# Patient Record
Sex: MALE | Race: WHITE | HISPANIC OR LATINO
[De-identification: names, ages, dates, MRNs, and addresses within clinical notes are randomized per-mention and may not be internally consistent; named-entity substitution may affect disease eponyms.]

---

## 2021-02-22 ENCOUNTER — APPOINTMENT (OUTPATIENT)
Dept: CT IMAGING | Facility: CLINIC | Age: 61
End: 2021-02-22
Payer: SELF-PAY

## 2021-02-22 ENCOUNTER — OUTPATIENT (OUTPATIENT)
Dept: OUTPATIENT SERVICES | Facility: HOSPITAL | Age: 61
LOS: 1 days | End: 2021-02-22

## 2021-02-22 PROBLEM — Z00.00 ENCOUNTER FOR PREVENTIVE HEALTH EXAMINATION: Status: ACTIVE | Noted: 2021-02-22

## 2021-02-22 PROCEDURE — 75571 CT HRT W/O DYE W/CA TEST: CPT | Mod: 26

## 2021-02-26 VITALS
SYSTOLIC BLOOD PRESSURE: 176 MMHG | RESPIRATION RATE: 16 BRPM | TEMPERATURE: 97 F | OXYGEN SATURATION: 100 % | HEIGHT: 66 IN | HEART RATE: 76 BPM | WEIGHT: 199.96 LBS | DIASTOLIC BLOOD PRESSURE: 92 MMHG

## 2021-02-26 RX ORDER — CHLORHEXIDINE GLUCONATE 213 G/1000ML
1 SOLUTION TOPICAL ONCE
Refills: 0 | Status: DISCONTINUED | OUTPATIENT
Start: 2021-03-02 | End: 2021-03-03

## 2021-02-26 NOTE — H&P ADULT - ASSESSMENT
60 year old M from [place], remote smoking history, FHx CAD (Father-MI-60s), PMHx HTN, HLD, prediabetes who was presents for cardiac catheterization secondary to abnormal testing.    ASA III Mallampati III  Precath consented  Started IVF NS @ 75cc/h   Loaded with Plavix 600mg POx1 and ASA 325mg POx1    Patient is a candidate for moderate sedation    Risks & benefits of procedure and alternative therapy have been explained to the patient including but not limited to: allergic reaction, bleeding w/possible need for blood transfusion, infection, renal and vascular compromise, limb damage, arrhythmia, stroke, vessel dissection/perforation, Myocardial infarction, emergent CABG. Informed consent obtained and in chart.  60 year old M from [place], remote smoking history, FHx CAD (Father-MI-60s), PMHx HTN, HLD, prediabetes who was presents for cardiac catheterization secondary to abnormal testing.    ASA III Mallampati III  Precath consented  Started IVF NS @ 75cc/h   Loaded with Plavix 600mg POx1 and took daily dose of ASA 81mg POx1 this AM    Patient is a candidate for moderate sedation    Risks & benefits of procedure and alternative therapy have been explained to the patient including but not limited to: allergic reaction, bleeding w/possible need for blood transfusion, infection, renal and vascular compromise, limb damage, arrhythmia, stroke, vessel dissection/perforation, Myocardial infarction, emergent CABG. Informed consent obtained and in chart.

## 2021-02-26 NOTE — H&P ADULT - HISTORY OF PRESENT ILLNESS
Cardiologist: Dr. Boone     Escort:     Pharmacy:     Covid:     [facility]      60 year old M former smoker former with FHx CAD (Father-MI-60s) and PMHx HTN, Hyperlipidemia, Pre-Diabetes, who presented to cardiologist after an abnormal Coronary Calcium Score on 2/22/2021.  Calcium Score 1721 which is at 99th percentile for age, gender, race/ethnicity, LM: 0, LAD: 774, LCx: 548, and RCA: 214.  Patient noted to have new LBBB on EKG from 2018.  [facility]  VERIFY MEDS    Cardiologist: Dr. Boone   Escort:   Pharmacy:   Covid negative 3/1/21    60 year old M from [place], remote smoking history, FHx CAD (Father-MI-60s), PMHx HTN, HLD, prediabetes, who was referred to Cardiologist Dr. Boone after an abnormal coronary calcium score. Pt states he feels well and climbs stairs & walks up to a few miles daily without significant exertional symptoms.  Denies CP, SOB, dizziness. Calcium Score 2/22/21: calcium score 1721 (99th percentile for age, gender, race/ethnicity), LM: 0, LAD: 774, LCx: 548, and RCA: 214.  Patient noted to have new LBBB on EKG from 2018.  In light of pt's risk factors, elevated calcium score, pt referred for cardiac cath with possible intervention to r/o underlying CAD.  VERIFY MEDS    Cardiologist: Dr. Boone   Escort:  Wife Lianet  Pharmacy: [facility] or [facility] [address] [address]  Covid negative 3/1/21    60 year old M from [place], remote smoking history, FHx CAD (Father-MI-60s), PMHx HTN, HLD, prediabetes who was referred to Cardiologist Dr. Boone after an abnormal coronary calcium score. Pt states he feels well and climbs stairs & walks up to a few miles daily without any significant exertional symptoms. Feels in USOH.  Denies CP, SOB, dizziness, diaphoresis, fatigue, LE edema, orthopnea, PND, syncope, N/V, abdominal pain. Calcium Score 2/22/21: calcium score 1721 (99th percentile for age, gender, race/ethnicity), LM: 0, LAD: 774, LCx: 548, and RCA: 214.  Patient noted to have new LBBB on EKG from 2018.  In light of pt's risk factors, severely elevated calcium score, new LBBB, FHx MI, pt referred for cardiac cath with possible intervention to r/o underlying CAD.  [facility] - will bring bottles    Cardiologist: Dr. Boone   Escort:  Wife Lianet  Pharmacy: [facility] or [facility] [address] [address]  Covid negative 3/1/21 (in HIE)    60 year old M from [place], remote smoking history, FHx CAD (Father-MI-60s), PMHx HTN, HLD, prediabetes who was referred to Cardiologist Dr. Boone after an abnormal coronary calcium score. Pt states he feels well and climbs stairs & walks up to a few miles daily without any significant exertional symptoms. Feels in USOH.  Denies CP, SOB, dizziness, diaphoresis, fatigue, LE edema, orthopnea, PND, syncope, N/V, abdominal pain. Calcium Score 2/22/21: calcium score 1721 (99th percentile for age, gender, race/ethnicity), LM: 0, LAD: 774, LCx: 548, and RCA: 214.  Patient noted to have new LBBB on EKG from 2018.  In light of pt's risk factors, severely elevated calcium score, new LBBB, FHx MI, pt referred for cardiac cath with possible intervention to r/o underlying CAD.  Cardiologist: Dr. Boone   Escort:  Wife Lianet  Pharmacy: [facility] or [facility] [address] [address]  Covid negative 3/1/21 (in HIE)    60 year old M from [place], remote smoking history, FHx CAD (Father-MI-60s), PMHx HTN, HLD, prediabetes who was referred to Cardiologist Dr. Boone after an abnormal coronary calcium score. Pt states he feels well and climbs stairs & walks up to a few miles daily without any significant exertional symptoms. Feels in USOH.  Denies CP, SOB, dizziness, diaphoresis, fatigue, LE edema, orthopnea, PND, syncope, N/V, abdominal pain. Calcium Score 2/22/21: calcium score 1721 (99th percentile for age, gender, race/ethnicity), LM: 0, LAD: 774, LCx: 548, and RCA: 214.  Patient noted to have new LBBB on EKG from 2018.  In light of pt's risk factors, severely elevated calcium score, new LBBB, FHx MI, pt referred for cardiac cath with possible intervention to r/o underlying CAD.

## 2021-02-26 NOTE — H&P ADULT - NSHPSOCIALHISTORY_GEN_ALL_CORE
TOB: Quit 25 years ago     ETOH: Quit 20 years ago TOB: Quit 25 years ago   ETOH: Quit 20 years ago TOB: Quit 20 years ago, was smoking 10 cig/day over 10 years  ETOH: Quit 10 years ago  Denies illicit drug use

## 2021-02-26 NOTE — H&P ADULT - NSICDXPASTMEDICALHX_GEN_ALL_CORE_FT
PAST MEDICAL HISTORY:  Erectile dysfunction     Hyperlipidemia     Hypertension     Obese     Prediabetes     Umbilical hernia

## 2021-03-01 ENCOUNTER — LABORATORY RESULT (OUTPATIENT)
Age: 61
End: 2021-03-01

## 2021-03-02 ENCOUNTER — INPATIENT (INPATIENT)
Facility: HOSPITAL | Age: 61
LOS: 0 days | Discharge: HOME | DRG: 246 | End: 2021-03-03
Attending: INTERNAL MEDICINE | Admitting: INTERNAL MEDICINE
Payer: COMMERCIAL

## 2021-03-02 DIAGNOSIS — Z98.890 OTHER SPECIFIED POSTPROCEDURAL STATES: Chronic | ICD-10-CM

## 2021-03-02 LAB
A1C WITH ESTIMATED AVERAGE GLUCOSE RESULT: 6.3 % — HIGH (ref 4–5.6)
ALBUMIN SERPL ELPH-MCNC: 4.5 G/DL — SIGNIFICANT CHANGE UP (ref 3.3–5)
ALP SERPL-CCNC: 57 U/L — SIGNIFICANT CHANGE UP (ref 40–120)
ALT FLD-CCNC: 23 U/L — SIGNIFICANT CHANGE UP (ref 10–45)
ANION GAP SERPL CALC-SCNC: 11 MMOL/L — SIGNIFICANT CHANGE UP (ref 5–17)
APTT BLD: 33.6 SEC — SIGNIFICANT CHANGE UP (ref 27.5–35.5)
AST SERPL-CCNC: 23 U/L — SIGNIFICANT CHANGE UP (ref 10–40)
BASOPHILS # BLD AUTO: 0 K/UL — SIGNIFICANT CHANGE UP (ref 0–0.2)
BASOPHILS NFR BLD AUTO: 0 % — SIGNIFICANT CHANGE UP (ref 0–2)
BILIRUB SERPL-MCNC: 0.3 MG/DL — SIGNIFICANT CHANGE UP (ref 0.2–1.2)
BUN SERPL-MCNC: 14 MG/DL — SIGNIFICANT CHANGE UP (ref 7–23)
BURR CELLS BLD QL SMEAR: PRESENT — SIGNIFICANT CHANGE UP
CALCIUM SERPL-MCNC: 9.3 MG/DL — SIGNIFICANT CHANGE UP (ref 8.4–10.5)
CHLORIDE SERPL-SCNC: 106 MMOL/L — SIGNIFICANT CHANGE UP (ref 96–108)
CHOLEST SERPL-MCNC: 148 MG/DL — SIGNIFICANT CHANGE UP
CK MB CFR SERPL CALC: 8 NG/ML — HIGH (ref 0–6.7)
CK SERPL-CCNC: 280 U/L — HIGH (ref 30–200)
CK SERPL-CCNC: 286 U/L — HIGH (ref 30–200)
CO2 SERPL-SCNC: 26 MMOL/L — SIGNIFICANT CHANGE UP (ref 22–31)
CREAT SERPL-MCNC: 0.94 MG/DL — SIGNIFICANT CHANGE UP (ref 0.5–1.3)
EOSINOPHIL # BLD AUTO: 0.14 K/UL — SIGNIFICANT CHANGE UP (ref 0–0.5)
EOSINOPHIL NFR BLD AUTO: 1.8 % — SIGNIFICANT CHANGE UP (ref 0–6)
ESTIMATED AVERAGE GLUCOSE: 134 MG/DL — HIGH (ref 68–114)
GIANT PLATELETS BLD QL SMEAR: PRESENT — SIGNIFICANT CHANGE UP
GLUCOSE SERPL-MCNC: 107 MG/DL — HIGH (ref 70–99)
HCT VFR BLD CALC: 45 % — SIGNIFICANT CHANGE UP (ref 39–50)
HDLC SERPL-MCNC: 41 MG/DL — SIGNIFICANT CHANGE UP
HGB BLD-MCNC: 14.6 G/DL — SIGNIFICANT CHANGE UP (ref 13–17)
INR BLD: 1.02 — SIGNIFICANT CHANGE UP (ref 0.88–1.16)
LIPID PNL WITH DIRECT LDL SERPL: 93 MG/DL — SIGNIFICANT CHANGE UP
LYMPHOCYTES # BLD AUTO: 2.03 K/UL — SIGNIFICANT CHANGE UP (ref 1–3.3)
LYMPHOCYTES # BLD AUTO: 26.4 % — SIGNIFICANT CHANGE UP (ref 13–44)
MANUAL SMEAR VERIFICATION: SIGNIFICANT CHANGE UP
MCHC RBC-ENTMCNC: 28.6 PG — SIGNIFICANT CHANGE UP (ref 27–34)
MCHC RBC-ENTMCNC: 32.4 GM/DL — SIGNIFICANT CHANGE UP (ref 32–36)
MCV RBC AUTO: 88.1 FL — SIGNIFICANT CHANGE UP (ref 80–100)
MONOCYTES # BLD AUTO: 0.7 K/UL — SIGNIFICANT CHANGE UP (ref 0–0.9)
MONOCYTES NFR BLD AUTO: 9.1 % — SIGNIFICANT CHANGE UP (ref 2–14)
NEUTROPHILS # BLD AUTO: 4.83 K/UL — SIGNIFICANT CHANGE UP (ref 1.8–7.4)
NEUTROPHILS NFR BLD AUTO: 62.7 % — SIGNIFICANT CHANGE UP (ref 43–77)
NON HDL CHOLESTEROL: 107 MG/DL — SIGNIFICANT CHANGE UP
OVALOCYTES BLD QL SMEAR: SLIGHT — SIGNIFICANT CHANGE UP
PLAT MORPH BLD: ABNORMAL
PLATELET # BLD AUTO: 339 K/UL — SIGNIFICANT CHANGE UP (ref 150–400)
POIKILOCYTOSIS BLD QL AUTO: SLIGHT — SIGNIFICANT CHANGE UP
POTASSIUM SERPL-MCNC: 3.9 MMOL/L — SIGNIFICANT CHANGE UP (ref 3.5–5.3)
POTASSIUM SERPL-SCNC: 3.9 MMOL/L — SIGNIFICANT CHANGE UP (ref 3.5–5.3)
PROT SERPL-MCNC: 7.5 G/DL — SIGNIFICANT CHANGE UP (ref 6–8.3)
PROTHROM AB SERPL-ACNC: 12.2 SEC — SIGNIFICANT CHANGE UP (ref 10.6–13.6)
RBC # BLD: 5.11 M/UL — SIGNIFICANT CHANGE UP (ref 4.2–5.8)
RBC # FLD: 21.9 % — HIGH (ref 10.3–14.5)
RBC BLD AUTO: ABNORMAL
SODIUM SERPL-SCNC: 143 MMOL/L — SIGNIFICANT CHANGE UP (ref 135–145)
TRIGL SERPL-MCNC: 71 MG/DL — SIGNIFICANT CHANGE UP
WBC # BLD: 7.7 K/UL — SIGNIFICANT CHANGE UP (ref 3.8–10.5)
WBC # FLD AUTO: 7.7 K/UL — SIGNIFICANT CHANGE UP (ref 3.8–10.5)

## 2021-03-02 PROCEDURE — 93458 L HRT ARTERY/VENTRICLE ANGIO: CPT | Mod: 26,59

## 2021-03-02 PROCEDURE — 92978 ENDOLUMINL IVUS OCT C 1ST: CPT | Mod: 26,LC

## 2021-03-02 PROCEDURE — 93010 ELECTROCARDIOGRAM REPORT: CPT

## 2021-03-02 PROCEDURE — 92928 PRQ TCAT PLMT NTRAC ST 1 LES: CPT | Mod: LD

## 2021-03-02 RX ORDER — AMLODIPINE BESYLATE 2.5 MG/1
5 TABLET ORAL DAILY
Refills: 0 | Status: DISCONTINUED | OUTPATIENT
Start: 2021-03-02 | End: 2021-03-03

## 2021-03-02 RX ORDER — LISINOPRIL 2.5 MG/1
40 TABLET ORAL DAILY
Refills: 0 | Status: DISCONTINUED | OUTPATIENT
Start: 2021-03-03 | End: 2021-03-03

## 2021-03-02 RX ORDER — CLOPIDOGREL BISULFATE 75 MG/1
75 TABLET, FILM COATED ORAL DAILY
Refills: 0 | Status: DISCONTINUED | OUTPATIENT
Start: 2021-03-03 | End: 2021-03-03

## 2021-03-02 RX ORDER — AMLODIPINE BESYLATE 2.5 MG/1
5 TABLET ORAL ONCE
Refills: 0 | Status: COMPLETED | OUTPATIENT
Start: 2021-03-02 | End: 2021-03-02

## 2021-03-02 RX ORDER — ASPIRIN/CALCIUM CARB/MAGNESIUM 324 MG
81 TABLET ORAL DAILY
Refills: 0 | Status: DISCONTINUED | OUTPATIENT
Start: 2021-03-03 | End: 2021-03-03

## 2021-03-02 RX ORDER — INFLUENZA VIRUS VACCINE 15; 15; 15; 15 UG/.5ML; UG/.5ML; UG/.5ML; UG/.5ML
0.5 SUSPENSION INTRAMUSCULAR ONCE
Refills: 0 | Status: DISCONTINUED | OUTPATIENT
Start: 2021-03-02 | End: 2021-03-03

## 2021-03-02 RX ORDER — ATORVASTATIN CALCIUM 80 MG/1
40 TABLET, FILM COATED ORAL AT BEDTIME
Refills: 0 | Status: DISCONTINUED | OUTPATIENT
Start: 2021-03-02 | End: 2021-03-03

## 2021-03-02 RX ORDER — CLOPIDOGREL BISULFATE 75 MG/1
600 TABLET, FILM COATED ORAL ONCE
Refills: 0 | Status: COMPLETED | OUTPATIENT
Start: 2021-03-02 | End: 2021-03-02

## 2021-03-02 RX ORDER — SODIUM CHLORIDE 9 MG/ML
500 INJECTION INTRAMUSCULAR; INTRAVENOUS; SUBCUTANEOUS
Refills: 0 | Status: DISCONTINUED | OUTPATIENT
Start: 2021-03-02 | End: 2021-03-03

## 2021-03-02 RX ORDER — METOPROLOL TARTRATE 50 MG
25 TABLET ORAL DAILY
Refills: 0 | Status: DISCONTINUED | OUTPATIENT
Start: 2021-03-02 | End: 2021-03-03

## 2021-03-02 RX ORDER — SODIUM CHLORIDE 9 MG/ML
500 INJECTION INTRAMUSCULAR; INTRAVENOUS; SUBCUTANEOUS
Refills: 0 | Status: DISCONTINUED | OUTPATIENT
Start: 2021-03-02 | End: 2021-03-02

## 2021-03-02 RX ADMIN — Medication 25 MILLIGRAM(S): at 19:41

## 2021-03-02 RX ADMIN — SODIUM CHLORIDE 75 MILLILITER(S): 9 INJECTION INTRAMUSCULAR; INTRAVENOUS; SUBCUTANEOUS at 10:13

## 2021-03-02 RX ADMIN — AMLODIPINE BESYLATE 5 MILLIGRAM(S): 2.5 TABLET ORAL at 21:43

## 2021-03-02 RX ADMIN — AMLODIPINE BESYLATE 5 MILLIGRAM(S): 2.5 TABLET ORAL at 17:15

## 2021-03-02 RX ADMIN — ATORVASTATIN CALCIUM 40 MILLIGRAM(S): 80 TABLET, FILM COATED ORAL at 21:43

## 2021-03-02 RX ADMIN — CLOPIDOGREL BISULFATE 600 MILLIGRAM(S): 75 TABLET, FILM COATED ORAL at 10:12

## 2021-03-02 RX ADMIN — SODIUM CHLORIDE 75 MILLILITER(S): 9 INJECTION INTRAMUSCULAR; INTRAVENOUS; SUBCUTANEOUS at 14:57

## 2021-03-02 RX ADMIN — Medication 1 TABLET(S): at 17:15

## 2021-03-02 NOTE — CHART NOTE - NSCHARTNOTEFT_GEN_A_CORE
Interventional Cardiology Radial band Removal Note    s/p Heparin 			    Pt without complaints.  VSS.    Right Radial access site TR Hemoband in place, _no____ hematoma, _no__bleed  Radial pulse:  2+    Hemostasis achieved with manual release of hemoband.    No____  Vasovagal reaction.    Meds given:  None    Right Radial access site  __no___ hematoma, ___no___ bleed  Radial pulse:  2+    A/P:  s/p PTCA/Stent   -	continue to monitor  -	OOB as tolerated  -	Post Procedure Instructions given  -             Call [phone] if any access site issues.

## 2021-03-03 ENCOUNTER — TRANSCRIPTION ENCOUNTER (OUTPATIENT)
Age: 61
End: 2021-03-03

## 2021-03-03 VITALS — TEMPERATURE: 97 F

## 2021-03-03 LAB
ANION GAP SERPL CALC-SCNC: 13 MMOL/L — SIGNIFICANT CHANGE UP (ref 5–17)
BUN SERPL-MCNC: 11 MG/DL — SIGNIFICANT CHANGE UP (ref 7–23)
CALCIUM SERPL-MCNC: 9.3 MG/DL — SIGNIFICANT CHANGE UP (ref 8.4–10.5)
CHLORIDE SERPL-SCNC: 103 MMOL/L — SIGNIFICANT CHANGE UP (ref 96–108)
CO2 SERPL-SCNC: 23 MMOL/L — SIGNIFICANT CHANGE UP (ref 22–31)
CREAT SERPL-MCNC: 0.71 MG/DL — SIGNIFICANT CHANGE UP (ref 0.5–1.3)
GLUCOSE SERPL-MCNC: 110 MG/DL — HIGH (ref 70–99)
HCT VFR BLD CALC: 47.7 % — SIGNIFICANT CHANGE UP (ref 39–50)
HCV AB S/CO SERPL IA: 0.1 S/CO — SIGNIFICANT CHANGE UP
HCV AB SERPL-IMP: SIGNIFICANT CHANGE UP
HGB BLD-MCNC: 15.5 G/DL — SIGNIFICANT CHANGE UP (ref 13–17)
MAGNESIUM SERPL-MCNC: 1.8 MG/DL — SIGNIFICANT CHANGE UP (ref 1.6–2.6)
MCHC RBC-ENTMCNC: 28.2 PG — SIGNIFICANT CHANGE UP (ref 27–34)
MCHC RBC-ENTMCNC: 32.5 GM/DL — SIGNIFICANT CHANGE UP (ref 32–36)
MCV RBC AUTO: 86.9 FL — SIGNIFICANT CHANGE UP (ref 80–100)
NRBC # BLD: 0 /100 WBCS — SIGNIFICANT CHANGE UP (ref 0–0)
PLATELET # BLD AUTO: 339 K/UL — SIGNIFICANT CHANGE UP (ref 150–400)
POTASSIUM SERPL-MCNC: 3.9 MMOL/L — SIGNIFICANT CHANGE UP (ref 3.5–5.3)
POTASSIUM SERPL-SCNC: 3.9 MMOL/L — SIGNIFICANT CHANGE UP (ref 3.5–5.3)
RBC # BLD: 5.49 M/UL — SIGNIFICANT CHANGE UP (ref 4.2–5.8)
RBC # FLD: 22.1 % — HIGH (ref 10.3–14.5)
SODIUM SERPL-SCNC: 139 MMOL/L — SIGNIFICANT CHANGE UP (ref 135–145)
WBC # BLD: 11.76 K/UL — HIGH (ref 3.8–10.5)
WBC # FLD AUTO: 11.76 K/UL — HIGH (ref 3.8–10.5)

## 2021-03-03 PROCEDURE — 93010 ELECTROCARDIOGRAM REPORT: CPT

## 2021-03-03 PROCEDURE — 99239 HOSP IP/OBS DSCHRG MGMT >30: CPT

## 2021-03-03 RX ORDER — CLOPIDOGREL BISULFATE 75 MG/1
1 TABLET, FILM COATED ORAL
Qty: 30 | Refills: 11
Start: 2021-03-03 | End: 2022-02-25

## 2021-03-03 RX ORDER — AMLODIPINE BESYLATE 2.5 MG/1
10 TABLET ORAL DAILY
Refills: 0 | Status: DISCONTINUED | OUTPATIENT
Start: 2021-03-03 | End: 2021-03-03

## 2021-03-03 RX ORDER — ASPIRIN/CALCIUM CARB/MAGNESIUM 324 MG
1 TABLET ORAL
Qty: 30 | Refills: 11
Start: 2021-03-03 | End: 2022-02-25

## 2021-03-03 RX ORDER — ROSUVASTATIN CALCIUM 5 MG/1
1 TABLET ORAL
Qty: 30 | Refills: 3
Start: 2021-03-03 | End: 2021-06-30

## 2021-03-03 RX ORDER — LISINOPRIL 2.5 MG/1
1 TABLET ORAL
Qty: 30 | Refills: 3
Start: 2021-03-03 | End: 2021-06-30

## 2021-03-03 RX ORDER — METOPROLOL TARTRATE 50 MG
50 TABLET ORAL DAILY
Refills: 0 | Status: DISCONTINUED | OUTPATIENT
Start: 2021-03-03 | End: 2021-03-03

## 2021-03-03 RX ORDER — LISINOPRIL 2.5 MG/1
1 TABLET ORAL
Qty: 0 | Refills: 0 | DISCHARGE

## 2021-03-03 RX ORDER — ASPIRIN/CALCIUM CARB/MAGNESIUM 324 MG
1 TABLET ORAL
Qty: 0 | Refills: 0 | DISCHARGE

## 2021-03-03 RX ORDER — METOPROLOL TARTRATE 50 MG
1 TABLET ORAL
Qty: 30 | Refills: 3
Start: 2021-03-03 | End: 2021-06-30

## 2021-03-03 RX ORDER — ROSUVASTATIN CALCIUM 5 MG/1
1 TABLET ORAL
Qty: 0 | Refills: 0 | DISCHARGE

## 2021-03-03 RX ADMIN — AMLODIPINE BESYLATE 5 MILLIGRAM(S): 2.5 TABLET ORAL at 05:39

## 2021-03-03 RX ADMIN — Medication 81 MILLIGRAM(S): at 10:24

## 2021-03-03 RX ADMIN — CLOPIDOGREL BISULFATE 75 MILLIGRAM(S): 75 TABLET, FILM COATED ORAL at 10:24

## 2021-03-03 RX ADMIN — Medication 1 TABLET(S): at 10:24

## 2021-03-03 RX ADMIN — LISINOPRIL 40 MILLIGRAM(S): 2.5 TABLET ORAL at 07:50

## 2021-03-03 RX ADMIN — Medication 50 MILLIGRAM(S): at 10:50

## 2021-03-03 NOTE — DISCHARGE NOTE PROVIDER - HOSPITAL COURSE
60 year old M from [place], remote smoking history, FHx CAD (Father-MI-60s), PMHx HTN, HLD, prediabetes who was referred to Cardiologist Dr. Boone after an abnormal coronary calcium score. Pt states he feels well and climbs stairs & walks up to a few miles daily without any significant exertional symptoms. Feels in USOH.  Denies CP, SOB, dizziness, diaphoresis, fatigue, LE edema, orthopnea, PND, syncope, N/V, abdominal pain. Calcium Score 2/22/21: calcium score 1721 (99th percentile for age, gender, race/ethnicity), LM: 0, LAD: 774, LCx: 548, and RCA: 214.  Patient noted to have new LBBB on EKG from 2018.  In light of pt's risk factors, severely elevated calcium score, new LBBB, FHx MI, pt referred for cardiac cath with possible intervention to r/o underlying CAD.    Pt is s/p on Cardiac cath 3/2/21: S/p KUSHAL mLCx 80%, KUSHAL OM1 80%, KUSHAL pLAD 80%. RCA 80% (small). R radial TR @ 3:30. EDP 10. No LV gram.    No significant events on telemetry overnight. Repeat EKG without ischemic changes. Patient has been medically cleared for discharge as per Dr. Boone. Patient has been given appropriate discharge instructions including medication regimen, access site management and follow up. Medications that patient needs refills on have been e-prescribed to preferred pharmacy.     VSS.   Gen: NAD, A&O x3  Cards: RRR, clear S1 and S2 without murmur  Pulm: CTA B/L without w/r/r  Right wrist: No hematoma or ooze, peripheral pulses 2+ B/L  Abd: soft, NT  Ext: no LE edema or ulcerations B/L

## 2021-03-03 NOTE — DISCHARGE NOTE PROVIDER - CARE PROVIDER_API CALL
Dhaval Boone)  Cardiovascular Disease; Internal Medicine  [address] [place] [address], [address]  [place] [address]  Phone: [phone]  Fax: [phone]  Follow Up Time: 1 week

## 2021-03-03 NOTE — DISCHARGE NOTE PROVIDER - NSDCFUADDINST_GEN_ALL_CORE_FT
ACTIVITY:     Do not drive or operate hazardous machinery for 24 hours.                        Limit your physical activities for 24 hours.                        Do not engage in in sports, heavy work or heavy lifting for 72 hours.    DIET:             You may resume your regular diet.                        Drinking extra fluids (water, juice) is encouraged.                        Abstain from alcohol for 24 hours.    HYGIENE:     Shower and take off the puncture site dressing the next morning.                        If you received a "closure device" in your groin, you may shower the next day, but not take a bath, hot tub or swim for 5    days.    PAIN MEDICATION:   A mild pain at the puncture siteis not unusual.                                        You may take Tylenol 1-2 tabs every 4-6 hours as needed, for one day.                                        If the pain persists contact the office at [phone]    SPECIAL INSTRUCTIONS:  Signs and symptoms to look out for:       1. Kaiden bleeding from the puncture site is an emergency.            Put direct pressure on the site and go directly to your local Emergency   Room for treatment.       2. Bleeding under the skin may also occur and a small "black and blue may be expected.         If there appears to be an expanding mass or swelling around the puncture site, apply manual compression and go          immediately to your local Emergency Room for treatment.       3. If your foot/leg or hand/arm (puncture site) becomes cool or blue and/or you are unable to move it, this must be treated as an   emergency.         go directly to your local emergency room for treatment.       4. Excessive puncture site pain is abnormal and should be assessed.      5.  Look out for signs of infection in the puncture site: fever, red streaking of the leg, discharge, pain.      6.  Lack of adequate urine output, provided you are drinking enough fluids, may be cause for concern, notify us if you think this is the case.

## 2021-03-03 NOTE — DISCHARGE NOTE PROVIDER - NSDCCPCAREPLAN_GEN_ALL_CORE_FT
PRINCIPAL DISCHARGE DIAGNOSIS  Diagnosis: CAD (coronary artery disease)  Assessment and Plan of Treatment: -You underwent a cardiac catheterization on 03/02/2021  and the blockage in your LEFT CIRCUMFLEX ARTERY and OBTUSE MARGINAL ARTERY were opened with stents placement. You are to take ASPIRIN 81 mg daily and PLAVIX (CLOPIDROGEL) 75 mg daily. These medications work to keep your stents open.  NEVER MISS A DOSE OF ASPIRIN OR PLAVIX; IF YOU DO, YOU ARE AT RISK OF YOUR STENT CLOSING AND HAVING A HEART ATTACK. DO NOT STOP THESE TWO MEDICATIONS UNLESS INSTRUCTED TO DO SO BY YOUR CARDIOLOGIST.  Your procedure was done through your wrist. You do not need to keep this area covered and you may shower. Please avoid any heavy lifting  (no more than 3 to 5 lbs) or strenuous activity for five days. If you develop any swelling, bleeding, hardening of the skin (hematoma formation), acute pain, numbness/tingling  in your arm please contact your doctor immediately or call our 24/7 line: [phone] Please return to the hospital/seek immediate medical attention if worsening of symptoms- including not limited to chest pain, shortness of breath. Please follow up with Dr. Boone in 1-2 weeks. Please call his office and make an appointment to see him. Please continue a heart healthy diet low in sodium, cholesterol, and fat.        SECONDARY DISCHARGE DIAGNOSES  Diagnosis: Hyperlipidemia  Assessment and Plan of Treatment: Too much cholesterol in your arteries may lead to a buildup of plaque known as atherosclerosis and contribute to heart disease. Please continue: CRESTOR (ROSUVASTATIN) 10 mg once daily.  Appropriate refills were sent to your preferred pharmacy. Please follow-up with your cardiologist for further management.      Diagnosis: HTN (hypertension)  Assessment and Plan of Treatment: You have a diagnosis of Hypertension or elevated blood pressure. Please continue taking your medications as listed to keep your blood pressure controlled. Please continue LISINOPRIL 40 mg once daily. In addition, there are multiple lifestyle modifications that have been proven to lower blood pressure: maintaining a healthy body weight, engaging in regular physical activity for at least 30 minutes per day on most days, and consuming a diet rich in fruits, vegetables, and low-fat dairy products with a reduced amount of total and saturated fats and sodium.  For blood pressures at home that are too high or low please see your Doctor or go to the Emergency Room as necessary.       PRINCIPAL DISCHARGE DIAGNOSIS  Diagnosis: CAD (coronary artery disease)  Assessment and Plan of Treatment: -You underwent a cardiac catheterization on 03/02/2021  and the blockage in your LEFT CIRCUMFLEX ARTERY and OBTUSE MARGINAL ARTERY were opened with stents placement. You are to take ASPIRIN 81 mg daily and PLAVIX (CLOPIDROGEL) 75 mg daily. These medications work to keep your stents open.  NEVER MISS A DOSE OF ASPIRIN OR PLAVIX; IF YOU DO, YOU ARE AT RISK OF YOUR STENT CLOSING AND HAVING A HEART ATTACK. DO NOT STOP THESE TWO MEDICATIONS UNLESS INSTRUCTED TO DO SO BY YOUR CARDIOLOGIST.  Your procedure was done through your wrist. You do not need to keep this area covered and you may shower. Please avoid any heavy lifting  (no more than 3 to 5 lbs) or strenuous activity for five days. If you develop any swelling, bleeding, hardening of the skin (hematoma formation), acute pain, numbness/tingling  in your arm please contact your doctor immediately or call our 24/7 line: [phone] Please return to the hospital/seek immediate medical attention if worsening of symptoms- including not limited to chest pain, shortness of breath. Please follow up with Dr. Boone in 1-2 weeks. Please call his office and make an appointment to see him. Please continue a heart healthy diet low in sodium, cholesterol, and fat.        SECONDARY DISCHARGE DIAGNOSES  Diagnosis: Hyperlipidemia  Assessment and Plan of Treatment: Too much cholesterol in your arteries may lead to a buildup of plaque known as atherosclerosis and contribute to heart disease. Please continue: CRESTOR (ROSUVASTATIN) 20 mg once daily.  Appropriate refills were sent to your preferred pharmacy. Please follow-up with your cardiologist for further management.      Diagnosis: HTN (hypertension)  Assessment and Plan of Treatment: You have a diagnosis of Hypertension or elevated blood pressure. Please continue taking your medications as listed to keep your blood pressure controlled. Please continue LISINOPRIL 40 mg once daily. In addition, there are multiple lifestyle modifications that have been proven to lower blood pressure: maintaining a healthy body weight, engaging in regular physical activity for at least 30 minutes per day on most days, and consuming a diet rich in fruits, vegetables, and low-fat dairy products with a reduced amount of total and saturated fats and sodium. Your metoprolol was increased to 50mg orally daily.  For blood pressures at home that are too high or low please see your Doctor or go to the Emergency Room as necessary.

## 2021-03-03 NOTE — DISCHARGE NOTE PROVIDER - NSDCMRMEDTOKEN_GEN_ALL_CORE_FT
Crestor 10 mg oral tablet: 1 tab(s) orally once a day  Ecotrin Adult Low Strength 81 mg oral delayed release tablet: 1 tab(s) orally once a day  lisinopril 40 mg oral tablet: 1 tab(s) orally once a day  Multiple Vitamins oral tablet: 1 tab(s) orally once a day   clopidogrel 75 mg oral tablet: 1 tab(s) orally once a day  Crestor 20 mg oral tablet: 1 tab(s) orally once a day (at bedtime)   Ecotrin Adult Low Strength 81 mg oral delayed release tablet: 1 tab(s) orally once a day  lisinopril 40 mg oral tablet: 1 tab(s) orally once a day  metoprolol succinate 50 mg oral tablet, extended release: 1 tab(s) orally once a day  Multiple Vitamins oral tablet: 1 tab(s) orally once a day

## 2021-03-09 DIAGNOSIS — I10 ESSENTIAL (PRIMARY) HYPERTENSION: ICD-10-CM

## 2021-03-09 DIAGNOSIS — Z87.891 PERSONAL HISTORY OF NICOTINE DEPENDENCE: ICD-10-CM

## 2021-03-09 DIAGNOSIS — Z00.6 ENCOUNTER FOR EXAMINATION FOR NORMAL COMPARISON AND CONTROL IN CLINICAL RESEARCH PROGRAM: ICD-10-CM

## 2021-03-09 DIAGNOSIS — I25.118 ATHEROSCLEROTIC HEART DISEASE OF NATIVE CORONARY ARTERY WITH OTHER FORMS OF ANGINA PECTORIS: ICD-10-CM

## 2021-03-09 DIAGNOSIS — R73.03 PREDIABETES: ICD-10-CM

## 2021-03-09 DIAGNOSIS — E78.5 HYPERLIPIDEMIA, UNSPECIFIED: ICD-10-CM

## 2021-03-09 DIAGNOSIS — I44.7 LEFT BUNDLE-BRANCH BLOCK, UNSPECIFIED: ICD-10-CM

## 2021-03-10 PROCEDURE — 80048 BASIC METABOLIC PNL TOTAL CA: CPT

## 2021-03-10 PROCEDURE — 85610 PROTHROMBIN TIME: CPT

## 2021-03-10 PROCEDURE — 85730 THROMBOPLASTIN TIME PARTIAL: CPT

## 2021-03-10 PROCEDURE — 83735 ASSAY OF MAGNESIUM: CPT

## 2021-03-10 PROCEDURE — 82550 ASSAY OF CK (CPK): CPT

## 2021-03-10 PROCEDURE — C1769: CPT

## 2021-03-10 PROCEDURE — 85025 COMPLETE CBC W/AUTO DIFF WBC: CPT

## 2021-03-10 PROCEDURE — 93005 ELECTROCARDIOGRAM TRACING: CPT

## 2021-03-10 PROCEDURE — C1725: CPT

## 2021-03-10 PROCEDURE — 36415 COLL VENOUS BLD VENIPUNCTURE: CPT

## 2021-03-10 PROCEDURE — 80061 LIPID PANEL: CPT

## 2021-03-10 PROCEDURE — C1874: CPT

## 2021-03-10 PROCEDURE — 85027 COMPLETE CBC AUTOMATED: CPT

## 2021-03-10 PROCEDURE — C1894: CPT

## 2021-03-10 PROCEDURE — C1887: CPT

## 2021-03-10 PROCEDURE — C1753: CPT

## 2021-03-10 PROCEDURE — 82553 CREATINE MB FRACTION: CPT

## 2021-03-10 PROCEDURE — 86803 HEPATITIS C AB TEST: CPT

## 2021-03-10 PROCEDURE — 80053 COMPREHEN METABOLIC PANEL: CPT

## 2021-03-10 PROCEDURE — 83036 HEMOGLOBIN GLYCOSYLATED A1C: CPT

## 2021-12-02 ENCOUNTER — EMERGENCY (EMERGENCY)
Facility: HOSPITAL | Age: 61
LOS: 1 days | Discharge: HOME | End: 2021-12-02
Attending: EMERGENCY MEDICINE | Admitting: SURGERY
Payer: COMMERCIAL

## 2021-12-02 VITALS
SYSTOLIC BLOOD PRESSURE: 140 MMHG | OXYGEN SATURATION: 100 % | DIASTOLIC BLOOD PRESSURE: 80 MMHG | HEART RATE: 75 BPM | RESPIRATION RATE: 18 BRPM | TEMPERATURE: 98 F

## 2021-12-02 VITALS
WEIGHT: 179.9 LBS | TEMPERATURE: 98 F | HEIGHT: 66 IN | DIASTOLIC BLOOD PRESSURE: 67 MMHG | HEART RATE: 83 BPM | SYSTOLIC BLOOD PRESSURE: 108 MMHG | OXYGEN SATURATION: 99 % | RESPIRATION RATE: 18 BRPM

## 2021-12-02 DIAGNOSIS — Z98.890 OTHER SPECIFIED POSTPROCEDURAL STATES: Chronic | ICD-10-CM

## 2021-12-02 PROBLEM — N52.9 MALE ERECTILE DYSFUNCTION, UNSPECIFIED: Chronic | Status: ACTIVE | Noted: 2021-03-01

## 2021-12-02 PROBLEM — K42.9 UMBILICAL HERNIA WITHOUT OBSTRUCTION OR GANGRENE: Chronic | Status: ACTIVE | Noted: 2021-03-01

## 2021-12-02 PROBLEM — R73.03 PREDIABETES: Chronic | Status: ACTIVE | Noted: 2021-03-01

## 2021-12-02 PROBLEM — E66.9 OBESITY, UNSPECIFIED: Chronic | Status: ACTIVE | Noted: 2021-03-01

## 2021-12-02 PROBLEM — I10 ESSENTIAL (PRIMARY) HYPERTENSION: Chronic | Status: ACTIVE | Noted: 2021-03-01

## 2021-12-02 PROBLEM — E78.5 HYPERLIPIDEMIA, UNSPECIFIED: Chronic | Status: ACTIVE | Noted: 2021-03-01

## 2021-12-02 LAB
ALBUMIN SERPL ELPH-MCNC: 4.5 G/DL — SIGNIFICANT CHANGE UP (ref 3.3–5)
ALP SERPL-CCNC: 50 U/L — SIGNIFICANT CHANGE UP (ref 40–120)
ALT FLD-CCNC: 17 U/L — SIGNIFICANT CHANGE UP (ref 10–45)
ANION GAP SERPL CALC-SCNC: 8 MMOL/L — SIGNIFICANT CHANGE UP (ref 5–17)
APTT BLD: 28.4 SEC — SIGNIFICANT CHANGE UP (ref 27.5–35.5)
AST SERPL-CCNC: 18 U/L — SIGNIFICANT CHANGE UP (ref 10–40)
BASOPHILS # BLD AUTO: 0.07 K/UL — SIGNIFICANT CHANGE UP (ref 0–0.2)
BASOPHILS NFR BLD AUTO: 0.9 % — SIGNIFICANT CHANGE UP (ref 0–2)
BILIRUB SERPL-MCNC: 0.3 MG/DL — SIGNIFICANT CHANGE UP (ref 0.2–1.2)
BLD GP AB SCN SERPL QL: NEGATIVE — SIGNIFICANT CHANGE UP
BUN SERPL-MCNC: 11 MG/DL — SIGNIFICANT CHANGE UP (ref 7–23)
CALCIUM SERPL-MCNC: 9.2 MG/DL — SIGNIFICANT CHANGE UP (ref 8.4–10.5)
CHLORIDE SERPL-SCNC: 106 MMOL/L — SIGNIFICANT CHANGE UP (ref 96–108)
CO2 SERPL-SCNC: 29 MMOL/L — SIGNIFICANT CHANGE UP (ref 22–31)
CREAT SERPL-MCNC: 0.92 MG/DL — SIGNIFICANT CHANGE UP (ref 0.5–1.3)
EOSINOPHIL # BLD AUTO: 0.1 K/UL — SIGNIFICANT CHANGE UP (ref 0–0.5)
EOSINOPHIL NFR BLD AUTO: 1.3 % — SIGNIFICANT CHANGE UP (ref 0–6)
GLUCOSE SERPL-MCNC: 101 MG/DL — HIGH (ref 70–99)
HCT VFR BLD CALC: 25.9 % — LOW (ref 39–50)
HGB BLD-MCNC: 7.7 G/DL — LOW (ref 13–17)
IMM GRANULOCYTES NFR BLD AUTO: 0.3 % — SIGNIFICANT CHANGE UP (ref 0–1.5)
INR BLD: 1.16 — SIGNIFICANT CHANGE UP (ref 0.88–1.16)
LYMPHOCYTES # BLD AUTO: 0.87 K/UL — LOW (ref 1–3.3)
LYMPHOCYTES # BLD AUTO: 11.1 % — LOW (ref 13–44)
MCHC RBC-ENTMCNC: 24.4 PG — LOW (ref 27–34)
MCHC RBC-ENTMCNC: 29.7 GM/DL — LOW (ref 32–36)
MCV RBC AUTO: 82.2 FL — SIGNIFICANT CHANGE UP (ref 80–100)
MONOCYTES # BLD AUTO: 0.91 K/UL — HIGH (ref 0–0.9)
MONOCYTES NFR BLD AUTO: 11.6 % — SIGNIFICANT CHANGE UP (ref 2–14)
NEUTROPHILS # BLD AUTO: 5.88 K/UL — SIGNIFICANT CHANGE UP (ref 1.8–7.4)
NEUTROPHILS NFR BLD AUTO: 74.8 % — SIGNIFICANT CHANGE UP (ref 43–77)
NRBC # BLD: 0 /100 WBCS — SIGNIFICANT CHANGE UP (ref 0–0)
PLATELET # BLD AUTO: 450 K/UL — HIGH (ref 150–400)
POTASSIUM SERPL-MCNC: 4.3 MMOL/L — SIGNIFICANT CHANGE UP (ref 3.5–5.3)
POTASSIUM SERPL-SCNC: 4.3 MMOL/L — SIGNIFICANT CHANGE UP (ref 3.5–5.3)
PROT SERPL-MCNC: 7.1 G/DL — SIGNIFICANT CHANGE UP (ref 6–8.3)
PROTHROM AB SERPL-ACNC: 13.8 SEC — HIGH (ref 10.6–13.6)
RBC # BLD: 3.15 M/UL — LOW (ref 4.2–5.8)
RBC # FLD: 16.2 % — HIGH (ref 10.3–14.5)
RH IG SCN BLD-IMP: POSITIVE — SIGNIFICANT CHANGE UP
SARS-COV-2 RNA SPEC QL NAA+PROBE: NEGATIVE — SIGNIFICANT CHANGE UP
SODIUM SERPL-SCNC: 143 MMOL/L — SIGNIFICANT CHANGE UP (ref 135–145)
WBC # BLD: 7.85 K/UL — SIGNIFICANT CHANGE UP (ref 3.8–10.5)
WBC # FLD AUTO: 7.85 K/UL — SIGNIFICANT CHANGE UP (ref 3.8–10.5)

## 2021-12-02 PROCEDURE — 99285 EMERGENCY DEPT VISIT HI MDM: CPT

## 2021-12-02 PROCEDURE — 87635 SARS-COV-2 COVID-19 AMP PRB: CPT

## 2021-12-02 PROCEDURE — 36415 COLL VENOUS BLD VENIPUNCTURE: CPT

## 2021-12-02 PROCEDURE — 71045 X-RAY EXAM CHEST 1 VIEW: CPT | Mod: 26

## 2021-12-02 PROCEDURE — 80053 COMPREHEN METABOLIC PANEL: CPT

## 2021-12-02 PROCEDURE — 96375 TX/PRO/DX INJ NEW DRUG ADDON: CPT

## 2021-12-02 PROCEDURE — 99285 EMERGENCY DEPT VISIT HI MDM: CPT | Mod: 25

## 2021-12-02 PROCEDURE — 93005 ELECTROCARDIOGRAM TRACING: CPT

## 2021-12-02 PROCEDURE — 86900 BLOOD TYPING SEROLOGIC ABO: CPT

## 2021-12-02 PROCEDURE — 71045 X-RAY EXAM CHEST 1 VIEW: CPT

## 2021-12-02 PROCEDURE — 96374 THER/PROPH/DIAG INJ IV PUSH: CPT

## 2021-12-02 PROCEDURE — 74177 CT ABD & PELVIS W/CONTRAST: CPT | Mod: QQ

## 2021-12-02 PROCEDURE — 85610 PROTHROMBIN TIME: CPT

## 2021-12-02 PROCEDURE — 85025 COMPLETE CBC W/AUTO DIFF WBC: CPT

## 2021-12-02 PROCEDURE — 86850 RBC ANTIBODY SCREEN: CPT

## 2021-12-02 PROCEDURE — 86901 BLOOD TYPING SEROLOGIC RH(D): CPT

## 2021-12-02 PROCEDURE — 74177 CT ABD & PELVIS W/CONTRAST: CPT | Mod: 26,QQ

## 2021-12-02 PROCEDURE — 93010 ELECTROCARDIOGRAM REPORT: CPT | Mod: NC

## 2021-12-02 PROCEDURE — 85730 THROMBOPLASTIN TIME PARTIAL: CPT

## 2021-12-02 RX ORDER — IOHEXOL 300 MG/ML
30 INJECTION, SOLUTION INTRAVENOUS ONCE
Refills: 0 | Status: COMPLETED | OUTPATIENT
Start: 2021-12-02 | End: 2021-12-02

## 2021-12-02 RX ORDER — SODIUM CHLORIDE 9 MG/ML
1000 INJECTION INTRAMUSCULAR; INTRAVENOUS; SUBCUTANEOUS ONCE
Refills: 0 | Status: COMPLETED | OUTPATIENT
Start: 2021-12-02 | End: 2021-12-02

## 2021-12-02 RX ORDER — HYDROMORPHONE HYDROCHLORIDE 2 MG/ML
1 INJECTION INTRAMUSCULAR; INTRAVENOUS; SUBCUTANEOUS ONCE
Refills: 0 | Status: DISCONTINUED | OUTPATIENT
Start: 2021-12-02 | End: 2021-12-02

## 2021-12-02 RX ORDER — ONDANSETRON 8 MG/1
4 TABLET, FILM COATED ORAL ONCE
Refills: 0 | Status: COMPLETED | OUTPATIENT
Start: 2021-12-02 | End: 2021-12-02

## 2021-12-02 RX ADMIN — HYDROMORPHONE HYDROCHLORIDE 1 MILLIGRAM(S): 2 INJECTION INTRAMUSCULAR; INTRAVENOUS; SUBCUTANEOUS at 22:21

## 2021-12-02 RX ADMIN — IOHEXOL 30 MILLILITER(S): 300 INJECTION, SOLUTION INTRAVENOUS at 17:00

## 2021-12-02 RX ADMIN — SODIUM CHLORIDE 1000 MILLILITER(S): 9 INJECTION INTRAMUSCULAR; INTRAVENOUS; SUBCUTANEOUS at 17:00

## 2021-12-02 RX ADMIN — ONDANSETRON 4 MILLIGRAM(S): 8 TABLET, FILM COATED ORAL at 17:00

## 2021-12-02 NOTE — ED PROVIDER NOTE - PROGRESS NOTE DETAILS
Labs show hgb 7.7, pt does not require emergent blood transfusion. CT shows incarcerated fat containing umbilical hernia. Surgery was consulted and attempted reduction at bedside which was unsuccessful. They request admit to regional surgery Dr. Talbot for further mgmt. Surgery attempted reduction again and they were able to reduce the umbilical hernia. Pending recommendations from surgery attending prior to dispo Surgery attempted reduction again and they were able to reduce the umbilical hernia. Per surgery ok for DC with f/u in ACS clinic.  Pt's PMD to continue to workup anemia as outpt.   Pt feeling improved and is stable for DC. ED evaluation and management discussed with the patient in detail.  Close PMD follow up encouraged.  Strict ED return instructions discussed in detail and patient given the opportunity to ask any questions about their discharge diagnosis and instructions. Patient verbalized understanding.

## 2021-12-02 NOTE — ED ADULT NURSE REASSESSMENT NOTE - NS ED NURSE REASSESS COMMENT FT1
Surgery consult at bedside.
Patient and wife updated on need for CT Scan results and possible surgery consult evaluation.

## 2021-12-02 NOTE — ED PROVIDER NOTE - CLINICAL SUMMARY MEDICAL DECISION MAKING FREE TEXT BOX
60M from [place], remote smoking history, FHx CAD (Father-MI-60s), PMHx HTN, HLD, prediabetes, LBBB, CAD with stent, on asa and plavix, umbilical hernia, who c/o SOb and fatigue for a few days, associated with painful swelling at site of umbilical hernia was sent in from PMD for low hgb. Pt also reports weight loss of 25 lbs since last March despite normal appetite. No cp/sob, no night sweats, no f/c, no diarrhea, report some dark stool recently after eating oreos and chocolate which has since resolved, currently denies black or bloody stool or bleeding anywhere, had a BM yesterday, passing gas, Has not had an egd/colo in the past.  Pt is pale, nonreducible umbilical hernia on exam, VSS, no focal neuro deficits, EKG shows old LBBB. Plan for labs, CT a/p, ice packs applied to umbilical hernia

## 2021-12-02 NOTE — ED ADULT NURSE NOTE - OBJECTIVE STATEMENT
59yo male , A&OX4, advised he went to his PCP for a checkup and his PCP noted his blood count was low. He was told to come to the ER for a blood transfusion. PT denies any CP/RANDA/ does note occasional SOB. PT also advised his hernia -abdominal started giving him concerns 2 days prior.

## 2021-12-02 NOTE — ED PROVIDER NOTE - PATIENT PORTAL LINK FT
You can access the FollowMyHealth Patient Portal offered by [facility] by registering at the following website: [email or web address]. By joining [facility]’s FollowMyHealth portal, you will also be able to view your health information using other applications (apps) compatible with our system.

## 2021-12-02 NOTE — ED ADULT NURSE NOTE - CHIEF COMPLAINT QUOTE
Pt presented to ED w/ c/o of fatigue, SOB x few days. Pt went to see PCP and was told he had low Hgb.

## 2021-12-02 NOTE — ED PROVIDER NOTE - OBJECTIVE STATEMENT
60M from [place], remote smoking history, FHx CAD (Father-MI-60s), PMHx HTN, HLD, prediabetes, CAD with stent who c/o SOb and fatigue fro a few days, was sent in from PMD for low hgb. 60M from [place], remote smoking history, FHx CAD (Father-MI-60s), PMHx HTN, HLD, prediabetes, CAD with stent, umbilical hernia, who c/o SOb and fatigue for a few days, associated with painful swelling at site of umbilical hernia was sent in from PMD for low hgb. 60M from [place], remote smoking history, FHx CAD (Father-MI-60s), PMHx HTN, HLD, prediabetes, LBBB, CAD with stent, on asa and plavix, umbilical hernia, who c/o SOb and fatigue for a few days, associated with painful swelling at site of umbilical hernia was sent in from PMD for low hgb. Pt also reports weight loss of 25 lbs since last March despite normal appetite. No cp/sob, no night sweats, no f/c, no diarrhea, report some dark stool recently after eating oreos and chocolate which has since resolved, currently denies black or bloody stool or bleeding anywhere, had a BM yesterday, passing gas, Has not had an egd/colo in the past.

## 2021-12-02 NOTE — ED PROVIDER NOTE - NSFOLLOWUPINSTRUCTIONS_ED_ALL_ED_FT
Please follow up with your primary care physician and surgery You may call our referrals coordinator at [phone] Monday to Friday 11am-7pm for assistance with making an appointment.  Return to the Emergency Department if you have any new or worsening symptoms, or for any other concerns. Please read below for further information.    Anemia    Anemia is a condition in which the concentration of red blood cells or hemoglobin in the blood is below normal. Hemoglobin is a substance in red blood cells that carries oxygen to the tissues of the body. Anemia results in not enough oxygen reaching these tissues which can cause symptoms such as weakness, dizziness/lightheadedness, shortness of breath, chest pain, paleness, or nausea. The cause of your anemia may or may not be determined immediately. If your hemoglobin was dangerously low, you may have received a blood transfusion. Usually reactions to transfusions occur immediately but monitor yourself for any fevers, rash, or shortness of breath.    SEEK IMMEDIATE MEDICAL CARE IF YOU HAVE ANY OF THE FOLLOWING SYMPTOMS: extreme weakness/chest pain/shortness of breath, black or bloody stools, vomiting blood, fainting, fever, or any signs of dehydration.      Umbilical Hernia    WHAT YOU NEED TO KNOW:    An umbilical hernia is a bulge through the abdominal wall near your umbilicus (belly button). The hernia may contain tissue from the abdomen, part of an organ (such as the intestine), or fluid.    Umbilical Hernia         DISCHARGE INSTRUCTIONS:    Return to the emergency department if:   •Your hernia gets bigger, feels firm, or turns blue or purple.      •You have severe abdominal pain with nausea or vomiting.      •You stop having bowel movements and passing gas.      •You have blood in your bowel movement.      Contact your healthcare provider if:   •You have a fever.      •You have nausea or are vomiting.      •You are constipated.      •You have questions or concerns about your condition or care.      Self-care:   •Drink more liquids. Liquids may prevent constipation and straining during a bowel movement. This can prevent your hernia from getting bigger. Ask how much liquid you should drink each day and which liquids are best for you.      •Eat high-fiber foods. Fiber may prevent constipation and straining during a bowel movement. This can prevent your hernia from getting bigger. Foods that contain fiber include fruits, vegetables, legumes, and whole grains.             •Avoid heavy lifting. Heavy lifting can put pressure on your hernia and make it bigger. Ask your healthcare provider how much is safe to lift.      •Do not place anything over your umbilical hernia. Do not place tape or a coin over the hernia. This treatment does not help treat a hernia.      Follow up with your healthcare provider as directed: You may need to see a surgeon to plan for hernia repair. Write down your questions so you remember to ask them during your visits.

## 2021-12-02 NOTE — ED PROVIDER NOTE - PHYSICAL EXAMINATION
GEN: Well appearing, well developed, awake, alert, oriented to person, place, time/situation and in no apparent distress. NTAF  ENT: Airway patent, Nasal mucosa clear. Mouth with normal mucosa.  EYES: Clear bilaterally. PERRL, EOMI  RESPIRATORY: Breathing comfortably with normal RR. No W/C/R, no hypoxia or resp distress.  CARDIAC: Regular rate and rhythm, no M/R/G  ABDOMEN: Soft, +Tender erythematous, nonreducible umbilical hernia, abd otherwise nontender, +bowel sounds, no rebound, rigidity, or guarding.  MSK: Range of motion is not limited, no deformities noted.  NEURO: Alert and oriented, no focal deficits.  SKIN: Skin normal color for race, warm, dry and intact. No evidence of rash.  PSYCH: Alert and oriented to person, place, time/situation. normal mood and affect. no apparent risk to self or others.

## 2021-12-02 NOTE — ED PROVIDER NOTE - CARE PROVIDERS DIRECT ADDRESSES
[email or web address]

## 2021-12-03 NOTE — CONSULT NOTE ADULT - ASSESSMENT
60M with remote smoking history quit 30-40 years ago, regular smoker of marihueduplanet KK, PMHx HTN, HLD, prediabetes, LBBB, CAD with stent x3 on ASA and Plavix on 3/2021, umbilical hernia, who was seen by PCP on 12/02 for chest pain and shortness of breath. PCP sent patient to emergency department for hemoglobin of 7.7. Additionally, painful swelling at site of umbilical hernia in the past 24 hours. Diagnosed with incarcerated hernia without clinical or radiological small bowel obstruction.     Hernia was able to be reduced in the emergency department.     Recommendations:   Patient will follow-up in [facility] (acute care clinic). He was provided with phone number and address.   He will also make appointment with PCP for workup of anemia and obtain GI referral for C-scope (given age and anemia) and EGD.   Will also make appointment with cardiologist for cardiac risk stratification for elective repair of umbilical hernia.   Patient instructed to return to ED if recurrent or worsening symptoms.       Discussed with surgical attending on call and chief resident.

## 2021-12-03 NOTE — CHART NOTE - NSCHARTNOTEFT_GEN_A_CORE
Chief Surgery Resident's Note:    60M w/ recent diagnosis of anemia (hgb 7.7 from ~15 about 9 months ago), CAD s/p 4 stents placed on March 2021 currently on ASA and Plavix, remote smoking history presenting with incarcerated umbilical hernia. Patient first noted presence of hernia for 1 year, which used to freely reducible. Hernia become incarcerated for the past 3 days, causing an increasing amount of tenderness. He denies any skin changes or drainage. He denies n/v/f/c and reports normal bowel movements. He has never noted any blood per rectum. Denies ever having a cscope of EGD. He recently found out regarding his anemia incidentally on labs and is currently undergoing workup. Vitals stable. On exam patient has an acutely incarcerated hernia that is severely tender to palpation. No overlying erythema or drainage. Abdomen is soft and nondistended. CT A/P w/ PO/IV contrast significant for incarcerated umbilical hernia containing fat.     A/P 60F with incarcerated umbilical hernia containing fat, successfully reduced in the ED. Patient will follow-up in [facility] acute care clinic. He was provided with phone number and address. He will also make appointment with PCP for workup of anemia and obtain GI referral for cscope (given age and anemia) and EGD. Will also make appointment with cardiologist for cardiac risk stratification for elective repair of umbilical hernia. Patient instructed to return to ED if recurrent or worsening symptoms. Discussed with surgical attending on call.

## 2021-12-03 NOTE — CONSULT NOTE ADULT - SUBJECTIVE AND OBJECTIVE BOX
Patient seen in the ED around 8:00 pm                                                                                                General Surgery Consult      Consulting surgical team: 4  Consulting attending: Dr. Talbot      HPI:  60M with remote smoking history quit 30-40 years ago, regular smoker of marisjana, PMHx HTN, HLD, prediabetes, LBBB, CAD with stent x3 on ASA and Plavix, umbilical hernia, who was seen by PCP on 12/02 for chest pain and shortness of breath the past 2 months he says he get tired after walking 4 blocks and his hands become cold. PCP sent to emergency department for hemoglobin of 7.7. Additionally, painful swelling at site of umbilical hernia in the past 24 hours. He says that he has had umbilical hernia for the past year, it was small but he was unable to get it fix due to COVID. He acknowledges hernia has become larger and now has some redness of the skin. He is passing gas and had a bowel movement in the ED. Denies nausea or emesis.   Patient denies blood in stool or melena. Has not had an EGD or colonoscopy in the past.    REVIEW OF SYSTEMS:  CONSTITUTIONAL: No weakness, fevers or chills. Weight loss of 25 lbs since last March due to diet.   RESPIRATORY: No cough, wheezing. Shortness of breath after walking 4 blocks  CARDIOVASCULAR: Mild chest pain with walking. No palpitations  GASTROINTESTINAL: Described in HPI  GENITOURINARY: No dysuria, frequency or hematuria  NEUROLOGICAL: No numbness or weakness  SKIN: No itching, rashes      PAST MEDICAL HISTORY:  Hypertension  Hyperlipidemia  Prediabetes  Obese  Umbilical hernia  Erectile dysfunction  Cardiac cath 3/2/21: S/p KUSHAL mLCx 80%, KUSHAL OM1 80%, KUSHAL pLAD 80%. RCA 80% (small). R radial TR @ 3:30. EDP 10. No LV gram.    PAST SURGICAL HISTORY:  Varicocele surgery when he was young.   H/O foot surgery      MEDICATIONS:  Asa 81 mg QD   Plavix 75 mg QD  Atorvastatin 1 tab PM   Multivitamin     ALLERGIES:  No Known Allergies    FH   - Mom brain tumor   - Father perforated ulcer    SH:   - former smoker, quit 30-40 years ago   - Marihuana smoke 1-3 times a day for years   - Alcohol occasionally   - No use of other recreational drugs         VITALS & I/Os:  Vital Signs Last 24 Hrs  T(C): 36.7 (02 Dec 2021 23:37), Max: 36.9 (02 Dec 2021 14:10)  T(F): 98 (02 Dec 2021 23:37), Max: 98.4 (02 Dec 2021 14:10)  HR: 75 (02 Dec 2021 23:37) (72 - 83)  BP: 140/80 (02 Dec 2021 23:37) (108/67 - 140/80)  BP(mean): --  RR: 18 (02 Dec 2021 23:37) (17 - 18)  SpO2: 100% (02 Dec 2021 23:37) (99% - 100%)    I&O's Summary      PHYSICAL EXAM:  General: No acute distress  Respiratory: Nonlabored  Cardiovascular: normotensive, regular rate  Abdominal: Soft, nondistended, periumbilical tenderness mild erythema, umbilical hernia the size of golf ball. No rebound or guarding.   Extremities: Warm      LABS:                        7.7    7.85  )-----------( 450      ( 02 Dec 2021 15:39 )             25.9     12-02    143  |  106  |  11  ----------------------------<  101<H>  4.3   |  29  |  0.92    Ca    9.2      02 Dec 2021 15:39    TPro  7.1  /  Alb  4.5  /  TBili  0.3  /  DBili  x   /  AST  18  /  ALT  17  /  AlkPhos  50  12-02    Lactate:    PT/INR - ( 02 Dec 2021 15:39 )   PT: 13.8 sec;   INR: 1.16          PTT - ( 02 Dec 2021 15:39 )  PTT:28.4 sec      IMAGING:  CT Abdomen and Pelvis w/ Oral Cont and w/ IV Cont:   EXAM:  CT ABDOMEN AND PELVIS OC IC                          PROCEDURE DATE:  12/02/2021          INTERPRETATION:  CLINICAL INFORMATION: Painful umbilical hernia, evaluate for obstruction/incarceration    COMPARISON: CT chest 2/22/2021.    PROCEDURE:  CT of the Abdomen and Pelvis was performed with intravenous contrast.  Intravenous contrast: 90 ml Omnipaque 350. 10 ml discarded.  Oral contrast: positive contrast was administered.  Sagittal and coronal reformats were performed.    FINDINGS:    Lower chest: Within normal limits.    Liver: Normal in size and morphology. No focal abnormality. The main portal vein is patent.  Gallbladder and biliary ducts: No gallstones. No intra/extrahepatic biliary ductal dilatation.  Spleen: Within normal limits.  Pancreas: Within normal limits.  Adrenals: Within normal limits.  Kidneys/ureters: No hydronephrosis. Several nonobstructing bilateral renal stones.. No focal parenchymal abnormality.    Bladder: Within normal limits.  Reproductive organs: Prostate within normal limits.    Bowel: The bowel is normal in caliber. Colonic diverticulosis.  Peritoneum/retroperitoneum: No ascites.  Vasculature:  The aorta and its branches are normal in caliber.  Lymph nodes: Lymph nodes are not enlarged.  Bones and soft tissue: 6.5 x 6.6 x 7.6 cm Umbilical hernia containing fat with neck size of 2.5 cm x 2.2. Herniated fat demonstrates fat stranding and trace fluid. No herniation of bowel.      IMPRESSION:  Fat-containing umbilical hernia suspicious for incarceration.    --- End of Report ---

## 2021-12-06 ENCOUNTER — NON-APPOINTMENT (OUTPATIENT)
Age: 61
End: 2021-12-06

## 2021-12-07 ENCOUNTER — APPOINTMENT (OUTPATIENT)
Dept: SURGERY | Facility: CLINIC | Age: 61
End: 2021-12-07
Payer: COMMERCIAL

## 2021-12-07 VITALS
TEMPERATURE: 95.4 F | WEIGHT: 179.5 LBS | BODY MASS INDEX: 28.85 KG/M2 | HEART RATE: 71 BPM | SYSTOLIC BLOOD PRESSURE: 116 MMHG | DIASTOLIC BLOOD PRESSURE: 71 MMHG | HEIGHT: 66 IN | OXYGEN SATURATION: 98 %

## 2021-12-07 PROCEDURE — 99204 OFFICE O/P NEW MOD 45 MIN: CPT

## 2021-12-07 RX ORDER — ROSUVASTATIN CALCIUM 10 MG/1
10 TABLET, FILM COATED ORAL
Refills: 0 | Status: ACTIVE | COMMUNITY

## 2021-12-07 RX ORDER — LISINOPRIL 40 MG/1
40 TABLET ORAL
Refills: 0 | Status: ACTIVE | COMMUNITY

## 2021-12-07 RX ORDER — MULTIVITAMIN
TABLET ORAL
Refills: 0 | Status: ACTIVE | COMMUNITY

## 2021-12-07 RX ORDER — ASPIRIN 81 MG
81 TABLET, DELAYED RELEASE (ENTERIC COATED) ORAL
Refills: 0 | Status: ACTIVE | COMMUNITY

## 2021-12-07 RX ORDER — CLOPIDOGREL 75 MG/1
TABLET, FILM COATED ORAL
Refills: 0 | Status: ACTIVE | COMMUNITY

## 2021-12-07 RX ORDER — CARVEDILOL 3.12 MG/1
3.12 TABLET, FILM COATED ORAL
Refills: 0 | Status: ACTIVE | COMMUNITY

## 2021-12-07 NOTE — REASON FOR VISIT
[Consultation] : a consultation visit [FreeTextEntry1] : Consultation requested by: Dr. Jaswinder Lozano

## 2021-12-07 NOTE — REVIEW OF SYSTEMS
[Fever] : no fever [Chills] : no chills [Feeling Poorly] : not feeling poorly [Feeling Tired] : not feeling tired [Recent Weight Gain (___ Lbs)] : no recent weight gain [Recent Weight Loss (___ Lbs)] : recent [unfilled] ~Ulb weight loss [Shortness Of Breath] : no shortness of breath [Wheezing] : no wheezing [Cough] : no cough [SOB on Exertion] : shortness of breath during exertion [Orthopnea] : no orthopnea [PND] : no PND [Abdominal Pain] : abdominal pain [Vomiting] : no vomiting [Constipation] : no constipation [Diarrhea] : no diarrhea [Heartburn] : no heartburn [Melena] : no melena [Negative] : Heme/Lymph

## 2021-12-07 NOTE — DATA REVIEWED
[FreeTextEntry1] : CT abdomen/pelvis (12/2/2021) - 6.5 x 6.6 x 7.6 cm fat-containing umbilical hernia with neck size of 2.5 cm x 2.2. Herniated fat demonstrates fat stranding and trace fluid, suspicious for incarceration. No herniation of bowel.

## 2021-12-07 NOTE — HISTORY OF PRESENT ILLNESS
[de-identified] : Mr. Oliva presented today for evaluation and management of an umbilical hernia.  He first noticed the hernia after lifting garbage at work and "felt a tear", then noticed a bulge.  He denied significant pain of the area, although it is sensitive to pressure.  He stated he believes the hernia is enlarging.

## 2021-12-07 NOTE — PHYSICAL EXAM
[Calm] : calm [de-identified] : NAD, comfortable [de-identified] : NCAT, no scleral icterus [de-identified] : +BS soft NT ND.  No hepatosplenomegaly.  Moderate umbilical hernia, reducible and non-tender. [de-identified] : No clubbing, cyanosis, or edema. [de-identified] : Warm, dry. [de-identified] : A&Ox3

## 2021-12-07 NOTE — CONSULT LETTER
[FreeTextEntry1] : 2021\par \par \par \par Jaswinder Lozano M.D.\par [facility]\par [address], [address]\[place] [address] \par Telephone #: [phone]\par \par \par Re: Bryce Oliva\par : 1960\par \par \par Dear Dr. Lozano:\par \par I had the opportunity to see Mr. Oliva today for evaluation and management of ventral hernia. He first noticed the hernia after lifting garbage at work and "felt a tear", then noticed a bulge.  He denied significant pain of the area, although it is sensitive to pressure.  He stated he believes the hernia is enlarging.\par \par On physical examination, his height is 5 feet 6 inches, his weight is 179 pounds, and BMI is 28.97. His temperature is 95.4 °F, blood pressure is 116/71, heart rate is 71, and O2 saturation is 98% on room air. In general, he is a well-dressed well-nourished man who appears his stated age and is in no acute distress. He is calm, alert and oriented x3.  HEENT exam demonstrates a normocephalic atraumatic appearance with no scleral icterus.  His abdomen has audible bowel sounds, is soft, non-tender, and non-distended.  There is a moderate umbilical hernia that is soft, non-tender, and partially reducible.  His extremities are warm and dry without clubbing, cyanosis or edema.  \par \par I reviewed the images and report of the CT abdomen/pelvis that was performed on 2021, which demonstrated a 6.5 x 6.6 x 7.6 cm fat-containing umbilical hernia with neck size of 2.5 cm x 2.2.  Herniated fat demonstrates fat stranding and trace fluid, suspicious for incarceration. No herniation of bowel.\par \par In summary, Mr. Oliva is a 61-year-old man with a moderate umbilical hernia.  We will plan for an open ventral hernia repair with mesh at the patient's convenience.  However, given his newly diagnosed anemia, he will need further work up for the anemia prior to any hernia repair is planned.  He can continue aspirin perioperatively, but if he is able to stop Plavix that would be preferable.\par \par Thank you for the opportunity to care for this patient. Please do not hesitate to contact me in the event that you have any questions or concerns about the care of this patient.\par \par Sincerely,\par \par \par \par Marisabel Saenz M.D.

## 2021-12-07 NOTE — ASSESSMENT
[FreeTextEntry1] : Mr. Oliva is a 61-year-old man with a moderate umbilical hernia.  We will plan for an open ventral hernia repair with mesh at the patient's convenience.  However, given his newly diagnosed anemia, he will need further work up for the anemia prior to any hernia repair is planned.  He can continue aspirin perioperatively, but if he is able to stop Plavix that would be preferable.

## 2021-12-08 DIAGNOSIS — I44.7 LEFT BUNDLE-BRANCH BLOCK, UNSPECIFIED: ICD-10-CM

## 2021-12-08 DIAGNOSIS — K42.0 UMBILICAL HERNIA WITH OBSTRUCTION, WITHOUT GANGRENE: ICD-10-CM

## 2021-12-08 DIAGNOSIS — E78.5 HYPERLIPIDEMIA, UNSPECIFIED: ICD-10-CM

## 2021-12-08 DIAGNOSIS — R73.03 PREDIABETES: ICD-10-CM

## 2021-12-08 DIAGNOSIS — Z79.82 LONG TERM (CURRENT) USE OF ASPIRIN: ICD-10-CM

## 2021-12-08 DIAGNOSIS — I10 ESSENTIAL (PRIMARY) HYPERTENSION: ICD-10-CM

## 2021-12-08 DIAGNOSIS — D64.9 ANEMIA, UNSPECIFIED: ICD-10-CM

## 2021-12-08 DIAGNOSIS — Z79.02 LONG TERM (CURRENT) USE OF ANTITHROMBOTICS/ANTIPLATELETS: ICD-10-CM

## 2021-12-23 ENCOUNTER — APPOINTMENT (OUTPATIENT)
Dept: HEMATOLOGY ONCOLOGY | Facility: CLINIC | Age: 61
End: 2021-12-23
Payer: COMMERCIAL

## 2021-12-23 PROCEDURE — 99204 OFFICE O/P NEW MOD 45 MIN: CPT | Mod: 95

## 2021-12-23 RX ORDER — OMEPRAZOLE 40 MG/1
40 CAPSULE, DELAYED RELEASE ORAL
Refills: 0 | Status: ACTIVE | COMMUNITY

## 2021-12-23 RX ORDER — IRON/IRON ASP GLY/FA/MV-MIN 38 125-25-1MG
TABLET ORAL
Refills: 0 | Status: ACTIVE | COMMUNITY

## 2021-12-23 NOTE — CONSULT LETTER
[Dear  ___] : Dear  [unfilled], [Consult Letter:] : I had the pleasure of evaluating your patient, [unfilled]. [Please see my note below.] : Please see my note below. [Consult Closing:] : Thank you very much for allowing me to participate in the care of this patient.  If you have any questions, please do not hesitate to contact me. [Sincerely,] : Sincerely, [FreeTextEntry3] : Meagan Dominguez MD\par

## 2021-12-23 NOTE — ASSESSMENT
[FreeTextEntry1] : I discussed with patient his iron deficiency anemia, and advised him to increase his oral iron intake to 2 to 3 tablets a day... He was also advised to increase his red meat intake...\par \par I will arrange for intravenous iron as soon as we obtain authorization from his insurance...\par \par Patient encouraged to have upper and lower endoscopy to identify the source of bleed.\par \par Follow-up here 2 weeks after the last dose of intravenous iron.

## 2021-12-23 NOTE — HISTORY OF PRESENT ILLNESS
[Home] : at home, [unfilled] , at the time of the visit. [Other Location: e.g. Home (Enter Location, City,State)___] : at [unfilled] [Verbal consent obtained from patient] : the patient, [unfilled] [de-identified] : 61 years old male, found to have significant iron deficiency anemia, when he had blood work prior to surgery for an umbilical hernia repair.. Patient denies seeing blood in his stool... He never had a colonoscopy... He was placed on oral iron, and he takes iron preparation with 65 mg of elemental iron, 1 tablet a day which he tolerates well without diarrhea or constipation...

## 2022-01-05 ENCOUNTER — OUTPATIENT (OUTPATIENT)
Dept: OUTPATIENT SERVICES | Facility: HOSPITAL | Age: 62
LOS: 1 days | End: 2022-01-05
Payer: COMMERCIAL

## 2022-01-05 ENCOUNTER — APPOINTMENT (OUTPATIENT)
Age: 62
End: 2022-01-05

## 2022-01-05 VITALS
DIASTOLIC BLOOD PRESSURE: 87 MMHG | TEMPERATURE: 98 F | OXYGEN SATURATION: 99 % | RESPIRATION RATE: 18 BRPM | HEART RATE: 67 BPM | SYSTOLIC BLOOD PRESSURE: 160 MMHG

## 2022-01-05 VITALS
DIASTOLIC BLOOD PRESSURE: 85 MMHG | TEMPERATURE: 98 F | HEART RATE: 63 BPM | SYSTOLIC BLOOD PRESSURE: 134 MMHG | RESPIRATION RATE: 18 BRPM | OXYGEN SATURATION: 98 %

## 2022-01-05 DIAGNOSIS — Z98.890 OTHER SPECIFIED POSTPROCEDURAL STATES: Chronic | ICD-10-CM

## 2022-01-05 DIAGNOSIS — D50.9 IRON DEFICIENCY ANEMIA, UNSPECIFIED: ICD-10-CM

## 2022-01-05 PROCEDURE — 96365 THER/PROPH/DIAG IV INF INIT: CPT

## 2022-01-05 RX ORDER — FERUMOXYTOL 510 MG/17ML
510 INJECTION INTRAVENOUS ONCE
Refills: 0 | Status: COMPLETED | OUTPATIENT
Start: 2022-01-05 | End: 2022-01-05

## 2022-01-05 RX ADMIN — FERUMOXYTOL 510 MILLIGRAM(S): 510 INJECTION INTRAVENOUS at 14:46

## 2022-01-05 RX ADMIN — FERUMOXYTOL 117 MILLIGRAM(S): 510 INJECTION INTRAVENOUS at 13:37

## 2022-01-10 ENCOUNTER — APPOINTMENT (OUTPATIENT)
Age: 62
End: 2022-01-10

## 2022-01-10 ENCOUNTER — OUTPATIENT (OUTPATIENT)
Dept: OUTPATIENT SERVICES | Facility: HOSPITAL | Age: 62
LOS: 1 days | End: 2022-01-10
Payer: COMMERCIAL

## 2022-01-10 VITALS
SYSTOLIC BLOOD PRESSURE: 130 MMHG | RESPIRATION RATE: 18 BRPM | OXYGEN SATURATION: 99 % | DIASTOLIC BLOOD PRESSURE: 80 MMHG | HEART RATE: 65 BPM | TEMPERATURE: 98 F

## 2022-01-10 DIAGNOSIS — D50.9 IRON DEFICIENCY ANEMIA, UNSPECIFIED: ICD-10-CM

## 2022-01-10 DIAGNOSIS — Z98.890 OTHER SPECIFIED POSTPROCEDURAL STATES: Chronic | ICD-10-CM

## 2022-01-10 PROCEDURE — 96365 THER/PROPH/DIAG IV INF INIT: CPT

## 2022-01-10 RX ORDER — FERUMOXYTOL 510 MG/17ML
510 INJECTION INTRAVENOUS ONCE
Refills: 0 | Status: COMPLETED | OUTPATIENT
Start: 2022-01-10 | End: 2022-01-10

## 2022-01-10 RX ADMIN — FERUMOXYTOL 117 MILLIGRAM(S): 510 INJECTION INTRAVENOUS at 14:44

## 2022-01-10 RX ADMIN — FERUMOXYTOL 510 MILLIGRAM(S): 510 INJECTION INTRAVENOUS at 15:44

## 2022-01-24 ENCOUNTER — TRANSCRIPTION ENCOUNTER (OUTPATIENT)
Age: 62
End: 2022-01-24

## 2022-01-24 ENCOUNTER — OUTPATIENT (OUTPATIENT)
Dept: OUTPATIENT SERVICES | Facility: HOSPITAL | Age: 62
LOS: 1 days | Discharge: HOME | End: 2022-01-24
Payer: COMMERCIAL

## 2022-01-24 ENCOUNTER — RESULT REVIEW (OUTPATIENT)
Age: 62
End: 2022-01-24

## 2022-01-24 DIAGNOSIS — Z98.890 OTHER SPECIFIED POSTPROCEDURAL STATES: Chronic | ICD-10-CM

## 2022-01-24 PROCEDURE — 45385 COLONOSCOPY W/LESION REMOVAL: CPT

## 2022-01-24 PROCEDURE — 88305 TISSUE EXAM BY PATHOLOGIST: CPT

## 2022-01-24 PROCEDURE — C1889: CPT

## 2022-01-24 PROCEDURE — 88305 TISSUE EXAM BY PATHOLOGIST: CPT | Mod: 26

## 2022-01-24 PROCEDURE — 43239 EGD BIOPSY SINGLE/MULTIPLE: CPT

## 2022-01-24 PROCEDURE — 45380 COLONOSCOPY AND BIOPSY: CPT | Mod: XS

## 2022-01-24 DEVICE — CLIP RESOLUTION 360 235CM: Type: IMPLANTABLE DEVICE | Status: FUNCTIONAL

## 2022-01-25 LAB — SURGICAL PATHOLOGY STUDY: SIGNIFICANT CHANGE UP

## 2022-02-23 ENCOUNTER — OUTPATIENT (OUTPATIENT)
Dept: OUTPATIENT SERVICES | Facility: HOSPITAL | Age: 62
LOS: 1 days | End: 2022-02-23
Payer: COMMERCIAL

## 2022-02-23 DIAGNOSIS — Z98.890 OTHER SPECIFIED POSTPROCEDURAL STATES: Chronic | ICD-10-CM

## 2022-02-23 PROCEDURE — 71046 X-RAY EXAM CHEST 2 VIEWS: CPT | Mod: 26

## 2022-02-23 PROCEDURE — 71046 X-RAY EXAM CHEST 2 VIEWS: CPT

## 2022-02-28 RX ORDER — CHLORHEXIDINE GLUCONATE 213 G/1000ML
1 SOLUTION TOPICAL DAILY
Refills: 0 | Status: DISCONTINUED | OUTPATIENT
Start: 2022-03-01 | End: 2022-03-01

## 2022-02-28 NOTE — ASU PATIENT PROFILE, ADULT - FALL HARM RISK - UNIVERSAL INTERVENTIONS
Bed in lowest position, wheels locked, appropriate side rails in place/Call bell, personal items and telephone in reach/Instruct patient to call for assistance before getting out of bed or chair/Non-slip footwear when patient is out of bed/[place] to call system/Physically safe environment - no spills, clutter or unnecessary equipment/Purposeful Proactive Rounding/Room/bathroom lighting operational, light cord in reach

## 2022-02-28 NOTE — ASU PATIENT PROFILE, ADULT - NSICDXPASTSURGICALHX_GEN_ALL_CORE_FT
PAST SURGICAL HISTORY:  H/O foot surgery L    S/P scrotal varicocelectomy     Status post cardiac catheterization 4 stents

## 2022-02-28 NOTE — ASU PATIENT PROFILE, ADULT - NSICDXPASTMEDICALHX_GEN_ALL_CORE_FT
PAST MEDICAL HISTORY:  Erectile dysfunction     Hyperlipidemia     Hypertension     Obese     Prediabetes     Umbilical hernia      PAST MEDICAL HISTORY:  CAD (coronary artery disease)     Erectile dysfunction     H/O cardiomyopathy     Hyperlipidemia     Hypertension     Obese     Prediabetes     Umbilical hernia

## 2022-03-01 ENCOUNTER — APPOINTMENT (OUTPATIENT)
Dept: SURGERY | Facility: AMBULATORY SURGERY CENTER | Age: 62
End: 2022-03-01

## 2022-03-01 ENCOUNTER — RESULT REVIEW (OUTPATIENT)
Age: 62
End: 2022-03-01

## 2022-03-01 ENCOUNTER — OUTPATIENT (OUTPATIENT)
Dept: OUTPATIENT SERVICES | Facility: HOSPITAL | Age: 62
LOS: 1 days | Discharge: HOME | End: 2022-03-01
Payer: COMMERCIAL

## 2022-03-01 VITALS
DIASTOLIC BLOOD PRESSURE: 85 MMHG | HEART RATE: 72 BPM | OXYGEN SATURATION: 98 % | RESPIRATION RATE: 16 BRPM | SYSTOLIC BLOOD PRESSURE: 151 MMHG

## 2022-03-01 VITALS
HEART RATE: 71 BPM | HEIGHT: 67 IN | WEIGHT: 188.72 LBS | DIASTOLIC BLOOD PRESSURE: 97 MMHG | RESPIRATION RATE: 16 BRPM | TEMPERATURE: 97 F | OXYGEN SATURATION: 99 % | SYSTOLIC BLOOD PRESSURE: 163 MMHG

## 2022-03-01 DIAGNOSIS — Z98.890 OTHER SPECIFIED POSTPROCEDURAL STATES: Chronic | ICD-10-CM

## 2022-03-01 PROCEDURE — 49561: CPT | Mod: GC

## 2022-03-01 PROCEDURE — 49568: CPT | Mod: GC

## 2022-03-01 PROCEDURE — 88302 TISSUE EXAM BY PATHOLOGIST: CPT | Mod: 26

## 2022-03-01 DEVICE — MESH HERNIA PERFIX PLUG MEDIUM 1.3 X 1.55": Type: IMPLANTABLE DEVICE | Status: FUNCTIONAL

## 2022-03-01 DEVICE — MESH HERNIA VENTRALEX ST SMALL 1.7": Type: IMPLANTABLE DEVICE | Status: FUNCTIONAL

## 2022-03-01 DEVICE — MESH HERNIA PERFIX PLUG SMALL 1 X 1.35": Type: IMPLANTABLE DEVICE | Status: FUNCTIONAL

## 2022-03-01 DEVICE — MESH HERNIA PERFIX PLUG EXTRA LARGE 1.6 X 2": Type: IMPLANTABLE DEVICE | Status: FUNCTIONAL

## 2022-03-01 DEVICE — MESH HERNIA VENTRALEX ST MEDIUM 2.5": Type: IMPLANTABLE DEVICE | Status: FUNCTIONAL

## 2022-03-01 DEVICE — MESH HERNIA PERFIX PLUG LARGE 1.6 X 1.90": Type: IMPLANTABLE DEVICE | Status: FUNCTIONAL

## 2022-03-01 DEVICE — MESH HERNIA VENTRALEX ST LARGE 3.2": Type: IMPLANTABLE DEVICE | Status: FUNCTIONAL

## 2022-03-01 RX ORDER — OXYCODONE HYDROCHLORIDE 5 MG/1
5 TABLET ORAL
Refills: 0 | Status: DISCONTINUED | OUTPATIENT
Start: 2022-03-01 | End: 2022-03-01

## 2022-03-01 RX ORDER — ONDANSETRON 8 MG/1
4 TABLET, FILM COATED ORAL ONCE
Refills: 0 | Status: DISCONTINUED | OUTPATIENT
Start: 2022-03-01 | End: 2022-03-01

## 2022-03-01 RX ORDER — ACETAMINOPHEN 500 MG
650 TABLET ORAL ONCE
Refills: 0 | Status: COMPLETED | OUTPATIENT
Start: 2022-03-01 | End: 2022-03-01

## 2022-03-01 RX ORDER — OXYCODONE HYDROCHLORIDE 5 MG/1
1 TABLET ORAL
Qty: 5 | Refills: 0
Start: 2022-03-01

## 2022-03-01 RX ORDER — ONDANSETRON 8 MG/1
4 TABLET, FILM COATED ORAL EVERY 6 HOURS
Refills: 0 | Status: DISCONTINUED | OUTPATIENT
Start: 2022-03-01 | End: 2022-03-01

## 2022-03-01 RX ORDER — FENTANYL CITRATE 50 UG/ML
25 INJECTION INTRAVENOUS
Refills: 0 | Status: DISCONTINUED | OUTPATIENT
Start: 2022-03-01 | End: 2022-03-01

## 2022-03-01 RX ORDER — FENTANYL CITRATE 50 UG/ML
50 INJECTION INTRAVENOUS
Refills: 0 | Status: DISCONTINUED | OUTPATIENT
Start: 2022-03-01 | End: 2022-03-01

## 2022-03-01 RX ORDER — SODIUM CHLORIDE 9 MG/ML
1000 INJECTION, SOLUTION INTRAVENOUS
Refills: 0 | Status: DISCONTINUED | OUTPATIENT
Start: 2022-03-01 | End: 2022-03-01

## 2022-03-01 RX ADMIN — FENTANYL CITRATE 50 MICROGRAM(S): 50 INJECTION INTRAVENOUS at 16:26

## 2022-03-01 RX ADMIN — OXYCODONE HYDROCHLORIDE 5 MILLIGRAM(S): 5 TABLET ORAL at 18:30

## 2022-03-01 RX ADMIN — FENTANYL CITRATE 50 MICROGRAM(S): 50 INJECTION INTRAVENOUS at 16:00

## 2022-03-01 RX ADMIN — OXYCODONE HYDROCHLORIDE 5 MILLIGRAM(S): 5 TABLET ORAL at 17:40

## 2022-03-01 RX ADMIN — Medication 650 MILLIGRAM(S): at 13:00

## 2022-03-01 RX ADMIN — FENTANYL CITRATE 50 MICROGRAM(S): 50 INJECTION INTRAVENOUS at 16:10

## 2022-03-01 NOTE — ASU DISCHARGE PLAN (ADULT/PEDIATRIC) - NS MD DC FALL RISK RISK
For information on Fall & Injury Prevention, visit: [email or web address] OR  [email or web address] OR  https://www.cdc.gov/steadi/patient.html

## 2022-03-01 NOTE — BRIEF OPERATIVE NOTE - OPERATION/FINDINGS
Incision inferior to umbilicus. Umbilical ligament transected. Dissected down around hernia sac to fascial defect. Hernia sac opened and excised. Contents replaced into abdomen. Ventralex mesh placed intraperitoneally and secured to overlying fascia using 0 maxon U stitches through mesh. Umbilicus tacked down to abdominal wall with 3-0 vicryl. Deep tissue reapproximated w 3-0 vicryl and deep dermal skin approximation with 3-0 vicryl. Running subcuticular monocryl skin closure.

## 2022-03-01 NOTE — ASU DISCHARGE PLAN (ADULT/PEDIATRIC) - CARE PROVIDER_API CALL
Marisabel Saenz)  Surgery  [address]  [place] [address]  Phone: [phone]  Fax: [phone]  Follow Up Time: 1 week

## 2022-03-01 NOTE — ASU PREOP CHECKLIST - NSSDAENDDT_GEN_ALL_CORE
Pt reports mild abdominal pain radiates into bilateral back started yesterday. Denies N/V,  sxs, and BM. Denies CP. Pt also denies weakness and dizziness. Step son bedside accompanying pt.    01-Mar-2022 13:47

## 2022-03-02 PROBLEM — Z86.79 PERSONAL HISTORY OF OTHER DISEASES OF THE CIRCULATORY SYSTEM: Chronic | Status: ACTIVE | Noted: 2022-03-01

## 2022-03-02 PROBLEM — I25.10 ATHEROSCLEROTIC HEART DISEASE OF NATIVE CORONARY ARTERY WITHOUT ANGINA PECTORIS: Chronic | Status: ACTIVE | Noted: 2022-03-01

## 2022-03-11 LAB — SURGICAL PATHOLOGY STUDY: SIGNIFICANT CHANGE UP

## 2022-03-14 ENCOUNTER — APPOINTMENT (OUTPATIENT)
Dept: SURGERY | Facility: CLINIC | Age: 62
End: 2022-03-14
Payer: COMMERCIAL

## 2022-03-14 VITALS
SYSTOLIC BLOOD PRESSURE: 152 MMHG | DIASTOLIC BLOOD PRESSURE: 88 MMHG | OXYGEN SATURATION: 99 % | TEMPERATURE: 96.9 F | HEART RATE: 72 BPM

## 2022-03-14 DIAGNOSIS — Z82.49 FAMILY HISTORY OF ISCHEMIC HEART DISEASE AND OTHER DISEASES OF THE CIRCULATORY SYSTEM: ICD-10-CM

## 2022-03-14 DIAGNOSIS — Z87.891 PERSONAL HISTORY OF NICOTINE DEPENDENCE: ICD-10-CM

## 2022-03-14 DIAGNOSIS — I25.10 ATHEROSCLEROTIC HEART DISEASE OF NATIVE CORONARY ARTERY W/OUT ANGINA PECTORIS: ICD-10-CM

## 2022-03-14 DIAGNOSIS — Z86.79 PERSONAL HISTORY OF OTHER DISEASES OF THE CIRCULATORY SYSTEM: ICD-10-CM

## 2022-03-14 DIAGNOSIS — D50.9 IRON DEFICIENCY ANEMIA, UNSPECIFIED: ICD-10-CM

## 2022-03-14 DIAGNOSIS — K43.9 VENTRAL HERNIA W/OUT OBSTRUCTION OR GANGRENE: ICD-10-CM

## 2022-03-14 PROCEDURE — 99024 POSTOP FOLLOW-UP VISIT: CPT

## 2022-03-14 NOTE — HISTORY OF PRESENT ILLNESS
[de-identified] : Mr. Oliva presented previously for evaluation and management of an umbilical hernia.  He first noticed the hernia after lifting garbage at work and "felt a tear", then noticed a bulge.  He denied significant pain of the area, although it is sensitive to pressure.  He stated he believes the hernia is enlarging.  He underwent an open ventral hernia repair with mesh on March 1, 2022. [de-identified] : He presented today for a routine post-operative visit.  He is overall feeling well.  He denied fever, chills, nausea, vomiting, diarrhea, or constipation.  He noted some bloody discharge from the incision this morning after he showered.

## 2022-03-14 NOTE — PHYSICAL EXAM
[Calm] : calm [de-identified] : NAD, comfortable [de-identified] : NCAT, no scleral icterus [de-identified] : soft NT ND.  Incision healing well without erythema or induration.  No evidence of a recurrent hernia on Valsalva maneuver.  There is a small amount of bloody discharge coming from the right lateral aspect of the incision, consistent with a resolving hematoma. [de-identified] : No clubbing, cyanosis, or edema. [de-identified] : Warm, dry. [de-identified] : A&Ox3

## 2022-03-14 NOTE — CONSULT LETTER
[FreeTextEntry1] : 2022\par \par \par \par Jaswinder Lozano M.D.\par [facility]\par [address], [address]\[place] [address] \par Telephone #: [phone]\par \par \par Re: Bryce Oliva\par : 1960\par \par \par Dear Dr. Lozano:\par \par I had the opportunity to see Mr. Oliva today for a routine post-operative visit after he underwent an open ventral hernia repair with mesh on 2022.  He is overall feeling well.  He denied fever, chills, nausea, vomiting, diarrhea, or constipation.  He noted some bloody discharge from the incision this morning after he showered.\par \par On physical examination, his temperature is 96.9 °F, blood pressure is 152/88, heart rate is 72, and O2 saturation is 99% on room air. In general, he is a well-dressed well-nourished man who appears his stated age and is in no acute distress. He is calm, alert and oriented x3.  HEENT exam demonstrates a normocephalic atraumatic appearance with no scleral icterus.  His abdomen is soft, non-tender, and non-distended.  The incision is healing well without erythema or induration.  There is a small amount of bloody discharge coming from the right lateral aspect of the incision, consistent with a resolving hematoma.  There is no evidence of a recurrent ventral hernia with Valsalva maneuver.  His extremities are warm and dry without clubbing, cyanosis or edema.  \par \par I reviewed the pathology, which demonstrated fibroadipose tissue consistent with hernia sac.\par \par In summary, Mr. Oliva is a 61-year-old man who underwent an open ventral hernia repair with mesh on 2022.  He is recovering as expected and will follow up with me as needed.\par \par Thank you for the opportunity to care for this patient. Please do not hesitate to contact me in the event that you have any questions or concerns about the care of this patient.\par \par Sincerely,\par \par \par \par Marisabel Saenz M.D.

## 2022-03-14 NOTE — DATA REVIEWED
[FreeTextEntry1] : CT abdomen/pelvis (12/2/2021) - 6.5 x 6.6 x 7.6 cm fat-containing umbilical hernia with neck size of 2.5 cm x 2.2. Herniated fat demonstrates fat stranding and trace fluid, suspicious for incarceration. No herniation of bowel.\par \par Pathology (3/1/2022) - fibroadipose tissue consistent with hernia sac.

## 2022-03-14 NOTE — ASSESSMENT
[FreeTextEntry1] : Mr. Oliva is a 61-year-old man who underwent an open ventral hernia repair with mesh on March 1, 2022.  He is recovering as expected and will follow up with me as needed.

## 2022-03-14 NOTE — REASON FOR VISIT
[Post Op: _________] : a [unfilled] post op visit [FreeTextEntry1] : He underwent an open ventral hernia repair with mesh on March 1, 2022.

## 2022-12-16 NOTE — ED PROVIDER NOTE - CARE PROVIDER_API CALL
not observed
Marisabel Saenz)  Surgery  [address] [place] [address]  Phone: [phone]  Fax: [phone]  Follow Up Time: 1 week    AMAN GONZALEZ  INTERNAL MEDICINE  [address], [address]  [place] [address]  Phone: [phone]  Fax: [phone]  Follow Up Time:

## 2023-03-30 ENCOUNTER — NON-APPOINTMENT (OUTPATIENT)
Age: 63
End: 2023-03-30

## 2023-04-08 ENCOUNTER — INPATIENT (INPATIENT)
Facility: HOSPITAL | Age: 63
LOS: 5 days | Discharge: HOME | DRG: 393 | End: 2023-04-14
Attending: STUDENT IN AN ORGANIZED HEALTH CARE EDUCATION/TRAINING PROGRAM | Admitting: INTERNAL MEDICINE
Payer: COMMERCIAL

## 2023-04-08 VITALS
WEIGHT: 179.02 LBS | RESPIRATION RATE: 20 BRPM | OXYGEN SATURATION: 100 % | HEART RATE: 111 BPM | DIASTOLIC BLOOD PRESSURE: 64 MMHG | TEMPERATURE: 98 F | SYSTOLIC BLOOD PRESSURE: 100 MMHG | HEIGHT: 66 IN

## 2023-04-08 DIAGNOSIS — I10 ESSENTIAL (PRIMARY) HYPERTENSION: ICD-10-CM

## 2023-04-08 DIAGNOSIS — R77.8 OTHER SPECIFIED ABNORMALITIES OF PLASMA PROTEINS: ICD-10-CM

## 2023-04-08 DIAGNOSIS — Z98.890 OTHER SPECIFIED POSTPROCEDURAL STATES: Chronic | ICD-10-CM

## 2023-04-08 DIAGNOSIS — D64.9 ANEMIA, UNSPECIFIED: ICD-10-CM

## 2023-04-08 DIAGNOSIS — I25.10 ATHEROSCLEROTIC HEART DISEASE OF NATIVE CORONARY ARTERY WITHOUT ANGINA PECTORIS: ICD-10-CM

## 2023-04-08 DIAGNOSIS — Z29.9 ENCOUNTER FOR PROPHYLACTIC MEASURES, UNSPECIFIED: ICD-10-CM

## 2023-04-08 DIAGNOSIS — I48.92 UNSPECIFIED ATRIAL FLUTTER: ICD-10-CM

## 2023-04-08 LAB
ALBUMIN SERPL ELPH-MCNC: 4.1 G/DL — SIGNIFICANT CHANGE UP (ref 3.3–5)
ALP SERPL-CCNC: 69 U/L — SIGNIFICANT CHANGE UP (ref 40–120)
ALT FLD-CCNC: 34 U/L — SIGNIFICANT CHANGE UP (ref 10–45)
ANION GAP SERPL CALC-SCNC: 11 MMOL/L — SIGNIFICANT CHANGE UP (ref 5–17)
ANISOCYTOSIS BLD QL: SIGNIFICANT CHANGE UP
APPEARANCE UR: CLEAR — SIGNIFICANT CHANGE UP
APTT BLD: 27.1 SEC — LOW (ref 27.5–35.5)
AST SERPL-CCNC: 24 U/L — SIGNIFICANT CHANGE UP (ref 10–40)
BASOPHILS # BLD AUTO: 0 K/UL — SIGNIFICANT CHANGE UP (ref 0–0.2)
BASOPHILS NFR BLD AUTO: 0 % — SIGNIFICANT CHANGE UP (ref 0–2)
BILIRUB DIRECT SERPL-MCNC: <0.2 MG/DL — SIGNIFICANT CHANGE UP (ref 0–0.3)
BILIRUB INDIRECT FLD-MCNC: SIGNIFICANT CHANGE UP MG/DL (ref 0.2–1)
BILIRUB SERPL-MCNC: 0.4 MG/DL — SIGNIFICANT CHANGE UP (ref 0.2–1.2)
BILIRUB SERPL-MCNC: 0.5 MG/DL — SIGNIFICANT CHANGE UP (ref 0.2–1.2)
BILIRUB UR-MCNC: NEGATIVE — SIGNIFICANT CHANGE UP
BLD GP AB SCN SERPL QL: NEGATIVE — SIGNIFICANT CHANGE UP
BUN SERPL-MCNC: 15 MG/DL — SIGNIFICANT CHANGE UP (ref 7–23)
CALCIUM SERPL-MCNC: 9.5 MG/DL — SIGNIFICANT CHANGE UP (ref 8.4–10.5)
CHLORIDE SERPL-SCNC: 102 MMOL/L — SIGNIFICANT CHANGE UP (ref 96–108)
CK MB CFR SERPL CALC: 3.8 NG/ML — SIGNIFICANT CHANGE UP (ref 0–6.7)
CK MB CFR SERPL CALC: 4.2 NG/ML — SIGNIFICANT CHANGE UP (ref 0–6.7)
CK SERPL-CCNC: 81 U/L — SIGNIFICANT CHANGE UP (ref 30–200)
CK SERPL-CCNC: 95 U/L — SIGNIFICANT CHANGE UP (ref 30–200)
CO2 SERPL-SCNC: 22 MMOL/L — SIGNIFICANT CHANGE UP (ref 22–31)
COLOR SPEC: YELLOW — SIGNIFICANT CHANGE UP
CREAT SERPL-MCNC: 0.88 MG/DL — SIGNIFICANT CHANGE UP (ref 0.5–1.3)
DACRYOCYTES BLD QL SMEAR: SLIGHT — SIGNIFICANT CHANGE UP
DIFF PNL FLD: NEGATIVE — SIGNIFICANT CHANGE UP
EGFR: 98 ML/MIN/1.73M2 — SIGNIFICANT CHANGE UP
EOSINOPHIL # BLD AUTO: 0.17 K/UL — SIGNIFICANT CHANGE UP (ref 0–0.5)
EOSINOPHIL NFR BLD AUTO: 1.7 % — SIGNIFICANT CHANGE UP (ref 0–6)
GIANT PLATELETS BLD QL SMEAR: PRESENT — SIGNIFICANT CHANGE UP
GLUCOSE SERPL-MCNC: 112 MG/DL — HIGH (ref 70–99)
GLUCOSE UR QL: NEGATIVE — SIGNIFICANT CHANGE UP
HAPTOGLOB SERPL-MCNC: 213 MG/DL — HIGH (ref 34–200)
HCT VFR BLD CALC: 23.7 % — LOW (ref 39–50)
HCT VFR BLD CALC: 27.3 % — LOW (ref 39–50)
HGB BLD-MCNC: 6.6 G/DL — CRITICAL LOW (ref 13–17)
HGB BLD-MCNC: 7.8 G/DL — LOW (ref 13–17)
HYPOCHROMIA BLD QL: SIGNIFICANT CHANGE UP
INR BLD: 1.13 — SIGNIFICANT CHANGE UP (ref 0.88–1.16)
KETONES UR-MCNC: NEGATIVE — SIGNIFICANT CHANGE UP
LACTATE SERPL-SCNC: 1.4 MMOL/L — SIGNIFICANT CHANGE UP (ref 0.5–2)
LACTATE SERPL-SCNC: 2.2 MMOL/L — HIGH (ref 0.5–2)
LDH SERPL L TO P-CCNC: 169 U/L — SIGNIFICANT CHANGE UP (ref 50–242)
LEUKOCYTE ESTERASE UR-ACNC: NEGATIVE — SIGNIFICANT CHANGE UP
LYMPHOCYTES # BLD AUTO: 0.52 K/UL — LOW (ref 1–3.3)
LYMPHOCYTES # BLD AUTO: 5.2 % — LOW (ref 13–44)
MANUAL SMEAR VERIFICATION: SIGNIFICANT CHANGE UP
MCHC RBC-ENTMCNC: 19.5 PG — LOW (ref 27–34)
MCHC RBC-ENTMCNC: 20.6 PG — LOW (ref 27–34)
MCHC RBC-ENTMCNC: 27.8 GM/DL — LOW (ref 32–36)
MCHC RBC-ENTMCNC: 28.6 GM/DL — LOW (ref 32–36)
MCV RBC AUTO: 70.1 FL — LOW (ref 80–100)
MCV RBC AUTO: 72 FL — LOW (ref 80–100)
MICROCYTES BLD QL: SLIGHT — SIGNIFICANT CHANGE UP
MONOCYTES # BLD AUTO: 0.96 K/UL — HIGH (ref 0–0.9)
MONOCYTES NFR BLD AUTO: 9.6 % — SIGNIFICANT CHANGE UP (ref 2–14)
NEUTROPHILS # BLD AUTO: 8.37 K/UL — HIGH (ref 1.8–7.4)
NEUTROPHILS NFR BLD AUTO: 83.5 % — HIGH (ref 43–77)
NITRITE UR-MCNC: NEGATIVE — SIGNIFICANT CHANGE UP
NRBC # BLD: 0 /100 WBCS — SIGNIFICANT CHANGE UP (ref 0–0)
OVALOCYTES BLD QL SMEAR: SLIGHT — SIGNIFICANT CHANGE UP
PH UR: 6 — SIGNIFICANT CHANGE UP (ref 5–8)
PLAT MORPH BLD: ABNORMAL
PLATELET # BLD AUTO: 512 K/UL — HIGH (ref 150–400)
PLATELET # BLD AUTO: 531 K/UL — HIGH (ref 150–400)
POIKILOCYTOSIS BLD QL AUTO: SIGNIFICANT CHANGE UP
POLYCHROMASIA BLD QL SMEAR: SLIGHT — SIGNIFICANT CHANGE UP
POTASSIUM SERPL-MCNC: 4.5 MMOL/L — SIGNIFICANT CHANGE UP (ref 3.5–5.3)
POTASSIUM SERPL-SCNC: 4.5 MMOL/L — SIGNIFICANT CHANGE UP (ref 3.5–5.3)
PROT SERPL-MCNC: 6.8 G/DL — SIGNIFICANT CHANGE UP (ref 6–8.3)
PROT UR-MCNC: NEGATIVE MG/DL — SIGNIFICANT CHANGE UP
PROTHROM AB SERPL-ACNC: 13.5 SEC — HIGH (ref 10.5–13.4)
RBC # BLD: 3.38 M/UL — LOW (ref 4.2–5.8)
RBC # BLD: 3.79 M/UL — LOW (ref 4.2–5.8)
RBC # FLD: 19.6 % — HIGH (ref 10.3–14.5)
RBC # FLD: 20.8 % — HIGH (ref 10.3–14.5)
RBC BLD AUTO: ABNORMAL
RH IG SCN BLD-IMP: POSITIVE — SIGNIFICANT CHANGE UP
RH IG SCN BLD-IMP: POSITIVE — SIGNIFICANT CHANGE UP
SARS-COV-2 RNA SPEC QL NAA+PROBE: NEGATIVE — SIGNIFICANT CHANGE UP
SCHISTOCYTES BLD QL AUTO: SLIGHT — SIGNIFICANT CHANGE UP
SODIUM SERPL-SCNC: 135 MMOL/L — SIGNIFICANT CHANGE UP (ref 135–145)
SP GR SPEC: 1.01 — SIGNIFICANT CHANGE UP (ref 1–1.03)
TARGETS BLD QL SMEAR: SLIGHT — SIGNIFICANT CHANGE UP
TROPONIN T SERPL-MCNC: 0.16 NG/ML — CRITICAL HIGH (ref 0–0.01)
TROPONIN T SERPL-MCNC: 0.16 NG/ML — CRITICAL HIGH (ref 0–0.01)
UROBILINOGEN FLD QL: 0.2 E.U./DL — SIGNIFICANT CHANGE UP
WBC # BLD: 10.02 K/UL — SIGNIFICANT CHANGE UP (ref 3.8–10.5)
WBC # BLD: 11.48 K/UL — HIGH (ref 3.8–10.5)
WBC # FLD AUTO: 10.02 K/UL — SIGNIFICANT CHANGE UP (ref 3.8–10.5)
WBC # FLD AUTO: 11.48 K/UL — HIGH (ref 3.8–10.5)

## 2023-04-08 PROCEDURE — 99223 1ST HOSP IP/OBS HIGH 75: CPT | Mod: GC

## 2023-04-08 PROCEDURE — 71046 X-RAY EXAM CHEST 2 VIEWS: CPT | Mod: 26

## 2023-04-08 PROCEDURE — 99291 CRITICAL CARE FIRST HOUR: CPT

## 2023-04-08 RX ORDER — DIGOXIN 250 MCG
250 TABLET ORAL ONCE
Refills: 0 | Status: COMPLETED | OUTPATIENT
Start: 2023-04-09 | End: 2023-04-09

## 2023-04-08 RX ORDER — DIGOXIN 250 MCG
500 TABLET ORAL ONCE
Refills: 0 | Status: COMPLETED | OUTPATIENT
Start: 2023-04-08 | End: 2023-04-08

## 2023-04-08 RX ORDER — ATORVASTATIN CALCIUM 80 MG/1
40 TABLET, FILM COATED ORAL AT BEDTIME
Refills: 0 | Status: DISCONTINUED | OUTPATIENT
Start: 2023-04-09 | End: 2023-04-14

## 2023-04-08 RX ORDER — METOPROLOL TARTRATE 50 MG
5 TABLET ORAL ONCE
Refills: 0 | Status: COMPLETED | OUTPATIENT
Start: 2023-04-08 | End: 2023-04-08

## 2023-04-08 RX ORDER — SODIUM CHLORIDE 9 MG/ML
1000 INJECTION INTRAMUSCULAR; INTRAVENOUS; SUBCUTANEOUS ONCE
Refills: 0 | Status: COMPLETED | OUTPATIENT
Start: 2023-04-08 | End: 2023-04-08

## 2023-04-08 RX ORDER — ACETAMINOPHEN 500 MG
650 TABLET ORAL EVERY 6 HOURS
Refills: 0 | Status: DISCONTINUED | OUTPATIENT
Start: 2023-04-08 | End: 2023-04-14

## 2023-04-08 RX ORDER — LANOLIN ALCOHOL/MO/W.PET/CERES
3 CREAM (GRAM) TOPICAL AT BEDTIME
Refills: 0 | Status: DISCONTINUED | OUTPATIENT
Start: 2023-04-08 | End: 2023-04-14

## 2023-04-08 RX ORDER — METOPROLOL TARTRATE 50 MG
25 TABLET ORAL EVERY 8 HOURS
Refills: 0 | Status: DISCONTINUED | OUTPATIENT
Start: 2023-04-08 | End: 2023-04-14

## 2023-04-08 RX ADMIN — Medication 5 MILLIGRAM(S): at 20:25

## 2023-04-08 RX ADMIN — Medication 500 MICROGRAM(S): at 22:21

## 2023-04-08 RX ADMIN — Medication 25 MILLIGRAM(S): at 21:06

## 2023-04-08 RX ADMIN — SODIUM CHLORIDE 1000 MILLILITER(S): 9 INJECTION INTRAMUSCULAR; INTRAVENOUS; SUBCUTANEOUS at 12:21

## 2023-04-08 NOTE — CONSULT NOTE ADULT - ASSESSMENT
#Anemia    #Troponemia    #pAfib  - Resolved  - Patient with episode of rapid Afib in ED,   - No known history of Afib  - Cards consulted     #Anemia  - Patient reports history of anemia 2/2 IAIN  - No recent bleeding  - Consult GI in AM    #Troponemia  #Non ACS troponin/myocardial injury  -likely due to anemia +/- demand from flutter/tachycardia in pt w/ underlying CAD -- pt is asymptomatic  Rec transfusion   trend in 6-8 hrs    #CAD s/p KUSHAL mLCX, OM1, pLAD 3/2021 - c/w rosuvastatin. Can hold plavix if needed due to concern of bleed.     #Rapid Aflutter  - Likely 2/2 Anemia, CTI dependent/typical w/ CHADS VASC - 2 (CAD, HTN)  - Needs ACs, apixaban 5mg BID. However, need to identify etiology of anemia/ro/o bleed prior to this  - Cards following - Favor control w/ DCCV/ablation but will need VENECIA and need to be able to take AC reliably/safely for these   62M w/ IAIN, HTN, HLD, prediabetes, LBBB (since 2018), CAD with stents (2021), on asa and plavix sent by cardiologist for anemia.    #Anemia  - Hgb 6.6 on admission, complaining of intermittent fatigue for the past week  - Patient reports history of anemia 2/2 IAIN, diagnosed with LGIB in the past  - F/u collateral from outpatient endoscopy  - S/p 1u pRBC in ED, f/u post-transfusion CBC  - No recent bleeding reported per patient  - Consult GI in AM    #Troponemia  - Likely Non ACS troponin/myocardial injury  - Likely due to anemia +/- demand from flutter/tachycardia in pt w/ underlying CAD  - Trend troponin in 6-8 hrs  - Cardiology following    #CAD s/p KUSHAL mLCX, OM1, pLAD 3/2021   - C/w home statin  - Hold plavix due to concern of bleed    #Rapid Aflutter  - Likely 2/2 Anemia, treatment as above  - CHADS VASC - 2 (CAD, HTN)  - Needs AC, apixaban 5mg BID. However, need to identify etiology of anemia/ro/o bleed prior to this  - Favor control w/ DCCV/ablation but will need VENECIA and need to be able to take AC reliably/safely for these  - Cards following

## 2023-04-08 NOTE — ED ADULT NURSE NOTE - OBJECTIVE STATEMENT
62 y.o. Male sent into ED by PCP for abnormal lab result. Per patient hgb 6 from outpatient lab work. C/o feeling tired x1 week and occasional SOB but denies CP, hematuria, notable blood in stool. Pt states that he had a low hemoglobin level in the past which was resolved with iron transfusions; does not currently take iron at home. x4 cardiac stents placed x2 years ago and takes plavix daily. Denies current CP, SOB, nausea/vomiting/diarrhea, fever/chills, pain. PMHx HTN, HLD, cardiomyopathy.

## 2023-04-08 NOTE — PATIENT PROFILE ADULT - FALL HARM RISK - HARM RISK INTERVENTIONS

## 2023-04-08 NOTE — ED PROVIDER NOTE - PROGRESS NOTE DETAILS
trop 0.16, pt without cp or sob. Rpt EKG +LBBB, ?aflutter. seen by cards, do not feel this is acute ischemia, no intervention for the flutter at this time, needs anemia to be worked up

## 2023-04-08 NOTE — H&P ADULT - PROBLEM SELECTOR PLAN 3
Presented with Troponin T: 0.16, CKMB WNL. Non ACS troponin/myocardial injury, patient asymptomatic with EKG unremarkable for ST changes. LBBB present (chronic since 2018). EKG revealed afib with RVR. Likely due to anemia +/- demand from flutter/tachycardia in pt w/ underlying CAD. No history of CP recently or on admission     -Managing anemia as per above   -Trend to peak   -Cardiology following - recs appreciated

## 2023-04-08 NOTE — ED PROVIDER NOTE - PHYSICAL EXAMINATION
CONSTITUTIONAL: Well-appearing;  in no apparent distress.   HEAD: Normocephalic; atraumatic.   EYES: PERRL; EOM intact; conjunctiva pale  ENT: normal nose; no rhinorrhea; normal pharynx with no erythema or lesions.   NECK: Supple; non-tender; no LAD  CARDIOVASCULAR: Normal S1, S2; No audible murmurs. Regular rate and rhythm.   RESPIRATORY: Breathing easily; breath sounds clear and equal bilaterally; no wheezes, rhonchi, or rales.  GI: Soft; non-distended; non-tender; no palpable organomegaly.   MSK: FROM at all extremities, normal tone   EXT: No cyanosis or edema; N/V intact  SKIN: Normal for age and race; warm; dry; good turgor; no apparent lesions or rash.   NEURO: A & O x 3; face symmetric; grossly unremarkable.   PSYCHOLOGICAL: The patient’s mood and manner are appropriate.

## 2023-04-08 NOTE — H&P ADULT - PROBLEM SELECTOR PLAN 6
Plan:  F: s/p 1L NS in the ED   E: replete K<4, Mg<2  N: DASH/TLC  VTE Prophylaxis: None  GI: None   C: Full Code  D: 07LA

## 2023-04-08 NOTE — H&P ADULT - PROBLEM SELECTOR PLAN 2
New onset a.flutter with -130s. Likely 2/2 Anemia, CTI dependent/typical w/ CHADS VASC - 2 (CAD, HTN),  cardiology consulted in ED with preference of DCCV/ablation in the setting of new onset a.flutter as rate control will be difficult with medical management   -S/p IV lopressor 5 mg   -HR more controlled to 90s-low 100s upon admission to telemetry     - Cards following - recs appreciated    -Evaluate for DCCV/ablation after VENECIA completed   -VENECIA ordered   -Starting metoprolol tartrate 25 mg Q8H for rate control for now until above is addressed

## 2023-04-08 NOTE — ED PROVIDER NOTE - OBJECTIVE STATEMENT
63 yo M with pmh of HTN, HLD, prediabetes, LBBB, CAD with stent, on asa and plavix 63 yo M with pmh of HTN, HLD, prediabetes, LBBB, CAD with stent, on asa and plavix sent by cardiologist for low hemoglobin and low blood pressure.  patient had a routine visit yesterday where they did blood and was called today to tell him that his hemoglobin was in the 6's. Patient states his blood pressure reading was also low.  Denies fever, chills, chest pain, shortness of breath, dizziness, nausea, vomiting, diarrhea, black or bloody stools.  Patient states he has had a low hemoglobin in the past that resolved with an iron transfusion but has never had a blood transfusion.  Patient does not take daily iron supplements.  Patient does note that he has been feeling more fatigued over the past week.  Earlier today when he was told he needed to go to the ER he started to get panicky and was hyperventilating but that resolved.

## 2023-04-08 NOTE — ED PROVIDER NOTE - CLINICAL SUMMARY MEDICAL DECISION MAKING FREE TEXT BOX
63 yo M with pmh of HTN, HLD, prediabetes, LBBB, CAD with stent, on asa and plavix sent by cardiologist for low hemoglobin and low blood pressure.   Denies fever, chills, chest pain, shortness of breath, dizziness, nausea, vomiting, diarrhea, black or bloody stools. Pt reports generalized fatigue. /64, . Non-toxic appearing. EKG with LBBB, unchanged. PE unremarkable. 61 yo M with pmh of HTN, HLD, prediabetes, LBBB, CAD with stent, on asa and plavix sent by cardiologist for low hemoglobin and low blood pressure.   Denies fever, chills, chest pain, shortness of breath, dizziness, nausea, vomiting, diarrhea, black or bloody stools. Pt reports generalized fatigue. /64, . Non-toxic appearing. EKG with LBBB, unchanged. PE unremarkable. labs, cxr r/o anemia r/o infection r/o acs

## 2023-04-08 NOTE — H&P ADULT - HISTORY OF PRESENT ILLNESS
63 yo M with PMH of IAIN,  HTN, HLD, prediabetes, LBBB (since 2018), CAD with stent (2021), on asa and plavix sent by cardiologist for anemia and hypotension.  Patient presented to cardiologist yesterday as he was feeling "run down" and was called today notifying him that his hemoglobin was in the 6's and referred to the ED. Patient states he was also hypotensive at his cardiologist appointment, unaware of the reading.  Of note, pt has had a history of IAIN diagnosed in 2021, s/p 2 iron transfusions, and was later prescribed PO iron which patient stopped taking on his own behalf. Patient states he had an EGD/Colonoscopy in 2022 which was remarkable for a polyp but otherwise showed no signs of bleeding. Denies recent NSAID or alcohol use, takes ASA/plavix daily, last dose being 4/7.     Reports ongoing fatigue for the last few days with associated exertional SOB that is now resolved and an episode of "hyperventilation" when he was notified today of his lab results due to anxiety. Denies fever, chills, chest pain, dizziness, nausea, vomiting, diarrhea, black or bloody stools.      ED vitals: T 98.4, , RR 20, /64, SpO2 100%, orthostatics negative  Labs: WBC 10, Hgb 6.6 (baseline 7-8), MCV: 70, Plts: 531 CMP normal, lactate 2.2, trop 0.16  UA: Unremarkable   CXR: Unremarkable, pending final read   EKG: Rapid aflutter with LBBB (chronic, since 2018)   Interventions: 1L NS, 1u pRBC  Consults: cardiology, ICU

## 2023-04-08 NOTE — CONSULT NOTE ADULT - ASSESSMENT
62M w/ Fedef anemia, CAD s/p KUSHAL, HLD p/w anemia w/ elevated troponin and new diagnosis of a flutter at rate of 110.      #Non ACS troponin/myocardial injury  -likely due to anemia +/- demand from flutter/tachycardia in pt w/ underlying CAD -- pt is asymptomatic  Rec transfusion   trend in 6-8 hrs    #A flutter likely CTI dependent/typical w/ CHADS VASC - 2 (CAD, HTN)  -Needs ACs, apixaban 5mg BID. However, need to identify etiology of anemia prior to this                  Meds: coreg, plavix,  62M w/ Fedef anemia, CAD s/p KUSHAL mLCX, OM1, pLAD 3/2021, HLD p/w anemia w/ elevated troponin and new diagnosis of a flutter at rate of 110.      #Non ACS troponin/myocardial injury  -likely due to anemia +/- demand from flutter/tachycardia in pt w/ underlying CAD -- pt is asymptomatic  Rec transfusion   trend in 6-8 hrs    #A flutter likely CTI dependent/typical w/ CHADS VASC - 2 (CAD, HTN)  -Needs ACs, apixaban 5mg BID. However, need to identify etiology of anemia/ro/o bleed prior to this  Favor control w/ DCCV/ablation but will need VENECIA and need to be able to take AC reliably/safely for these    #CAD s/p KUSHAL mLCX, OM1, pLAD 3/2021 - c/w rosuvastatin. Can hold plavix if needed due to concern of bleed.

## 2023-04-08 NOTE — H&P ADULT - ASSESSMENT
62M with PMH IAIN, CAD s/p KUSHAL mLCX, OM1, pLAD 3/2021, HLD presenting with symptomatic anemia associated with elevated troponin and new diagnosis of a flutter at rate of 110, admitted for anemia and a.flutter rate control

## 2023-04-08 NOTE — ED PROVIDER NOTE - CADM POA PRESS ULCER
getting better, can lay on it and it is less swollen. She is doing exercises faithfully. Her left hand continues to shake when she is having pain. OBJECTIVE: See eval   Observation:    Test measurements:      RESTRICTIONS/PRECAUTIONS:     Exercises/Interventions:   Therapeutic Ex (40857)  Min: 25 Resistance/Reps Cues/Notes   Wand flexion 5 sec x 10 with 2#    Wand scaption 5 sec x 10 with 2#    Wand ER 5 sec x10    Wall slides 5 sec x 10    OH pulleys 3 min Added 3/31   Posterior capsular Sleeper stretcher Int rot - 3 x 30 sec Added to HEP on 3/31/21   Right sidelying scap mob with UE ranger 20X (pt really liked this exercise) Added 3/31   Anterior capsule sleeper stretcher Ext rot- 3 x 30 sec Added 4/8/21   Wand IR behind her back 5 sec x 10    Manual Intervention  (34547)  Min:20      GHJ and capsular mobs, long axis distraction, PROM all ranges     Rhythmic stabilization -3 x 30 sec     NMR re-education (28850)  Min:               Therapeutic Activity (88209)  Min:               Modalities:  Min                 Other Therapeutic Activities:  Pt was educated on PT POC, Diagnosis, Prognosis, pathomechanics as well as frequency and duration of scheduling future physical therapy appointments. Time was also taken on this day to answer all patient questions and participation in PT. Reviewed appointment policy in detail with patient and patient verbalized understanding. Home Exercise Program: [place] [facility] program as above for the 5 exercises to start with stretching the shoulder capsule.     3/31/21: Added sleeper's stretch to HEP, 3 x 30 sec   4/8/21: Added sleepers stretch into ext rot 3x 30 sec    Therapeutic Exercise and NMR EXR  [x] (08402) Provided verbal/tactile cueing for activities related to strengthening, flexibility, endurance, ROM  for improvements in scapular, scapulothoracic and UE control with self care, reaching, carrying, lifting, house/yardwork, driving/computer work.    [] (41499) Provided verbal/tactile cueing for activities related to improving balance, coordination, kinesthetic sense, posture, motor skill, proprioception  to assist with  scapular, scapulothoracic and UE control with self care, reaching, carrying, lifting, house/yardwork, driving/computer work. Therapeutic Activities:    [] (22665 or 58263) Provided verbal/tactile cueing for activities related to improving balance, coordination, kinesthetic sense, posture, motor skill, proprioception and motor activation to allow for proper function of scapular, scapulothoracic and UE control with self care, carrying, lifting, driving/computer work.      Home Exercise Program:    [x] (32709) Reviewed/Progressed HEP activities related to strengthening, flexibility, endurance, ROM of scapular, scapulothoracic and UE control with self care, reaching, carrying, lifting, house/yardwork, driving/computer work  [] (21364) Reviewed/Progressed HEP activities related to improving balance, coordination, kinesthetic sense, posture, motor skill, proprioception of scapular, scapulothoracic and UE control with self care, reaching, carrying, lifting, house/yardwork, driving/computer work      Manual Treatments:  PROM / STM / Oscillations-Mobs:  G-I, II, III, IV (PA's, Inf., Post.)  [x] (37984) Provided manual therapy to mobilize soft tissue/joints of cervical/CT, scapular GHJ and UE for the purpose of modulating pain, promoting relaxation,  increasing ROM, reducing/eliminating soft tissue swelling/inflammation/restriction, improving soft tissue extensibility and allowing for proper ROM for normal function with self care, reaching, carrying, lifting, house/yardwork, driving/computer work    Charges:  Timed Code Treatment Minutes: 50   Total Treatment Minutes: 50       [] EVAL (LOW) 78854 (typically 20 minutes face-to-face)  [] EVAL (MOD) 70410 (typically 30 minutes face-to-face)  [] EVAL (HIGH) 13874 (typically 45 minutes face-to-face)  [] RE-EVAL     [x] ST(08237) x 2    [] Dry needle 1 or 2 Muscles (72235)  [] NMR (24547) x     [] Dry needle 3+ Muscles (18562)  [x] Manual (89814) x  1   [] Ultrasound (13623) x  [] TA (84006) x     [] Mech Traction (65189)  [] ES(attended) (84534)     [] ES (un) (35112):   [] Vasopump (41927) [] Ionto (38381)   [] Other:    If BWC Please Indicate Time In/Out  CPT Code Time in Time out                                   Eddye Host stated goal: \"To get back to work\"  []? Progressing: []? Met: []? Not Met: []? Adjusted     Therapist goals for Patient:   Short Term Goals: To be achieved in: 2 weeks  1. Independent in HEP and progression per patient tolerance, in order to prevent re-injury. []? Progressing: []? Met: []? Not Met: []? Adjusted  2. Patient will have a decrease in pain to facilitate improvement in movement, function, and ADLs as indicated by Functional Deficits. []? Progressing: []? Met: []? Not Met: []? Adjusted     Long Term Goals: To be achieved in: 8 weeks  1. Disability index score of 30% or less for the Quick DASH to assist with reaching prior level of function. []? Progressing: []? Met: []? Not Met: []? Adjusted  2. Patient will demonstrate increased AROM to 150 degrees of flexion and abduction to allow for proper joint functioning as indicated by Functional Deficits. []? Progressing: []? Met: []? Not Met: []? Adjusted  3. Patient will demonstrate an increase in NM recruitment/activation and overall GH and scapular strength to within n5lbs HHD or WNL for proper functional mobility as indicated by patients Functional Deficits. []? Progressing: []? Met: []? Not Met: []? Adjusted  4. Patient will return to 70%  activities without increased symptoms or restriction. []? Progressing: []? Met: []? Not Met: []? Adjusted  5. Pt will increase mobility to normal shoulder range so she can return to work (patient specific functional goal)           []? Progressing: []? Met: []? Not Met: []?  Adjusted     ASSESSMENT: Pt seems to be developing adhesive capsulitis and in view of her left hand pain, possibly Chronic Regional  Pain Syndrome as well. Movement must begin to improve with urgency to avoid worsening of her condition. Treatment/Activity Tolerance:  [x] Patient tolerated treatment well [] Patient limited by fatique  [] Patient limited by pain  [] Patient limited by other medical complications  [] Other:     Overall Progression Towards Functional goals/ Treatment Progress Update:  [] Patient is progressing as expected towards functional goals listed. [] Progression is slowed due to complexities/Impairments listed. [] Progression has been slowed due to co-morbidities. [x] Plan just implemented, too soon to assess goals progression <30days   [] Goals require adjustment due to lack of progress  [] Patient is not progressing as expected and requires additional follow up with physician  [] Other    Prognosis for POC: [x] Good [] Fair  [] Poor    Patient requires continued skilled intervention: [x] Yes  [] No      PLAN: First focus on mobility of capsule and shoulder and decrease of pain. [x] Continue per plan of care [] Alter current plan (see comments)  [] Plan of care initiated [] Hold pending MD visit [] Discharge    Electronically signed by: Sarthak Bennett PT     Note: If patient does not return for scheduled/recommended follow up visits, this note will serve as a discharge from care along with the most recent update on progress. No

## 2023-04-08 NOTE — H&P ADULT - PROBLEM SELECTOR PLAN 1
Microcytic anemia, presented with Hgb 6.6 (baseline 7-8). S/p 1u pRBCs in ED, pending repeat Hgb. History of severe iron deficiency anemia, s/p 2 iron transfusions in the past and prescribed oral iron which pt stopped on his own behalf. Pt currently without sx of melena, black/tarry stool, hematemesis, easy bleeding/bruising. Orthostatics negative in ED  -Etiology likely 2/2 ongoing iron deficiency and iron supplement cessation  -On AC and has tolerated thus far, without history of recent NSAID, etoh use     -Trend CBC. Pending repeat Hgb s/p transfusion   -Transfuse if Hgb <7   - F/u iron panel. Start iron sucrose based on iron levels pending   -F/u hemolysis labs   -Active T&S  - Consider GI consult in AM if Hgb trending downwards Microcytic anemia, presented with Hgb 6.6 (baseline 7-8). S/p 1u pRBCs in ED, pending repeat Hgb. History of severe iron deficiency anemia, s/p 2 iron transfusions in the past and prescribed oral iron which pt stopped on his own behalf. Pt currently without sx of melena, black/tarry stool, hematemesis, easy bleeding/bruising. Orthostatics negative in ED  -Etiology likely 2/2 ongoing iron deficiency and iron supplement cessation  -On AC and has tolerated thus far, without history of recent NSAID, etoh use     -Trend CBC. Pending repeat Hgb s/p transfusion   -Transfuse if Hgb <7   - F/u iron panel. Start iron sucrose based on iron levels pending   -F/u hemolysis labs   -Active T&S  -GI consult in AM

## 2023-04-08 NOTE — CONSULT NOTE ADULT - SUBJECTIVE AND OBJECTIVE BOX
Patient is a 62y old  Male who presents with a chief complaint of     Consult reason:  ED vitals:   T   HR   RR   BP   SpO2  Labs:  CXR:  EKG:   Interventions:  Consults:    HPI:      ROS:  General:  Pulm:  CV:  GI:  :  Neuro:  MSK:  Heme:      PAST MEDICAL & SURGICAL HISTORY:  Hypertension      Hyperlipidemia      Prediabetes      Obese      Umbilical hernia      Erectile dysfunction      CAD (coronary artery disease)      H/O cardiomyopathy      H/O foot surgery  L      S/P scrotal varicocelectomy      Status post cardiac catheterization  4 stents          Home Medications:  Multiple Vitamins oral tablet: 1 tab(s) orally once a day (01 Mar 2022 12:50)    MEDICATIONS  (STANDING):    MEDICATIONS  (PRN):      Allergies    No Known Allergies    Intolerances        SOCIAL HX:       FAMILY HISTORY:  FAMILY HISTORY:  FH: myocardial infarction  Father in his 60s    :      PHYSICAL EXAM:    ICU Vital Signs Last 24 Hrs  T(C): 36.7 (2023 15:25), Max: 36.9 (2023 11:29)  T(F): 98 (2023 15:25), Max: 98.4 (2023 11:29)  HR: 90 (2023 15:25) (90 - 111)  BP: 114/63 (2023 15:25) (100/64 - 114/63)  BP(mean): --  ABP: --  ABP(mean): --  RR: 18 (2023 15:25) (18 - 20)  SpO2: 100% (2023 15:25) (100% - 100%)    O2 Parameters below as of 2023 15:25  Patient On (Oxygen Delivery Method): room air            General: NC/AT, lying in bed   HEENT:  NC/AT, EOMI, sclera anicteric, PERRL       Lymphatic system: No LN  Lungs: Bilateral BS  Cardiovascular: RRR, nl s1, s2, no m/r/g appreciated  Gastrointestinal: abdomen soft, NTND, bowel sounds present  Musculoskeletal: No clubbing.  Moves all extremities.    Skin: Warm, dry, intact. No rashes noted.  Neurological: A&Ox3, strength 5/5 and sensation intact in all extremities         LABS:                          6.6    10.02 )-----------( 531      ( 2023 11:51 )             23.7                                               04-08    135  |  102  |  15  ----------------------------<  112<H>  4.5   |  22  |  0.88    Ca    9.5      2023 11:51    TPro  6.8  /  Alb  4.1  /  TBili  0.4  /  DBili  x   /  AST  24  /  ALT  34  /  AlkPhos  69  04-08      PT/INR - ( 2023 11:51 )   PT: 13.5 sec;   INR: 1.13          PTT - ( 2023 11:51 )  PTT:27.1 sec                                       Urinalysis Basic - ( 2023 13:02 )    Color: Yellow / Appearance: Clear / S.010 / pH: x  Gluc: x / Ketone: NEGATIVE  / Bili: Negative / Urobili: 0.2 E.U./dL   Blood: x / Protein: NEGATIVE mg/dL / Nitrite: NEGATIVE   Leuk Esterase: NEGATIVE / RBC: x / WBC x   Sq Epi: x / Non Sq Epi: x / Bacteria: x        CARDIAC MARKERS ( 2023 11:51 )  x     / 0.16 ng/mL / 95 U/L / x     / 4.2 ng/mL                                            LIVER FUNCTIONS - ( 2023 11:51 )  Alb: 4.1 g/dL / Pro: 6.8 g/dL / ALK PHOS: 69 U/L / ALT: 34 U/L / AST: 24 U/L / GGT: x                                                  Urinalysis with Rflx Culture (collected 2023 13:02)                                                                                            Patient is a 62y old  Male who presents with a chief complaint of anemia.    Consult reason: Anemia  ED vitals: T 98.4, , RR 20, /64, SpO2 100%, orthostatic negative  Labs: WBC 10, Hgb 6.6 (baseline 7-8), CMP normal, lactate 2.2, trop 0.16  UA: clean  CXR:  EKG: Rapid Afib  Interventions: 1L NS, 1u pRBC  Consults: cardiology, ICU    HPI:    61 yo M with pmh of HTN, HLD, prediabetes, LBBB, CAD with stent, on asa and plavix sent by cardiologist for low hemoglobin and low blood pressure.  patient had a routine visit yesterday where they did blood and was called today to tell him that his hemoglobin was in the 6's. Patient states his blood pressure reading was also low.  Denies fever, chills, chest pain, shortness of breath, dizziness, nausea, vomiting, diarrhea, black or bloody stools.  Patient states he has had a low hemoglobin in the past that resolved with an iron transfusion but has never had a blood transfusion.  Patient does not take daily iron supplements.  Patient does note that he has been feeling more fatigued over the past week.  Earlier today when he was told he needed to go to the ER he started to get panicky and was hyperventilating but that resolved.      PAST MEDICAL & SURGICAL HISTORY:  Hypertension      Hyperlipidemia      Prediabetes      Obese      Umbilical hernia      Erectile dysfunction      CAD (coronary artery disease)      H/O cardiomyopathy      H/O foot surgery  L      S/P scrotal varicocelectomy      Status post cardiac catheterization  4 stents          Home Medications:  Multiple Vitamins oral tablet: 1 tab(s) orally once a day (01 Mar 2022 12:50)    MEDICATIONS  (STANDING):    MEDICATIONS  (PRN):      Allergies    No Known Allergies    Intolerances        SOCIAL HX:       FAMILY HISTORY:  FAMILY HISTORY:  FH: myocardial infarction  Father in his 60s    :      PHYSICAL EXAM:    ICU Vital Signs Last 24 Hrs  T(C): 36.7 (2023 15:25), Max: 36.9 (2023 11:29)  T(F): 98 (2023 15:25), Max: 98.4 (2023 11:29)  HR: 90 (2023 15:25) (90 - 111)  BP: 114/63 (2023 15:25) (100/64 - 114/63)  BP(mean): --  ABP: --  ABP(mean): --  RR: 18 (2023 15:25) (18 - 20)  SpO2: 100% (2023 15:25) (100% - 100%)    O2 Parameters below as of 2023 15:25  Patient On (Oxygen Delivery Method): room air            General: NC/AT, lying in bed   HEENT:  NC/AT, EOMI, sclera anicteric, PERRL       Lymphatic system: No LN  Lungs: Bilateral BS  Cardiovascular: RRR, nl s1, s2, no m/r/g appreciated  Gastrointestinal: abdomen soft, NTND, bowel sounds present  Musculoskeletal: No clubbing.  Moves all extremities.    Skin: Warm, dry, intact. No rashes noted.  Neurological: A&Ox3, strength 5/5 and sensation intact in all extremities         LABS:                          6.6    10.02 )-----------( 531      ( 2023 11:51 )             23.7                                               04-08    135  |  102  |  15  ----------------------------<  112<H>  4.5   |  22  |  0.88    Ca    9.5      2023 11:51    TPro  6.8  /  Alb  4.1  /  TBili  0.4  /  DBili  x   /  AST  24  /  ALT  34  /  AlkPhos  69  04-08      PT/INR - ( 2023 11:51 )   PT: 13.5 sec;   INR: 1.13          PTT - ( 2023 11:51 )  PTT:27.1 sec                                       Urinalysis Basic - ( 2023 13:02 )    Color: Yellow / Appearance: Clear / S.010 / pH: x  Gluc: x / Ketone: NEGATIVE  / Bili: Negative / Urobili: 0.2 E.U./dL   Blood: x / Protein: NEGATIVE mg/dL / Nitrite: NEGATIVE   Leuk Esterase: NEGATIVE / RBC: x / WBC x   Sq Epi: x / Non Sq Epi: x / Bacteria: x        CARDIAC MARKERS ( 2023 11:51 )  x     / 0.16 ng/mL / 95 U/L / x     / 4.2 ng/mL                                            LIVER FUNCTIONS - ( 2023 11:51 )  Alb: 4.1 g/dL / Pro: 6.8 g/dL / ALK PHOS: 69 U/L / ALT: 34 U/L / AST: 24 U/L / GGT: x                                                  Urinalysis with Rflx Culture (collected 2023 13:02)                                                                                            Patient is a 62y old  Male who presents with a chief complaint of anemia.    Consult reason: Anemia  ED vitals: T 98.4, , RR 20, /64, SpO2 100%, orthostatic negative  Labs: WBC 10, Hgb 6.6 (baseline 7-8), CMP normal, lactate 2.2, trop 0.16  UA: clean  CXR: None  EKG: Rapid Afib  Interventions: 1L NS, 1u pRBC  Consults: cardiology, ICU    HPI:    63 yo M with pmh of HTN, HLD, prediabetes, LBBB, CAD with stent, on asa and plavix sent by cardiologist for low hemoglobin and low blood pressure.  patient had a routine visit yesterday where they did blood and was called today to tell him that his hemoglobin was in the 6's. Patient states his blood pressure reading was also low.  Denies fever, chills, chest pain, shortness of breath, dizziness, nausea, vomiting, diarrhea, black or bloody stools.  Patient states he has had a low hemoglobin in the past that resolved with an iron transfusion but has never had a blood transfusion.  Patient does not take daily iron supplements.  Patient does note that he has been feeling more fatigued over the past week.  Earlier today when he was told he needed to go to the ER he started to get panicky and was hyperventilating but that resolved.      PAST MEDICAL & SURGICAL HISTORY:  Hypertension      Hyperlipidemia      Prediabetes      Obese      Umbilical hernia      Erectile dysfunction      CAD (coronary artery disease)      H/O cardiomyopathy      H/O foot surgery  L      S/P scrotal varicocelectomy      Status post cardiac catheterization  4 stents          Home Medications:  Multiple Vitamins oral tablet: 1 tab(s) orally once a day (01 Mar 2022 12:50)    MEDICATIONS  (STANDING):    MEDICATIONS  (PRN):      Allergies    No Known Allergies    Intolerances        SOCIAL HX:       FAMILY HISTORY:  FAMILY HISTORY:  FH: myocardial infarction  Father in his 60s    :      PHYSICAL EXAM:    ICU Vital Signs Last 24 Hrs  T(C): 36.7 (2023 15:25), Max: 36.9 (2023 11:29)  T(F): 98 (2023 15:25), Max: 98.4 (2023 11:29)  HR: 90 (2023 15:25) (90 - 111)  BP: 114/63 (2023 15:25) (100/64 - 114/63)  BP(mean): --  ABP: --  ABP(mean): --  RR: 18 (2023 15:25) (18 - 20)  SpO2: 100% (2023 15:25) (100% - 100%)    O2 Parameters below as of 2023 15:25  Patient On (Oxygen Delivery Method): room air            General: NC/AT, lying in bed   HEENT:  NC/AT, EOMI, sclera anicteric, PERRL       Lymphatic system: No LN  Lungs: Bilateral BS  Cardiovascular: RRR, nl s1, s2, no m/r/g appreciated  Gastrointestinal: abdomen soft, NTND, bowel sounds present  Musculoskeletal: No clubbing.  Moves all extremities.    Skin: Warm, dry, intact. No rashes noted.  Neurological: A&Ox3, strength 5/5 and sensation intact in all extremities         LABS:                          6.6    10.02 )-----------( 531      ( 2023 11:51 )             23.7                                               04-08    135  |  102  |  15  ----------------------------<  112<H>  4.5   |  22  |  0.88    Ca    9.5      2023 11:51    TPro  6.8  /  Alb  4.1  /  TBili  0.4  /  DBili  x   /  AST  24  /  ALT  34  /  AlkPhos  69  04-08      PT/INR - ( 2023 11:51 )   PT: 13.5 sec;   INR: 1.13          PTT - ( 2023 11:51 )  PTT:27.1 sec                                       Urinalysis Basic - ( 2023 13:02 )    Color: Yellow / Appearance: Clear / S.010 / pH: x  Gluc: x / Ketone: NEGATIVE  / Bili: Negative / Urobili: 0.2 E.U./dL   Blood: x / Protein: NEGATIVE mg/dL / Nitrite: NEGATIVE   Leuk Esterase: NEGATIVE / RBC: x / WBC x   Sq Epi: x / Non Sq Epi: x / Bacteria: x        CARDIAC MARKERS ( 2023 11:51 )  x     / 0.16 ng/mL / 95 U/L / x     / 4.2 ng/mL                                            LIVER FUNCTIONS - ( 2023 11:51 )  Alb: 4.1 g/dL / Pro: 6.8 g/dL / ALK PHOS: 69 U/L / ALT: 34 U/L / AST: 24 U/L / GGT: x                                                  Urinalysis with Rflx Culture (collected 2023 13:02)

## 2023-04-08 NOTE — ED ADULT TRIAGE NOTE - OTHER COMPLAINTS
reports blood work noted hg around 6 per pt with home bp low. denies bleeding. endorsing sob with weakness. on plavix. ekg in progress

## 2023-04-08 NOTE — H&P ADULT - PROBLEM SELECTOR PLAN 4
s/p KUSHAL mLCX, OM1, pLAD 3/2021, Home med : Rosuvastatin 10 mg, ASA 81 mg, Plavix 75 mg QD    -C/w statin therapeutic interchange   -Deferring AC for now until active bleed ruled out (pending repeat Hgb)

## 2023-04-08 NOTE — H&P ADULT - ATTENDING COMMENTS
HTN, CAD, ABLA with AF  physical as above  transfuse and follow H/h  start metoprolol; cardiology consult  follow troponins  echo  GI consult

## 2023-04-08 NOTE — ED ADULT NURSE NOTE - NSIMPLEMENTINTERV_GEN_ALL_ED
Implemented All Fall with Harm Risk Interventions:  [place] to call system. Call bell, personal items and telephone within reach. Instruct patient to call for assistance. Room bathroom lighting operational. Non-slip footwear when patient is off stretcher. Physically safe environment: no spills, clutter or unnecessary equipment. Stretcher in lowest position, wheels locked, appropriate side rails in place. Provide visual cue, wrist band, yellow gown, etc. Monitor gait and stability. Monitor for mental status changes and reorient to person, place, and time. Review medications for side effects contributing to fall risk. Reinforce activity limits and safety measures with patient and family. Provide visual clues: red socks.

## 2023-04-08 NOTE — ED ADULT NURSE NOTE - NSICDXPASTMEDICALHX_GEN_ALL_CORE_FT
PAST MEDICAL HISTORY:  CAD (coronary artery disease)     Erectile dysfunction     H/O cardiomyopathy     Hyperlipidemia     Hypertension     Obese     Prediabetes     Umbilical hernia

## 2023-04-08 NOTE — ED ADULT NURSE REASSESSMENT NOTE - NS ED NURSE REASSESS COMMENT FT1
Pt placed on CCM and continuous O2 sat monitor upon arrival. A/Ox4, NAD. Tachycardic PA aware. Denies CP, SOB.

## 2023-04-08 NOTE — H&P ADULT - PROBLEM SELECTOR PLAN 5
home meds: lisinopril 20 mg QD     -Holding in setting of hypotension. Resume as appropriate home meds: lisinopril 20 mg QD     -Holding in setting of intermittent hypotension. Resume as appropriate

## 2023-04-08 NOTE — CONSULT NOTE ADULT - SUBJECTIVE AND OBJECTIVE BOX
HPI  HPI:    62M sent in by his cardiologist for incidentally noted Hb of 6.6. Does note he has exertional chest tightness  consistent w/ angina which has been ongoing. No CP currently at rest and no recent hx of CP at rest.  Denies recent viral illness, dyspnea, palpitations, syncope or pre syncope.  He has not noted overt GI bleed but johnston shave a long standing hx of iron deficiency and does get Fe transfusions.       Denies CP, dyspnea, palpitations, presyncope, syncope, orthopnea, PND    ROS: A 10-point review of systems was otherwise negative.    PAST MEDICAL & SURGICAL HISTORY:  Hypertension      Hyperlipidemia      Prediabetes      Obese      Umbilical hernia      Erectile dysfunction      CAD (coronary artery disease)      H/O cardiomyopathy      H/O foot surgery  L      S/P scrotal varicocelectomy      Status post cardiac catheterization  4 stents          SOCIAL HISTORY: non smoker  FAMILY HISTORY:  FH: myocardial infarction Father in his 60s  No hx of premature ASCVD in first degree relative, no sudden cardiac death in first degree relatives.   Reviewed and non contributory    ALLERGIES: 	  No Known Allergies            MEDICATIONS:      PHYSICAL EXAM:  T(C): 36.7 (04-08-23 @ 15:25), Max: 36.9 (04-08-23 @ 11:29)  HR: 90 (04-08-23 @ 15:25) (90 - 111)  BP: 114/63 (04-08-23 @ 15:25) (100/64 - 114/63)  RR: 18 (04-08-23 @ 15:25) (18 - 20)  SpO2: 100% (04-08-23 @ 15:25) (100% - 100%)  Wt(kg): --    GEN:  NAD.   HEENT: JVP 5  cmH2O  RESP: Chest clear bilaterally  CV: S1+s2. No murmur  ABD: Soft and non tender  EXT: Warm and well perfused. No edema    LABS:                        6.6    10.02 )-----------( 531      ( 08 Apr 2023 11:51 )             23.7     04-08    135  |  102  |  15  ----------------------------<  112<H>  4.5   |  22  |  0.88    Ca    9.5      08 Apr 2023 11:51    TPro  6.8  /  Alb  4.1  /  TBili  0.4  /  DBili  x   /  AST  24  /  ALT  34  /  AlkPhos  69  04-08      Radiographic Imaging, ECG, echocardiography personally reviewed.

## 2023-04-08 NOTE — H&P ADULT - NSHPPHYSICALEXAM_GEN_ALL_CORE
General: NC/AT, sitting up comfortably  HEENT:  NC/AT, EOMI, sclera anicteric, PERRL       Lungs: Clear to auscultation bilaterally   Cardiovascular: Tachycardic with regular rhythm. nl s1, s2, no m/r/g appreciated  Gastrointestinal: soft, obese abdomen, NTND, bowel sounds present  Musculoskeletal: No clubbing.  Moves all extremities.    Skin: Warm, dry, intact. No rashes noted.  Extremities: wwp; no cyanosis, clubbing or edema.   Neurological: A&Ox3, strength 5/5 and sensation intact in all extremities

## 2023-04-08 NOTE — ED PROVIDER NOTE - RATE
Called and left a message for patient to call back.    PSRs: Patient is currently scheduled with Dr. Cazares on 4/7/2020 at 10:00 AM.     Per Dr. Cazares, due to the current COVID-19 pandemic and the CDC recommendations, we would like to reschedule the appointment for on or after 5-.      [phone]

## 2023-04-09 LAB
ALBUMIN SERPL ELPH-MCNC: 3.7 G/DL — SIGNIFICANT CHANGE UP (ref 3.3–5)
ALP SERPL-CCNC: 63 U/L — SIGNIFICANT CHANGE UP (ref 40–120)
ALT FLD-CCNC: 28 U/L — SIGNIFICANT CHANGE UP (ref 10–45)
ANION GAP SERPL CALC-SCNC: 8 MMOL/L — SIGNIFICANT CHANGE UP (ref 5–17)
ANISOCYTOSIS BLD QL: SIGNIFICANT CHANGE UP
AST SERPL-CCNC: 18 U/L — SIGNIFICANT CHANGE UP (ref 10–40)
BASOPHILS # BLD AUTO: 0.09 K/UL — SIGNIFICANT CHANGE UP (ref 0–0.2)
BASOPHILS NFR BLD AUTO: 0.9 % — SIGNIFICANT CHANGE UP (ref 0–2)
BILIRUB SERPL-MCNC: 0.6 MG/DL — SIGNIFICANT CHANGE UP (ref 0.2–1.2)
BUN SERPL-MCNC: 15 MG/DL — SIGNIFICANT CHANGE UP (ref 7–23)
CALCIUM SERPL-MCNC: 8.8 MG/DL — SIGNIFICANT CHANGE UP (ref 8.4–10.5)
CHLORIDE SERPL-SCNC: 109 MMOL/L — HIGH (ref 96–108)
CO2 SERPL-SCNC: 21 MMOL/L — LOW (ref 22–31)
CREAT SERPL-MCNC: 0.89 MG/DL — SIGNIFICANT CHANGE UP (ref 0.5–1.3)
EGFR: 98 ML/MIN/1.73M2 — SIGNIFICANT CHANGE UP
ELLIPTOCYTES BLD QL SMEAR: SIGNIFICANT CHANGE UP
EOSINOPHIL # BLD AUTO: 0.18 K/UL — SIGNIFICANT CHANGE UP (ref 0–0.5)
EOSINOPHIL NFR BLD AUTO: 1.8 % — SIGNIFICANT CHANGE UP (ref 0–6)
FERRITIN SERPL-MCNC: 7 NG/ML — LOW (ref 30–400)
GIANT PLATELETS BLD QL SMEAR: PRESENT — SIGNIFICANT CHANGE UP
GLUCOSE SERPL-MCNC: 96 MG/DL — SIGNIFICANT CHANGE UP (ref 70–99)
HCT VFR BLD CALC: 25 % — LOW (ref 39–50)
HCT VFR BLD CALC: 27.4 % — LOW (ref 39–50)
HCT VFR BLD CALC: 29.4 % — LOW (ref 39–50)
HGB BLD-MCNC: 7 G/DL — CRITICAL LOW (ref 13–17)
HGB BLD-MCNC: 7.4 G/DL — LOW (ref 13–17)
HGB BLD-MCNC: 8.4 G/DL — LOW (ref 13–17)
HYPOCHROMIA BLD QL: SIGNIFICANT CHANGE UP
IRON SATN MFR SERPL: 14 UG/DL — LOW (ref 45–165)
IRON SATN MFR SERPL: 3 % — LOW (ref 16–55)
LYMPHOCYTES # BLD AUTO: 1.31 K/UL — SIGNIFICANT CHANGE UP (ref 1–3.3)
LYMPHOCYTES # BLD AUTO: 13.3 % — SIGNIFICANT CHANGE UP (ref 13–44)
MACROCYTES BLD QL: SLIGHT — SIGNIFICANT CHANGE UP
MAGNESIUM SERPL-MCNC: 1.9 MG/DL — SIGNIFICANT CHANGE UP (ref 1.6–2.6)
MANUAL SMEAR VERIFICATION: SIGNIFICANT CHANGE UP
MCHC RBC-ENTMCNC: 20.1 PG — LOW (ref 27–34)
MCHC RBC-ENTMCNC: 20.3 PG — LOW (ref 27–34)
MCHC RBC-ENTMCNC: 21.2 PG — LOW (ref 27–34)
MCHC RBC-ENTMCNC: 27 GM/DL — LOW (ref 32–36)
MCHC RBC-ENTMCNC: 28 GM/DL — LOW (ref 32–36)
MCHC RBC-ENTMCNC: 28.6 GM/DL — LOW (ref 32–36)
MCV RBC AUTO: 72.7 FL — LOW (ref 80–100)
MCV RBC AUTO: 74.2 FL — LOW (ref 80–100)
MCV RBC AUTO: 74.3 FL — LOW (ref 80–100)
MICROCYTES BLD QL: SIGNIFICANT CHANGE UP
MONOCYTES # BLD AUTO: 0.52 K/UL — SIGNIFICANT CHANGE UP (ref 0–0.9)
MONOCYTES NFR BLD AUTO: 5.3 % — SIGNIFICANT CHANGE UP (ref 2–14)
NEUTROPHILS # BLD AUTO: 7.74 K/UL — HIGH (ref 1.8–7.4)
NEUTROPHILS NFR BLD AUTO: 78.7 % — HIGH (ref 43–77)
NRBC # BLD: 0 /100 WBCS — SIGNIFICANT CHANGE UP (ref 0–0)
NRBC # BLD: 0 /100 WBCS — SIGNIFICANT CHANGE UP (ref 0–0)
OVALOCYTES BLD QL SMEAR: SLIGHT — SIGNIFICANT CHANGE UP
PHOSPHATE SERPL-MCNC: 4.1 MG/DL — SIGNIFICANT CHANGE UP (ref 2.5–4.5)
PLAT MORPH BLD: ABNORMAL
PLATELET # BLD AUTO: 477 K/UL — HIGH (ref 150–400)
PLATELET # BLD AUTO: 485 K/UL — HIGH (ref 150–400)
PLATELET # BLD AUTO: 494 K/UL — HIGH (ref 150–400)
POIKILOCYTOSIS BLD QL AUTO: SIGNIFICANT CHANGE UP
POTASSIUM SERPL-MCNC: 4.2 MMOL/L — SIGNIFICANT CHANGE UP (ref 3.5–5.3)
POTASSIUM SERPL-SCNC: 4.2 MMOL/L — SIGNIFICANT CHANGE UP (ref 3.5–5.3)
PROT SERPL-MCNC: 6.4 G/DL — SIGNIFICANT CHANGE UP (ref 6–8.3)
RBC # BLD: 3.44 M/UL — LOW (ref 4.2–5.8)
RBC # BLD: 3.69 M/UL — LOW (ref 4.2–5.8)
RBC # BLD: 3.96 M/UL — LOW (ref 4.2–5.8)
RBC # FLD: 20.7 % — HIGH (ref 10.3–14.5)
RBC # FLD: 20.8 % — HIGH (ref 10.3–14.5)
RBC # FLD: 21.3 % — HIGH (ref 10.3–14.5)
RBC BLD AUTO: ABNORMAL
SCHISTOCYTES BLD QL AUTO: SIGNIFICANT CHANGE UP
SODIUM SERPL-SCNC: 138 MMOL/L — SIGNIFICANT CHANGE UP (ref 135–145)
TARGETS BLD QL SMEAR: SIGNIFICANT CHANGE UP
TIBC SERPL-MCNC: 417 UG/DL — SIGNIFICANT CHANGE UP (ref 220–430)
TRANSFERRIN SERPL-MCNC: 331 MG/DL — SIGNIFICANT CHANGE UP (ref 200–360)
UIBC SERPL-MCNC: 403 UG/DL — HIGH (ref 110–370)
WBC # BLD: 10.48 K/UL — SIGNIFICANT CHANGE UP (ref 3.8–10.5)
WBC # BLD: 10.69 K/UL — HIGH (ref 3.8–10.5)
WBC # BLD: 9.83 K/UL — SIGNIFICANT CHANGE UP (ref 3.8–10.5)
WBC # FLD AUTO: 10.48 K/UL — SIGNIFICANT CHANGE UP (ref 3.8–10.5)
WBC # FLD AUTO: 10.69 K/UL — HIGH (ref 3.8–10.5)
WBC # FLD AUTO: 9.83 K/UL — SIGNIFICANT CHANGE UP (ref 3.8–10.5)

## 2023-04-09 PROCEDURE — 99233 SBSQ HOSP IP/OBS HIGH 50: CPT | Mod: GC

## 2023-04-09 PROCEDURE — 99233 SBSQ HOSP IP/OBS HIGH 50: CPT

## 2023-04-09 PROCEDURE — 99222 1ST HOSP IP/OBS MODERATE 55: CPT

## 2023-04-09 RX ORDER — PANTOPRAZOLE SODIUM 20 MG/1
40 TABLET, DELAYED RELEASE ORAL EVERY 12 HOURS
Refills: 0 | Status: DISCONTINUED | OUTPATIENT
Start: 2023-04-09 | End: 2023-04-12

## 2023-04-09 RX ORDER — MAGNESIUM SULFATE 500 MG/ML
1 VIAL (ML) INJECTION ONCE
Refills: 0 | Status: COMPLETED | OUTPATIENT
Start: 2023-04-09 | End: 2023-04-09

## 2023-04-09 RX ORDER — MAGNESIUM OXIDE 400 MG ORAL TABLET 241.3 MG
400 TABLET ORAL
Refills: 0 | Status: DISCONTINUED | OUTPATIENT
Start: 2023-04-09 | End: 2023-04-09

## 2023-04-09 RX ORDER — DIGOXIN 250 MCG
250 TABLET ORAL ONCE
Refills: 0 | Status: COMPLETED | OUTPATIENT
Start: 2023-04-09 | End: 2023-04-09

## 2023-04-09 RX ADMIN — Medication 25 MILLIGRAM(S): at 12:02

## 2023-04-09 RX ADMIN — Medication 25 MILLIGRAM(S): at 05:48

## 2023-04-09 RX ADMIN — Medication 25 MILLIGRAM(S): at 21:22

## 2023-04-09 RX ADMIN — ATORVASTATIN CALCIUM 40 MILLIGRAM(S): 80 TABLET, FILM COATED ORAL at 21:22

## 2023-04-09 RX ADMIN — Medication 250 MICROGRAM(S): at 10:19

## 2023-04-09 RX ADMIN — Medication 100 GRAM(S): at 10:19

## 2023-04-09 RX ADMIN — PANTOPRAZOLE SODIUM 40 MILLIGRAM(S): 20 TABLET, DELAYED RELEASE ORAL at 18:22

## 2023-04-09 RX ADMIN — Medication 250 MICROGRAM(S): at 04:57

## 2023-04-09 NOTE — CONSULT NOTE ADULT - ATTENDING COMMENTS
-CAD - h/o PCI in 2021, currently p/w symptomatic anemia w/ Hgb ~6.6, p/w new onset Aflutter w/ RVR and baseline LBBB, found to have mildly elevated troponins, neg CPK/MB; Clinical picture and troponin release c/w Type II MI 2/2 elevated HR and anemia now s/p 1U PRBC w/ conversion to NSR, resolution of chest discomfort and trop trend flat; Ok to hold Plavix while pursuing w/u of anemia w/ unclear etiology; c/w Statin. Pt. currently CP free and HD stable  -AF - New onset Aflut w/ RVR - pt denies prior h/o AF/Aflut, recently seen in Cardiologist's office on Friday 4/7 - found to have Hgb ~6s and sent in for further w/u - Was not in Aflut during this visit; Pt. is now s/p 1U PRBC w/ conversion to NSR indicating anemia as the likely  of Aflutter; No need for AC at this time given resolution of AF in <48hrs and active microcytic anemia; Pt. does have elevated CHADs-Vasc and would be candidate for long term A/C after completion of anemia w/u and only if w/ recurrent AF; Currently HD stable; c/w Lopressor 25 q8h; Obtain TTE  -Will continue to follow    Robin Whitaker MD  Cardiology Attending
Patient seen and d/w Dr. Schwartz. Agree with plan above.  62M w/ hx of IAIN and CAD s/p PCI on DAPT refer to hospital for anemia found to have new atrial flutter. Discussed role of endoscopy in the setting of new indication for anticoagulation.  In the setting of new anticoagulation requirement, will tentatively plan for 4/11/23. Continue PPI and trend CBC.
Please see H&P

## 2023-04-09 NOTE — PROGRESS NOTE ADULT - PROBLEM SELECTOR PLAN 1
Nurse changed ble extremity wounds as ordered. Hip dressing is clean dry and intact. Wound coccyx dressing was changed.  Pt is refusing to take 1400 medications at this time Microcytic anemia, presented with Hgb 6.6 (baseline 7-8). S/p 1u pRBCs in ED, pending repeat Hgb. History of severe iron deficiency anemia, s/p 2 iron transfusions in the past and prescribed oral iron which pt stopped on his own behalf. Pt currently without sx of melena, black/tarry stool, hematemesis, easy bleeding/bruising. Orthostatics negative in ED  -Etiology likely 2/2 ongoing iron deficiency and iron supplement cessation  -On AC and has tolerated thus far, without history of recent NSAID, etoh use     -Trend CBC. Pending repeat Hgb s/p transfusion   -Transfuse if Hgb <7   - total iron 14, ferritin 7  Start iron sucrose based on iron levels pending   -F/u hemolysis labs   -Active T&S  -GI consult in AM Microcytic anemia, presented with Hgb 6.6 (previous baseline 15). S/p 1u pRBCs in ED, pending repeat Hgb. History of severe iron deficiency anemia, s/p 2 iron transfusions in the past and prescribed oral iron which pt stopped on his own behalf. Pt currently without sx of melena, black/tarry stool, hematemesis, easy bleeding/bruising. Orthostatics negative in ED  -Etiology likely 2/2 ongoing iron deficiency and iron supplement cessation  -On AC and has tolerated thus far, without history of recent NSAID, etoh use     -Trend CBC. Pending repeat Hgb s/p transfusion   -GI consulted, plan for EGD tuesday, CLD tmro  -f/u celiac serology, hypercoagulable work up  -Transfuse if Hgb <7   - total iron 14, ferritin 7  Start iron sucrose based on iron levels pending   -F/u hemolysis labs   -Active T&S  -GI consult in AM

## 2023-04-09 NOTE — PROGRESS NOTE ADULT - ATTENDING COMMENTS
CAD with IAIN and acute on chronic blood loss anemia with rapid AF  physical as above  digoxin added to metoprolol  GI consult for endoscopies  holding AC, ASA  follow H/H, for now >7  decision making of high complexity

## 2023-04-09 NOTE — CONSULT NOTE ADULT - SUBJECTIVE AND OBJECTIVE BOX
HPI:  63 yo M with PMH of IAIN,  HTN, HLD, prediabetes, LBBB (since 2018), CAD with stent (2021), on asa and plavix sent by cardiologist for anemia and hypotension.  Patient presented to cardiologist yesterday as he was feeling "run down" and was called today notifying him that his hemoglobin was in the 6's and referred to the ED. Patient states he was also hypotensive at his cardiologist appointment, unaware of the reading.  Of note, pt has had a history of IAIN diagnosed in 2021, s/p 2 iron transfusions, and was later prescribed PO iron which patient stopped taking on his own behalf. Patient states he had an EGD/Colonoscopy in 2022 which was remarkable for a polyp but otherwise showed no signs of bleeding. Denies recent NSAID or alcohol use, takes ASA/plavix daily, last dose being 4/7.     Reports ongoing fatigue for the last few days with associated exertional SOB that is now resolved and an episode of "hyperventilation" when he was notified today of his lab results due to anxiety. Denies fever, chills, chest pain, dizziness, nausea, vomiting, diarrhea, black or bloody stools.      ED vitals: T 98.4, , RR 20, /64, SpO2 100%, orthostatics negative  Labs: WBC 10, Hgb 6.6 (baseline 7-8), MCV: 70, Plts: 531 CMP normal, lactate 2.2, trop 0.16  UA: Unremarkable   CXR: Unremarkable, pending final read   EKG: Rapid aflutter with LBBB (chronic, since 2018)   Interventions: 1L NS, 1u pRBC  Consults: cardiology, ICU   (08 Apr 2023 20:16)    SHx: umbilical hernia repair (2022)  Family history: negative for colorectal cancer  SH: Former smoker, notes social EtOH use years ago, currently does not drink alcohol   No other NSAID use, other than ASA and Plavix (last taken Friday, 4/7/23 per patient)    GI consulted for IAIN and for consideration of endoscopic evaluation.     Allergies    No Known Allergies    Intolerances      MEDICATIONS:  MEDICATIONS  (STANDING):  atorvastatin 40 milliGRAM(s) Oral at bedtime  metoprolol tartrate 25 milliGRAM(s) Oral every 8 hours    MEDICATIONS  (PRN):  acetaminophen     Tablet .. 650 milliGRAM(s) Oral every 6 hours PRN Temp greater or equal to 38C (100.4F), Mild Pain (1 - 3)  melatonin 3 milliGRAM(s) Oral at bedtime PRN Insomnia    PAST MEDICAL & SURGICAL HISTORY:  Hypertension      Hyperlipidemia      Prediabetes      Obese      Umbilical hernia      Erectile dysfunction      CAD (coronary artery disease)      H/O cardiomyopathy      H/O foot surgery  L      S/P scrotal varicocelectomy      Status post cardiac catheterization  4 stents        FAMILY HISTORY:  FH: myocardial infarction  Father in his 60s      SOCIAL HISTORY:  [place]: [ ] Current, [ ] Former, [ ] Never; Pack Years:  Alcohol:  Illicit Drugs:    REVIEW OF SYSTEMS:  Constitutional: No fever, chills, weight loss, or fatigue  ENMT:  No visual changes; No difficulty hearing, tinnitus, vertigo; No sinus or throat pain  Neck: No pain or stiffness  Respiratory: No cough, wheezing, or hemoptysis; No shortness of breath  Cardiovascular: No chest pain, palpitations, dizziness, orthopnea, PND, or leg swelling  Gastrointestinal: No abdominal or epigastric pain. No  nausea, vomiting, or hematemesis. No diarrhea, constipation, or steatorrhea. No melena or hematochezia  Genitourinary: No dysuria, urinary frequency/hesitancy, or hematuria  Skin: No itching, burning, rashes or lesions   Musculoskeletal: No joint pain or swelling; No muscle, back or extremity pain    Vital Signs Last 24 Hrs  T(C): 36.7 (09 Apr 2023 10:00), Max: 37.3 (09 Apr 2023 01:00)  T(F): 98.1 (09 Apr 2023 10:00), Max: 99.2 (09 Apr 2023 01:00)  HR: 78 (09 Apr 2023 12:00) (72 - 132)  BP: 113/71 (09 Apr 2023 12:00) (92/53 - 122/61)  BP(mean): 87 (09 Apr 2023 12:00) (67 - 89)  RR: 17 (09 Apr 2023 12:00) (16 - 20)  SpO2: 100% (09 Apr 2023 12:00) (94% - 100%)    Parameters below as of 09 Apr 2023 12:00  Patient On (Oxygen Delivery Method): room air          PHYSICAL EXAM:    General: male, sitting in a chair at bedside; family present; in no acute distress  HEENT: MMM, conjunctiva and sclera clear  Gastrointestinal: Soft, non-tender non-distended; Normal bowel sounds; No rebound or guarding  Extremities: Normal range of motion, No clubbing, cyanosis or edema  Neurological: Alert and oriented x3  Skin: Warm and dry. No obvious rash    LABS:                        7.4    9.83  )-----------( 494      ( 09 Apr 2023 07:03 )             27.4     04-09    138  |  109<H>  |  15  ----------------------------<  96  4.2   |  21<L>  |  0.89    Ca    8.8      09 Apr 2023 07:03  Phos  4.1     04-09  Mg     1.9     04-09    TPro  6.4  /  Alb  3.7  /  TBili  0.6  /  DBili  x   /  AST  18  /  ALT  28  /  AlkPhos  63  04-09        RADIOLOGY & ADDITIONAL STUDIES:

## 2023-04-09 NOTE — CONSULT NOTE ADULT - ASSESSMENT
62M PMH of IAIN,  HTN, HLD, prediabetes, LBBB (since 2018), CAD with stent (2021), on asa and plavix sent by cardiologist for anemia and hypotension, found to have a Hgb 6.6, also noted to have new Atrial flutter, admitted to telemetry for close monitoring, GI consulted for anemia evaluation in the setting of IAIN.     EGD (1/24/22)  -Irregularity of Zline and GEJ, 3 cm Hiatal hernia (PATH: lower third esophagus bx, glandular mucosa showing intestinal metaplasia, negative for dysplasia), Normal gastric (gastric antral & fundal mucosa WNL, negative for H pylori) & duodenal mucosa (Path: small intestinal mucosa with well-formed villi, WNL)    Colonoscopy (1/24/22)  -Diminutive Ascending colon polyp (s/p cold forceps polypectomy, PATH: normal mucosa), 5-6 mm sessile polyp in descending colon (s/p cold snare polypectomy, s/p hemoclip placement, PATH: tubular adenoma), small Internal hemorrhoids, moderate left sided diverticulosis  -Cecum AVM bx - colonic mucosa within normal limits    With no overt bleeding at this time with ddx wide at this time including Rashid lesion (i/s/o Hiatal hernia) vs PUD (given DAPT hx) vs AVM (previously noted on colonoscopy in 2022). With family history of MS, denies Celiac disease and previous EGD pathology with normal duodenal path, lower suspicion for underlying Celiac to explain IAIN however cannot entirely exclude from ddx.     #IAIN  -Appreciate cardiology evaluation to determine need for DAPT vs anti-platelet monotherapy (stent placed 2021)   -Obtain TTE   -Can consider for endoscopic evaluation with EGD/Colonoscopy, tentatively for Tuesday 4/11  -CLD tomorrow, 4/10  -Protonix 40 mg IVP BID   -Closely monitor H/H  -Maintain Active T&S  -Transfusion goal as per primary team     Case discussed with svc attending and primary team.     Jo Schwartz DO  Gastroenterology Fellow  Pager: [phone]

## 2023-04-09 NOTE — PROGRESS NOTE ADULT - SUBJECTIVE AND OBJECTIVE BOX
OVERNIGHT EVENTS:    SUBJECTIVE / INTERVAL HPI: Patient seen and examined at bedside.     VITAL SIGNS:  Vital Signs Last 24 Hrs  T(C): 37.3 (2023 01:00), Max: 37.3 (2023 01:00)  T(F): 99.2 (2023 01:00), Max: 99.2 (2023 01:00)  HR: 80 (2023 05:50) (80 - 132)  BP: 112/74 (2023 05:50) (92/53 - 122/61)  BP(mean): 89 (2023 05:50) (67 - 89)  RR: 18 (2023 05:50) (16 - 20)  SpO2: 94% (2023 05:50) (94% - 100%)    Parameters below as of 2023 05:50  Patient On (Oxygen Delivery Method): room air        PHYSICAL EXAM:    General: WDWN  HEENT: NCAT; PERRL, anicteric sclera; MMM  Neck: supple, trachea midline  Cardiovascular: S1, S2 normal; RRR, no M/G/R  Respiratory: CTABL; no W/R/R  Gastrointestinal: soft, nontender, nondistended. bowel sounds present.  Skin: no ulcerations or visible rashes appreciated  Extremities: WWP; no edema, clubbing or cyanosis  Vascular: 2+ radial, DP/PT pulses B/L  Neurological: AAOx3; CN II-XII grossly intact; no focal deficits    MEDICATIONS:  MEDICATIONS  (STANDING):  atorvastatin 40 milliGRAM(s) Oral at bedtime  digoxin  Injectable 250 MICROGram(s) IV Push once  metoprolol tartrate 25 milliGRAM(s) Oral every 8 hours    MEDICATIONS  (PRN):  acetaminophen     Tablet .. 650 milliGRAM(s) Oral every 6 hours PRN Temp greater or equal to 38C (100.4F), Mild Pain (1 - 3)  melatonin 3 milliGRAM(s) Oral at bedtime PRN Insomnia      ALLERGIES:  Allergies    No Known Allergies    Intolerances        LABS:                        7.4    9.83  )-----------( 494      ( 2023 07:03 )             27.4     04-09    138  |  x   |  x   ----------------------------<  x   x    |  x   |  x     Ca    9.5      2023 11:51  Phos  4.1     04-  Mg     1.9     04-    TPro  x   /  Alb  x   /  TBili  0.5  /  DBili  <0.2  /  AST  x   /  ALT  x   /  AlkPhos  x   -08    PT/INR - ( 2023 11:51 )   PT: 13.5 sec;   INR: 1.13          PTT - ( 2023 11:51 )  PTT:27.1 sec  Urinalysis Basic - ( 2023 13:02 )    Color: Yellow / Appearance: Clear / S.010 / pH: x  Gluc: x / Ketone: NEGATIVE  / Bili: Negative / Urobili: 0.2 E.U./dL   Blood: x / Protein: NEGATIVE mg/dL / Nitrite: NEGATIVE   Leuk Esterase: NEGATIVE / RBC: x / WBC x   Sq Epi: x / Non Sq Epi: x / Bacteria: x      CAPILLARY BLOOD GLUCOSE          RADIOLOGY & ADDITIONAL TESTS: Reviewed. OVERNIGHT EVENTS:    SUBJECTIVE / INTERVAL HPI: Patient seen and examined at bedside.     CONSTITUTIONAL: No weakness, fevers or chills  EYES/ENT: No visual changes;  No vertigo or throat pain   NECK: No pain or stiffness  RESPIRATORY: No cough, wheezing, hemoptysis; No shortness of breath  CARDIOVASCULAR: No chest pain or palpitations  GASTROINTESTINAL: No abdominal or epigastric pain. No nausea, vomiting, or hematemesis; No diarrhea or constipation. No melena or hematochezia.  GENITOURINARY: No dysuria, frequency or hematuria  NEUROLOGICAL: No numbness or weakness  SKIN: No itching, burning, rashes, or lesions   All other review of systems is negative unless indicated above.    VITAL SIGNS:  Vital Signs Last 24 Hrs  T(C): 37.3 (2023 01:00), Max: 37.3 (2023 01:00)  T(F): 99.2 (2023 01:00), Max: 99.2 (2023 01:00)  HR: 80 (2023 05:50) (80 - 132)  BP: 112/74 (2023 05:50) (92/53 - 122/61)  BP(mean): 89 (2023 05:50) (67 - 89)  RR: 18 (2023 05:50) (16 - 20)  SpO2: 94% (2023 05:50) (94% - 100%)    Parameters below as of 2023 05:50  Patient On (Oxygen Delivery Method): room air        PHYSICAL EXAM:    General: NAD. lying in bed comfortably  HEENT: NCAT; PERRL, anicteric sclera; MMM  Neck: supple, trachea midline  Cardiovascular: S1, S2 normal; RRR, murmur L sternal border  Respiratory: CTABL; no W/R/R  Gastrointestinal: soft, nontender, nondistended. bowel sounds present.  Skin: no ulcerations or visible rashes appreciated  Extremities: WWP; no edema, clubbing or cyanosis  Vascular: 2+ radial, DP/PT pulses B/L  Neurological: AAOx3; CN II-XII grossly intact; no focal deficits    MEDICATIONS:  MEDICATIONS  (STANDING):  atorvastatin 40 milliGRAM(s) Oral at bedtime  digoxin  Injectable 250 MICROGram(s) IV Push once  metoprolol tartrate 25 milliGRAM(s) Oral every 8 hours    MEDICATIONS  (PRN):  acetaminophen     Tablet .. 650 milliGRAM(s) Oral every 6 hours PRN Temp greater or equal to 38C (100.4F), Mild Pain (1 - 3)  melatonin 3 milliGRAM(s) Oral at bedtime PRN Insomnia      ALLERGIES:  Allergies    No Known Allergies    Intolerances        LABS:                        7.4    9.83  )-----------( 494      ( 2023 07:03 )             27.4     04    138  |  x   |  x   ----------------------------<  x   x    |  x   |  x     Ca    9.5      2023 11:51  Phos  4.1     -  Mg     1.9         TPro  x   /  Alb  x   /  TBili  0.5  /  DBili  <0.2  /  AST  x   /  ALT  x   /  AlkPhos  x   04-08    PT/INR - ( 2023 11:51 )   PT: 13.5 sec;   INR: 1.13          PTT - ( 2023 11:51 )  PTT:27.1 sec  Urinalysis Basic - ( 2023 13:02 )    Color: Yellow / Appearance: Clear / S.010 / pH: x  Gluc: x / Ketone: NEGATIVE  / Bili: Negative / Urobili: 0.2 E.U./dL   Blood: x / Protein: NEGATIVE mg/dL / Nitrite: NEGATIVE   Leuk Esterase: NEGATIVE / RBC: x / WBC x   Sq Epi: x / Non Sq Epi: x / Bacteria: x      CAPILLARY BLOOD GLUCOSE          RADIOLOGY & ADDITIONAL TESTS: Reviewed.

## 2023-04-10 LAB
ALBUMIN SERPL ELPH-MCNC: 3.8 G/DL — SIGNIFICANT CHANGE UP (ref 3.3–5)
ALP SERPL-CCNC: 63 U/L — SIGNIFICANT CHANGE UP (ref 40–120)
ALT FLD-CCNC: 23 U/L — SIGNIFICANT CHANGE UP (ref 10–45)
ANION GAP SERPL CALC-SCNC: 10 MMOL/L — SIGNIFICANT CHANGE UP (ref 5–17)
APTT BLD: 26.8 SEC — LOW (ref 27.5–35.5)
AST SERPL-CCNC: 15 U/L — SIGNIFICANT CHANGE UP (ref 10–40)
BASOPHILS # BLD AUTO: 0.1 K/UL — SIGNIFICANT CHANGE UP (ref 0–0.2)
BASOPHILS NFR BLD AUTO: 0.9 % — SIGNIFICANT CHANGE UP (ref 0–2)
BILIRUB SERPL-MCNC: 0.9 MG/DL — SIGNIFICANT CHANGE UP (ref 0.2–1.2)
BLD GP AB SCN SERPL QL: NEGATIVE — SIGNIFICANT CHANGE UP
BUN SERPL-MCNC: 13 MG/DL — SIGNIFICANT CHANGE UP (ref 7–23)
CALCIUM SERPL-MCNC: 8.8 MG/DL — SIGNIFICANT CHANGE UP (ref 8.4–10.5)
CHLORIDE SERPL-SCNC: 106 MMOL/L — SIGNIFICANT CHANGE UP (ref 96–108)
CO2 SERPL-SCNC: 22 MMOL/L — SIGNIFICANT CHANGE UP (ref 22–31)
CREAT SERPL-MCNC: 0.89 MG/DL — SIGNIFICANT CHANGE UP (ref 0.5–1.3)
EGFR: 98 ML/MIN/1.73M2 — SIGNIFICANT CHANGE UP
EOSINOPHIL # BLD AUTO: 0.28 K/UL — SIGNIFICANT CHANGE UP (ref 0–0.5)
EOSINOPHIL NFR BLD AUTO: 2.5 % — SIGNIFICANT CHANGE UP (ref 0–6)
GLUCOSE SERPL-MCNC: 99 MG/DL — SIGNIFICANT CHANGE UP (ref 70–99)
HCT VFR BLD CALC: 29.6 % — LOW (ref 39–50)
HGB BLD-MCNC: 8.4 G/DL — LOW (ref 13–17)
IMM GRANULOCYTES NFR BLD AUTO: 0.4 % — SIGNIFICANT CHANGE UP (ref 0–0.9)
INR BLD: 1.1 — SIGNIFICANT CHANGE UP (ref 0.88–1.16)
LYMPHOCYTES # BLD AUTO: 1.07 K/UL — SIGNIFICANT CHANGE UP (ref 1–3.3)
LYMPHOCYTES # BLD AUTO: 9.5 % — LOW (ref 13–44)
MAGNESIUM SERPL-MCNC: 2 MG/DL — SIGNIFICANT CHANGE UP (ref 1.6–2.6)
MCHC RBC-ENTMCNC: 21 PG — LOW (ref 27–34)
MCHC RBC-ENTMCNC: 28.4 GM/DL — LOW (ref 32–36)
MCV RBC AUTO: 74 FL — LOW (ref 80–100)
MONOCYTES # BLD AUTO: 1.15 K/UL — HIGH (ref 0–0.9)
MONOCYTES NFR BLD AUTO: 10.3 % — SIGNIFICANT CHANGE UP (ref 2–14)
NEUTROPHILS # BLD AUTO: 8.57 K/UL — HIGH (ref 1.8–7.4)
NEUTROPHILS NFR BLD AUTO: 76.4 % — SIGNIFICANT CHANGE UP (ref 43–77)
NRBC # BLD: 0 /100 WBCS — SIGNIFICANT CHANGE UP (ref 0–0)
PHOSPHATE SERPL-MCNC: 4 MG/DL — SIGNIFICANT CHANGE UP (ref 2.5–4.5)
PLATELET # BLD AUTO: 470 K/UL — HIGH (ref 150–400)
POTASSIUM SERPL-MCNC: 4.4 MMOL/L — SIGNIFICANT CHANGE UP (ref 3.5–5.3)
POTASSIUM SERPL-SCNC: 4.4 MMOL/L — SIGNIFICANT CHANGE UP (ref 3.5–5.3)
PROT SERPL-MCNC: 6.5 G/DL — SIGNIFICANT CHANGE UP (ref 6–8.3)
PROTHROM AB SERPL-ACNC: 13.1 SEC — SIGNIFICANT CHANGE UP (ref 10.5–13.4)
RBC # BLD: 4 M/UL — LOW (ref 4.2–5.8)
RBC # FLD: 20.8 % — HIGH (ref 10.3–14.5)
RETICS #: 77 K/UL — SIGNIFICANT CHANGE UP (ref 25–125)
RETICS/RBC NFR: 2 % — SIGNIFICANT CHANGE UP (ref 0.5–2.5)
RH IG SCN BLD-IMP: POSITIVE — SIGNIFICANT CHANGE UP
SODIUM SERPL-SCNC: 138 MMOL/L — SIGNIFICANT CHANGE UP (ref 135–145)
WBC # BLD: 11.21 K/UL — HIGH (ref 3.8–10.5)
WBC # FLD AUTO: 11.21 K/UL — HIGH (ref 3.8–10.5)

## 2023-04-10 PROCEDURE — 99233 SBSQ HOSP IP/OBS HIGH 50: CPT

## 2023-04-10 PROCEDURE — 99232 SBSQ HOSP IP/OBS MODERATE 35: CPT | Mod: GC

## 2023-04-10 RX ORDER — POLYETHYLENE GLYCOL 3350 17 G/17G
17 POWDER, FOR SOLUTION ORAL DAILY
Refills: 0 | Status: DISCONTINUED | OUTPATIENT
Start: 2023-04-10 | End: 2023-04-13

## 2023-04-10 RX ORDER — SOD SULF/SODIUM/NAHCO3/KCL/PEG
4000 SOLUTION, RECONSTITUTED, ORAL ORAL ONCE
Refills: 0 | Status: COMPLETED | OUTPATIENT
Start: 2023-04-10 | End: 2023-04-10

## 2023-04-10 RX ORDER — IRON SUCROSE 20 MG/ML
200 INJECTION, SOLUTION INTRAVENOUS EVERY 24 HOURS
Refills: 0 | Status: COMPLETED | OUTPATIENT
Start: 2023-04-10 | End: 2023-04-14

## 2023-04-10 RX ORDER — SENNA PLUS 8.6 MG/1
2 TABLET ORAL AT BEDTIME
Refills: 0 | Status: DISCONTINUED | OUTPATIENT
Start: 2023-04-10 | End: 2023-04-13

## 2023-04-10 RX ADMIN — PANTOPRAZOLE SODIUM 40 MILLIGRAM(S): 20 TABLET, DELAYED RELEASE ORAL at 17:27

## 2023-04-10 RX ADMIN — ATORVASTATIN CALCIUM 40 MILLIGRAM(S): 80 TABLET, FILM COATED ORAL at 21:28

## 2023-04-10 RX ADMIN — Medication 25 MILLIGRAM(S): at 21:28

## 2023-04-10 RX ADMIN — IRON SUCROSE 110 MILLIGRAM(S): 20 INJECTION, SOLUTION INTRAVENOUS at 11:52

## 2023-04-10 RX ADMIN — Medication 4000 MILLILITER(S): at 17:37

## 2023-04-10 RX ADMIN — Medication 25 MILLIGRAM(S): at 12:21

## 2023-04-10 RX ADMIN — POLYETHYLENE GLYCOL 3350 17 GRAM(S): 17 POWDER, FOR SOLUTION ORAL at 11:56

## 2023-04-10 RX ADMIN — Medication 25 MILLIGRAM(S): at 05:21

## 2023-04-10 RX ADMIN — PANTOPRAZOLE SODIUM 40 MILLIGRAM(S): 20 TABLET, DELAYED RELEASE ORAL at 05:21

## 2023-04-10 RX ADMIN — SENNA PLUS 2 TABLET(S): 8.6 TABLET ORAL at 21:28

## 2023-04-10 NOTE — PROGRESS NOTE ADULT - SUBJECTIVE AND OBJECTIVE BOX
INTERVAL HPI/OVERNIGHT EVENTS:  Patient was seen and examined at bedside. Case discussed with attending physician during morning rounds.     As per nurse and patient, no o/n events, patient resting comfortably. No complaints at this time. Patient denies fevers, chills, vomiting, diarrhea, melena, hematochezia, chest pain, shortness of breath.     VITAL SIGNS:  T(F): 98.5 (04-10-23 @ 09:47)  HR: 70 (04-10-23 @ 08:25)  BP: 129/69 (04-10-23 @ 08:25)  RR: 16 (04-10-23 @ 08:25)  SpO2: 97% (04-10-23 @ 05:23)  Wt(kg): --    PHYSICAL EXAM:    Constitutional: No acute distress, resting comfortably in bed.    HEENT: Sclera non-icteric, neck supple, MMM.  Respiratory: Clear to auscultation bilaterally, adequate air entry, no wheezing, no rhonchi, no rales, without accessory muscle use and no intercostal retractions  Cardiovascular: Regular rate and rhythm, normal S1S2.  Gastrointestinal: soft, non-tender and non-distended, Normoactive bowel sounds.  Extremities: Warm, well perfused, pulses equal bilateral upper and lower extremities.  Neurological: AAOx3.  Skin: Normal temperature, warm, dry.    MEDICATIONS  (STANDING):  atorvastatin 40 milliGRAM(s) Oral at bedtime  iron sucrose IVPB 200 milliGRAM(s) IV Intermittent every 24 hours  metoprolol tartrate 25 milliGRAM(s) Oral every 8 hours  pantoprazole  Injectable 40 milliGRAM(s) IV Push every 12 hours  polyethylene glycol 3350 17 Gram(s) Oral daily  senna 2 Tablet(s) Oral at bedtime    MEDICATIONS  (PRN):  acetaminophen     Tablet .. 650 milliGRAM(s) Oral every 6 hours PRN Temp greater or equal to 38C (100.4F), Mild Pain (1 - 3)  melatonin 3 milliGRAM(s) Oral at bedtime PRN Insomnia      Allergies    No Known Allergies    Intolerances        LABS:                        8.4    11.21 )-----------( 470      ( 10 Apr 2023 05:30 )             29.6     04-10    138  |  106  |  13  ----------------------------<  99  4.4   |  22  |  0.89    Ca    8.8      10 Apr 2023 05:30  Phos  4.0     04-10  Mg     2.0     04-10    TPro  6.5  /  Alb  3.8  /  TBili  0.9  /  DBili  x   /  AST  15  /  ALT  23  /  AlkPhos  63  04-10    PT/INR - ( 10 Apr 2023 05:30 )   PT: 13.1 sec;   INR: 1.10          PTT - ( 10 Apr 2023 05:30 )  PTT:26.8 sec  Urinalysis Basic - ( 2023 13:02 )    Color: Yellow / Appearance: Clear / S.010 / pH: x  Gluc: x / Ketone: NEGATIVE  / Bili: Negative / Urobili: 0.2 E.U./dL   Blood: x / Protein: NEGATIVE mg/dL / Nitrite: NEGATIVE   Leuk Esterase: NEGATIVE / RBC: x / WBC x   Sq Epi: x / Non Sq Epi: x / Bacteria: x          RADIOLOGY & ADDITIONAL TESTS:  Reviewed

## 2023-04-10 NOTE — PROGRESS NOTE ADULT - ATTENDING COMMENTS
Initial attending contact date  4/10/23    . See fellow note written above for details. I reviewed the fellow documentation. I have personally seen and examined this patient. I reviewed vitals, labs, medications, cardiac studies, and additional imaging. I agree with the above fellow's findings and plans as written above with the following additions/statements.    -Pt with known, stable CAD s/p PCI 2021, admitted with anemia   -noted to be in new onset A fib in setting of anemia, has since converted and maintaining NSR  -Plan for EGD/[facility] 4/11. Pt considered intermediate risk for low risk procedure  -Pending scope results, stable H/H would opt for eliquis 5mg bid vs ASA 81 mg qd upon dc   -Will cont to follow

## 2023-04-10 NOTE — PROGRESS NOTE ADULT - ASSESSMENT
62M with PMH IAIN, CAD s/p KUSHAL mLCX, OM1, pLAD 3/2021, HLD presenting with symptomatic anemia associated with elevated troponin and new diagnosis of a flutter at rate of 110, admitted for anemia and a.flutter rate control. Cardiology and GI consulted.  A. flutter now resolved post PRBC transfusion, plan for TTE no need for ablation per cardiology.

## 2023-04-10 NOTE — PROGRESS NOTE ADULT - ASSESSMENT
62M w/ Fedef anemia, CAD s/p KUSHAL mLCX, OM1, pLAD 3/2021, HLD p/w anemia w/ elevated troponin and new diagnosis of a flutter at rate of 110.      #Non ACS troponin/myocardial injury - resolved  -likely due to anemia +/- demand from flutter/tachycardia in pt w/ underlying CAD -- pt is asymptomatic  - 0.16 and flat, can stop trending    #A flutter likely CTI dependent/typical w/ CHADS VASC - 2 (CAD, HTN)  - AC is indicated, plan for GI workup to indentify source  - resume eliquis once cleared by GI and bleeding has been addressed , 5mgBID  - continue lopressor 25mg q8h for now for rate control.    #CAD s/p KUSHAL mLCX, OM1, pLAD 3/2021 - c/w rosuvastatin. BB as aboive.  Can hold plavix given bleed. given stent was >1 year ago, will consider discharging on single agent (eliquis only, no antiplatelet agent)

## 2023-04-10 NOTE — PROGRESS NOTE ADULT - SUBJECTIVE AND OBJECTIVE BOX
Pt seen and examined at bedside.  Tolerating CLD this AM. Feels well  Describes 1-2 normal BM/day in recent memory. No melena/hematochezia.  Has had pica in last few months.    Allergies    No Known Allergies    Intolerances        MEDICATIONS:  MEDICATIONS  (STANDING):  atorvastatin 40 milliGRAM(s) Oral at bedtime  iron sucrose IVPB 200 milliGRAM(s) IV Intermittent every 24 hours  metoprolol tartrate 25 milliGRAM(s) Oral every 8 hours  pantoprazole  Injectable 40 milliGRAM(s) IV Push every 12 hours  polyethylene glycol 3350 17 Gram(s) Oral daily  senna 2 Tablet(s) Oral at bedtime    MEDICATIONS  (PRN):  acetaminophen     Tablet .. 650 milliGRAM(s) Oral every 6 hours PRN Temp greater or equal to 38C (100.4F), Mild Pain (1 - 3)  melatonin 3 milliGRAM(s) Oral at bedtime PRN Insomnia      Vital Signs Last 24 Hrs  T(C): 36.9 (10 Apr 2023 09:47), Max: 37.1 (2023 14:39)  T(F): 98.5 (10 Apr 2023 09:47), Max: 98.8 (2023 14:39)  HR: 70 (10 Apr 2023 12:00) (66 - 96)  BP: 114/68 (10 Apr 2023 12:00) (112/66 - 129/69)  BP(mean): 85 (10 Apr 2023 12:00) (79 - 98)  RR: 18 (10 Apr 2023 12:00) (16 - 18)  SpO2: 95% (10 Apr 2023 12:00) (95% - 100%)    Parameters below as of 10 Apr 2023 12:00  Patient On (Oxygen Delivery Method): room air          PHYSICAL EXAM:    General: No acute distress  HEENT: MMM, conjunctiva and sclera clear  Gastrointestinal: Soft non-tender non-distended. No rebound or guarding  Skin: Warm and dry. No obvious rash    LABS:  CBC Full  -  ( 10 Apr 2023 05:30 )  WBC Count : 11.21 K/uL  RBC Count : 4.00 M/uL  Hemoglobin : 8.4 g/dL  Hematocrit : 29.6 %  Platelet Count - Automated : 470 K/uL  Mean Cell Volume : 74.0 fl  Mean Cell Hemoglobin : 21.0 pg  Mean Cell Hemoglobin Concentration : 28.4 gm/dL  Auto Neutrophil # : 8.57 K/uL  Auto Lymphocyte # : 1.07 K/uL  Auto Monocyte # : 1.15 K/uL  Auto Eosinophil # : 0.28 K/uL  Auto Basophil # : 0.10 K/uL  Auto Neutrophil % : 76.4 %  Auto Lymphocyte % : 9.5 %  Auto Monocyte % : 10.3 %  Auto Eosinophil % : 2.5 %  Auto Basophil % : 0.9 %    04-10    138  |  106  |  13  ----------------------------<  99  4.4   |  22  |  0.89    Ca    8.8      10 Apr 2023 05:30  Phos  4.0     04-10  Mg     2.0     04-10    TPro  6.5  /  Alb  3.8  /  TBili  0.9  /  DBili  x   /  AST  15  /  ALT  23  /  AlkPhos  63  04-10    PT/INR - ( 10 Apr 2023 05:30 )   PT: 13.1 sec;   INR: 1.10          PTT - ( 10 Apr 2023 05:30 )  PTT:26.8 sec      Urinalysis Basic - ( 2023 13:02 )    Color: Yellow / Appearance: Clear / S.010 / pH: x  Gluc: x / Ketone: NEGATIVE  / Bili: Negative / Urobili: 0.2 E.U./dL   Blood: x / Protein: NEGATIVE mg/dL / Nitrite: NEGATIVE   Leuk Esterase: NEGATIVE / RBC: x / WBC x   Sq Epi: x / Non Sq Epi: x / Bacteria: x                RADIOLOGY & ADDITIONAL STUDIES:

## 2023-04-10 NOTE — PROGRESS NOTE ADULT - PROBLEM SELECTOR PLAN 2
New onset a.flutter with -130s. Likely 2/2 Anemia, CTI dependent/typical w/ CHADS VASC - 2 (CAD, HTN),  cardiology consulted in ED with preference of DCCV/ablation in the setting of new onset a.flutter as rate control will be difficult with medical management. s/p IV lopressor 5 mg and digoxin loading. HR more controlled to 90s-low 100s upon admission to telemetry     - Cards following - recs appreciated.   - A. flutter resolved, plan for TTE and discharge.   - Continue metoprolol tartrate 25 mg Q8H for rate control for now until above is addressed.

## 2023-04-10 NOTE — PROGRESS NOTE ADULT - ATTENDING COMMENTS
Patient seen and d/w Dr. Jeffries. Agree with plan above.  Patient with no evidence of overt GI bleeding. R/B/A for bidirectional endoscopy discussed, plan for EGD/Colonoscopy 4/11/23.

## 2023-04-10 NOTE — PROGRESS NOTE ADULT - ASSESSMENT
62M PMH of IAIN,  HTN, HLD, prediabetes, LBBB (since 2018), CAD with stent (2021), on asa and plavix sent by cardiologist for anemia and hypotension, found to have a Hgb 6.6, also noted to have new Atrial flutter, admitted to telemetry for close monitoring, GI consulted for anemia evaluation in the setting of IAIN.     EGD (1/24/22)  -Irregularity of Zline and GEJ, 3 cm Hiatal hernia (PATH: lower third esophagus bx, glandular mucosa showing intestinal metaplasia, negative for dysplasia), Normal gastric (gastric antral & fundal mucosa WNL, negative for H pylori) & duodenal mucosa (Path: small intestinal mucosa with well-formed villi, WNL)    Colonoscopy (1/24/22)  -Diminutive Ascending colon polyp (s/p cold forceps polypectomy, PATH: normal mucosa), 5-6 mm sessile polyp in descending colon (s/p cold snare polypectomy, s/p hemoclip placement, PATH: tubular adenoma), small Internal hemorrhoids, moderate left sided diverticulosis  -Cecum AVM bx - colonic mucosa within normal limits    With no overt bleeding at this time with ddx wide at this time including Rashid lesion (i/s/o Hiatal hernia) vs PUD (given DAPT hx) vs AVM (previously noted on colonoscopy in 2022). With family history of MS, denies Celiac disease and previous EGD pathology with normal duodenal path, lower suspicion for underlying Celiac to explain IAIN however cannot entirely exclude from ddx.     In light of significant decline in Hgb since Feb 2022 without clear explanation, repeat bi-directional endoscopy is reasonable next step along with consideration of push enteroscopy vs VCE.     #IAIN  -Cardiology recommendations appreciated  -Plan for EGD/Colonoscopy, tentatively for Tuesday 4/11.  Please prep with golytely starting 1800. NPO at midnight  -Protonix 40 mg IVP BID   -Closely monitor H/H  -Maintain Active T&S  -Transfusion goal as per primary team     Justin Jeffries DO  Gastroenterology Fellow  Pager: [phone]

## 2023-04-10 NOTE — PROGRESS NOTE ADULT - ATTENDING COMMENTS
Acute on chronic anemia of iron deficiency c/b new onset Aflutter with RVR and demand ischemia. Pending EGD. Keep Hb >9 given cardiac history and recent KUSHAL stent. Rest of plan as per above. Decision making of high complexity.

## 2023-04-10 NOTE — PROGRESS NOTE ADULT - PROBLEM SELECTOR PLAN 4
s/p KUSHAL mLCX, OM1, pLAD 3/2021, Home med : Rosuvastatin 10 mg, ASA 81 mg, Plavix 75 mg QD    - C/w statin therapeutic interchange.  - Discuss anti-platelet therapy transition with cardiology.   - Deferring AC for now until active bleed ruled out.

## 2023-04-10 NOTE — PROGRESS NOTE ADULT - PROBLEM SELECTOR PLAN 1
Microcytic anemia, presented with Hgb 6.6 (previous baseline 15). S/p 1u pRBCs in ED, pending repeat Hgb. History of severe iron deficiency anemia, s/p 2 iron transfusions in the past and prescribed oral iron which pt stopped on his own behalf. Pt currently without sx of melena, black/tarry stool, hematemesis, easy bleeding/bruising. Orthostatics negative in ED. Etiology likely 2/2 ongoing iron deficiency and iron supplement cessation vs overall nutritional deficiency. On AC and has tolerated thus far, without history of recent NSAID, ETOH use.     -GI consulted, possible plan for EGD Tuesday, clears liquid diet today.   -f/u celiac serology, hypercoagulable work up  -Transfuse if Hgb <7.  - total iron 14, ferritin 7.  - IV iron 200 mg for 5 days.  - F/u hemolysis labs   - Active T&S

## 2023-04-10 NOTE — PROGRESS NOTE ADULT - SUBJECTIVE AND OBJECTIVE BOX
Cardiology Consult    no acute events, no complaints.   receiving IV iron this morning.     OBJECTIVE  Vitals:  T(C): 36.5 (04-10-23 @ 14:51), Max: 36.9 (04-10-23 @ 09:47)  HR: 92 (04-10-23 @ 16:20) (70 - 96)  BP: 123/87 (04-10-23 @ 16:20) (114/68 - 129/69)  RR: 19 (04-10-23 @ 16:20) (16 - 19)  SpO2: 96% (04-10-23 @ 16:20) (95% - 98%)  Wt(kg): --    I/O:  I&O's Summary      PHYSICAL EXAM:  Appearance: NAD. Speaking in full sentences.   HEENT: No pallor noted.  Conjunctiva clear b/l. Moist oral mucosa.  Cardiovascular: RRR with no murmurs.  Respiratory: Lungs CTAB.   Gastrointestinal:  Soft, nontender. Non-distended. Non-rigid.	  Extremities: No edema b/l. No erythema b/l. LE WWP b/l.  Vascular: DP intact  Neurologic:  Alert and awake. Moving all extremities. Following commands.   	  LABS:                        8.4    11.21 )-----------( 470      ( 10 Apr 2023 05:30 )             29.6     04-10    138  |  106  |  13  ----------------------------<  99  4.4   |  22  |  0.89    Ca    8.8      10 Apr 2023 05:30  Phos  4.0     04-10  Mg     2.0     04-10    TPro  6.5  /  Alb  3.8  /  TBili  0.9  /  DBili  x   /  AST  15  /  ALT  23  /  AlkPhos  63  04-10    PT/INR - ( 10 Apr 2023 05:30 )   PT: 13.1 sec;   INR: 1.10          PTT - ( 10 Apr 2023 05:30 )  PTT:26.8 sec      RADIOLOGY & ADDITIONAL TESTS:  Reviewed .    MEDICATIONS  (STANDING):  atorvastatin 40 milliGRAM(s) Oral at bedtime  iron sucrose IVPB 200 milliGRAM(s) IV Intermittent every 24 hours  metoprolol tartrate 25 milliGRAM(s) Oral every 8 hours  pantoprazole  Injectable 40 milliGRAM(s) IV Push every 12 hours  polyethylene glycol 3350 17 Gram(s) Oral daily  polyethylene glycol/electrolyte Solution. 4000 milliLiter(s) Oral once  senna 2 Tablet(s) Oral at bedtime    MEDICATIONS  (PRN):  acetaminophen     Tablet .. 650 milliGRAM(s) Oral every 6 hours PRN Temp greater or equal to 38C (100.4F), Mild Pain (1 - 3)  melatonin 3 milliGRAM(s) Oral at bedtime PRN Insomnia

## 2023-04-11 LAB
ALBUMIN SERPL ELPH-MCNC: 3.8 G/DL — SIGNIFICANT CHANGE UP (ref 3.3–5)
ALP SERPL-CCNC: 74 U/L — SIGNIFICANT CHANGE UP (ref 40–120)
ALT FLD-CCNC: 18 U/L — SIGNIFICANT CHANGE UP (ref 10–45)
ANION GAP SERPL CALC-SCNC: 14 MMOL/L — SIGNIFICANT CHANGE UP (ref 5–17)
APTT BLD: 29.3 SEC — SIGNIFICANT CHANGE UP (ref 27.5–35.5)
AST SERPL-CCNC: 15 U/L — SIGNIFICANT CHANGE UP (ref 10–40)
BASOPHILS # BLD AUTO: 0.08 K/UL — SIGNIFICANT CHANGE UP (ref 0–0.2)
BASOPHILS NFR BLD AUTO: 0.8 % — SIGNIFICANT CHANGE UP (ref 0–2)
BILIRUB SERPL-MCNC: 1 MG/DL — SIGNIFICANT CHANGE UP (ref 0.2–1.2)
BUN SERPL-MCNC: 8 MG/DL — SIGNIFICANT CHANGE UP (ref 7–23)
CALCIUM SERPL-MCNC: 8.9 MG/DL — SIGNIFICANT CHANGE UP (ref 8.4–10.5)
CHLORIDE SERPL-SCNC: 104 MMOL/L — SIGNIFICANT CHANGE UP (ref 96–108)
CO2 SERPL-SCNC: 21 MMOL/L — LOW (ref 22–31)
CREAT SERPL-MCNC: 0.83 MG/DL — SIGNIFICANT CHANGE UP (ref 0.5–1.3)
EGFR: 100 ML/MIN/1.73M2 — SIGNIFICANT CHANGE UP
EOSINOPHIL # BLD AUTO: 0.25 K/UL — SIGNIFICANT CHANGE UP (ref 0–0.5)
EOSINOPHIL NFR BLD AUTO: 2.6 % — SIGNIFICANT CHANGE UP (ref 0–6)
GLUCOSE SERPL-MCNC: 97 MG/DL — SIGNIFICANT CHANGE UP (ref 70–99)
HCT VFR BLD CALC: 31.3 % — LOW (ref 39–50)
HGB BLD-MCNC: 8.8 G/DL — LOW (ref 13–17)
IGA FLD-MCNC: 278 MG/DL — SIGNIFICANT CHANGE UP (ref 84–499)
IMM GRANULOCYTES NFR BLD AUTO: 0.4 % — SIGNIFICANT CHANGE UP (ref 0–0.9)
INR BLD: 1.15 — SIGNIFICANT CHANGE UP (ref 0.88–1.16)
LYMPHOCYTES # BLD AUTO: 0.81 K/UL — LOW (ref 1–3.3)
LYMPHOCYTES # BLD AUTO: 8.6 % — LOW (ref 13–44)
MAGNESIUM SERPL-MCNC: 1.9 MG/DL — SIGNIFICANT CHANGE UP (ref 1.6–2.6)
MCHC RBC-ENTMCNC: 20.6 PG — LOW (ref 27–34)
MCHC RBC-ENTMCNC: 28.1 GM/DL — LOW (ref 32–36)
MCV RBC AUTO: 73.3 FL — LOW (ref 80–100)
MONOCYTES # BLD AUTO: 1.01 K/UL — HIGH (ref 0–0.9)
MONOCYTES NFR BLD AUTO: 10.7 % — SIGNIFICANT CHANGE UP (ref 2–14)
NEUTROPHILS # BLD AUTO: 7.28 K/UL — SIGNIFICANT CHANGE UP (ref 1.8–7.4)
NEUTROPHILS NFR BLD AUTO: 76.9 % — SIGNIFICANT CHANGE UP (ref 43–77)
NRBC # BLD: 0 /100 WBCS — SIGNIFICANT CHANGE UP (ref 0–0)
PHOSPHATE SERPL-MCNC: 4.2 MG/DL — SIGNIFICANT CHANGE UP (ref 2.5–4.5)
PLATELET # BLD AUTO: 500 K/UL — HIGH (ref 150–400)
POTASSIUM SERPL-MCNC: 4.1 MMOL/L — SIGNIFICANT CHANGE UP (ref 3.5–5.3)
POTASSIUM SERPL-SCNC: 4.1 MMOL/L — SIGNIFICANT CHANGE UP (ref 3.5–5.3)
PROT SERPL-MCNC: 6.6 G/DL — SIGNIFICANT CHANGE UP (ref 6–8.3)
PROTHROM AB SERPL-ACNC: 13.7 SEC — HIGH (ref 10.5–13.4)
RBC # BLD: 4.27 M/UL — SIGNIFICANT CHANGE UP (ref 4.2–5.8)
RBC # FLD: 21.4 % — HIGH (ref 10.3–14.5)
SARS-COV-2 RNA SPEC QL NAA+PROBE: NEGATIVE — SIGNIFICANT CHANGE UP
SODIUM SERPL-SCNC: 139 MMOL/L — SIGNIFICANT CHANGE UP (ref 135–145)
TTG IGA SER-ACNC: <1.2 U/ML — SIGNIFICANT CHANGE UP
TTG IGA SER-ACNC: NEGATIVE — SIGNIFICANT CHANGE UP
TTG IGG SER IA-ACNC: NEGATIVE — SIGNIFICANT CHANGE UP
TTG IGG SER-ACNC: <1.2 U/ML — SIGNIFICANT CHANGE UP
WBC # BLD: 9.47 K/UL — SIGNIFICANT CHANGE UP (ref 3.8–10.5)
WBC # FLD AUTO: 9.47 K/UL — SIGNIFICANT CHANGE UP (ref 3.8–10.5)

## 2023-04-11 PROCEDURE — 99232 SBSQ HOSP IP/OBS MODERATE 35: CPT | Mod: GC

## 2023-04-11 RX ORDER — METOPROLOL TARTRATE 50 MG
25 TABLET ORAL ONCE
Refills: 0 | Status: COMPLETED | OUTPATIENT
Start: 2023-04-11 | End: 2023-04-11

## 2023-04-11 RX ORDER — SODIUM CHLORIDE 9 MG/ML
1000 INJECTION, SOLUTION INTRAVENOUS
Refills: 0 | Status: DISCONTINUED | OUTPATIENT
Start: 2023-04-11 | End: 2023-04-12

## 2023-04-11 RX ORDER — SOD SULF/SODIUM/NAHCO3/KCL/PEG
2000 SOLUTION, RECONSTITUTED, ORAL ORAL ONCE
Refills: 0 | Status: COMPLETED | OUTPATIENT
Start: 2023-04-11 | End: 2023-04-11

## 2023-04-11 RX ADMIN — Medication 25 MILLIGRAM(S): at 12:10

## 2023-04-11 RX ADMIN — PANTOPRAZOLE SODIUM 40 MILLIGRAM(S): 20 TABLET, DELAYED RELEASE ORAL at 15:35

## 2023-04-11 RX ADMIN — Medication 2000 MILLILITER(S): at 17:54

## 2023-04-11 RX ADMIN — Medication 25 MILLIGRAM(S): at 06:14

## 2023-04-11 RX ADMIN — PANTOPRAZOLE SODIUM 40 MILLIGRAM(S): 20 TABLET, DELAYED RELEASE ORAL at 05:48

## 2023-04-11 RX ADMIN — ATORVASTATIN CALCIUM 40 MILLIGRAM(S): 80 TABLET, FILM COATED ORAL at 22:54

## 2023-04-11 RX ADMIN — Medication 25 MILLIGRAM(S): at 05:48

## 2023-04-11 RX ADMIN — IRON SUCROSE 110 MILLIGRAM(S): 20 INJECTION, SOLUTION INTRAVENOUS at 12:43

## 2023-04-11 RX ADMIN — Medication 25 MILLIGRAM(S): at 22:52

## 2023-04-11 RX ADMIN — SODIUM CHLORIDE 80 MILLILITER(S): 9 INJECTION, SOLUTION INTRAVENOUS at 22:54

## 2023-04-11 NOTE — PROGRESS NOTE ADULT - PROBLEM SELECTOR PLAN 2
New onset a.flutter with -130s. Likely 2/2 Anemia, CTI dependent/typical w/ CHADS VASC - 2 (CAD, HTN),  cardiology consulted in ED with preference of DCCV/ablation in the setting of new onset a.flutter as rate control will be difficult with medical management. s/p IV lopressor 5 mg and digoxin loading. HR more controlled to 90s-low 100s upon admission to telemetry     - Cards following - recs appreciated.   - A. flutter resolved, plan for TTE and discharge  - Continue metoprolol tartrate 25 mg Q8H for rate control for now until above is addressed. New onset a.flutter with -130s. Likely 2/2 Anemia, CTI dependent/typical w/ CHADS VASC - 2 (CAD, HTN),  cardiology consulted in ED with preference of DCCV/ablation in the setting of new onset a.flutter as rate control will be difficult with medical management. s/p IV lopressor 5 mg and digoxin loading. HR more controlled to 90s-low 100s upon admission to telemetry.     - Cards following - recs appreciated.   - A. flutter intermittent, plan for TTE and discharge  - Continue metoprolol tartrate 25 mg Q8H for rate control for now until above is addressed.

## 2023-04-11 NOTE — PROGRESS NOTE ADULT - ASSESSMENT
62M with PMH IAIN, CAD s/p KUSHAL mLCX, OM1, pLAD 3/2021, HLD presenting with symptomatic anemia associated with elevated troponin and new diagnosis of a flutter at rate of 110, admitted for anemia and a.flutter rate control. Cardiology and GI consulted.  A. flutter now resolved post PRBC transfusion, plan for TTE no need for ablation per cardiology.  62M with PMH IAIN, CAD s/p KUSHAL mLCX, OM1, pLAD 3/2021, HLD presenting with symptomatic anemia associated with elevated troponin and new diagnosis of a flutter at rate of 110, admitted for anemia and a.flutter rate control. Cardiology and GI consulted.  A. flutter now resolved post PRBC transfusion, plan for TTE no need for ablation per cardiology. Complicated by tachycardia vs flutter post bowel prep for colonoscopy.

## 2023-04-11 NOTE — PROGRESS NOTE ADULT - SUBJECTIVE AND OBJECTIVE BOX
INTERVAL HPI/OVERNIGHT EVENTS:  Patient was seen and examined at bedside. Case discussed with attending physician during morning rounds.     As per nurse and patient, no o/n events, patient resting comfortably. No complaints at this time. Patient denies: fever, chills, dizziness, weakness, emesis, hematochezia, or melena. Patient denies shortness of breath and chest pain.     Course complicated by development of Afib on transport to [facility]. Given one time Metoprolol Tartrate with conversion to sinus.     VITAL SIGNS:  T(F): 98 (04-11-23 @ 13:50)  HR: 66 (04-11-23 @ 12:38)  BP: 112/61 (04-11-23 @ 12:38)  RR: 16 (04-11-23 @ 12:38)  SpO2: 97% (04-11-23 @ 12:38)  Wt(kg): --    PHYSICAL EXAM:  Constitutional: No acute distress, resting comfortably in bed.    HEENT: Sclera non-icteric, neck supple, MMM.  Respiratory: Clear to auscultation bilaterally, adequate air entry, no wheezing, no rhonchi, no rales, without accessory muscle use and no intercostal retractions  Cardiovascular: Regular rate and rhythm, normal S1S2.  Gastrointestinal: soft, non-tender and non-distended, Normoactive bowel sounds.  Extremities: Warm, well perfused, pulses equal bilateral upper and lower extremities.  Neurological: AAOx3.  Skin: Normal temperature, warm, dry.    MEDICATIONS  (STANDING):  atorvastatin 40 milliGRAM(s) Oral at bedtime  iron sucrose IVPB 200 milliGRAM(s) IV Intermittent every 24 hours  lactated ringers. 1000 milliLiter(s) (80 mL/Hr) IV Continuous <Continuous>  metoprolol tartrate 25 milliGRAM(s) Oral every 8 hours  pantoprazole  Injectable 40 milliGRAM(s) IV Push every 12 hours  polyethylene glycol 3350 17 Gram(s) Oral daily  polyethylene glycol/electrolyte Solution. 2000 milliLiter(s) Oral once  senna 2 Tablet(s) Oral at bedtime    MEDICATIONS  (PRN):  acetaminophen     Tablet .. 650 milliGRAM(s) Oral every 6 hours PRN Temp greater or equal to 38C (100.4F), Mild Pain (1 - 3)  melatonin 3 milliGRAM(s) Oral at bedtime PRN Insomnia      Allergies    No Known Allergies    Intolerances        LABS:                        8.8    9.47  )-----------( 500      ( 11 Apr 2023 05:30 )             31.3     04-11    139  |  104  |  8   ----------------------------<  97  4.1   |  21<L>  |  0.83    Ca    8.9      11 Apr 2023 05:30  Phos  4.2     04-11  Mg     1.9     04-11    TPro  6.6  /  Alb  3.8  /  TBili  1.0  /  DBili  x   /  AST  15  /  ALT  18  /  AlkPhos  74  04-11    PT/INR - ( 11 Apr 2023 05:30 )   PT: 13.7 sec;   INR: 1.15          PTT - ( 11 Apr 2023 05:30 )  PTT:29.3 sec        RADIOLOGY & ADDITIONAL TESTS:  Reviewed INTERVAL HPI/OVERNIGHT EVENTS:  Patient was seen and examined at bedside. Case discussed with attending physician during morning rounds.     As per nurse and patient, no o/n events, patient resting comfortably. No complaints at this time. Patient denies: fever, chills, dizziness, weakness, emesis, hematochezia, or melena. Patient denies shortness of breath and chest pain.     Course complicated by development of atrial tachycardia/Afib on transport to [facility]. Telemetry heart rate elevated to ~110s with intermittent elevations to the 130s/140s. Given Metoprolol Tartrate 25 mg PO as per schedule with with conversion to sinus rhythm rate of 90s prior to transport back to [facility]. Heart rate after return to [facility] noted to be in the 60s.      VITAL SIGNS:  T(F): 98 (04-11-23 @ 13:50)  HR: 66 (04-11-23 @ 12:38)  BP: 112/61 (04-11-23 @ 12:38)  RR: 16 (04-11-23 @ 12:38)  SpO2: 97% (04-11-23 @ 12:38)  Wt(kg): --    PHYSICAL EXAM:  Constitutional: No acute distress, resting comfortably in bed.    HEENT: Sclera non-icteric, neck supple, MMM.  Respiratory: Clear to auscultation bilaterally, adequate air entry, no wheezing, no rhonchi, no rales, without accessory muscle use and no intercostal retractions  Cardiovascular: Regular rate and rhythm, normal S1S2.  Gastrointestinal: soft, non-tender and non-distended, Normoactive bowel sounds.  Extremities: Warm, well perfused, pulses equal bilateral upper and lower extremities.  Neurological: AAOx3.  Skin: Normal temperature, warm, dry.    MEDICATIONS  (STANDING):  atorvastatin 40 milliGRAM(s) Oral at bedtime  iron sucrose IVPB 200 milliGRAM(s) IV Intermittent every 24 hours  lactated ringers. 1000 milliLiter(s) (80 mL/Hr) IV Continuous <Continuous>  metoprolol tartrate 25 milliGRAM(s) Oral every 8 hours  pantoprazole  Injectable 40 milliGRAM(s) IV Push every 12 hours  polyethylene glycol 3350 17 Gram(s) Oral daily  polyethylene glycol/electrolyte Solution. 2000 milliLiter(s) Oral once  senna 2 Tablet(s) Oral at bedtime    MEDICATIONS  (PRN):  acetaminophen     Tablet .. 650 milliGRAM(s) Oral every 6 hours PRN Temp greater or equal to 38C (100.4F), Mild Pain (1 - 3)  melatonin 3 milliGRAM(s) Oral at bedtime PRN Insomnia      Allergies    No Known Allergies    Intolerances        LABS:                        8.8    9.47  )-----------( 500      ( 11 Apr 2023 05:30 )             31.3     04-11    139  |  104  |  8   ----------------------------<  97  4.1   |  21<L>  |  0.83    Ca    8.9      11 Apr 2023 05:30  Phos  4.2     04-11  Mg     1.9     04-11    TPro  6.6  /  Alb  3.8  /  TBili  1.0  /  DBili  x   /  AST  15  /  ALT  18  /  AlkPhos  74  04-11    PT/INR - ( 11 Apr 2023 05:30 )   PT: 13.7 sec;   INR: 1.15          PTT - ( 11 Apr 2023 05:30 )  PTT:29.3 sec        RADIOLOGY & ADDITIONAL TESTS:  Reviewed

## 2023-04-11 NOTE — PROGRESS NOTE ADULT - PROBLEM SELECTOR PLAN 4
s/p KUSHAL mLCX, OM1, pLAD 3/2021, Home med : Rosuvastatin 10 mg, ASA 81 mg, Plavix 75 mg QD    - C/w statin therapeutic interchange.  - Discuss anti-platelet therapy transition with cardiology.   - Deferring AC for now until active bleed ruled out. s/p KUSHAL mLCX, OM1, pLAD 3/2021, Home med : Rosuvastatin 10 mg, ASA 81 mg, Plavix 75 mg QD    - C/w statin therapeutic interchange.  - Discuss anti-platelet with eliquis vs ASA with cardiology prior to discharge.   - Deferring AC for now until active bleed ruled out.

## 2023-04-11 NOTE — PROGRESS NOTE ADULT - PROBLEM SELECTOR PLAN 1
Microcytic anemia, presented with Hgb 6.6 (previous baseline 15). S/p 1u pRBCs in ED, pending repeat Hgb. History of severe iron deficiency anemia, s/p 2 iron transfusions in the past and prescribed oral iron which pt stopped on his own behalf. Pt currently without sx of melena, black/tarry stool, hematemesis, easy bleeding/bruising. Orthostatics negative in ED. Etiology likely 2/2 ongoing iron deficiency and iron supplement cessation vs overall nutritional deficiency. On AC and has tolerated thus far, without history of recent NSAID, ETOH use.     - GI consulted, possible plan for EGD Tuesday, clears liquid diet today.   - f/u celiac serology, hypercoagulable work up  - Transfuse if Hgb <7.  - total iron 14, ferritin 7.  - IV iron 200 mg for 5 days.  - F/u hemolysis labs   - Active T&S

## 2023-04-11 NOTE — PROGRESS NOTE ADULT - PROBLEM SELECTOR PLAN 3
Presented with Troponin T: 0.16, CKMB WNL. Non ACS troponin/myocardial injury, patient asymptomatic with EKG unremarkable for ST changes. LBBB present (chronic since 2018). EKG revealed aflutter with RVR. Likely due to anemia +/- demand from flutter/tachycardia in pt w/ underlying CAD. No history of CP recently or on admission     - Managing anemia as per above.   - Cardiology following - recs appreciated.

## 2023-04-12 ENCOUNTER — RESULT REVIEW (OUTPATIENT)
Age: 63
End: 2023-04-12

## 2023-04-12 ENCOUNTER — TRANSCRIPTION ENCOUNTER (OUTPATIENT)
Age: 63
End: 2023-04-12

## 2023-04-12 LAB
ACANTHOCYTES BLD QL SMEAR: SLIGHT — SIGNIFICANT CHANGE UP
ANION GAP SERPL CALC-SCNC: 12 MMOL/L — SIGNIFICANT CHANGE UP (ref 5–17)
ANISOCYTOSIS BLD QL: SIGNIFICANT CHANGE UP
APTT BLD: 30 SEC — SIGNIFICANT CHANGE UP (ref 27.5–35.5)
BASO STIPL BLD QL SMEAR: PRESENT — SIGNIFICANT CHANGE UP
BASOPHILS # BLD AUTO: 0.35 K/UL — HIGH (ref 0–0.2)
BASOPHILS NFR BLD AUTO: 3.6 % — HIGH (ref 0–2)
BUN SERPL-MCNC: 7 MG/DL — SIGNIFICANT CHANGE UP (ref 7–23)
BURR CELLS BLD QL SMEAR: PRESENT — SIGNIFICANT CHANGE UP
CALCIUM SERPL-MCNC: 8.9 MG/DL — SIGNIFICANT CHANGE UP (ref 8.4–10.5)
CHLORIDE SERPL-SCNC: 104 MMOL/L — SIGNIFICANT CHANGE UP (ref 96–108)
CO2 SERPL-SCNC: 23 MMOL/L — SIGNIFICANT CHANGE UP (ref 22–31)
CREAT SERPL-MCNC: 0.83 MG/DL — SIGNIFICANT CHANGE UP (ref 0.5–1.3)
EGFR: 100 ML/MIN/1.73M2 — SIGNIFICANT CHANGE UP
EOSINOPHIL # BLD AUTO: 0 K/UL — SIGNIFICANT CHANGE UP (ref 0–0.5)
EOSINOPHIL NFR BLD AUTO: 0 % — SIGNIFICANT CHANGE UP (ref 0–6)
GIANT PLATELETS BLD QL SMEAR: PRESENT — SIGNIFICANT CHANGE UP
GLUCOSE SERPL-MCNC: 88 MG/DL — SIGNIFICANT CHANGE UP (ref 70–99)
HCT VFR BLD CALC: 28.7 % — LOW (ref 39–50)
HGB BLD-MCNC: 8.5 G/DL — LOW (ref 13–17)
HYPOCHROMIA BLD QL: SLIGHT — SIGNIFICANT CHANGE UP
INR BLD: 1.24 — HIGH (ref 0.88–1.16)
LYMPHOCYTES # BLD AUTO: 0.94 K/UL — LOW (ref 1–3.3)
LYMPHOCYTES # BLD AUTO: 9.8 % — LOW (ref 13–44)
MACROCYTES BLD QL: SLIGHT — SIGNIFICANT CHANGE UP
MAGNESIUM SERPL-MCNC: 1.7 MG/DL — SIGNIFICANT CHANGE UP (ref 1.6–2.6)
MANUAL SMEAR VERIFICATION: SIGNIFICANT CHANGE UP
MCHC RBC-ENTMCNC: 21.4 PG — LOW (ref 27–34)
MCHC RBC-ENTMCNC: 29.6 GM/DL — LOW (ref 32–36)
MCV RBC AUTO: 72.3 FL — LOW (ref 80–100)
MICROCYTES BLD QL: SIGNIFICANT CHANGE UP
MONOCYTES # BLD AUTO: 0.77 K/UL — SIGNIFICANT CHANGE UP (ref 0–0.9)
MONOCYTES NFR BLD AUTO: 8 % — SIGNIFICANT CHANGE UP (ref 2–14)
NEUTROPHILS # BLD AUTO: 7.55 K/UL — HIGH (ref 1.8–7.4)
NEUTROPHILS NFR BLD AUTO: 78.6 % — HIGH (ref 43–77)
OVALOCYTES BLD QL SMEAR: SLIGHT — SIGNIFICANT CHANGE UP
PHOSPHATE SERPL-MCNC: 4.6 MG/DL — HIGH (ref 2.5–4.5)
PLAT MORPH BLD: ABNORMAL
PLATELET # BLD AUTO: 447 K/UL — HIGH (ref 150–400)
POIKILOCYTOSIS BLD QL AUTO: SIGNIFICANT CHANGE UP
POLYCHROMASIA BLD QL SMEAR: SLIGHT — SIGNIFICANT CHANGE UP
POTASSIUM SERPL-MCNC: 3.8 MMOL/L — SIGNIFICANT CHANGE UP (ref 3.5–5.3)
POTASSIUM SERPL-SCNC: 3.8 MMOL/L — SIGNIFICANT CHANGE UP (ref 3.5–5.3)
PROTHROM AB SERPL-ACNC: 14.8 SEC — HIGH (ref 10.5–13.4)
RBC # BLD: 3.97 M/UL — LOW (ref 4.2–5.8)
RBC # FLD: 22 % — HIGH (ref 10.3–14.5)
RBC BLD AUTO: ABNORMAL
SCHISTOCYTES BLD QL AUTO: SLIGHT — SIGNIFICANT CHANGE UP
SODIUM SERPL-SCNC: 139 MMOL/L — SIGNIFICANT CHANGE UP (ref 135–145)
SPHEROCYTES BLD QL SMEAR: SLIGHT — SIGNIFICANT CHANGE UP
TARGETS BLD QL SMEAR: SLIGHT — SIGNIFICANT CHANGE UP
WBC # BLD: 9.61 K/UL — SIGNIFICANT CHANGE UP (ref 3.8–10.5)
WBC # FLD AUTO: 9.61 K/UL — SIGNIFICANT CHANGE UP (ref 3.8–10.5)

## 2023-04-12 PROCEDURE — 88305 TISSUE EXAM BY PATHOLOGIST: CPT | Mod: 26

## 2023-04-12 PROCEDURE — 43255 EGD CONTROL BLEEDING ANY: CPT

## 2023-04-12 PROCEDURE — 45378 DIAGNOSTIC COLONOSCOPY: CPT

## 2023-04-12 PROCEDURE — 99232 SBSQ HOSP IP/OBS MODERATE 35: CPT | Mod: GC

## 2023-04-12 DEVICE — CLIP RESOLUTION 360 235CM: Type: IMPLANTABLE DEVICE | Status: FUNCTIONAL

## 2023-04-12 RX ORDER — PANTOPRAZOLE SODIUM 20 MG/1
40 TABLET, DELAYED RELEASE ORAL
Refills: 0 | Status: DISCONTINUED | OUTPATIENT
Start: 2023-04-13 | End: 2023-04-14

## 2023-04-12 RX ORDER — HEPARIN SODIUM 5000 [USP'U]/ML
1300 INJECTION INTRAVENOUS; SUBCUTANEOUS
Qty: 25000 | Refills: 0 | Status: DISCONTINUED | OUTPATIENT
Start: 2023-04-12 | End: 2023-04-13

## 2023-04-12 RX ADMIN — Medication 25 MILLIGRAM(S): at 06:08

## 2023-04-12 RX ADMIN — Medication 25 MILLIGRAM(S): at 21:12

## 2023-04-12 RX ADMIN — PANTOPRAZOLE SODIUM 40 MILLIGRAM(S): 20 TABLET, DELAYED RELEASE ORAL at 05:14

## 2023-04-12 RX ADMIN — IRON SUCROSE 110 MILLIGRAM(S): 20 INJECTION, SOLUTION INTRAVENOUS at 12:56

## 2023-04-12 RX ADMIN — Medication 25 MILLIGRAM(S): at 12:55

## 2023-04-12 RX ADMIN — ATORVASTATIN CALCIUM 40 MILLIGRAM(S): 80 TABLET, FILM COATED ORAL at 22:05

## 2023-04-12 RX ADMIN — HEPARIN SODIUM 1300 UNIT(S)/HR: 5000 INJECTION INTRAVENOUS; SUBCUTANEOUS at 20:00

## 2023-04-12 NOTE — PRE-ANESTHESIA EVALUATION ADULT - NSANTHOSAYNRD_GEN_A_CORE
No. YANA screening performed.  STOP BANG Legend: 0-2 = LOW Risk; 3-4 = INTERMEDIATE Risk; 5-8 = HIGH Risk

## 2023-04-12 NOTE — DISCHARGE NOTE PROVIDER - CARE PROVIDERS DIRECT ADDRESSES
,[email or web address],[email or web address],[email or web address]

## 2023-04-12 NOTE — DISCHARGE NOTE PROVIDER - ATTENDING DISCHARGE PHYSICAL EXAMINATION:
-Gen: NAD, resting in bed  -HEENT: EOMI, PERRL, no scleral icterus, no JVD  -CV: normal S1 and S2  -Lungs: CTABL, normal respiratory effort on RA  -Ab: soft, NT, ND, normal BS  -Ext: no LE edema  -Neuro: A&O x 3, no focal deficits

## 2023-04-12 NOTE — PROGRESS NOTE ADULT - PROBLEM SELECTOR PLAN 3
Presented with Troponin T: 0.16, CKMB WNL. Non ACS troponin/myocardial injury, patient asymptomatic with EKG unremarkable for ST changes. LBBB present (chronic since 2018). EKG revealed aflutter with RVR. Likely due to anemia +/- demand from flutter/tachycardia in pt w/ underlying CAD. No history of CP recently or on admission     - Managing anemia as per above.   - Cardiology following - recs appreciated. Presented with Troponin T: 0.16, CKMB WNL. Non ACS troponin/myocardial injury, patient asymptomatic with EKG unremarkable for ST changes. LBBB present (chronic since 2018). EKG revealed aflutter with RVR. Likely due to anemia +/- demand from flutter/tachycardia in pt w/ underlying CAD. No history of CP recently or on admission    Resolved     - Managing anemia as per above.   - Cardiology following - recs appreciated.

## 2023-04-12 NOTE — CHART NOTE - NSCHARTNOTEFT_GEN_A_CORE
[place] [facility] [place]    Findings as noted:    EGD:  -Irregularity of the mucosa with no bleeding was noted in the area at and just proximal to the squamo-columnar junction. Biopsies were taken from the noted salmon mucosal tongue(s) to identify the possible presence of esophagitis, intestinal metaplasia or dysplasia. Multiple cold forceps biopsies were performed for histology.  -An oozing Dieulafoy lesion was seen in the proximal gastric body. Three endoclips were successfully applied for the purpose of hemostasis.  -A hiatal hernia (Hill Grade I) was seen, displacing the gastroesophageal junction (GEJ) to 36cm from the incisors, with hiatal narrowing at 41cm from the incisors. Retroflexion view in the stomach confirmed the size and morphology of the hernia.  -Localized erosions of the mucosa was noted along the lesser curvature. These findings are compatible with erosive gastritis.  -Normal duodenum     Colonoscopy:  -Multiple non-bleeding diverticula were seen in the sigmoid colon.	  -Normal mucosa was noted in the terminal ileum.	  -Non-bleeding grade/stage I internal hemorrhoids were noted.	  -The colon was otherwise unremarkable.    Recommendations:  -We will follow up pathology with pt  -Advance diet as tolerated  -Protonix 40 mg daily  -No GI contraindications to anticoagulation  -Follow up with Dr. Tapia (GI) upon discharge  -IV iron therapy as outpatient with hematology is recommended    Justin Jeffries DO  Gastroenterology Fellow  Pager: [phone]
Mr. Oliva was planned for a C scope today but in GI suite became tachycardic and procedure as aborted.   no ECG or rhythm strips to show me. they were concerned it was aflutter. he was asymptomatic and hemodynamically stable at the time.   on review his HR have been 60-90 in mostly sinus rhythm with short runs of AT anf AFl that were asyx.     plan is to eat clear liquids and retry tomorrow.
Pt noted to have aflutter with RVR while in endoscopy awaiting EGD/Colonoscopy.  BP stable, though HR >130.      Following discussion with anesthesiologist and given non-urgent nature of procedure, procedure cancelled for today in favor of continued medical optimization.    Recommendations:  -IM and Cardiology recommendations appreciated  -Clear liquids OK today. Please administer 1/2 golytely prep tomorow  -NPO at midnight for EGD/[place] tomorrow pending medical stabilization    Justin Jeffries DO  Gastroenterology Fellow  Pager: [phone]
notified by medicine team that scope was completed without issue.   there was oozing in the stomache s/p endoclips. otherwise erosive gastritis and non bleeding diverticula.   GI noted no contraindication to AC.    spoke with Dr. Clayton resident on primary team to start heparin gtt overnight, and if Hb remains stable, no rebleeding, can transition to eliquis.

## 2023-04-12 NOTE — PROGRESS NOTE ADULT - SUBJECTIVE AND OBJECTIVE BOX
INTERVAL HPI/OVERNIGHT EVENTS:  Patient was seen and examined at bedside. As per overnight team, no o/n events, patient resting comfortably. No complaints this AM. Patient to go for EGD/Colonoscopy. Patient denies: fever, chills, lightheadedness, weakness, CP, palpitations, SOB, cough, N/V. ROS otherwise negative.    VITAL SIGNS:  T(F): 98.5 (04-12-23 @ 07:04)  HR: 66 (04-12-23 @ 09:30)  BP: 117/70 (04-12-23 @ 09:30)  RR: 17 (04-12-23 @ 09:30)  SpO2: 98% (04-12-23 @ 09:30)  Wt(kg): --      04-11-23 @ 07:01  -  04-12-23 @ 07:00  --------------------------------------------------------  IN: 800 mL / OUT: 0 mL / NET: 800 mL        PHYSICAL EXAM:  Constitutional: No acute distress, resting comfortably in bed.    HEENT: Sclera non-icteric, neck supple, MMM.  Respiratory: Clear to auscultation bilaterally, adequate air entry, no wheezing, no rhonchi, no rales, without accessory muscle use and no intercostal retractions  Cardiovascular: Regular rate and rhythm, normal S1S2.  Gastrointestinal: soft, non-tender and non-distended, Normoactive bowel sounds.  Extremities: Warm, well perfused, pulses equal bilateral upper and lower extremities.  Neurological: AAOx3.  Skin: Normal temperature, warm, dry.      MEDICATIONS  (STANDING):  atorvastatin 40 milliGRAM(s) Oral at bedtime  iron sucrose IVPB 200 milliGRAM(s) IV Intermittent every 24 hours  metoprolol tartrate 25 milliGRAM(s) Oral every 8 hours  polyethylene glycol 3350 17 Gram(s) Oral daily  senna 2 Tablet(s) Oral at bedtime    MEDICATIONS  (PRN):  acetaminophen     Tablet .. 650 milliGRAM(s) Oral every 6 hours PRN Temp greater or equal to 38C (100.4F), Mild Pain (1 - 3)  melatonin 3 milliGRAM(s) Oral at bedtime PRN Insomnia      Allergies    No Known Allergies    Intolerances        LABS:                        8.5    9.61  )-----------( 447      ( 12 Apr 2023 05:30 )             28.7     04-12    139  |  104  |  7   ----------------------------<  88  3.8   |  23  |  0.83    Ca    8.9      12 Apr 2023 05:30  Phos  4.6     04-12  Mg     1.7     04-12    TPro  6.6  /  Alb  3.8  /  TBili  1.0  /  DBili  x   /  AST  15  /  ALT  18  /  AlkPhos  74  04-11    PT/INR - ( 12 Apr 2023 05:30 )   PT: 14.8 sec;   INR: 1.24          PTT - ( 12 Apr 2023 05:30 )  PTT:30.0 sec      RADIOLOGY & ADDITIONAL TESTS:  Reviewed

## 2023-04-12 NOTE — PROGRESS NOTE ADULT - ASSESSMENT
62M with PMH IAIN, CAD s/p KUHSAL mLCX, OM1, pLAD 3/2021, HLD presenting with symptomatic anemia associated with elevated troponin and new diagnosis of a flutter at rate of 110, admitted for anemia and a.flutter rate control. Cardiology and GI consulted.  A. flutter now resolved post PRBC transfusion, plan for TTE no need for ablation per cardiology. Complicated by tachycardia vs flutter post bowel prep for colonoscopy, now post EGD/Colonoscopy w/ dieulafoy lesion s/p 3 hemoclips.

## 2023-04-12 NOTE — PROGRESS NOTE ADULT - PROBLEM SELECTOR PLAN 1
Microcytic anemia, presented with Hgb 6.6 (previous baseline 15). S/p 1u pRBCs in ED, pending repeat Hgb. History of severe iron deficiency anemia, s/p 2 iron transfusions in the past and prescribed oral iron which pt stopped on his own behalf. Pt currently without sx of melena, black/tarry stool, hematemesis, easy bleeding/bruising. Orthostatics negative in ED. Etiology likely 2/2 ongoing iron deficiency and iron supplement cessation vs overall nutritional deficiency. On AC and has tolerated thus far, without history of recent NSAID, ETOH use.     S/p EGD/C-scope: irregular mucosa of the SCJ, s/p biopsy. Oozing Dieulafoy lesion s/p 3 endoclips, hiatal hernia (Hill Grade I), erosive gastritis. Multiple non-bleeding diverticula in the sigmoid colon. Non-bleeding grade/stage I internal hemorrhoids.    GI Recs:  - Advance diet as tolerated  - Protonix 40 mg daily  - IV iron therapy as outpatient with hematology   -Outpatient f/u w/ Dr. Jc    - Transfuse if Hgb <7.  - IV iron 200 mg for 5 days.  - Active T&S

## 2023-04-12 NOTE — DISCHARGE NOTE PROVIDER - NSDCCPCAREPLAN_GEN_ALL_CORE_FT
PRINCIPAL DISCHARGE DIAGNOSIS  Diagnosis: Anemia  Assessment and Plan of Treatment: Anemia is a condition in which the body does not have enough healthy red blood cells. Red blood cells provide oxygen to body tissues. Anemia may be due to several factors, including acute blood loss, iron, B12, or folate deficiency.  You were admitted to the hospital due to symptomatic anemia and low blood pressures with concern for an active GI bleed. You were assessed by our Gastroenterology team, and underwent an endoscopy and colonoscopy, which identified a Dieulafoy lesion.  Dieulafoy's lesion (or Dieulofoy lesion) is a medical condition characterized by a large tortuous blood vessel most commonly in the stomach wall (submucosal) that erodes and bleeds.  The identified Dieulafoy's lesion was clipped with 3 hemoclips and there is no further evidence of bleeding.   *************************************************************  -Please make sure to follow with Dr. Jc (GI) at your scheduled appointment  - Continue to take your prescribed Protonix until your follow-up appointment with Dr. Jc.   - Continue to take your Iron supplements as prescribed. You may need to see an outpatient hematologist for IV Iron infusions.         SECONDARY DISCHARGE DIAGNOSES  Diagnosis: Atrial flutter  Assessment and Plan of Treatment: Atrial flutter is a type of abnormal heart rhythm, or arrhythmia. It occurs when a short circuit in the heart causes the upper chambers (atria) to pump very rapidly. Atrial flutter is important not only because of its symptoms but because it can cause a stroke that may result in permanent disability or death.  While admitted to the hospital, you temporarily developed Atrial flutter, likely due to the bowel prep for the colonoscopy and significant dehydration. You were given fluids and started back on your home dose of metoprolol. You were also started on a blood thinner called ________ due to the risk of developing a clot and subsequent stroke.  *******************************************  - Please make sure to follow-up with your PCP and Cardiologist at your scheduled appointment  - Continue to take Metoprolol as prescribed  - Continue to take __________(AC) as prescribed       PRINCIPAL DISCHARGE DIAGNOSIS  Diagnosis: Anemia  Assessment and Plan of Treatment: Anemia is a condition in which the body does not have enough healthy red blood cells. Red blood cells provide oxygen to body tissues. Anemia may be due to several factors, including acute blood loss, iron, B12, or folate deficiency.  You were admitted to the hospital due to symptomatic anemia and low blood pressures with concern for an active GI bleed. You were assessed by our Gastroenterology team, and underwent an endoscopy and colonoscopy, which identified a Dieulafoy lesion.  Dieulafoy's lesion (or Dieulofoy lesion) is a medical condition characterized by a large tortuous blood vessel most commonly in the stomach wall (submucosal) that erodes and bleeds.  The identified Dieulafoy's lesion was clipped with 3 hemoclips and there is no further evidence of bleeding.   *************************************************************  -Please make sure to follow with Dr. Jc (GI) at your scheduled appointment  - Continue to take your prescribed Protonix until your follow-up appointment with Dr. Jc.   - Continue to take your Iron supplements as prescribed. You may need to see an outpatient hematologist for IV Iron infusions.         SECONDARY DISCHARGE DIAGNOSES  Diagnosis: Atrial flutter  Assessment and Plan of Treatment: Atrial flutter is a type of abnormal heart rhythm, or arrhythmia. It occurs when a short circuit in the heart causes the upper chambers (atria) to pump very rapidly. Atrial flutter is important not only because of its symptoms but because it can cause a stroke that may result in permanent disability or death.  While admitted to the hospital, you temporarily developed Atrial flutter, likely due to the bowel prep for the colonoscopy and significant dehydration. You were given fluids and started back on your home dose of metoprolol. You were also started on a blood thinner called ELIQUIS due to the risk of developing a clot and subsequent stroke.  *******************************************  - Please make sure to follow-up with your PCP and Cardiologist at your scheduled appointment  - Continue to take Metoprolol as prescribed (you will take 2 tablets for a total of 50mg in the morning and 1 tablet for a total of 25mg in the evening)  - Continue to take Eliquis as prescribed      Diagnosis: CAD (coronary artery disease)  Assessment and Plan of Treatment: You have a history of coronary artery disease. This is damage or disease in the heart's major blood vessels. The usual cause is the buildup of plaque. This causes coronary arteries to narrow, limiting blood flow to the heart. Coronary artery disease can range from no symptoms, to chest pain, to a heart attack. Please continue to take your medications as directed and follow up with your primary care doctor and cardiologist in 10-14 days.  On discharge, you DO NOT need to take aspirin and plavix on discharge but you should take Eliquis as prescribed.

## 2023-04-12 NOTE — DISCHARGE NOTE PROVIDER - NSDCMRMEDTOKEN_GEN_ALL_CORE_FT
clopidogrel 75 mg oral tablet: 1 tab(s) orally once a day  Coreg 6.25 mg oral tablet: 1 orally 2 times a day  Crestor 10 mg oral tablet: 1 orally once a day (at bedtime)  lisinopril 20 mg oral tablet: 1 orally once a day   clopidogrel 75 mg oral tablet: 1 tab(s) orally once a day  Coreg 6.25 mg oral tablet: 1 orally 2 times a day  Crestor 10 mg oral tablet: 1 orally once a day (at bedtime)  Eliquis 5 mg oral tablet: 1 tab(s) orally 2 times a day  lisinopril 20 mg oral tablet: 1 orally once a day   Crestor 10 mg oral tablet: 1 orally once a day (at bedtime)  Eliquis 5 mg oral tablet: 1 tab(s) orally 2 times a day  metoprolol succinate 25 mg oral tablet, extended release: 1 tab(s) orally 2 times a day Please take 2 tablets (50mg) in the morning and 1 tablet (25mg) in the evening  pantoprazole 40 mg oral delayed release tablet: 1 tab(s) orally once a day (before a meal)

## 2023-04-12 NOTE — DISCHARGE NOTE PROVIDER - NSDCFUADDAPPT_GEN_ALL_CORE_FT
1) Please follow-up with your primary care doctor. As recommended, you will need referral for outpatient IV IRON INFUSIONS.    2) We left a message with the  of Dr. Chance. If you do not get a call back by Monday 4/17, please call the office to schedule a follow-up appointment.

## 2023-04-12 NOTE — DISCHARGE NOTE PROVIDER - CARE PROVIDER_API CALL
Ishan Tapia)  Gastroenterology; Internal Medicine  [address] [address]  Phone: [phone]  Fax: [phone]  Follow Up Time: 2 weeks    Jaswinder Lozano)  Internal Medicine  [address], [address]  [place] [address]  Phone: [phone]  Fax: [phone]  Established Patient  Follow Up Time: 1 week   Ishan Tapia)  Gastroenterology; Internal Medicine  [address] [place] [address]  Phone: [phone]  Fax: [phone]  Follow Up Time: 2 weeks    Jaswinder Lozano)  Internal Medicine  [address] [place] [address], [address]  [place] [address]  Phone: [phone]  Fax: [phone]  Established Patient  Follow Up Time: 1 week    Dhaval Boone)  Cardiovascular Disease; Internal Medicine  [address] [place] [address], [address]  [place] [address]  Phone: [phone]  Fax: [phone]  Established Patient  Follow Up Time: 2 weeks

## 2023-04-12 NOTE — PROGRESS NOTE ADULT - PROBLEM SELECTOR PLAN 4
s/p KUSHAL mLCX, OM1, pLAD 3/2021, Home med : Rosuvastatin 10 mg, ASA 81 mg, Plavix 75 mg QD    - C/w statin therapeutic interchange.  - Discuss anti-platelet with eliquis vs ASA with cardiology prior to discharge.   - Deferring AC for now until active bleed ruled out. s/p KUSHAL mLCX, OM1, pLAD 3/2021, Home med : Rosuvastatin 10 mg, ASA 81 mg, Plavix 75 mg QD    - C/w statin therapeutic interchange.  - Discuss anti-platelet with eliquis vs ASA with cardiology prior to discharge.

## 2023-04-12 NOTE — DISCHARGE NOTE PROVIDER - PROVIDER TOKENS
PROVIDER:[TOKEN:[[id number]],FOLLOWUP:[2 weeks]],PROVIDER:[TOKEN:[[id number]],FOLLOWUP:[1 week],ESTABLISHEDPATIENT:[T]] PROVIDER:[TOKEN:[[id number]],FOLLOWUP:[2 weeks]],PROVIDER:[TOKEN:[[id number]],FOLLOWUP:[1 week],ESTABLISHEDPATIENT:[T]],PROVIDER:[TOKEN:[[id number]],FOLLOWUP:[2 weeks],ESTABLISHEDPATIENT:[T]]

## 2023-04-12 NOTE — DISCHARGE NOTE PROVIDER - HOSPITAL COURSE
#Discharge: do not delete  61 yo M with PMH of IAIN,  HTN, HLD, prediabetes, LBBB (since 2018), CAD with stent (2021), on asa and plavix presenting from outpatient cardiologist for anemia and hypotension.  Patient presented to cardiologist yesterday due to generalized weakness and malaise, found to have hemoglobin of 6 and hypotensive. Admitted for symptomatic anemia. Course complicated by development of atrial tachycardia/Afib on transport to [facility]. Telemetry heart rate elevated to ~110s with intermittent elevations to the 130s/140s. Given Metoprolol Tartrate 25 mg PO as per schedule with with conversion to sinus rhythm rate of 90s prior to transport back to [facility]. S/p EGD/C-scope w/ bleeding Dieulafoy lesion, s/p 3 hemoclips. Continued on home dose of metoprolol. Per Cards, started on __________ for AC.     EGD:  -Irregularity of the mucosa with no bleeding was noted in the area at and just proximal to the squamo-columnar junction. Biopsies were taken from the noted salmon mucosal tongue(s) to identify the possible presence of esophagitis, intestinal metaplasia or dysplasia. Multiple cold forceps biopsies were performed for histology.  -An oozing Dieulafoy lesion was seen in the proximal gastric body. Three endoclips were successfully applied for the purpose of hemostasis.  -A hiatal hernia (Hill Grade I) was seen, displacing the gastroesophageal junction (GEJ) to 36cm from the incisors, with hiatal narrowing at 41cm from the incisors. Retroflexion view in the stomach confirmed the size and morphology of the hernia.  -Localized erosions of the mucosa was noted along the lesser curvature. These findings are compatible with erosive gastritis.  -Normal duodenum     Colonoscopy:  -Multiple non-bleeding diverticula were seen in the sigmoid colon.	  -Normal mucosa was noted in the terminal ileum.	  -Non-bleeding grade/stage I internal hemorrhoids were noted.	  -The colon was otherwise unremarkable.      Problem List/Main Diagnoses (system-based):  #Anemia   Microcytic anemia, presented with Hgb 6.6 (previous baseline 15). S/p 1u pRBCs in ED, pending repeat Hgb. History of severe iron deficiency anemia, s/p 2 iron transfusions in the past and prescribed oral iron which pt stopped on his own behalf. Pt currently without sx of melena, black/tarry stool, hematemesis, easy bleeding/bruising. Orthostatics negative in ED. Etiology likely 2/2 ongoing iron deficiency and iron supplement cessation vs overall nutritional deficiency. On AC and has tolerated thus far, without history of recent NSAID, ETOH use.     S/p EGD/C-scope: irregular mucosa of the SCJ, s/p biopsy. Oozing Dieulafoy lesion s/p 3 endoclips, hiatal hernia (Hill Grade I), erosive gastritis. Multiple non-bleeding diverticula in the sigmoid colon. Non-bleeding grade/stage I internal hemorrhoids.    GI Recs:  - Protonix 40 mg daily  - IV iron therapy as outpatient with hematology   - Outpatient f/u w/ Dr. Jc      - IV iron 200 mg for 5 days.      #Atrial flutter.   New onset a.flutter with -130s. Likely 2/2 Anemia, CTI dependent/typical w/ CHADS VASC - 2 (CAD, HTN),  cardiology consulted in ED with preference of DCCV/ablation in the setting of new onset a.flutter as rate control will be difficult with medical management. s/p IV lopressor 5 mg and digoxin loading. HR more controlled to 90s-low 100s upon admission to telemetry.     - A. flutter intermittent, plan for TTE and discharge  - Continue metoprolol tartrate 25 mg Q8H for rate control for now until above is addressed.  - plan for AC post-EGD/C-scope. Per GI no contraindication to AC.    #Elevated troponin.   Presented with Troponin T: 0.16, CKMB WNL. Non ACS troponin/myocardial injury, patient asymptomatic with EKG unremarkable for ST changes. LBBB present (chronic since 2018). EKG revealed aflutter with RVR. Likely due to anemia +/- demand from flutter/tachycardia in pt w/ underlying CAD. No history of CP recently or on admission    Resolved     #CAD (coronary artery disease).   s/p KUSHAL mLCX, OM1, pLAD 3/2021, Home med : Rosuvastatin 10 mg, ASA 81 mg, Plavix 75 mg QD    - C/w statin therapeutic interchange.  - Per Cards __________ for AC on discharge    #Hypertension.   home meds: lisinopril 20 mg QD      Patient was discharged to: Home  New medications: AC ____________  Changes to old medications: none  Medications that were stopped: none  Items to follow up as outpatient: PCP, Cardiologist, GI, Hematology  Physical exam at the time of discharge:    Constitutional: No acute distress, resting comfortably in bed.    HEENT: Sclera non-icteric, neck supple, MMM.  Respiratory: Clear to auscultation bilaterally, adequate air entry, no wheezing, no rhonchi, no rales, without accessory muscle use and no intercostal retractions  Cardiovascular: Regular rate and rhythm, normal S1S2.  Gastrointestinal: soft, non-tender and non-distended, Normoactive bowel sounds.  Extremities: Warm, well perfused, pulses equal bilateral upper and lower extremities.  Neurological: AAOx3.  Skin: Normal temperature, warm, dry. This is a 63 yo M with PMH of IAIN, HTN, HLD, prediabetes, LBBB (since 2018), CAD with stent (2021), on asa and plavix presenting from outpatient cardiologist for anemia and hypotension.  Patient presented to cardiologist yesterday due to generalized weakness and malaise, found to have hemoglobin of 6 and hypotensive. Admitted for symptomatic anemia. Course complicated by development of atrial tachycardia/Afib on transport to [facility]. Telemetry heart rate elevated to ~110s with intermittent elevations to the 130s/140s. Given Metoprolol Tartrate 25 mg PO as per schedule with with conversion to sinus rhythm rate of 90s prior to transport back to [facility]. S/p EGD/C-scope w/ bleeding Dieulafoy lesion, s/p 3 hemoclips. Continued on home dose of metoprolol. Per Cards, started on __________ for AC.     EGD:  -Irregularity of the mucosa with no bleeding was noted in the area at and just proximal to the squamo-columnar junction. Biopsies were taken from the noted salmon mucosal tongue(s) to identify the possible presence of esophagitis, intestinal metaplasia or dysplasia. Multiple cold forceps biopsies were performed for histology.  -An oozing Dieulafoy lesion was seen in the proximal gastric body. Three endoclips were successfully applied for the purpose of hemostasis.  -A hiatal hernia (Hill Grade I) was seen, displacing the gastroesophageal junction (GEJ) to 36cm from the incisors, with hiatal narrowing at 41cm from the incisors. Retroflexion view in the stomach confirmed the size and morphology of the hernia.  -Localized erosions of the mucosa was noted along the lesser curvature. These findings are compatible with erosive gastritis.  -Normal duodenum     Colonoscopy:  -Multiple non-bleeding diverticula were seen in the sigmoid colon.	  -Normal mucosa was noted in the terminal ileum.	  -Non-bleeding grade/stage I internal hemorrhoids were noted.	  -The colon was otherwise unremarkable.    ECHO: Moderately reduced left ventricular systolic function. Basal and mid inferior wall, basal and mid inferolateral wall, and mid anterolateral segment are akinetic.  Normal left and right ventricular size and systolic function. Grade II left ventricular diastolic dysfunction. Severely dilated left atrium.    # HFrEF: Echocardiogram performed in the setting of new arrythmia, indicative of new down EF with regional wall motion abnormalities. Cardiology reports ***. Plan for ***.     #Anemia: Microcytic anemia, presented with Hgb 6.6 (previous baseline 15). S/p 1u pRBCs in ED, pending repeat Hgb. History of severe iron deficiency anemia, s/p 2 iron transfusions in the past and prescribed oral iron which pt stopped on his own behalf. Pt currently without sx of melena, black/tarry stool, hematemesis, easy bleeding/bruising. Orthostatics negative in ED. Etiology likely 2/2 ongoing iron deficiency and iron supplement cessation vs overall nutritional deficiency. On AC and has tolerated thus far, without history of recent NSAID, ETOH use.     S/p EGD/C-scope: irregular mucosa of the SCJ, s/p biopsy. Oozing Dieulafoy lesion s/p 3 endoclips, hiatal hernia (Hill Grade I), erosive gastritis. Multiple non-bleeding diverticula in the sigmoid colon. Non-bleeding grade/stage I internal hemorrhoids.    GI Recs:  - Protonix 40 mg daily  - IV iron 200 mg for 5 days therapy as outpatient with hematology   - Outpatient follow up with Dr. Jc    #Atrial flutter: New onset a.flutter with -130s. Likely 2/2 Anemia, CTI dependent/typical w/ CHADS VASC - 2 (CAD, HTN),  cardiology consulted in ED with preference of DCCV/ablation in the setting of new onset a.flutter as rate control will be difficult with medical management. s/p IV lopressor 5 mg and digoxin loading. HR more controlled to 90s-low 100s upon admission to telemetry. A. flutter intermittent, plan for TTE and discharge. Continue metoprolol tartrate 25 mg Q8H for rate control for now until above is addressed.   - plan for AC post-EGD/C-scope. Per GI no contraindication to AC.    #Elevated troponin.   Presented with Troponin T: 0.16, CKMB WNL. Non ACS troponin/myocardial injury, patient asymptomatic with EKG unremarkable for ST changes. LBBB present (chronic since 2018). EKG revealed aflutter with RVR. Likely due to anemia +/- demand from flutter/tachycardia in pt w/ underlying CAD. No history of CP recently or on admission. Resolved.    #CAD (coronary artery disease).   s/p KUSHAL mLCX, OM1, pLAD 3/2021, Home med : Rosuvastatin 10 mg, ASA 81 mg, Plavix 75 mg QD    - Continue statin therapeutic interchange.  - Per Cards __________ for AC on discharge    #Hypertension.   home meds: lisinopril 20 mg QD      Patient was discharged to: Home  New medications: AC ____________  Changes to old medications: none  Medications that were stopped: none  Items to follow up as outpatient: PCP, Cardiologist, GI, Hematology  Physical exam at the time of discharge:    Constitutional: No acute distress, resting comfortably in bed.    HEENT: Sclera non-icteric, neck supple, MMM.  Respiratory: Clear to auscultation bilaterally, adequate air entry, no wheezing, no rhonchi, no rales, without accessory muscle use and no intercostal retractions  Cardiovascular: Regular rate and rhythm, normal S1S2.  Gastrointestinal: soft, non-tender and non-distended, Normoactive bowel sounds.  Extremities: Warm, well perfused, pulses equal bilateral upper and lower extremities.  Neurological: AAOx3.  Skin: Normal temperature, warm, dry. 61 yo M with PMH of IAIN, HTN, HLD, prediabetes, LBBB (since 2018), CAD with stent (2021), on asa and plavix presenting from outpatient cardiologist for anemia and hypotension.  Patient presented to cardiologist yesterday due to generalized weakness and malaise, found to have hemoglobin of 6 and hypotensive. Admitted for symptomatic anemia. Course complicated by development of atrial tachycardia/Afib on transport to [facility]. Telemetry heart rate elevated to ~110s with intermittent elevations to the 130s/140s. Given Metoprolol Tartrate 25 mg PO as per schedule with with conversion to sinus rhythm rate of 90s prior to transport back to [facility]. S/p EGD/C-scope w/ bleeding Dieulafoy lesion, s/p 3 hemoclips. Stepped down to [facility], Eliquis started without bleed and stable for discharge home.    EGD:  -Irregularity of the mucosa with no bleeding was noted in the area at and just proximal to the squamo-columnar junction. Biopsies were taken from the noted salmon mucosal tongue(s) to identify the possible presence of esophagitis, intestinal metaplasia or dysplasia. Multiple cold forceps biopsies were performed for histology.  -An oozing Dieulafoy lesion was seen in the proximal gastric body. Three endoclips were successfully applied for the purpose of hemostasis.  -A hiatal hernia (Hill Grade I) was seen, displacing the gastroesophageal junction (GEJ) to 36cm from the incisors, with hiatal narrowing at 41cm from the incisors. Retroflexion view in the stomach confirmed the size and morphology of the hernia.  -Localized erosions of the mucosa was noted along the lesser curvature. These findings are compatible with erosive gastritis.  -Normal duodenum     Colonoscopy:  -Multiple non-bleeding diverticula were seen in the sigmoid colon.	  -Normal mucosa was noted in the terminal ileum.	  -Non-bleeding grade/stage I internal hemorrhoids were noted.	  -The colon was otherwise unremarkable.    ECHO: Moderately reduced left ventricular systolic function. Basal and mid inferior wall, basal and mid inferolateral wall, and mid anterolateral segment are akinetic.  Normal left and right ventricular size and systolic function. Grade II left ventricular diastolic dysfunction. Severely dilated left atrium.    # HFrEF: Echocardiogram performed in the setting of new arrythmia, indicative of new down EF with regional wall motion abnormalities.   - On discharge, metoprolol succinate 50mg qAM + 25mg qPM    #Anemia  S/p EGD/C-scope: irregular mucosa of the SCJ, s/p biopsy. Oozing Dieulafoy lesion s/p 3 endoclips, hiatal hernia (Hill Grade I), erosive gastritis. Multiple non-bleeding diverticula in the sigmoid colon. Non-bleeding grade/stage I internal hemorrhoids.  - Protonix 40 mg daily  - IV iron 200 mg for 5 days therapy as outpatient with hematology   - Outpatient follow up with Dr. Jc    #Atrial flutter: New onset a.flutter with -130s. Likely 2/2 Anemia, CTI dependent/typical w/ CHADS VASC - 2 (CAD, HTN),  cardiology consulted in ED with preference of DCCV/ablation in the setting of new onset a.flutter as rate control will be difficult with medical management. s/p IV lopressor 5 mg and digoxin loading. HR more controlled to 90s-low 100s upon admission to telemetry. A. flutter intermittent, plan for TTE and discharge. Continue metoprolol tartrate 25 mg Q8H for rate control for now until above is addressed.   - Eliquis 5mg Q12  - On discharge, metoprolol succinate 50mg qAM + 25mg qPM    #Elevated troponin.   Presented with Troponin T: 0.16, CKMB WNL. Non ACS troponin/myocardial injury, patient asymptomatic with EKG unremarkable for ST changes. LBBB present (chronic since 2018). EKG revealed aflutter with RVR. Likely due to anemia +/- demand from flutter/tachycardia in pt w/ underlying CAD. No history of CP recently or on admission. Resolved.    #CAD (coronary artery disease).   s/p KUSHAL mLCX, OM1, pLAD 3/2021, Home med : Rosuvastatin 10 mg, ASA 81 mg, Plavix 75 mg QD    - Continue statin therapeutic interchange.  - No ASA needed on discharge     #Hypertension.   home meds: lisinopril 20 mg QD   - Holding until PCP FOLLOW-UP     Patient was discharged to: Home    Items to follow up as outpatient: PCP, Cardiologist, GI, Hematology    Physical exam at the time of discharge:  Constitutional: No acute distress, resting comfortably in bed.    HEENT: Sclera non-icteric, neck supple, MMM.  Respiratory: Clear to auscultation bilaterally, adequate air entry, no wheezing, no rhonchi, no rales, without accessory muscle use and no intercostal retractions  Cardiovascular: Regular rate and rhythm, normal S1S2.  Gastrointestinal: soft, non-tender and non-distended, Normoactive bowel sounds.  Extremities: Warm, well perfused, pulses equal bilateral upper and lower extremities.  Neurological: AAOx3.  Skin: Normal temperature, warm, dry.

## 2023-04-12 NOTE — PROGRESS NOTE ADULT - ATTENDING COMMENTS
Pt with oozing Dieulafoy lesion clipped. Start clears now and f/u with Card regarding anticoag need. No further Aflutter noted and SR on EKG this AM.

## 2023-04-12 NOTE — PROGRESS NOTE ADULT - PROBLEM SELECTOR PLAN 2
New onset a.flutter with -130s. Likely 2/2 Anemia, CTI dependent/typical w/ CHADS VASC - 2 (CAD, HTN),  cardiology consulted in ED with preference of DCCV/ablation in the setting of new onset a.flutter as rate control will be difficult with medical management. s/p IV lopressor 5 mg and digoxin loading. HR more controlled to 90s-low 100s upon admission to telemetry.     - Cards following - recs appreciated.   - A. flutter intermittent, plan for TTE and discharge  - Continue metoprolol tartrate 25 mg Q8H for rate control for now until above is addressed. New onset a.flutter with -130s. Likely 2/2 Anemia, CTI dependent/typical w/ CHADS VASC - 2 (CAD, HTN),  cardiology consulted in ED with preference of DCCV/ablation in the setting of new onset a.flutter as rate control will be difficult with medical management. s/p IV lopressor 5 mg and digoxin loading. HR more controlled to 90s-low 100s upon admission to telemetry.     - Cards following - recs appreciated.   - A. flutter intermittent, plan for TTE and discharge  - Continue metoprolol tartrate 25 mg Q8H for rate control for now until above is addressed.  - plan for AC post-EGD/C-scope. Per GI no contraindication to AC.

## 2023-04-12 NOTE — DISCHARGE NOTE PROVIDER - NSDCCPTREATMENT_GEN_ALL_CORE_FT
PRINCIPAL PROCEDURE  Procedure: UGI endoscopy  Findings and Treatment: EGD:  -Irregularity of the mucosa with no bleeding was noted in the area at and just proximal to the squamo-columnar junction. Biopsies were taken from the noted salmon mucosal tongue(s) to identify the possible presence of esophagitis, intestinal metaplasia or dysplasia. Multiple cold forceps biopsies were performed for histology.  -An oozing Dieulafoy lesion was seen in the proximal gastric body. Three endoclips were successfully applied for the purpose of hemostasis.  -A hiatal hernia (Hill Grade I) was seen, displacing the gastroesophageal junction (GEJ) to 36cm from the incisors, with hiatal narrowing at 41cm from the incisors. Retroflexion view in the stomach confirmed the size and morphology of the hernia.  -Localized erosions of the mucosa was noted along the lesser curvature. These findings are compatible with erosive gastritis.  -Normal duodenum   Colonoscopy:  -Multiple non-bleeding diverticula were seen in the sigmoid colon.	  -Normal mucosa was noted in the terminal ileum.	  -Non-bleeding grade/stage I internal hemorrhoids were noted.	  -The colon was otherwise unremarkable.      SECONDARY PROCEDURE  Procedure: Transthoracic echocardiography (TTE)  Findings and Treatment: 1. Moderately reduced left ventricular systolic function.   2. Basal and mid inferior wall, basal and mid inferolateral wall, and   mid anterolateral segment are akinetic.   3. Normal left and right ventricular size and systolic function.   4. Grade II left ventricular diastolic dysfunction.   5. Severely dilated left atrium.   6. Mild mitral regurgitation.   7. Aortic sclerosis without significant stenosis.   8. No evidence of pulmonary hypertension.   9. No pericardial effusion.  10. No prior echo is available for comparison.

## 2023-04-12 NOTE — DISCHARGE NOTE PROVIDER - REASON FOR ADMISSION
[facility] NURSING HOME PROGRESS NOTE  [facility]  [id number] [place], [place], [address]    DATE OF SERVICE: 03/04/22  LOCATION: [place], [place]    Patient ID:  Aniya Henry   10/11/1920  89 year old (female)      Chief Complaint   Patient presents with   • Follow-up     HPI    Patient is seen and examined, sitting up in chair, alert and verbally responsive, NAD. Comfortable. Denies pain. The patient denies visual changes, HA, difficulty swallowing, SOB, cough, CP, N/V, abd pain, diarrhea, MSK complaints, and lower extremity edema.   Nurse reports no new illness or complaints.   Has fair appetites.      Patient Active Problem List   Diagnosis   • Dementia (CMS/HCC)   • Essential hypertension   • Osteoarthritis   • Dyspepsia   • Mild major depression (CMS/HCC)   • Muscle weakness   • Wound of buttock, left, initial encounter       HTN (hypertension)                                          History reviewed. No pertinent surgical history.  History reviewed. No pertinent family history.  Current Outpatient Medications   Medication Sig Dispense Refill   • docusate sodium (COLACE) 100 MG capsule Take 100 mg by mouth daily.     • escitalopram (LEXAPRO) 5 MG tablet Take 5 mg by mouth daily.     • SODIUM CHLORIDE PO Take 1,000 mg by mouth 2 times daily as needed.     • amLODIPine (NORVASC) 5 MG tablet Take 5 mg by mouth daily.     • cloNIDine (CATAPRES-TTS 3) 0.3 MG/24HR Place 1 patch onto the skin 1 day a week. Box includes patch and non-medicated adhesive cover. Apply adhesive cover if patch begins to lift.     • omeprazole 20 MG tablet Take 20 mg by mouth daily.     • polyethylene glycol (MIRALAX) 17 g packet Take 17 g by mouth daily. Stir and dissolve powder in any 4 to 8 ounces of beverage, then drink.       No current facility-administered medications for this visit.     ALLERGIES:   Allergen Reactions   • Aspirin Other (See Comments)   • Penicillins Other (See Comments)        Review of Systems:  Review of Systems   Constitutional: Negative.    HENT: Negative.    Eyes: Negative.    Respiratory: Negative.    Cardiovascular: Negative.    Gastrointestinal: Negative.    Musculoskeletal: Negative.    Skin: Negative.    Neurological: Negative.    Psychiatric/Behavioral: Negative.        Physical:  Physical Exam  Vitals and nursing note reviewed.   Constitutional:       Appearance: She is well-developed.   HENT:      Head: Normocephalic and atraumatic.      Right Ear: External ear normal.      Left Ear: External ear normal.      Nose: Nose normal.   Eyes:      Conjunctiva/sclera: Conjunctivae normal.      Pupils: Pupils are equal, round, and reactive to light.   Cardiovascular:      Rate and Rhythm: Normal rate and regular rhythm.      Heart sounds: Normal heart sounds.   Pulmonary:      Effort: Pulmonary effort is normal.      Breath sounds: Normal breath sounds.   Abdominal:      General: Bowel sounds are normal.      Palpations: Abdomen is soft.   Musculoskeletal:         General: Normal range of motion.      Cervical back: Normal range of motion and neck supple.   Skin:     General: Skin is warm and dry.   Neurological:      General: No focal deficit present.      Mental Status: She is alert. Mental status is at baseline.   Psychiatric:         Mood and Affect: Mood normal.         Behavior: Behavior normal.          Problem List Items Addressed This Visit        Cardiac and Vasculature    Essential hypertension       Gastrointestinal and Abdominal    Dyspepsia       Mental Health    Mild major depression (CMS/HCC)       Neuro    Dementia (CMS/HCC) - Primary          PLAN  -Vital signs, medications and lab work reviewed.  Dementia: fall precaution, supportive care  Fall precaution  HTN: amlodipine 5mg po every day, clonidine 1 patch change once weekly  Constipation: docusate 100mg 1 cab po every day  Depression: lexapro 5mg po every day, mood is stable  GERD: omeprazole 20mg po every  day.  -Will continue to watch the patient closely.   -Plan discussed with the patient and nursing staff.     Eliceo Peres, CNP     Symptomatic anemia

## 2023-04-13 LAB
ANION GAP SERPL CALC-SCNC: 10 MMOL/L — SIGNIFICANT CHANGE UP (ref 5–17)
APTT BLD: 120.4 SEC — CRITICAL HIGH (ref 27.5–35.5)
APTT BLD: 127.1 SEC — CRITICAL HIGH (ref 27.5–35.5)
APTT BLD: 70.3 SEC — HIGH (ref 27.5–35.5)
BASOPHILS # BLD AUTO: 0.06 K/UL — SIGNIFICANT CHANGE UP (ref 0–0.2)
BASOPHILS NFR BLD AUTO: 0.6 % — SIGNIFICANT CHANGE UP (ref 0–2)
BLD GP AB SCN SERPL QL: NEGATIVE — SIGNIFICANT CHANGE UP
BUN SERPL-MCNC: 9 MG/DL — SIGNIFICANT CHANGE UP (ref 7–23)
CALCIUM SERPL-MCNC: 8.8 MG/DL — SIGNIFICANT CHANGE UP (ref 8.4–10.5)
CHLORIDE SERPL-SCNC: 106 MMOL/L — SIGNIFICANT CHANGE UP (ref 96–108)
CO2 SERPL-SCNC: 24 MMOL/L — SIGNIFICANT CHANGE UP (ref 22–31)
CREAT SERPL-MCNC: 0.79 MG/DL — SIGNIFICANT CHANGE UP (ref 0.5–1.3)
EGFR: 101 ML/MIN/1.73M2 — SIGNIFICANT CHANGE UP
EOSINOPHIL # BLD AUTO: 0.14 K/UL — SIGNIFICANT CHANGE UP (ref 0–0.5)
EOSINOPHIL NFR BLD AUTO: 1.5 % — SIGNIFICANT CHANGE UP (ref 0–6)
GLUCOSE SERPL-MCNC: 113 MG/DL — HIGH (ref 70–99)
HCT VFR BLD CALC: 30.2 % — LOW (ref 39–50)
HCT VFR BLD CALC: 35.8 % — LOW (ref 39–50)
HGB BLD-MCNC: 10 G/DL — LOW (ref 13–17)
HGB BLD-MCNC: 8.6 G/DL — LOW (ref 13–17)
IMM GRANULOCYTES NFR BLD AUTO: 0.3 % — SIGNIFICANT CHANGE UP (ref 0–0.9)
LYMPHOCYTES # BLD AUTO: 0.94 K/UL — LOW (ref 1–3.3)
LYMPHOCYTES # BLD AUTO: 10.1 % — LOW (ref 13–44)
MAGNESIUM SERPL-MCNC: 1.8 MG/DL — SIGNIFICANT CHANGE UP (ref 1.6–2.6)
MCHC RBC-ENTMCNC: 21.1 PG — LOW (ref 27–34)
MCHC RBC-ENTMCNC: 21.5 PG — LOW (ref 27–34)
MCHC RBC-ENTMCNC: 27.9 GM/DL — LOW (ref 32–36)
MCHC RBC-ENTMCNC: 28.5 GM/DL — LOW (ref 32–36)
MCV RBC AUTO: 74 FL — LOW (ref 80–100)
MCV RBC AUTO: 77 FL — LOW (ref 80–100)
MONOCYTES # BLD AUTO: 1.23 K/UL — HIGH (ref 0–0.9)
MONOCYTES NFR BLD AUTO: 13.2 % — SIGNIFICANT CHANGE UP (ref 2–14)
NEUTROPHILS # BLD AUTO: 6.92 K/UL — SIGNIFICANT CHANGE UP (ref 1.8–7.4)
NEUTROPHILS NFR BLD AUTO: 74.3 % — SIGNIFICANT CHANGE UP (ref 43–77)
NRBC # BLD: 0 /100 WBCS — SIGNIFICANT CHANGE UP (ref 0–0)
NRBC # BLD: 0 /100 WBCS — SIGNIFICANT CHANGE UP (ref 0–0)
PHOSPHATE SERPL-MCNC: 4.1 MG/DL — SIGNIFICANT CHANGE UP (ref 2.5–4.5)
PLATELET # BLD AUTO: 449 K/UL — HIGH (ref 150–400)
PLATELET # BLD AUTO: 474 K/UL — HIGH (ref 150–400)
POTASSIUM SERPL-MCNC: 4 MMOL/L — SIGNIFICANT CHANGE UP (ref 3.5–5.3)
POTASSIUM SERPL-SCNC: 4 MMOL/L — SIGNIFICANT CHANGE UP (ref 3.5–5.3)
RBC # BLD: 4.08 M/UL — LOW (ref 4.2–5.8)
RBC # BLD: 4.65 M/UL — SIGNIFICANT CHANGE UP (ref 4.2–5.8)
RBC # FLD: 22.7 % — HIGH (ref 10.3–14.5)
RBC # FLD: 23.5 % — HIGH (ref 10.3–14.5)
RH IG SCN BLD-IMP: POSITIVE — SIGNIFICANT CHANGE UP
SODIUM SERPL-SCNC: 140 MMOL/L — SIGNIFICANT CHANGE UP (ref 135–145)
WBC # BLD: 13.56 K/UL — HIGH (ref 3.8–10.5)
WBC # BLD: 9.32 K/UL — SIGNIFICANT CHANGE UP (ref 3.8–10.5)
WBC # FLD AUTO: 13.56 K/UL — HIGH (ref 3.8–10.5)
WBC # FLD AUTO: 9.32 K/UL — SIGNIFICANT CHANGE UP (ref 3.8–10.5)

## 2023-04-13 PROCEDURE — 99233 SBSQ HOSP IP/OBS HIGH 50: CPT

## 2023-04-13 PROCEDURE — 99233 SBSQ HOSP IP/OBS HIGH 50: CPT | Mod: GC

## 2023-04-13 PROCEDURE — 99232 SBSQ HOSP IP/OBS MODERATE 35: CPT | Mod: GC

## 2023-04-13 PROCEDURE — 99232 SBSQ HOSP IP/OBS MODERATE 35: CPT

## 2023-04-13 PROCEDURE — 93306 TTE W/DOPPLER COMPLETE: CPT | Mod: 26

## 2023-04-13 RX ORDER — HEPARIN SODIUM 5000 [USP'U]/ML
1100 INJECTION INTRAVENOUS; SUBCUTANEOUS
Qty: 25000 | Refills: 0 | Status: DISCONTINUED | OUTPATIENT
Start: 2023-04-13 | End: 2023-04-13

## 2023-04-13 RX ORDER — ONDANSETRON 8 MG/1
4 TABLET, FILM COATED ORAL ONCE
Refills: 0 | Status: COMPLETED | OUTPATIENT
Start: 2023-04-13 | End: 2023-04-13

## 2023-04-13 RX ORDER — APIXABAN 2.5 MG/1
5 TABLET, FILM COATED ORAL EVERY 12 HOURS
Refills: 0 | Status: DISCONTINUED | OUTPATIENT
Start: 2023-04-13 | End: 2023-04-14

## 2023-04-13 RX ORDER — APIXABAN 2.5 MG/1
1 TABLET, FILM COATED ORAL
Qty: 60 | Refills: 3
Start: 2023-04-13 | End: 2023-08-10

## 2023-04-13 RX ORDER — HEPARIN SODIUM 5000 [USP'U]/ML
900 INJECTION INTRAVENOUS; SUBCUTANEOUS
Qty: 25000 | Refills: 0 | Status: DISCONTINUED | OUTPATIENT
Start: 2023-04-13 | End: 2023-04-13

## 2023-04-13 RX ADMIN — HEPARIN SODIUM 1100 UNIT(S)/HR: 5000 INJECTION INTRAVENOUS; SUBCUTANEOUS at 04:20

## 2023-04-13 RX ADMIN — ATORVASTATIN CALCIUM 40 MILLIGRAM(S): 80 TABLET, FILM COATED ORAL at 22:20

## 2023-04-13 RX ADMIN — ONDANSETRON 4 MILLIGRAM(S): 8 TABLET, FILM COATED ORAL at 15:07

## 2023-04-13 RX ADMIN — HEPARIN SODIUM 9 UNIT(S)/HR: 5000 INJECTION INTRAVENOUS; SUBCUTANEOUS at 11:40

## 2023-04-13 RX ADMIN — Medication 25 MILLIGRAM(S): at 05:02

## 2023-04-13 RX ADMIN — APIXABAN 5 MILLIGRAM(S): 2.5 TABLET, FILM COATED ORAL at 22:21

## 2023-04-13 RX ADMIN — Medication 25 MILLIGRAM(S): at 15:07

## 2023-04-13 RX ADMIN — Medication 25 MILLIGRAM(S): at 22:21

## 2023-04-13 RX ADMIN — PANTOPRAZOLE SODIUM 40 MILLIGRAM(S): 20 TABLET, DELAYED RELEASE ORAL at 06:02

## 2023-04-13 RX ADMIN — IRON SUCROSE 110 MILLIGRAM(S): 20 INJECTION, SOLUTION INTRAVENOUS at 19:01

## 2023-04-13 NOTE — PROGRESS NOTE ADULT - ASSESSMENT
62M PMH of IAIN,  HTN, HLD, prediabetes, LBBB (since 2018), CAD with stent (2021), on asa and plavix sent by cardiologist for anemia and hypotension, found to have a Hgb 6.6, also noted to have new Atrial flutter, admitted to telemetry for close monitoring, GI consulted for anemia evaluation in the setting of IAIN, likely due to dieulafoy in prox gastric body s/p 3 endoclips on 4/12 4/12/23    EGD:  -Irregularity of the mucosa with no bleeding was noted in the area at and just proximal to the squamo-columnar junction. Biopsies were taken from the noted salmon mucosal tongue(s) to identify the possible presence of esophagitis, intestinal metaplasia or dysplasia. Multiple cold forceps biopsies were performed for histology.  -An oozing Dieulafoy lesion was seen in the proximal gastric body. Three endoclips were successfully applied for the purpose of hemostasis.  -A hiatal hernia (Hill Grade I) was seen, displacing the gastroesophageal junction (GEJ) to 36cm from the incisors, with hiatal narrowing at 41cm from the incisors. Retroflexion view in the stomach confirmed the size and morphology of the hernia.  -Localized erosions of the mucosa was noted along the lesser curvature. These findings are compatible with erosive gastritis.  -Normal duodenum     Colonoscopy:  -Multiple non-bleeding diverticula were seen in the sigmoid colon.	  -Normal mucosa was noted in the terminal ileum.	  -Non-bleeding grade/stage I internal hemorrhoids were noted.	  -The colon was otherwise unremarkable.    Recommendations:  -We will follow up pathology with pt  -Protonix 40 mg daily  -Follow up with Dr. Tapia (GI) upon discharge  -IV iron therapy as outpatient with hematology is recommended  -Awaiting CBC. If stable, no further recommendations from GI standpoint  -We appreciate cardiology and medicine co-management     Justin Jeffries DO  Gastroenterology Fellow  Pager: [phone]

## 2023-04-13 NOTE — PROGRESS NOTE ADULT - SUBJECTIVE AND OBJECTIVE BOX
***** Transfer note from Telemetry to [facility] *****  This is a 62-year old male with a past medical history of Iron deficiency anemia, CAD s/p KUSHAL mLCX, OM1, pLAD 3/2021, HLD presenting with symptomatic anemia associated with elevated troponin and new diagnosis of a. fib/a. flutter at rate of 110, admitted for symptomatic anemia and rhythm/rate control. Cardiology and GI consulted.  A. flutter spontaneously resolved post PRBC transfusion in the emergency department, thus no need for ablation this hospitalization per cardiology. Patient planned for EGD/ Colonoscopy to assess source of symptomatic anemia with course complicated by tachycardia vs flutter post bowel prep for colonoscopy leading to delay in procedure. On 4/12 EGD/Colonoscopy significant for Dieulafoy lesion status post 3 hemoclips. Patient was started on IV iron for 5 days. Pending Echo today and will need follow up for IV iron infusions due to concern of malabsorption.     INTERVAL HPI/OVERNIGHT EVENTS:  Patient was seen and examined at bedside. Case discussed with attending physician during morning rounds.     As per nurse and patient, no o/n events, patient resting comfortably. No complaints at this time. Patient denies emesis, hematochezia, melena, chest pain, shortness of breath, abdominal pain.     VITAL SIGNS:  T(F): 98 (04-13-23 @ 11:13)  HR: 70 (04-13-23 @ 08:20)  BP: 111/60 (04-13-23 @ 08:20)  RR: 15 (04-13-23 @ 08:20)  SpO2: 97% (04-13-23 @ 08:20)  Wt(kg): --    PHYSICAL EXAM:  Constitutional: No acute distress, resting comfortably in bed.    HEENT: Sclera non-icteric, neck supple, MMM.  Respiratory: Clear to auscultation bilaterally, adequate air entry, no wheezing, no rhonchi, no rales, without accessory muscle use and no intercostal retractions  Cardiovascular: Regular rate and rhythm, normal S1S2.  Gastrointestinal: soft, non-tender and non-distended, Normoactive bowel sounds.  Extremities: Warm, well perfused, pulses equal bilateral upper and lower extremities.  Neurological: AAOx3.  Skin: Normal temperature, warm, dry.    MEDICATIONS  (STANDING):  atorvastatin 40 milliGRAM(s) Oral at bedtime  heparin  Infusion 900 Unit(s)/Hr (9 mL/Hr) IV Continuous <Continuous>  iron sucrose IVPB 200 milliGRAM(s) IV Intermittent every 24 hours  metoprolol tartrate 25 milliGRAM(s) Oral every 8 hours  polyethylene glycol 3350 17 Gram(s) Oral daily  senna 2 Tablet(s) Oral at bedtime    MEDICATIONS  (PRN):  acetaminophen     Tablet .. 650 milliGRAM(s) Oral every 6 hours PRN Temp greater or equal to 38C (100.4F), Mild Pain (1 - 3)  melatonin 3 milliGRAM(s) Oral at bedtime PRN Insomnia      Allergies    No Known Allergies    Intolerances        LABS:                        8.6    9.32  )-----------( 449      ( 13 Apr 2023 10:07 )             30.2     04-13    140  |  106  |  9   ----------------------------<  113<H>  4.0   |  24  |  0.79    Ca    8.8      13 Apr 2023 10:07  Phos  4.1     04-13  Mg     1.8     04-13      PT/INR - ( 12 Apr 2023 05:30 )   PT: 14.8 sec;   INR: 1.24          PTT - ( 13 Apr 2023 10:00 )  PTT:120.4 sec        RADIOLOGY & ADDITIONAL TESTS:  Reviewed

## 2023-04-13 NOTE — PROGRESS NOTE ADULT - ATTENDING COMMENTS
62-year-old female with a PMHx of IAIN, CAD (s/p PCI in 2021) and HLD who presented with symptomatic anemia and Afib with RVR.      #Symptomatic Anemia    -GI consulted, EGD/colonoscopy most notable for Dieulafoy lesion (s/p 3 hemoclips), erosive gastritis and multiple non-bleeding diverticula   -IV iron while inpatient, outpatient hematology follow up for continued infusions    -continue with pantoprazole 40mg daily    -outpatient GI follow up      #AFib/Flutter   -cardiology consulted, appreciate recommendations  -continue with metoprolol tartrate 25mg q8hrs   -transition from heparin gtt to Eliquis for evening dose pending repeat CBC and ptt  -follow up TTE     #Elevated Troponin    #CAD/HLD   -cardiology consulted, low concern for ACS   -continue with atorvastatin 40mg daily      #HTN   -hold lisinopril 20mg daily, resume as tolerated    DVT PPx: heparin gtt    Dispo: home

## 2023-04-13 NOTE — PROGRESS NOTE ADULT - PROBLEM SELECTOR PLAN 1
Microcytic anemia, presented with Hgb 6.6 (previous baseline 15). S/p 1u pRBCs in ED, pending repeat Hgb. History of severe iron deficiency anemia, s/p 2 iron transfusions in the past and prescribed oral iron which pt stopped on his own behalf. Pt currently without sx of melena, black/tarry stool, hematemesis, easy bleeding/bruising. Orthostatics negative in ED. Etiology likely 2/2 ongoing iron deficiency and iron supplement cessation vs overall nutritional deficiency. On AC and has tolerated thus far, without history of recent NSAID, ETOH use.     S/p EGD/C-scope: irregular mucosa of the SCJ, s/p biopsy. Oozing Dieulafoy lesion s/p 3 endoclips, hiatal hernia (Hill Grade I), erosive gastritis. Multiple non-bleeding diverticula in the sigmoid colon. Non-bleeding grade/stage I internal hemorrhoids.    GI Recs:  - Advance diet as tolerated  - Protonix 40 mg daily  - IV iron therapy as outpatient with hematology   - Outpatient f/u w/ Dr. Jc  - Transfuse if Hgb <7.  - IV iron 200 mg for 5 days.  - Active T&S

## 2023-04-13 NOTE — PROGRESS NOTE ADULT - ATTENDING COMMENTS
Patient seen and d/w Dr. Jeffries. Agree with plan above.  No acute event overnight and no overt GI bleeding. Patient tolerated EGD/Colonoscopy which was notable for a dieulafoy lesion at the proximal gastric body s/p 3 hemoclip with complete hemostasis. Can continue PPI. Outpatient GI followup at discharge. All questions answered.

## 2023-04-13 NOTE — DIETITIAN INITIAL EVALUATION ADULT - ADD RECOMMEND
1. Continue DASH/TLC diet  2. Follow intake closely, adjust prn  3. Monitor electrolytes, adjust and replete prn  4. Pain and bowel regimen per team   5. RD diet edu prn   1. Continue DASH/TLC diet, recommend ensure enlive once/day  2. Follow intake closely, adjust prn  3. Monitor electrolytes, adjust and replete prn  4. Pain and bowel regimen per team   5. RD diet edu prn

## 2023-04-13 NOTE — PROGRESS NOTE ADULT - SUBJECTIVE AND OBJECTIVE BOX
ELLA MAGUIRE    ***** Transfer note from Telemetry to [facility] *****  62-year old male with a past medical history of Iron deficiency anemia, CAD s/p KUSHAL mLCX, OM1, pLAD 3/2021, HLD presenting with symptomatic anemia associated with elevated troponin and new diagnosis of a. fib/a. flutter at rate of 110, admitted for symptomatic anemia and rhythm/rate control. Cardiology and GI consulted.  A. flutter spontaneously resolved post PRBC transfusion in the emergency department, thus no need for ablation this hospitalization per cardiology. Patient planned for EGD/ Colonoscopy to assess source of symptomatic anemia with course complicated by tachycardia vs flutter post bowel prep for colonoscopy leading to delay in procedure. On 4/12 EGD/Colonoscopy significant for Dieulafoy lesion status post 3 hemoclips. Patient was started on IV iron for 5 days. Pending Echo today and will need follow up for IV iron infusions due to concern of malabsorption.     Subjective:  No o/n events, patient resting comfortably. No complaints at this time. Patient denies emesis, hematochezia, melena, chest pain, shortness of breath, abdominal pain.     T(C): 36.7 (04-13-23 @ 11:13), Max: 36.7 (04-13-23 @ 11:13)  HR: 72 (04-13-23 @ 11:42) (62 - 102)  BP: 104/65 (04-13-23 @ 11:42) (93/55 - 111/60)  RR: 14 (04-13-23 @ 11:42) (14 - 18)  SpO2: 99% (04-13-23 @ 11:42) (95% - 99%)  Wt(kg): --Vital Signs Last 24 Hrs  T(C): 36.7 (13 Apr 2023 11:13), Max: 36.7 (13 Apr 2023 11:13)  T(F): 98 (13 Apr 2023 11:13), Max: 98 (13 Apr 2023 11:13)  HR: 72 (13 Apr 2023 11:42) (62 - 102)  BP: 104/65 (13 Apr 2023 11:42) (93/55 - 111/60)  BP(mean): 79 (13 Apr 2023 11:42) (68 - 79)  RR: 14 (13 Apr 2023 11:42) (14 - 18)  SpO2: 99% (13 Apr 2023 11:42) (95% - 99%)    Parameters below as of 13 Apr 2023 11:42  Patient On (Oxygen Delivery Method): room air    PHYSICAL EXAM:  Constitutional: No acute distress, resting comfortably in bed.    HEENT: Sclera non-icteric, neck supple, MMM.  Respiratory: Clear to auscultation bilaterally, adequate air entry, no wheezing, no rhonchi, no rales, without accessory muscle use and no intercostal retractions  Cardiovascular: Regular rate and rhythm, normal S1S2.  Gastrointestinal: soft, non-tender and non-distended, Normoactive bowel sounds.  Extremities: Warm, well perfused, pulses equal bilateral upper and lower extremities.  Neurological: AAOx3.  Skin: Normal temperature, warm, dry.  Care Discussed with Consultants/Other Providers [ x] YES  [ ] NO    LABS:                        8.6    9.32  )-----------( 449      ( 13 Apr 2023 10:07 )             30.2     04-13    140  |  106  |  9   ----------------------------<  113<H>  4.0   |  24  |  0.79    Ca    8.8      13 Apr 2023 10:07  Phos  4.1     04-13  Mg     1.8     04-13        PT/INR - ( 12 Apr 2023 05:30 )   PT: 14.8 sec;   INR: 1.24          PTT - ( 13 Apr 2023 10:00 )  PTT:120.4 sec    CAPILLARY BLOOD GLUCOSE                RADIOLOGY & ADDITIONAL TESTS:    Imaging Personally Reviewed:  [ ] YES  [ ] NO

## 2023-04-13 NOTE — PROGRESS NOTE ADULT - ASSESSMENT
62M with PMH IAIN, CAD s/p KUSHAL mLCX, OM1, pLAD 3/2021, HLD presenting with symptomatic anemia associated with elevated troponin and new diagnosis of a flutter at rate of 110, admitted for anemia and a.flutter rate control. Cardiology and GI consulted.  A. flutter now resolved post PRBC transfusion, plan for TTE no need for ablation per cardiology. Complicated by tachycardia vs flutter post bowel prep for colonoscopy, now post EGD/Colonoscopy w/ dieulafoy lesion s/p 3 hemoclips.

## 2023-04-13 NOTE — PROGRESS NOTE ADULT - PROBLEM SELECTOR PLAN 2
New onset a.flutter with -130s. Likely 2/2 Anemia, CTI dependent/typical w/ CHADS VASC - 2 (CAD, HTN),  cardiology consulted in ED with preference of DCCV/ablation in the setting of new onset a.flutter as rate control will be difficult with medical management. s/p IV lopressor 5 mg and digoxin loading. HR more controlled to 90s-low 100s upon admission to telemetry.     - Cards following - recs appreciated.   - A. flutter intermittent, pending TTE.   - Continue metoprolol tartrate 25 mg Q8H for rate control for now until above is addressed.  - Pending transition to DOAC therapy prior to discharge pending cardiology recommendations.

## 2023-04-13 NOTE — DIETITIAN INITIAL EVALUATION ADULT - PROBLEM SELECTOR PLAN 5
home meds: lisinopril 20 mg QD     -Holding in setting of intermittent hypotension. Resume as appropriate

## 2023-04-13 NOTE — DIETITIAN INITIAL EVALUATION ADULT - PROBLEM SELECTOR PLAN 1
Microcytic anemia, presented with Hgb 6.6 (baseline 7-8). S/p 1u pRBCs in ED, pending repeat Hgb. History of severe iron deficiency anemia, s/p 2 iron transfusions in the past and prescribed oral iron which pt stopped on his own behalf. Pt currently without sx of melena, black/tarry stool, hematemesis, easy bleeding/bruising. Orthostatics negative in ED  -Etiology likely 2/2 ongoing iron deficiency and iron supplement cessation  -On AC and has tolerated thus far, without history of recent NSAID, etoh use     -Trend CBC. Pending repeat Hgb s/p transfusion   -Transfuse if Hgb <7   - F/u iron panel. Start iron sucrose based on iron levels pending   -F/u hemolysis labs   -Active T&S  -GI consult in AM

## 2023-04-13 NOTE — PROGRESS NOTE ADULT - ATTENDING COMMENTS
Initial attending contact date  4/10/23    . See fellow note written above for details. I reviewed the fellow documentation. I have personally seen and examined this patient. I reviewed vitals, labs, medications, cardiac studies, and additional imaging. I agree with the above fellow's findings and plans as written above with the following additions/statements.    -Pt with known, stable CAD s/p PCI 2021, admitted with anemia   -noted to be in new onset A fib in setting of anemia, has since converted and maintaining NSR  -s/p EGD and colo: EGD with oozing noted in gastric body s/p 3 hemoclips  -Tolerating IV heparin, without recurrent bleed. H/H remains stable.  -Transition to eliquis 5mg bid and monitor for bleed. No ASA upon dc  -To fu with his outpatient cardiologist - Dr Dhaval Boone upon dc

## 2023-04-13 NOTE — PROGRESS NOTE ADULT - PROBLEM SELECTOR PLAN 3
Presented with Troponin T: 0.16, CKMB WNL. Non ACS troponin/myocardial injury, patient asymptomatic with EKG unremarkable for ST changes. LBBB present (chronic since 2018). EKG revealed aflutter with RVR. Likely due to anemia +/- demand from flutter/tachycardia in pt w/ underlying CAD. No history of CP recently or on admission  Resolved.    - Managing anemia as per above.   - Cardiology following - recs appreciated.

## 2023-04-13 NOTE — PROGRESS NOTE ADULT - PROBLEM SELECTOR PLAN 4
s/p KUSHAL mLCX, OM1, pLAD 3/2021, Home med : Rosuvastatin 10 mg, ASA 81 mg, Plavix 75 mg QD    - C/w statin therapeutic interchange.  - Discuss plans for Eliquis on discharge.

## 2023-04-13 NOTE — PROGRESS NOTE ADULT - PROBLEM SELECTOR PLAN 3
Presented with Troponin T: 0.16, CKMB WNL. Non ACS troponin/myocardial injury, patient asymptomatic with EKG unremarkable for ST changes. LBBB present (chronic since 2018). EKG revealed aflutter with RVR. Likely due to anemia +/- demand from flutter/tachycardia in pt w/ underlying CAD. No history of CP recently or on admission    Resolved     - Managing anemia as per above.   - Cardiology following - recs appreciated.

## 2023-04-13 NOTE — PROGRESS NOTE ADULT - SUBJECTIVE AND OBJECTIVE BOX
Cardiology Consult    EGD showed an oozing Dieulafoy lesion was seen in the proximal gastric body. Three endoclips were successfully applied for the purpose of hemostasis.  no melena, Hb stable since yesterday. started heparin challenge overnight.     OBJECTIVE  Vitals:  T(C): 36.8 (04-13-23 @ 14:46), Max: 36.8 (04-13-23 @ 14:46)  HR: 63 (04-13-23 @ 14:46) (62 - 102)  BP: 116/68 (04-13-23 @ 14:46) (93/55 - 116/68)  RR: 16 (04-13-23 @ 14:46) (14 - 18)  SpO2: 99% (04-13-23 @ 14:46) (95% - 99%)  Wt(kg): --    I/O:  I&O's Summary    12 Apr 2023 07:01  -  13 Apr 2023 07:00  --------------------------------------------------------  IN: 335 mL / OUT: 1400 mL / NET: -1065 mL    13 Apr 2023 07:01  -  13 Apr 2023 15:25  --------------------------------------------------------  IN: 44 mL / OUT: 600 mL / NET: -556 mL        PHYSICAL EXAM:  Appearance: NAD. Speaking in full sentences.   HEENT: No pallor noted.  Conjunctiva clear b/l. Moist oral mucosa.  Cardiovascular: RRR with no murmurs.  Respiratory: Lungs CTAB.   Gastrointestinal:  Soft, nontender. Non-distended. Non-rigid.	  Extremities: No edema b/l. No erythema b/l. LE WWP b/l.  Vascular: DP intact  Neurologic:  Alert and awake. Moving all extremities. Following commands.   	  LABS:                        8.6    9.32  )-----------( 449      ( 13 Apr 2023 10:07 )             30.2     04-13    140  |  106  |  9   ----------------------------<  113<H>  4.0   |  24  |  0.79    Ca    8.8      13 Apr 2023 10:07  Phos  4.1     04-13  Mg     1.8     04-13      PT/INR - ( 12 Apr 2023 05:30 )   PT: 14.8 sec;   INR: 1.24          PTT - ( 13 Apr 2023 10:00 )  PTT:120.4 sec      RADIOLOGY & ADDITIONAL TESTS:  Reviewed .    MEDICATIONS  (STANDING):  atorvastatin 40 milliGRAM(s) Oral at bedtime  heparin  Infusion 900 Unit(s)/Hr (9 mL/Hr) IV Continuous <Continuous>  iron sucrose IVPB 200 milliGRAM(s) IV Intermittent every 24 hours  metoprolol tartrate 25 milliGRAM(s) Oral every 8 hours  pantoprazole    Tablet 40 milliGRAM(s) Oral before breakfast    MEDICATIONS  (PRN):  acetaminophen     Tablet .. 650 milliGRAM(s) Oral every 6 hours PRN Temp greater or equal to 38C (100.4F), Mild Pain (1 - 3)  melatonin 3 milliGRAM(s) Oral at bedtime PRN Insomnia

## 2023-04-13 NOTE — PROGRESS NOTE ADULT - PROBLEM SELECTOR PLAN 4
s/p KUSHAL mLCX, OM1, pLAD 3/2021, Home med : Rosuvastatin 10 mg, ASA 81 mg, Plavix 75 mg QD    - c/w atorvastatin 40mg OD

## 2023-04-13 NOTE — PROGRESS NOTE ADULT - PROBLEM SELECTOR PLAN 1
Microcytic anemia, presented with Hgb 6.6 (previous baseline 15). S/p 1u pRBCs in ED, pending repeat Hgb. History of severe iron deficiency anemia, s/p 2 iron transfusions in the past and prescribed oral iron which pt stopped on his own behalf. Pt currently without sx of melena, black/tarry stool, hematemesis, easy bleeding/bruising. Orthostatics negative in ED. Etiology likely 2/2 ongoing iron deficiency and iron supplement cessation vs overall nutritional deficiency. On AC and has tolerated thus far, without history of recent NSAID, ETOH use.     S/p EGD/C-scope: irregular mucosa of the SCJ, s/p biopsy. Oozing Dieulafoy lesion s/p 3 endoclips, hiatal hernia (Hill Grade I), erosive gastritis. Multiple non-bleeding diverticula in the sigmoid colon. Non-bleeding grade/stage I internal hemorrhoids.    - Advance diet as tolerated  - Protonix 40 mg daily  - Transfuse if Hgb <7.  - IV iron 200 mg for 5 days.  - Active T&S  - IV iron therapy as outpatient with hematology   - Outpatient f/u w/ Dr. Jc  - GI following, f/u recs

## 2023-04-13 NOTE — PROGRESS NOTE ADULT - PROBLEM SELECTOR PLAN 2
New onset a.flutter with -130s. Likely 2/2 Anemia, CTI dependent/typical w/ CHADS VASC - 2 (CAD, HTN),  cardiology consulted in ED with preference of DCCV/ablation in the setting of new onset a.flutter as rate control will be difficult with medical management. s/p IV lopressor 5 mg and digoxin loading. HR more controlled to 90s-low 100s upon admission to telemetry.     - Cards following - f/u recs   - A. flutter intermittent, plan for TTE (ordered, pending image)  - Continue metoprolol tartrate 25 mg Q8H for rate control   - s/p EGD/C-scope. Per GI no contraindication to AC (on hep) will likely transition to DOAC

## 2023-04-13 NOTE — DIETITIAN INITIAL EVALUATION ADULT - PERTINENT MEDS FT
MEDICATIONS  (STANDING):  atorvastatin 40 milliGRAM(s) Oral at bedtime  heparin  Infusion 900 Unit(s)/Hr (9 mL/Hr) IV Continuous <Continuous>  iron sucrose IVPB 200 milliGRAM(s) IV Intermittent every 24 hours  metoprolol tartrate 25 milliGRAM(s) Oral every 8 hours  pantoprazole    Tablet 40 milliGRAM(s) Oral before breakfast    MEDICATIONS  (PRN):  acetaminophen     Tablet .. 650 milliGRAM(s) Oral every 6 hours PRN Temp greater or equal to 38C (100.4F), Mild Pain (1 - 3)  melatonin 3 milliGRAM(s) Oral at bedtime PRN Insomnia

## 2023-04-13 NOTE — PROGRESS NOTE ADULT - SUBJECTIVE AND OBJECTIVE BOX
Pt seen and examined at bedside.  Tolerating diet. No abd pain, nausea, vomiting  No BM overnight on therapeutic anticoag    Allergies    No Known Allergies    Intolerances        MEDICATIONS:  MEDICATIONS  (STANDING):  atorvastatin 40 milliGRAM(s) Oral at bedtime  heparin  Infusion. 1100 Unit(s)/Hr (11 mL/Hr) IV Continuous <Continuous>  iron sucrose IVPB 200 milliGRAM(s) IV Intermittent every 24 hours  metoprolol tartrate 25 milliGRAM(s) Oral every 8 hours  polyethylene glycol 3350 17 Gram(s) Oral daily  senna 2 Tablet(s) Oral at bedtime    MEDICATIONS  (PRN):  acetaminophen     Tablet .. 650 milliGRAM(s) Oral every 6 hours PRN Temp greater or equal to 38C (100.4F), Mild Pain (1 - 3)  melatonin 3 milliGRAM(s) Oral at bedtime PRN Insomnia      Vital Signs Last 24 Hrs  T(C): 36.4 (13 Apr 2023 06:45), Max: 36.4 (13 Apr 2023 01:00)  T(F): 97.6 (13 Apr 2023 06:45), Max: 97.6 (13 Apr 2023 06:45)  HR: 70 (13 Apr 2023 08:20) (62 - 102)  BP: 111/60 (13 Apr 2023 08:20) (93/55 - 118/61)  BP(mean): 78 (13 Apr 2023 08:20) (68 - 84)  RR: 15 (13 Apr 2023 08:20) (15 - 18)  SpO2: 97% (13 Apr 2023 08:20) (95% - 99%)    Parameters below as of 13 Apr 2023 08:20  Patient On (Oxygen Delivery Method): room air        04-12 @ 07:01  -  04-13 @ 07:00  --------------------------------------------------------  IN: 335 mL / OUT: 1400 mL / NET: -1065 mL        PHYSICAL EXAM:    General: No acute distress  HEENT: MMM, conjunctiva and sclera clear  Gastrointestinal: Soft non-tender non-distended. No rebound or guarding  Skin: Warm and dry. No obvious rash    LABS:  CBC Full  -  ( 12 Apr 2023 05:30 )  WBC Count : 9.61 K/uL  RBC Count : 3.97 M/uL  Hemoglobin : 8.5 g/dL  Hematocrit : 28.7 %  Platelet Count - Automated : 447 K/uL  Mean Cell Volume : 72.3 fl  Mean Cell Hemoglobin : 21.4 pg  Mean Cell Hemoglobin Concentration : 29.6 gm/dL  Auto Neutrophil # : 7.55 K/uL  Auto Lymphocyte # : 0.94 K/uL  Auto Monocyte # : 0.77 K/uL  Auto Eosinophil # : 0.00 K/uL  Auto Basophil # : 0.35 K/uL  Auto Neutrophil % : 78.6 %  Auto Lymphocyte % : 9.8 %  Auto Monocyte % : 8.0 %  Auto Eosinophil % : 0.0 %  Auto Basophil % : 3.6 %    04-12    139  |  104  |  7   ----------------------------<  88  3.8   |  23  |  0.83    Ca    8.9      12 Apr 2023 05:30  Phos  4.6     04-12  Mg     1.7     04-12      PT/INR - ( 12 Apr 2023 05:30 )   PT: 14.8 sec;   INR: 1.24          PTT - ( 13 Apr 2023 02:16 )  PTT:127.1 sec                  RADIOLOGY & ADDITIONAL STUDIES:

## 2023-04-13 NOTE — DIETITIAN INITIAL EVALUATION ADULT - OTHER INFO
62M with PMH IAIN, CAD s/p KUSHAL mLCX, OM1, pLAD 3/2021, HLD presenting with symptomatic anemia associated with elevated troponin and new diagnosis of a flutter at rate of 110, admitted for anemia and a.flutter rate control. Cardiology and GI consulted.  A. flutter now resolved post PRBC transfusion, plan for TTE no need for ablation per cardiology. Complicated by tachycardia vs flutter post bowel prep for colonoscopy, now post EGD/Colonoscopy w/ dieulafoy lesion s/p 3 hemoclips. Patient was started on IV iron for 5 days. Pending Echo today and will need follow up for IV iron infusions due to concern of malabsorption.  62M with PMH IAIN, CAD s/p KUSHAL mLCX, OM1, pLAD 3/2021, HLD presenting with symptomatic anemia associated with elevated troponin and new diagnosis of a flutter at rate of 110, admitted for anemia and a.flutter rate control. Cardiology and GI consulted.  A. flutter now resolved post PRBC transfusion, plan for TTE no need for ablation per cardiology. Complicated by tachycardia vs flutter post bowel prep for colonoscopy, now post EGD/Colonoscopy w/ dieulafoy lesion s/p 3 hemoclips. Patient was started on IV iron for 5 days. Pending Echo today and will need follow up for IV iron infusions due to concern of malabsorption.    Pt assessed at bedside. Continues on DASH/TLC diet. Reports fair-good PO intake, tolerating diet well. No weight or intake changes noted PTA. Reviewed adequate intake parameters, nutrient dense foods, increasing high iron foods. Diet preferences reviewed. No pain noted. Heladio score 21. RD to follow, nutrition recommendations below.

## 2023-04-13 NOTE — PROGRESS NOTE ADULT - ASSESSMENT
62M w/ iron def anemia, CAD s/p KUSHAL mLCX, OM1, pLAD 3/2021, HLD p/w anemia w/ elevated troponin and new diagnosis of a flutter at rate of 110.GI workuo showed gastric bleeding s/po clip.   cleared by GI for anticoagulation    #Non ACS troponin/myocardial injury - resolved  -likely due to anemia +/- demand from flutter/tachycardia in pt w/ underlying CAD -- pt is asymptomatic    #A flutter likely CTI dependent/typical w/ CHADS VASC - 2 (CAD, HTN)  - would stop heparin drip and start eliquis 5mg BID    #CAD s/p KUSHAL mLCX, OM1, pLAD 3/2021  - c/w rosuvastatin. BB as aboive.    - discharging on single agent (eliquis only, no antiplatelet agent)     Sepsis

## 2023-04-14 ENCOUNTER — TRANSCRIPTION ENCOUNTER (OUTPATIENT)
Age: 63
End: 2023-04-14

## 2023-04-14 VITALS
DIASTOLIC BLOOD PRESSURE: 68 MMHG | RESPIRATION RATE: 18 BRPM | TEMPERATURE: 98 F | SYSTOLIC BLOOD PRESSURE: 127 MMHG | HEART RATE: 78 BPM | OXYGEN SATURATION: 98 %

## 2023-04-14 LAB
ANION GAP SERPL CALC-SCNC: 13 MMOL/L — SIGNIFICANT CHANGE UP (ref 5–17)
ANISOCYTOSIS BLD QL: SIGNIFICANT CHANGE UP
APPEARANCE UR: CLEAR — SIGNIFICANT CHANGE UP
APTT BLD: 35.2 SEC — SIGNIFICANT CHANGE UP (ref 27.5–35.5)
BACTERIA # UR AUTO: SIGNIFICANT CHANGE UP /HPF
BASOPHILS # BLD AUTO: 0 K/UL — SIGNIFICANT CHANGE UP (ref 0–0.2)
BASOPHILS # BLD AUTO: 0.06 K/UL — SIGNIFICANT CHANGE UP (ref 0–0.2)
BASOPHILS NFR BLD AUTO: 0 % — SIGNIFICANT CHANGE UP (ref 0–2)
BASOPHILS NFR BLD AUTO: 0.4 % — SIGNIFICANT CHANGE UP (ref 0–2)
BILIRUB UR-MCNC: NEGATIVE — SIGNIFICANT CHANGE UP
BUN SERPL-MCNC: 8 MG/DL — SIGNIFICANT CHANGE UP (ref 7–23)
BURR CELLS BLD QL SMEAR: PRESENT — SIGNIFICANT CHANGE UP
CALCIUM SERPL-MCNC: 8.6 MG/DL — SIGNIFICANT CHANGE UP (ref 8.4–10.5)
CHLORIDE SERPL-SCNC: 103 MMOL/L — SIGNIFICANT CHANGE UP (ref 96–108)
CO2 SERPL-SCNC: 20 MMOL/L — LOW (ref 22–31)
COLOR SPEC: YELLOW — SIGNIFICANT CHANGE UP
CREAT SERPL-MCNC: 0.8 MG/DL — SIGNIFICANT CHANGE UP (ref 0.5–1.3)
DIFF PNL FLD: ABNORMAL
EGFR: 101 ML/MIN/1.73M2 — SIGNIFICANT CHANGE UP
EOSINOPHIL # BLD AUTO: 0.19 K/UL — SIGNIFICANT CHANGE UP (ref 0–0.5)
EOSINOPHIL # BLD AUTO: 0.56 K/UL — HIGH (ref 0–0.5)
EOSINOPHIL NFR BLD AUTO: 1.3 % — SIGNIFICANT CHANGE UP (ref 0–6)
EOSINOPHIL NFR BLD AUTO: 3.5 % — SIGNIFICANT CHANGE UP (ref 0–6)
EPI CELLS # UR: SIGNIFICANT CHANGE UP /HPF (ref 0–5)
GI PCR PANEL: SIGNIFICANT CHANGE UP
GIANT PLATELETS BLD QL SMEAR: PRESENT — SIGNIFICANT CHANGE UP
GLUCOSE SERPL-MCNC: 97 MG/DL — SIGNIFICANT CHANGE UP (ref 70–99)
GLUCOSE UR QL: NEGATIVE — SIGNIFICANT CHANGE UP
HCT VFR BLD CALC: 33.3 % — LOW (ref 39–50)
HCT VFR BLD CALC: 34.1 % — LOW (ref 39–50)
HGB BLD-MCNC: 9.4 G/DL — LOW (ref 13–17)
HGB BLD-MCNC: 9.9 G/DL — LOW (ref 13–17)
HYPOCHROMIA BLD QL: SIGNIFICANT CHANGE UP
IMM GRANULOCYTES NFR BLD AUTO: 0.5 % — SIGNIFICANT CHANGE UP (ref 0–0.9)
INR BLD: 1.48 — HIGH (ref 0.88–1.16)
KETONES UR-MCNC: NEGATIVE — SIGNIFICANT CHANGE UP
LEUKOCYTE ESTERASE UR-ACNC: NEGATIVE — SIGNIFICANT CHANGE UP
LYMPHOCYTES # BLD AUTO: 0.94 K/UL — LOW (ref 1–3.3)
LYMPHOCYTES # BLD AUTO: 0.97 K/UL — LOW (ref 1–3.3)
LYMPHOCYTES # BLD AUTO: 6.1 % — LOW (ref 13–44)
LYMPHOCYTES # BLD AUTO: 6.4 % — LOW (ref 13–44)
MACROCYTES BLD QL: SLIGHT — SIGNIFICANT CHANGE UP
MANUAL SMEAR VERIFICATION: SIGNIFICANT CHANGE UP
MCHC RBC-ENTMCNC: 21.3 PG — LOW (ref 27–34)
MCHC RBC-ENTMCNC: 22.1 PG — LOW (ref 27–34)
MCHC RBC-ENTMCNC: 28.2 GM/DL — LOW (ref 32–36)
MCHC RBC-ENTMCNC: 29 GM/DL — LOW (ref 32–36)
MCV RBC AUTO: 75.3 FL — LOW (ref 80–100)
MCV RBC AUTO: 76.1 FL — LOW (ref 80–100)
MICROCYTES BLD QL: SIGNIFICANT CHANGE UP
MONOCYTES # BLD AUTO: 1.2 K/UL — HIGH (ref 0–0.9)
MONOCYTES # BLD AUTO: 1.24 K/UL — HIGH (ref 0–0.9)
MONOCYTES NFR BLD AUTO: 7.8 % — SIGNIFICANT CHANGE UP (ref 2–14)
MONOCYTES NFR BLD AUTO: 8.1 % — SIGNIFICANT CHANGE UP (ref 2–14)
NEUTROPHILS # BLD AUTO: 12.26 K/UL — HIGH (ref 1.8–7.4)
NEUTROPHILS # BLD AUTO: 13.17 K/UL — HIGH (ref 1.8–7.4)
NEUTROPHILS NFR BLD AUTO: 82.6 % — HIGH (ref 43–77)
NEUTROPHILS NFR BLD AUTO: 83.3 % — HIGH (ref 43–77)
NITRITE UR-MCNC: NEGATIVE — SIGNIFICANT CHANGE UP
NRBC # BLD: 0 /100 WBCS — SIGNIFICANT CHANGE UP (ref 0–0)
OVALOCYTES BLD QL SMEAR: SIGNIFICANT CHANGE UP
PH UR: 5.5 — SIGNIFICANT CHANGE UP (ref 5–8)
PLAT MORPH BLD: ABNORMAL
PLATELET # BLD AUTO: 483 K/UL — HIGH (ref 150–400)
PLATELET # BLD AUTO: 488 K/UL — HIGH (ref 150–400)
POIKILOCYTOSIS BLD QL AUTO: SIGNIFICANT CHANGE UP
POLYCHROMASIA BLD QL SMEAR: SLIGHT — SIGNIFICANT CHANGE UP
POTASSIUM SERPL-MCNC: 3.7 MMOL/L — SIGNIFICANT CHANGE UP (ref 3.5–5.3)
POTASSIUM SERPL-SCNC: 3.7 MMOL/L — SIGNIFICANT CHANGE UP (ref 3.5–5.3)
PROT UR-MCNC: NEGATIVE MG/DL — SIGNIFICANT CHANGE UP
PROTHROM AB SERPL-ACNC: 17.7 SEC — HIGH (ref 10.5–13.4)
RBC # BLD: 4.42 M/UL — SIGNIFICANT CHANGE UP (ref 4.2–5.8)
RBC # BLD: 4.48 M/UL — SIGNIFICANT CHANGE UP (ref 4.2–5.8)
RBC # FLD: 24.2 % — HIGH (ref 10.3–14.5)
RBC # FLD: 24.5 % — HIGH (ref 10.3–14.5)
RBC BLD AUTO: ABNORMAL
RBC CASTS # UR COMP ASSIST: < 5 /HPF — SIGNIFICANT CHANGE UP
SCHISTOCYTES BLD QL AUTO: SLIGHT — SIGNIFICANT CHANGE UP
SODIUM SERPL-SCNC: 136 MMOL/L — SIGNIFICANT CHANGE UP (ref 135–145)
SP GR SPEC: <=1.005 — SIGNIFICANT CHANGE UP (ref 1–1.03)
SPHEROCYTES BLD QL SMEAR: SLIGHT — SIGNIFICANT CHANGE UP
UROBILINOGEN FLD QL: 0.2 E.U./DL — SIGNIFICANT CHANGE UP
WBC # BLD: 14.73 K/UL — HIGH (ref 3.8–10.5)
WBC # BLD: 15.94 K/UL — HIGH (ref 3.8–10.5)
WBC # FLD AUTO: 14.73 K/UL — HIGH (ref 3.8–10.5)
WBC # FLD AUTO: 15.94 K/UL — HIGH (ref 3.8–10.5)
WBC UR QL: < 5 /HPF — SIGNIFICANT CHANGE UP

## 2023-04-14 PROCEDURE — P9016: CPT

## 2023-04-14 PROCEDURE — 87507 IADNA-DNA/RNA PROBE TQ 12-25: CPT

## 2023-04-14 PROCEDURE — 81001 URINALYSIS AUTO W/SCOPE: CPT

## 2023-04-14 PROCEDURE — 99291 CRITICAL CARE FIRST HOUR: CPT

## 2023-04-14 PROCEDURE — 71045 X-RAY EXAM CHEST 1 VIEW: CPT

## 2023-04-14 PROCEDURE — 86850 RBC ANTIBODY SCREEN: CPT

## 2023-04-14 PROCEDURE — 93306 TTE W/DOPPLER COMPLETE: CPT

## 2023-04-14 PROCEDURE — 85027 COMPLETE CBC AUTOMATED: CPT

## 2023-04-14 PROCEDURE — 83540 ASSAY OF IRON: CPT

## 2023-04-14 PROCEDURE — 88305 TISSUE EXAM BY PATHOLOGIST: CPT

## 2023-04-14 PROCEDURE — 86900 BLOOD TYPING SEROLOGIC ABO: CPT

## 2023-04-14 PROCEDURE — 87635 SARS-COV-2 COVID-19 AMP PRB: CPT

## 2023-04-14 PROCEDURE — 83010 ASSAY OF HAPTOGLOBIN QUANT: CPT

## 2023-04-14 PROCEDURE — 84484 ASSAY OF TROPONIN QUANT: CPT

## 2023-04-14 PROCEDURE — 99239 HOSP IP/OBS DSCHRG MGMT >30: CPT | Mod: GC

## 2023-04-14 PROCEDURE — 84100 ASSAY OF PHOSPHORUS: CPT

## 2023-04-14 PROCEDURE — 82553 CREATINE MB FRACTION: CPT

## 2023-04-14 PROCEDURE — 82247 BILIRUBIN TOTAL: CPT

## 2023-04-14 PROCEDURE — 85025 COMPLETE CBC W/AUTO DIFF WBC: CPT

## 2023-04-14 PROCEDURE — 80048 BASIC METABOLIC PNL TOTAL CA: CPT

## 2023-04-14 PROCEDURE — 80053 COMPREHEN METABOLIC PANEL: CPT

## 2023-04-14 PROCEDURE — 83615 LACTATE (LD) (LDH) ENZYME: CPT

## 2023-04-14 PROCEDURE — 86364 TISS TRNSGLTMNASE EA IG CLAS: CPT

## 2023-04-14 PROCEDURE — C1889: CPT

## 2023-04-14 PROCEDURE — 36430 TRANSFUSION BLD/BLD COMPNT: CPT

## 2023-04-14 PROCEDURE — 82180 ASSAY OF ASCORBIC ACID: CPT

## 2023-04-14 PROCEDURE — 86923 COMPATIBILITY TEST ELECTRIC: CPT

## 2023-04-14 PROCEDURE — 82728 ASSAY OF FERRITIN: CPT

## 2023-04-14 PROCEDURE — 86901 BLOOD TYPING SEROLOGIC RH(D): CPT

## 2023-04-14 PROCEDURE — 85610 PROTHROMBIN TIME: CPT

## 2023-04-14 PROCEDURE — 82550 ASSAY OF CK (CPK): CPT

## 2023-04-14 PROCEDURE — 82248 BILIRUBIN DIRECT: CPT

## 2023-04-14 PROCEDURE — 71045 X-RAY EXAM CHEST 1 VIEW: CPT | Mod: 26

## 2023-04-14 PROCEDURE — 82784 ASSAY IGA/IGD/IGG/IGM EACH: CPT

## 2023-04-14 PROCEDURE — 85730 THROMBOPLASTIN TIME PARTIAL: CPT

## 2023-04-14 PROCEDURE — 83735 ASSAY OF MAGNESIUM: CPT

## 2023-04-14 PROCEDURE — 85045 AUTOMATED RETICULOCYTE COUNT: CPT

## 2023-04-14 PROCEDURE — 84466 ASSAY OF TRANSFERRIN: CPT

## 2023-04-14 PROCEDURE — 81003 URINALYSIS AUTO W/O SCOPE: CPT

## 2023-04-14 PROCEDURE — 83520 IMMUNOASSAY QUANT NOS NONAB: CPT

## 2023-04-14 PROCEDURE — 36415 COLL VENOUS BLD VENIPUNCTURE: CPT

## 2023-04-14 PROCEDURE — 71046 X-RAY EXAM CHEST 2 VIEWS: CPT

## 2023-04-14 PROCEDURE — 83605 ASSAY OF LACTIC ACID: CPT

## 2023-04-14 PROCEDURE — 83550 IRON BINDING TEST: CPT

## 2023-04-14 RX ORDER — LISINOPRIL 2.5 MG/1
1 TABLET ORAL
Refills: 0 | DISCHARGE

## 2023-04-14 RX ORDER — METOPROLOL TARTRATE 50 MG
1 TABLET ORAL
Qty: 270 | Refills: 3
Start: 2023-04-14 | End: 2024-04-07

## 2023-04-14 RX ORDER — CARVEDILOL PHOSPHATE 80 MG/1
1 CAPSULE, EXTENDED RELEASE ORAL
Refills: 0 | DISCHARGE

## 2023-04-14 RX ORDER — PANTOPRAZOLE SODIUM 20 MG/1
1 TABLET, DELAYED RELEASE ORAL
Qty: 30 | Refills: 0
Start: 2023-04-14 | End: 2023-05-13

## 2023-04-14 RX ORDER — METOPROLOL TARTRATE 50 MG
1 TABLET ORAL
Qty: 60 | Refills: 0
Start: 2023-04-14 | End: 2023-05-13

## 2023-04-14 RX ORDER — METOPROLOL TARTRATE 50 MG
1 TABLET ORAL
Qty: 180 | Refills: 3
Start: 2023-04-14 | End: 2024-04-07

## 2023-04-14 RX ADMIN — PANTOPRAZOLE SODIUM 40 MILLIGRAM(S): 20 TABLET, DELAYED RELEASE ORAL at 06:50

## 2023-04-14 RX ADMIN — Medication 25 MILLIGRAM(S): at 12:54

## 2023-04-14 RX ADMIN — IRON SUCROSE 110 MILLIGRAM(S): 20 INJECTION, SOLUTION INTRAVENOUS at 12:54

## 2023-04-14 RX ADMIN — Medication 25 MILLIGRAM(S): at 05:52

## 2023-04-14 RX ADMIN — APIXABAN 5 MILLIGRAM(S): 2.5 TABLET, FILM COATED ORAL at 10:23

## 2023-04-14 NOTE — PROGRESS NOTE ADULT - PROBLEM SELECTOR PLAN 5
home meds: lisinopril 20 mg QD     -Holding in setting of intermittent hypotension. Resume as appropriate.
home meds: lisinopril 20 mg QD     -Holding in setting of intermittent hypotension. Resume as appropriate
home meds: lisinopril 20 mg QD     -Holding in setting of intermittent hypotension. Resume as appropriate.
home meds: lisinopril 20 mg QD     -Holding in setting of intermittent hypotension. Resume as appropriate.

## 2023-04-14 NOTE — DISCHARGE NOTE NURSING/CASE MANAGEMENT/SOCIAL WORK - PATIENT PORTAL LINK FT
You can access the FollowMyHealth Patient Portal offered by [facility] by registering at the following website: [email or web address]. By joining [employer]’s FollowMyHealth portal, you will also be able to view your health information using other applications (apps) compatible with our system.

## 2023-04-14 NOTE — DISCHARGE NOTE NURSING/CASE MANAGEMENT/SOCIAL WORK - NSDCPEFALRISK_GEN_ALL_CORE
For information on Fall & Injury Prevention, visit: [email or web address] OR  [email or web address] OR  https://www.cdc.gov/steadi/patient.html

## 2023-04-14 NOTE — PROGRESS NOTE ADULT - ATTENDING COMMENTS
62-year-old male with a PMHx of IAIN, CAD (s/p PCI in 2021) and HLD who presented with symptomatic anemia and Afib with RVR.       #Symptomatic Anemia     -GI consulted, EGD/colonoscopy most notable for Dieulafoy lesion (s/p 3 hemoclips), erosive gastritis and multiple non-bleeding diverticula    -IV iron while inpatient, outpatient follow up for continued infusions     -continue with pantoprazole 40mg daily     -outpatient GI follow up    -resumed Eliquis with stable hemoglobin     #AFib/Flutter    -cardiology consulted, appreciate recommendations   -continue with metoprolol tartrate 25mg q8hrs    -continue Eliquis 5mg BID        #Elevated Troponin     #CAD/HLD    -cardiology consulted, low concern for ACS    -continue with atorvastatin 40mg daily     -outpatient cardiology follow up     #HTN    -hold lisinopril 20mg daily, resume as tolerated     #Leukocytosis 62-year-old male with a PMHx of IAIN, CAD (s/p PCI in 2021) and HLD who presented with symptomatic anemia and Afib with RVR.       #Symptomatic Anemia     -GI consulted, EGD/colonoscopy most notable for Dieulafoy lesion (s/p 3 hemoclips), erosive gastritis and multiple non-bleeding diverticula    -IV iron while inpatient, outpatient follow up for continued infusions     -continue with pantoprazole 40mg daily     -outpatient GI follow up    -resumed Eliquis with stable hemoglobin     #AFib/Flutter    -cardiology consulted, appreciate recommendations   -continue with metoprolol tartrate 25mg q8hrs    -continue Eliquis 5mg BID        #Elevated Troponin     #CAD/HLD    -cardiology consulted, low concern for ACS    -continue with atorvastatin 40mg daily     -outpatient cardiology follow up   -TTE: LVEF 35-40%    #HTN    -hold lisinopril 20mg daily, resume as tolerated     #Leukocytosis  -unclear etiology but improving without intervention   -GI PCR, UA and CXR negative     DVT PPx: Eliquis     Dispo: home

## 2023-04-14 NOTE — PROGRESS NOTE ADULT - PROVIDER SPECIALTY LIST ADULT
Cardiology
Internal Medicine
Cardiology
Gastroenterology
Internal Medicine
Gastroenterology
Internal Medicine

## 2023-04-14 NOTE — PROGRESS NOTE ADULT - SUBJECTIVE AND OBJECTIVE BOX
ELLA MAGUIRE    Overnight: 2 episodes diarrhea 4/13 no new episodes. GI PCR neg, c diff neg     Subjective: Patient examined at bedside. No o/n events, patient resting comfortably. No complaints at this time. Patient denies emesis, hematochezia, melena, chest pain, shortness of breath, abdominal pain.     T(C): 37.2 (04-14-23 @ 05:16), Max: 37.2 (04-14-23 @ 05:16)  HR: 92 (04-14-23 @ 05:16) (63 - 92)  BP: 113/70 (04-14-23 @ 05:16) (104/65 - 127/64)  RR: 17 (04-14-23 @ 05:16) (14 - 18)  SpO2: 96% (04-14-23 @ 05:16) (96% - 99%)  Wt(kg): --Vital Signs Last 24 Hrs  T(C): 37.2 (14 Apr 2023 05:16), Max: 37.2 (14 Apr 2023 05:16)  T(F): 98.9 (14 Apr 2023 05:16), Max: 98.9 (14 Apr 2023 05:16)  HR: 92 (14 Apr 2023 05:16) (63 - 92)  BP: 113/70 (14 Apr 2023 05:16) (104/65 - 127/64)  BP(mean): 85 (13 Apr 2023 22:20) (79 - 85)  RR: 17 (14 Apr 2023 05:16) (14 - 18)  SpO2: 96% (14 Apr 2023 05:16) (96% - 99%)    Parameters below as of 14 Apr 2023 05:16  Patient On (Oxygen Delivery Method): room air        PHYSICAL EXAM:  Constitutional: NAD, resting comfortably in bed.    HEENT: Sclera non-icteric, neck supple, MMM.  Respiratory: Clear to auscultation bilaterally, adequate air entry, no wheezing, no rhonchi, no rales, without accessory muscle use and no intercostal retractions  Cardiovascular: Regular rate and rhythm, normal S1S2.  Gastrointestinal: soft, non-tender and non-distended, Normoactive bowel sounds.  Extremities: Warm, well perfused, pulses equal bilateral upper and lower extremities.  Neurological: AAOx3.  Skin: Normal temperature, warm, dry.  Consultant(s) Notes Reviewed:  [x ] YES  [ ] NO  Care Discussed with Consultants/Other Providers [ x] YES  [ ] NO    LABS:                        9.4    15.94 )-----------( 488      ( 14 Apr 2023 06:34 )             33.3     04-14    136  |  103  |  8   ----------------------------<  97  3.7   |  20<L>  |  0.80    Ca    8.6      14 Apr 2023 06:34  Phos  4.1     04-13  Mg     1.8     04-13        PT/INR - ( 14 Apr 2023 06:34 )   PT: 17.7 sec;   INR: 1.48          PTT - ( 14 Apr 2023 06:34 )  PTT:35.2 sec  Urinalysis Basic - ( 14 Apr 2023 10:56 )    Color: Yellow / Appearance: Clear / SG: <=1.005 / pH: x  Gluc: x / Ketone: NEGATIVE  / Bili: Negative / Urobili: 0.2 E.U./dL   Blood: x / Protein: NEGATIVE mg/dL / Nitrite: NEGATIVE   Leuk Esterase: NEGATIVE / RBC: < 5 /HPF / WBC < 5 /HPF   Sq Epi: x / Non Sq Epi: x / Bacteria: None /HPF      CAPILLARY BLOOD GLUCOSE            Urinalysis Basic - ( 14 Apr 2023 10:56 )    Color: Yellow / Appearance: Clear / SG: <=1.005 / pH: x  Gluc: x / Ketone: NEGATIVE  / Bili: Negative / Urobili: 0.2 E.U./dL   Blood: x / Protein: NEGATIVE mg/dL / Nitrite: NEGATIVE   Leuk Esterase: NEGATIVE / RBC: < 5 /HPF / WBC < 5 /HPF   Sq Epi: x / Non Sq Epi: x / Bacteria: None /HPF        RADIOLOGY & ADDITIONAL TESTS:    Imaging Personally Reviewed:  [ ] YES  [ ] NO

## 2023-04-14 NOTE — PROGRESS NOTE ADULT - PROBLEM SELECTOR PLAN 6
Plan:  F: PO.   E: replete K<4, Mg<2  N: DASH/TLC  VTE Prophylaxis: None  GI: None   C: Full Code  D: 07LA
Plan:  F: s/p 1L NS in the ED   E: replete K<4, Mg<2  N: DASH/TLC  VTE Prophylaxis: None  GI: None   C: Full Code  D: 07LA

## 2023-04-14 NOTE — PROGRESS NOTE ADULT - PROBLEM SELECTOR PLAN 1
Microcytic anemia, presented with Hgb 6.6 (previous baseline 15). S/p 1u pRBCs in ED, pending repeat Hgb. History of severe iron deficiency anemia, s/p 2 iron transfusions in the past and prescribed oral iron which pt stopped on his own behalf. Pt currently without sx of melena, black/tarry stool, hematemesis, easy bleeding/bruising. Orthostatics negative in ED. Etiology likely 2/2 ongoing iron deficiency and iron supplement cessation vs overall nutritional deficiency. On AC and has tolerated thus far, without history of recent NSAID, ETOH use.     S/p EGD/C-scope: irregular mucosa of the SCJ, s/p biopsy. Oozing Dieulafoy lesion s/p 3 endoclips, hiatal hernia (Hill Grade I), erosive gastritis. Multiple non-bleeding diverticula in the sigmoid colon. Non-bleeding grade/stage I internal hemorrhoids.    - Advance diet as tolerated  - Protonix 40 mg daily  - Transfuse if Hgb <7.  - IV iron 200 mg   - Active T&S  - IV iron therapy as outpatient with hematology   - Outpatient f/u w/ Dr. Jc  - GI following, f/u recs

## 2023-04-14 NOTE — PROGRESS NOTE ADULT - PROBLEM SELECTOR PROBLEM 2
Atrial flutter

## 2023-04-15 LAB — VIT C SERPL-MCNC: 0.3 MG/DL — LOW (ref 0.4–2)

## 2023-04-18 LAB — SURGICAL PATHOLOGY STUDY: SIGNIFICANT CHANGE UP

## 2023-04-20 DIAGNOSIS — K64.8 OTHER HEMORRHOIDS: ICD-10-CM

## 2023-04-20 DIAGNOSIS — E66.9 OBESITY, UNSPECIFIED: ICD-10-CM

## 2023-04-20 DIAGNOSIS — Z87.891 PERSONAL HISTORY OF NICOTINE DEPENDENCE: ICD-10-CM

## 2023-04-20 DIAGNOSIS — R73.03 PREDIABETES: ICD-10-CM

## 2023-04-20 DIAGNOSIS — N52.9 MALE ERECTILE DYSFUNCTION, UNSPECIFIED: ICD-10-CM

## 2023-04-20 DIAGNOSIS — K57.90 DIVERTICULOSIS OF INTESTINE, PART UNSPECIFIED, WITHOUT PERFORATION OR ABSCESS WITHOUT BLEEDING: ICD-10-CM

## 2023-04-20 DIAGNOSIS — R53.1 WEAKNESS: ICD-10-CM

## 2023-04-20 DIAGNOSIS — I25.10 ATHEROSCLEROTIC HEART DISEASE OF NATIVE CORONARY ARTERY WITHOUT ANGINA PECTORIS: ICD-10-CM

## 2023-04-20 DIAGNOSIS — I48.91 UNSPECIFIED ATRIAL FIBRILLATION: ICD-10-CM

## 2023-04-20 DIAGNOSIS — I48.92 UNSPECIFIED ATRIAL FLUTTER: ICD-10-CM

## 2023-04-20 DIAGNOSIS — D50.9 IRON DEFICIENCY ANEMIA, UNSPECIFIED: ICD-10-CM

## 2023-04-20 DIAGNOSIS — K29.61 OTHER GASTRITIS WITH BLEEDING: ICD-10-CM

## 2023-04-20 DIAGNOSIS — D72.829 ELEVATED WHITE BLOOD CELL COUNT, UNSPECIFIED: ICD-10-CM

## 2023-04-20 DIAGNOSIS — Z95.5 PRESENCE OF CORONARY ANGIOPLASTY IMPLANT AND GRAFT: ICD-10-CM

## 2023-04-20 DIAGNOSIS — I10 ESSENTIAL (PRIMARY) HYPERTENSION: ICD-10-CM

## 2023-04-20 DIAGNOSIS — I42.9 CARDIOMYOPATHY, UNSPECIFIED: ICD-10-CM

## 2023-04-20 DIAGNOSIS — K63.81 DIEULAFOY LESION OF INTESTINE: ICD-10-CM

## 2023-04-20 DIAGNOSIS — E78.5 HYPERLIPIDEMIA, UNSPECIFIED: ICD-10-CM

## 2023-04-24 ENCOUNTER — NON-APPOINTMENT (OUTPATIENT)
Age: 63
End: 2023-04-24

## 2023-09-10 ENCOUNTER — NON-APPOINTMENT (OUTPATIENT)
Age: 63
End: 2023-09-10

## 2023-12-12 ENCOUNTER — INPATIENT (INPATIENT)
Facility: HOSPITAL | Age: 63
LOS: 2 days | Discharge: HOME | DRG: 321 | End: 2023-12-15
Attending: INTERNAL MEDICINE | Admitting: INTERNAL MEDICINE
Payer: COMMERCIAL

## 2023-12-12 VITALS
OXYGEN SATURATION: 99 % | DIASTOLIC BLOOD PRESSURE: 89 MMHG | TEMPERATURE: 98 F | SYSTOLIC BLOOD PRESSURE: 140 MMHG | WEIGHT: 184.97 LBS | RESPIRATION RATE: 18 BRPM | HEART RATE: 76 BPM

## 2023-12-12 DIAGNOSIS — Z98.890 OTHER SPECIFIED POSTPROCEDURAL STATES: Chronic | ICD-10-CM

## 2023-12-12 LAB
ALBUMIN SERPL ELPH-MCNC: 3.7 G/DL — SIGNIFICANT CHANGE UP (ref 3.4–5)
ALBUMIN SERPL ELPH-MCNC: 3.7 G/DL — SIGNIFICANT CHANGE UP (ref 3.4–5)
ALP SERPL-CCNC: 62 U/L — SIGNIFICANT CHANGE UP (ref 40–120)
ALP SERPL-CCNC: 62 U/L — SIGNIFICANT CHANGE UP (ref 40–120)
ALT FLD-CCNC: 19 U/L — SIGNIFICANT CHANGE UP (ref 12–42)
ALT FLD-CCNC: 19 U/L — SIGNIFICANT CHANGE UP (ref 12–42)
ANION GAP SERPL CALC-SCNC: 14 MMOL/L — SIGNIFICANT CHANGE UP (ref 9–16)
ANION GAP SERPL CALC-SCNC: 14 MMOL/L — SIGNIFICANT CHANGE UP (ref 9–16)
AST SERPL-CCNC: 37 U/L — SIGNIFICANT CHANGE UP (ref 15–37)
AST SERPL-CCNC: 37 U/L — SIGNIFICANT CHANGE UP (ref 15–37)
BASOPHILS # BLD AUTO: 0.06 K/UL — SIGNIFICANT CHANGE UP (ref 0–0.2)
BASOPHILS # BLD AUTO: 0.06 K/UL — SIGNIFICANT CHANGE UP (ref 0–0.2)
BASOPHILS NFR BLD AUTO: 0.5 % — SIGNIFICANT CHANGE UP (ref 0–2)
BASOPHILS NFR BLD AUTO: 0.5 % — SIGNIFICANT CHANGE UP (ref 0–2)
BILIRUB SERPL-MCNC: 0.3 MG/DL — SIGNIFICANT CHANGE UP (ref 0.2–1.2)
BILIRUB SERPL-MCNC: 0.3 MG/DL — SIGNIFICANT CHANGE UP (ref 0.2–1.2)
BUN SERPL-MCNC: 14 MG/DL — SIGNIFICANT CHANGE UP (ref 7–23)
BUN SERPL-MCNC: 14 MG/DL — SIGNIFICANT CHANGE UP (ref 7–23)
CALCIUM SERPL-MCNC: 8.6 MG/DL — SIGNIFICANT CHANGE UP (ref 8.5–10.5)
CALCIUM SERPL-MCNC: 8.6 MG/DL — SIGNIFICANT CHANGE UP (ref 8.5–10.5)
CHLORIDE SERPL-SCNC: 104 MMOL/L — SIGNIFICANT CHANGE UP (ref 96–108)
CHLORIDE SERPL-SCNC: 104 MMOL/L — SIGNIFICANT CHANGE UP (ref 96–108)
CO2 SERPL-SCNC: 22 MMOL/L — SIGNIFICANT CHANGE UP (ref 22–31)
CO2 SERPL-SCNC: 22 MMOL/L — SIGNIFICANT CHANGE UP (ref 22–31)
CREAT SERPL-MCNC: 0.78 MG/DL — SIGNIFICANT CHANGE UP (ref 0.5–1.3)
CREAT SERPL-MCNC: 0.78 MG/DL — SIGNIFICANT CHANGE UP (ref 0.5–1.3)
D DIMER BLD IA.RAPID-MCNC: <187 NG/ML DDU — SIGNIFICANT CHANGE UP
D DIMER BLD IA.RAPID-MCNC: <187 NG/ML DDU — SIGNIFICANT CHANGE UP
EGFR: 101 ML/MIN/1.73M2 — SIGNIFICANT CHANGE UP
EGFR: 101 ML/MIN/1.73M2 — SIGNIFICANT CHANGE UP
EOSINOPHIL # BLD AUTO: 0.02 K/UL — SIGNIFICANT CHANGE UP (ref 0–0.5)
EOSINOPHIL # BLD AUTO: 0.02 K/UL — SIGNIFICANT CHANGE UP (ref 0–0.5)
EOSINOPHIL NFR BLD AUTO: 0.2 % — SIGNIFICANT CHANGE UP (ref 0–6)
EOSINOPHIL NFR BLD AUTO: 0.2 % — SIGNIFICANT CHANGE UP (ref 0–6)
GLUCOSE SERPL-MCNC: 114 MG/DL — HIGH (ref 70–99)
GLUCOSE SERPL-MCNC: 114 MG/DL — HIGH (ref 70–99)
HCT VFR BLD CALC: 30 % — LOW (ref 39–50)
HCT VFR BLD CALC: 30 % — LOW (ref 39–50)
HGB BLD-MCNC: 9.3 G/DL — LOW (ref 13–17)
HGB BLD-MCNC: 9.3 G/DL — LOW (ref 13–17)
IMM GRANULOCYTES NFR BLD AUTO: 0.3 % — SIGNIFICANT CHANGE UP (ref 0–0.9)
IMM GRANULOCYTES NFR BLD AUTO: 0.3 % — SIGNIFICANT CHANGE UP (ref 0–0.9)
LYMPHOCYTES # BLD AUTO: 0.87 K/UL — LOW (ref 1–3.3)
LYMPHOCYTES # BLD AUTO: 0.87 K/UL — LOW (ref 1–3.3)
LYMPHOCYTES # BLD AUTO: 7.4 % — LOW (ref 13–44)
LYMPHOCYTES # BLD AUTO: 7.4 % — LOW (ref 13–44)
MAGNESIUM SERPL-MCNC: 1.7 MG/DL — SIGNIFICANT CHANGE UP (ref 1.6–2.6)
MAGNESIUM SERPL-MCNC: 1.7 MG/DL — SIGNIFICANT CHANGE UP (ref 1.6–2.6)
MCHC RBC-ENTMCNC: 26.3 PG — LOW (ref 27–34)
MCHC RBC-ENTMCNC: 26.3 PG — LOW (ref 27–34)
MCHC RBC-ENTMCNC: 31 GM/DL — LOW (ref 32–36)
MCHC RBC-ENTMCNC: 31 GM/DL — LOW (ref 32–36)
MCV RBC AUTO: 85 FL — SIGNIFICANT CHANGE UP (ref 80–100)
MCV RBC AUTO: 85 FL — SIGNIFICANT CHANGE UP (ref 80–100)
MONOCYTES # BLD AUTO: 0.72 K/UL — SIGNIFICANT CHANGE UP (ref 0–0.9)
MONOCYTES # BLD AUTO: 0.72 K/UL — SIGNIFICANT CHANGE UP (ref 0–0.9)
MONOCYTES NFR BLD AUTO: 6.2 % — SIGNIFICANT CHANGE UP (ref 2–14)
MONOCYTES NFR BLD AUTO: 6.2 % — SIGNIFICANT CHANGE UP (ref 2–14)
NEUTROPHILS # BLD AUTO: 9.98 K/UL — HIGH (ref 1.8–7.4)
NEUTROPHILS # BLD AUTO: 9.98 K/UL — HIGH (ref 1.8–7.4)
NEUTROPHILS NFR BLD AUTO: 85.4 % — HIGH (ref 43–77)
NEUTROPHILS NFR BLD AUTO: 85.4 % — HIGH (ref 43–77)
NRBC # BLD: 0 /100 WBCS — SIGNIFICANT CHANGE UP (ref 0–0)
NRBC # BLD: 0 /100 WBCS — SIGNIFICANT CHANGE UP (ref 0–0)
NT-PROBNP SERPL-SCNC: 547 PG/ML — HIGH
NT-PROBNP SERPL-SCNC: 547 PG/ML — HIGH
PLATELET # BLD AUTO: 392 K/UL — SIGNIFICANT CHANGE UP (ref 150–400)
PLATELET # BLD AUTO: 392 K/UL — SIGNIFICANT CHANGE UP (ref 150–400)
POTASSIUM SERPL-MCNC: 3.5 MMOL/L — SIGNIFICANT CHANGE UP (ref 3.5–5.3)
POTASSIUM SERPL-MCNC: 3.5 MMOL/L — SIGNIFICANT CHANGE UP (ref 3.5–5.3)
POTASSIUM SERPL-SCNC: 3.5 MMOL/L — SIGNIFICANT CHANGE UP (ref 3.5–5.3)
POTASSIUM SERPL-SCNC: 3.5 MMOL/L — SIGNIFICANT CHANGE UP (ref 3.5–5.3)
PROT SERPL-MCNC: 7.3 G/DL — SIGNIFICANT CHANGE UP (ref 6.4–8.2)
PROT SERPL-MCNC: 7.3 G/DL — SIGNIFICANT CHANGE UP (ref 6.4–8.2)
RBC # BLD: 3.53 M/UL — LOW (ref 4.2–5.8)
RBC # BLD: 3.53 M/UL — LOW (ref 4.2–5.8)
RBC # FLD: 17.6 % — HIGH (ref 10.3–14.5)
RBC # FLD: 17.6 % — HIGH (ref 10.3–14.5)
SODIUM SERPL-SCNC: 140 MMOL/L — SIGNIFICANT CHANGE UP (ref 132–145)
SODIUM SERPL-SCNC: 140 MMOL/L — SIGNIFICANT CHANGE UP (ref 132–145)
TROPONIN I, HIGH SENSITIVITY RESULT: 1919.4 NG/L — HIGH
TROPONIN I, HIGH SENSITIVITY RESULT: 1919.4 NG/L — HIGH
WBC # BLD: 11.69 K/UL — HIGH (ref 3.8–10.5)
WBC # BLD: 11.69 K/UL — HIGH (ref 3.8–10.5)
WBC # FLD AUTO: 11.69 K/UL — HIGH (ref 3.8–10.5)
WBC # FLD AUTO: 11.69 K/UL — HIGH (ref 3.8–10.5)

## 2023-12-12 PROCEDURE — 71045 X-RAY EXAM CHEST 1 VIEW: CPT | Mod: 26

## 2023-12-12 PROCEDURE — 99291 CRITICAL CARE FIRST HOUR: CPT

## 2023-12-12 PROCEDURE — 99223 1ST HOSP IP/OBS HIGH 75: CPT

## 2023-12-12 RX ORDER — DILTIAZEM HCL 120 MG
10 CAPSULE, EXT RELEASE 24 HR ORAL ONCE
Refills: 0 | Status: COMPLETED | OUTPATIENT
Start: 2023-12-12 | End: 2023-12-12

## 2023-12-12 RX ORDER — SODIUM CHLORIDE 9 MG/ML
1000 INJECTION INTRAMUSCULAR; INTRAVENOUS; SUBCUTANEOUS ONCE
Refills: 0 | Status: COMPLETED | OUTPATIENT
Start: 2023-12-12 | End: 2023-12-12

## 2023-12-12 RX ORDER — METOPROLOL TARTRATE 50 MG
25 TABLET ORAL ONCE
Refills: 0 | Status: COMPLETED | OUTPATIENT
Start: 2023-12-12 | End: 2023-12-12

## 2023-12-12 RX ADMIN — SODIUM CHLORIDE 1000 MILLILITER(S): 9 INJECTION INTRAMUSCULAR; INTRAVENOUS; SUBCUTANEOUS at 15:59

## 2023-12-12 RX ADMIN — Medication 25 MILLIGRAM(S): at 17:19

## 2023-12-12 RX ADMIN — Medication 10 MILLIGRAM(S): at 15:35

## 2023-12-12 NOTE — ED PROVIDER NOTE - IV ALTEPLASE DOOR HIDDEN
Physical Therapy Visit    Visit Type: Daily Treatment Note  Visit: 13  Referring Provider: Niki Carreon DO  Medical Diagnosis (from order): Diagnosis Information    Diagnosis  434.11 (ICD-9-CM) - I63.40 (ICD-10-CM) - Cerebrovascular accident (CVA) due to embolism of cerebral artery (CMD)         SUBJECTIVE                                                                                                               Pt reports R distal LE pain, denies falling  RLE pain during rolling in bed overnight    Pain / Symptoms  - Location: RLE pt did not rate pain  - Quality / Description: shooting  - Alleviating Factors: change in position      OBJECTIVE                                                                                                                                    Treatment     Therapeutic Exercise  NuStep, Seat 8 (LEs only), Level 2-3 x10 min, verbal cues for SPM above 80    Discussed RLE pain and recommendation to see MD to assess    Therapeutic Activity  Four square stepping over canes (forward, backward, sidestepping with RUE support on straight cane, CGA-min A to maintain balance and postural control x5 min    Standing while tapping three colored discs in a row, alternating feet, RUE support on straight cane, CGA-min A to maintain balance and postural control x5 min    Standing in parallel bars with BUE support:  Standing on Airex balance pad while performing taps to 6\" cones on ground, 2/10, Min verbal cuing for increased hip flexion, SBA for safety     Skilled input: verbal instruction/cues, tactile instruction/cues and posture correction    Writer verbally educated and received verbal consent for hand placement, positioning of patient, and techniques to be performed today from patient for therapist position for techniques as described above and how they are pertinent to the patient's plan of care.    Home Exercise Program  Access Code: [email or web address]  URL:  [email or web address]  Date: 04/06/2023  Prepared by: ZAINAB BERTRAND    Exercises  - Supine Hip Adduction Isometric with Ball  - 1 x daily - 7 x weekly - 2 sets - 10 reps - 5 hold  - Hooklying Clamshell with Resistance  - 1 x daily - 7 x weekly - 2 sets - 10 reps  - Supine March with Resistance Band  - 1 x daily - 7 x weekly - 2 sets - 10 reps  - Bridge with Resistance  - 1 x daily - 7 x weekly - 2 sets - 10 reps  - Standing 3-Way Leg Reach with Resistance at Ankles and Unilateral Counter Support  - 1 x daily - 7 x weekly - 2 sets - 10 reps  - Side Stepping with Resistance at Ankles and Counter Support  - 1 x daily - 7 x weekly - 2 sets - 10 reps  - Forward Monster Walk with Resistance at Ankles and Counter Support  - 1 x daily - 7 x weekly - 2 sets - 10 reps  - Forward Lunge with Back Leg Straight and Counter Support  - 1 x daily - 7 x weekly - 2 sets - 10 reps        ASSESSMENT                                                                                                            Pt required min verbal cuing for sequence,technique for improved balance/stability during planned interventions. Reinforced importance and benefit of completing HEP daily for maximal benefit of OPPT.  Education:   - Results of above outlined education: Verbalizes understanding, Demonstrates understanding and Needs reinforcement    PLAN                                                                                                                           Suggestions for next session as indicated: Progress per plan of care       Therapy procedure time and total treatment time can be found documented on the Time Entry flowsheet     show

## 2023-12-12 NOTE — ED PROVIDER NOTE - CLINICAL SUMMARY MEDICAL DECISION MAKING FREE TEXT BOX
Accepted for admission to cardiology, labs noted with elevated troponin, could be demand ischemia or NSTEMI.  A-fib converted to NSR.

## 2023-12-12 NOTE — ED PROVIDER NOTE - PHYSICAL EXAMINATION
U/s comp  
VSS in NAD non toxic appearing   NCAT EOMI PERRL OP clear  heart RRR no murmur   lungs CTA no wheezing no rales no rhonchi   abd soft NT ND no CVAT no guarding no rebound   normal neuro exam CN I-XII grossly intact, no groos motor or sensory deficits  no peripheral c/c/e

## 2023-12-12 NOTE — ED ADULT NURSE REASSESSMENT NOTE - NS ED NURSE REASSESS COMMENT FT1
Pt. received AAOx3 semi fowlers in stretcher breathing at ease on room air in no apparent distress. Pt. has no complaints at this time. 20g to R. hand no redness, swelling, or tenderness noted. Pt. is on continuous cardiac monitor. Pending txp to [facility]. Pt. received AAOx3 semi fowlers in stretcher breathing at ease on room air in no apparent distress. Pt. has no complaints at this time. 20g to R. hand no redness, swelling, or tenderness noted. Pt. is on continuous cardiac monitor. Pending txp to [facility].

## 2023-12-12 NOTE — ED ADULT TRIAGE NOTE - CHIEF COMPLAINT QUOTE
pt. with hx. of a-fib and 4 cardiac stents reports about 1 hour ago he began having chest pain. Pt. states pain has remained consistent since it began. EKG began in triage.

## 2023-12-12 NOTE — ED PROVIDER NOTE - OBJECTIVE STATEMENT
63-year-old male with history of paroxysmal A-fib, hypertension, with complaint of chest pain and fatigue, shortness of breath, tachycardia and palpitations sudden onset after he ate lunch.  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  Felt well with normal amount of activity at work.  Symptoms started shortly after going out for lunch.  Reports fatigue, tachycardia and palpitations.  On arrival to the ED EKG showed A-fib with RVR at a rate of approximately 129.  Rhythm appeared wide-complex and irregular.  An old EKG showed left bundle branch block.

## 2023-12-12 NOTE — ED PROVIDER NOTE - CRITICAL CARE ATTENDING CONTRIBUTION TO CARE
The patient was seen immediately upon arrival due to a high probability of imminent or life-threatening deterioration secondary to RAPID ATRIAL FIBRILLATION, which required my direct attention, intervention, and personal management at the bedside. I have personally provided critical care time exclusive of time spent on separately billable procedures. Time includes review of laboratory data, radiology results, discussion with consultants, discussion with the patient's family, and monitoring for potential decompensation.

## 2023-12-12 NOTE — ED ADULT NURSE NOTE - NSFALLRISKINTERV_ED_ALL_ED
Assistance OOB with selected safe patient handling equipment if applicable/Assistance with ambulation/Communicate fall risk and risk factors to all staff, patient, and family/Encourage patient to sit up slowly, dangle for a short time, stand at bedside before walking/Monitor gait and stability/Orthostatic vital signs/Provide visual cue: yellow wristband, yellow gown, etc/Reinforce activity limits and safety measures with patient and family/Call bell, personal items and telephone in reach/Instruct patient to call for assistance before getting out of bed/chair/stretcher/Non-slip footwear applied when patient is off stretcher/[place] to call system/Physically safe environment - no spills, clutter or unnecessary equipment/Purposeful Proactive Rounding/Room/bathroom lighting operational, light cord in reach Assistance OOB with selected safe patient handling equipment if applicable/Assistance with ambulation/Communicate fall risk and risk factors to all staff, patient, and family/Encourage patient to sit up slowly, dangle for a short time, stand at bedside before walking/Monitor gait and stability/Orthostatic vital signs/Provide visual cue: yellow wristband, yellow gown, etc/Reinforce activity limits and safety measures with patient and family/Call bell, personal items and telephone in reach/Instruct patient to call for assistance before getting out of bed/chair/stretcher/Non-slip footwear applied when patient is off stretcher/[place] to call system/Physically safe environment - no spills, clutter or unnecessary equipment/Purposeful Proactive Rounding/Room/bathroom lighting operational, light cord in reach

## 2023-12-12 NOTE — ED ADULT NURSE NOTE - OBJECTIVE STATEMENT
Pt arrives in rapid a-fib with rates 130-140s. Pt with CP. 10mg Cardizem IV given at 1535, pt felt dizzy afterwards. Heart rate lowered but still in a-fib. No n/v. No SOB. Placed on all monitors.

## 2023-12-13 DIAGNOSIS — D64.9 ANEMIA, UNSPECIFIED: ICD-10-CM

## 2023-12-13 DIAGNOSIS — D50.9 IRON DEFICIENCY ANEMIA, UNSPECIFIED: ICD-10-CM

## 2023-12-13 DIAGNOSIS — I21.4 NON-ST ELEVATION (NSTEMI) MYOCARDIAL INFARCTION: ICD-10-CM

## 2023-12-13 DIAGNOSIS — I25.10 ATHEROSCLEROTIC HEART DISEASE OF NATIVE CORONARY ARTERY WITHOUT ANGINA PECTORIS: ICD-10-CM

## 2023-12-13 DIAGNOSIS — I50.20 UNSPECIFIED SYSTOLIC (CONGESTIVE) HEART FAILURE: ICD-10-CM

## 2023-12-13 DIAGNOSIS — I48.91 UNSPECIFIED ATRIAL FIBRILLATION: ICD-10-CM

## 2023-12-13 DIAGNOSIS — I10 ESSENTIAL (PRIMARY) HYPERTENSION: ICD-10-CM

## 2023-12-13 LAB
A1C WITH ESTIMATED AVERAGE GLUCOSE RESULT: 5.6 % — SIGNIFICANT CHANGE UP (ref 4–5.6)
A1C WITH ESTIMATED AVERAGE GLUCOSE RESULT: 5.6 % — SIGNIFICANT CHANGE UP (ref 4–5.6)
ANION GAP SERPL CALC-SCNC: 10 MMOL/L — SIGNIFICANT CHANGE UP (ref 5–17)
ANION GAP SERPL CALC-SCNC: 10 MMOL/L — SIGNIFICANT CHANGE UP (ref 5–17)
APTT BLD: 30.4 SEC — SIGNIFICANT CHANGE UP (ref 24.5–35.6)
APTT BLD: 30.4 SEC — SIGNIFICANT CHANGE UP (ref 24.5–35.6)
APTT BLD: 61.3 SEC — HIGH (ref 24.5–35.6)
APTT BLD: 61.3 SEC — HIGH (ref 24.5–35.6)
APTT BLD: 73.1 SEC — HIGH (ref 24.5–35.6)
APTT BLD: 73.1 SEC — HIGH (ref 24.5–35.6)
BLD GP AB SCN SERPL QL: NEGATIVE — SIGNIFICANT CHANGE UP
BLD GP AB SCN SERPL QL: NEGATIVE — SIGNIFICANT CHANGE UP
BUN SERPL-MCNC: 12 MG/DL — SIGNIFICANT CHANGE UP (ref 7–23)
BUN SERPL-MCNC: 12 MG/DL — SIGNIFICANT CHANGE UP (ref 7–23)
CALCIUM SERPL-MCNC: 8.7 MG/DL — SIGNIFICANT CHANGE UP (ref 8.4–10.5)
CALCIUM SERPL-MCNC: 8.7 MG/DL — SIGNIFICANT CHANGE UP (ref 8.4–10.5)
CHLORIDE SERPL-SCNC: 106 MMOL/L — SIGNIFICANT CHANGE UP (ref 96–108)
CHLORIDE SERPL-SCNC: 106 MMOL/L — SIGNIFICANT CHANGE UP (ref 96–108)
CHOLEST SERPL-MCNC: 111 MG/DL — SIGNIFICANT CHANGE UP
CHOLEST SERPL-MCNC: 111 MG/DL — SIGNIFICANT CHANGE UP
CK MB CFR SERPL CALC: 121.3 NG/ML — HIGH (ref 0–6.7)
CK MB CFR SERPL CALC: 121.3 NG/ML — HIGH (ref 0–6.7)
CK SERPL-CCNC: 946 U/L — HIGH (ref 30–200)
CK SERPL-CCNC: 946 U/L — HIGH (ref 30–200)
CO2 SERPL-SCNC: 21 MMOL/L — LOW (ref 22–31)
CO2 SERPL-SCNC: 21 MMOL/L — LOW (ref 22–31)
CREAT SERPL-MCNC: 0.67 MG/DL — SIGNIFICANT CHANGE UP (ref 0.5–1.3)
CREAT SERPL-MCNC: 0.67 MG/DL — SIGNIFICANT CHANGE UP (ref 0.5–1.3)
EGFR: 106 ML/MIN/1.73M2 — SIGNIFICANT CHANGE UP
EGFR: 106 ML/MIN/1.73M2 — SIGNIFICANT CHANGE UP
ESTIMATED AVERAGE GLUCOSE: 114 MG/DL — SIGNIFICANT CHANGE UP (ref 68–114)
ESTIMATED AVERAGE GLUCOSE: 114 MG/DL — SIGNIFICANT CHANGE UP (ref 68–114)
FERRITIN SERPL-MCNC: 6 NG/ML — LOW (ref 30–400)
FERRITIN SERPL-MCNC: 6 NG/ML — LOW (ref 30–400)
GLUCOSE SERPL-MCNC: 115 MG/DL — HIGH (ref 70–99)
GLUCOSE SERPL-MCNC: 115 MG/DL — HIGH (ref 70–99)
HCT VFR BLD CALC: 24.2 % — LOW (ref 39–50)
HCT VFR BLD CALC: 24.2 % — LOW (ref 39–50)
HCT VFR BLD CALC: 25.1 % — LOW (ref 39–50)
HCT VFR BLD CALC: 25.1 % — LOW (ref 39–50)
HCT VFR BLD CALC: 25.9 % — LOW (ref 39–50)
HCT VFR BLD CALC: 25.9 % — LOW (ref 39–50)
HDLC SERPL-MCNC: 44 MG/DL — SIGNIFICANT CHANGE UP
HDLC SERPL-MCNC: 44 MG/DL — SIGNIFICANT CHANGE UP
HGB BLD-MCNC: 7.8 G/DL — LOW (ref 13–17)
HGB BLD-MCNC: 7.8 G/DL — LOW (ref 13–17)
HGB BLD-MCNC: 8 G/DL — LOW (ref 13–17)
HGB BLD-MCNC: 8 G/DL — LOW (ref 13–17)
HGB BLD-MCNC: 8.1 G/DL — LOW (ref 13–17)
HGB BLD-MCNC: 8.1 G/DL — LOW (ref 13–17)
INR BLD: 1.1 — SIGNIFICANT CHANGE UP (ref 0.85–1.18)
INR BLD: 1.1 — SIGNIFICANT CHANGE UP (ref 0.85–1.18)
IRON SATN MFR SERPL: 15 UG/DL — LOW (ref 45–165)
IRON SATN MFR SERPL: 15 UG/DL — LOW (ref 45–165)
IRON SATN MFR SERPL: 4 % — LOW (ref 16–55)
IRON SATN MFR SERPL: 4 % — LOW (ref 16–55)
LIPID PNL WITH DIRECT LDL SERPL: 55 MG/DL — SIGNIFICANT CHANGE UP
LIPID PNL WITH DIRECT LDL SERPL: 55 MG/DL — SIGNIFICANT CHANGE UP
MAGNESIUM SERPL-MCNC: 1.8 MG/DL — SIGNIFICANT CHANGE UP (ref 1.6–2.6)
MAGNESIUM SERPL-MCNC: 1.8 MG/DL — SIGNIFICANT CHANGE UP (ref 1.6–2.6)
MCHC RBC-ENTMCNC: 26 PG — LOW (ref 27–34)
MCHC RBC-ENTMCNC: 26.3 PG — LOW (ref 27–34)
MCHC RBC-ENTMCNC: 26.3 PG — LOW (ref 27–34)
MCHC RBC-ENTMCNC: 31.3 GM/DL — LOW (ref 32–36)
MCHC RBC-ENTMCNC: 31.3 GM/DL — LOW (ref 32–36)
MCHC RBC-ENTMCNC: 31.9 GM/DL — LOW (ref 32–36)
MCHC RBC-ENTMCNC: 31.9 GM/DL — LOW (ref 32–36)
MCHC RBC-ENTMCNC: 32.2 GM/DL — SIGNIFICANT CHANGE UP (ref 32–36)
MCHC RBC-ENTMCNC: 32.2 GM/DL — SIGNIFICANT CHANGE UP (ref 32–36)
MCV RBC AUTO: 80.7 FL — SIGNIFICANT CHANGE UP (ref 80–100)
MCV RBC AUTO: 80.7 FL — SIGNIFICANT CHANGE UP (ref 80–100)
MCV RBC AUTO: 82.6 FL — SIGNIFICANT CHANGE UP (ref 80–100)
MCV RBC AUTO: 82.6 FL — SIGNIFICANT CHANGE UP (ref 80–100)
MCV RBC AUTO: 83 FL — SIGNIFICANT CHANGE UP (ref 80–100)
MCV RBC AUTO: 83 FL — SIGNIFICANT CHANGE UP (ref 80–100)
NON HDL CHOLESTEROL: 67 MG/DL — SIGNIFICANT CHANGE UP
NON HDL CHOLESTEROL: 67 MG/DL — SIGNIFICANT CHANGE UP
NRBC # BLD: 0 /100 WBCS — SIGNIFICANT CHANGE UP (ref 0–0)
PLATELET # BLD AUTO: 340 K/UL — SIGNIFICANT CHANGE UP (ref 150–400)
PLATELET # BLD AUTO: 340 K/UL — SIGNIFICANT CHANGE UP (ref 150–400)
PLATELET # BLD AUTO: 374 K/UL — SIGNIFICANT CHANGE UP (ref 150–400)
PLATELET # BLD AUTO: 374 K/UL — SIGNIFICANT CHANGE UP (ref 150–400)
PLATELET # BLD AUTO: 383 K/UL — SIGNIFICANT CHANGE UP (ref 150–400)
PLATELET # BLD AUTO: 383 K/UL — SIGNIFICANT CHANGE UP (ref 150–400)
POTASSIUM SERPL-MCNC: 3.6 MMOL/L — SIGNIFICANT CHANGE UP (ref 3.5–5.3)
POTASSIUM SERPL-MCNC: 3.6 MMOL/L — SIGNIFICANT CHANGE UP (ref 3.5–5.3)
POTASSIUM SERPL-SCNC: 3.6 MMOL/L — SIGNIFICANT CHANGE UP (ref 3.5–5.3)
POTASSIUM SERPL-SCNC: 3.6 MMOL/L — SIGNIFICANT CHANGE UP (ref 3.5–5.3)
PROTHROM AB SERPL-ACNC: 12.5 SEC — SIGNIFICANT CHANGE UP (ref 9.5–13)
PROTHROM AB SERPL-ACNC: 12.5 SEC — SIGNIFICANT CHANGE UP (ref 9.5–13)
RBC # BLD: 3 M/UL — LOW (ref 4.2–5.8)
RBC # BLD: 3 M/UL — LOW (ref 4.2–5.8)
RBC # BLD: 3.04 M/UL — LOW (ref 4.2–5.8)
RBC # BLD: 3.04 M/UL — LOW (ref 4.2–5.8)
RBC # BLD: 3.12 M/UL — LOW (ref 4.2–5.8)
RBC # BLD: 3.12 M/UL — LOW (ref 4.2–5.8)
RBC # FLD: 17.2 % — HIGH (ref 10.3–14.5)
RBC # FLD: 17.2 % — HIGH (ref 10.3–14.5)
RBC # FLD: 17.3 % — HIGH (ref 10.3–14.5)
RH IG SCN BLD-IMP: POSITIVE — SIGNIFICANT CHANGE UP
RH IG SCN BLD-IMP: POSITIVE — SIGNIFICANT CHANGE UP
SODIUM SERPL-SCNC: 137 MMOL/L — SIGNIFICANT CHANGE UP (ref 135–145)
SODIUM SERPL-SCNC: 137 MMOL/L — SIGNIFICANT CHANGE UP (ref 135–145)
TIBC SERPL-MCNC: 417 UG/DL — SIGNIFICANT CHANGE UP (ref 220–430)
TIBC SERPL-MCNC: 417 UG/DL — SIGNIFICANT CHANGE UP (ref 220–430)
TRANSFERRIN SERPL-MCNC: 344 MG/DL — SIGNIFICANT CHANGE UP (ref 200–360)
TRANSFERRIN SERPL-MCNC: 344 MG/DL — SIGNIFICANT CHANGE UP (ref 200–360)
TRIGL SERPL-MCNC: 62 MG/DL — SIGNIFICANT CHANGE UP
TRIGL SERPL-MCNC: 62 MG/DL — SIGNIFICANT CHANGE UP
TROPONIN T, HIGH SENSITIVITY RESULT: 1293 NG/L — CRITICAL HIGH (ref 0–51)
TROPONIN T, HIGH SENSITIVITY RESULT: 1293 NG/L — CRITICAL HIGH (ref 0–51)
TROPONIN T, HIGH SENSITIVITY RESULT: 1417 NG/L — CRITICAL HIGH (ref 0–51)
TROPONIN T, HIGH SENSITIVITY RESULT: 1417 NG/L — CRITICAL HIGH (ref 0–51)
TROPONIN T, HIGH SENSITIVITY RESULT: 1477 NG/L — CRITICAL HIGH (ref 0–51)
TROPONIN T, HIGH SENSITIVITY RESULT: 1477 NG/L — CRITICAL HIGH (ref 0–51)
UIBC SERPL-MCNC: 402 UG/DL — HIGH (ref 110–370)
UIBC SERPL-MCNC: 402 UG/DL — HIGH (ref 110–370)
WBC # BLD: 10.82 K/UL — HIGH (ref 3.8–10.5)
WBC # BLD: 10.82 K/UL — HIGH (ref 3.8–10.5)
WBC # BLD: 11.86 K/UL — HIGH (ref 3.8–10.5)
WBC # BLD: 11.86 K/UL — HIGH (ref 3.8–10.5)
WBC # BLD: 11.87 K/UL — HIGH (ref 3.8–10.5)
WBC # BLD: 11.87 K/UL — HIGH (ref 3.8–10.5)
WBC # FLD AUTO: 10.82 K/UL — HIGH (ref 3.8–10.5)
WBC # FLD AUTO: 10.82 K/UL — HIGH (ref 3.8–10.5)
WBC # FLD AUTO: 11.86 K/UL — HIGH (ref 3.8–10.5)
WBC # FLD AUTO: 11.86 K/UL — HIGH (ref 3.8–10.5)
WBC # FLD AUTO: 11.87 K/UL — HIGH (ref 3.8–10.5)
WBC # FLD AUTO: 11.87 K/UL — HIGH (ref 3.8–10.5)

## 2023-12-13 PROCEDURE — 99233 SBSQ HOSP IP/OBS HIGH 50: CPT

## 2023-12-13 RX ORDER — HEPARIN SODIUM 5000 [USP'U]/ML
6500 INJECTION INTRAVENOUS; SUBCUTANEOUS ONCE
Refills: 0 | Status: DISCONTINUED | OUTPATIENT
Start: 2023-12-13 | End: 2023-12-14

## 2023-12-13 RX ORDER — LISINOPRIL 2.5 MG/1
20 TABLET ORAL DAILY
Refills: 0 | Status: DISCONTINUED | OUTPATIENT
Start: 2023-12-13 | End: 2023-12-15

## 2023-12-13 RX ORDER — POTASSIUM CHLORIDE 20 MEQ
40 PACKET (EA) ORAL ONCE
Refills: 0 | Status: COMPLETED | OUTPATIENT
Start: 2023-12-13 | End: 2023-12-13

## 2023-12-13 RX ORDER — SODIUM CHLORIDE 9 MG/ML
250 INJECTION INTRAMUSCULAR; INTRAVENOUS; SUBCUTANEOUS ONCE
Refills: 0 | Status: COMPLETED | OUTPATIENT
Start: 2023-12-14 | End: 2023-12-14

## 2023-12-13 RX ORDER — ROSUVASTATIN CALCIUM 5 MG/1
1 TABLET ORAL
Refills: 0 | DISCHARGE

## 2023-12-13 RX ORDER — SODIUM CHLORIDE 9 MG/ML
1000 INJECTION INTRAMUSCULAR; INTRAVENOUS; SUBCUTANEOUS
Refills: 0 | Status: DISCONTINUED | OUTPATIENT
Start: 2023-12-14 | End: 2023-12-14

## 2023-12-13 RX ORDER — PANTOPRAZOLE SODIUM 20 MG/1
40 TABLET, DELAYED RELEASE ORAL
Refills: 0 | Status: DISCONTINUED | OUTPATIENT
Start: 2023-12-13 | End: 2023-12-15

## 2023-12-13 RX ORDER — ATORVASTATIN CALCIUM 80 MG/1
40 TABLET, FILM COATED ORAL AT BEDTIME
Refills: 0 | Status: DISCONTINUED | OUTPATIENT
Start: 2023-12-13 | End: 2023-12-15

## 2023-12-13 RX ORDER — HEPARIN SODIUM 5000 [USP'U]/ML
6500 INJECTION INTRAVENOUS; SUBCUTANEOUS EVERY 6 HOURS
Refills: 0 | Status: DISCONTINUED | OUTPATIENT
Start: 2023-12-13 | End: 2023-12-14

## 2023-12-13 RX ORDER — LISINOPRIL 2.5 MG/1
1 TABLET ORAL
Refills: 0 | DISCHARGE

## 2023-12-13 RX ORDER — METOPROLOL TARTRATE 50 MG
1 TABLET ORAL
Refills: 0 | DISCHARGE

## 2023-12-13 RX ORDER — METOPROLOL TARTRATE 50 MG
25 TABLET ORAL
Refills: 0 | Status: DISCONTINUED | OUTPATIENT
Start: 2023-12-13 | End: 2023-12-15

## 2023-12-13 RX ORDER — FERROUS SULFATE 325(65) MG
325 TABLET ORAL DAILY
Refills: 0 | Status: DISCONTINUED | OUTPATIENT
Start: 2023-12-13 | End: 2023-12-15

## 2023-12-13 RX ORDER — HEPARIN SODIUM 5000 [USP'U]/ML
3000 INJECTION INTRAVENOUS; SUBCUTANEOUS EVERY 6 HOURS
Refills: 0 | Status: DISCONTINUED | OUTPATIENT
Start: 2023-12-13 | End: 2023-12-14

## 2023-12-13 RX ORDER — HEPARIN SODIUM 5000 [USP'U]/ML
INJECTION INTRAVENOUS; SUBCUTANEOUS
Qty: 25000 | Refills: 0 | Status: DISCONTINUED | OUTPATIENT
Start: 2023-12-13 | End: 2023-12-14

## 2023-12-13 RX ORDER — CLOPIDOGREL BISULFATE 75 MG/1
75 TABLET, FILM COATED ORAL DAILY
Refills: 0 | Status: DISCONTINUED | OUTPATIENT
Start: 2023-12-13 | End: 2023-12-15

## 2023-12-13 RX ORDER — MAGNESIUM OXIDE 400 MG ORAL TABLET 241.3 MG
800 TABLET ORAL ONCE
Refills: 0 | Status: COMPLETED | OUTPATIENT
Start: 2023-12-13 | End: 2023-12-13

## 2023-12-13 RX ORDER — INFLUENZA VIRUS VACCINE 15; 15; 15; 15 UG/.5ML; UG/.5ML; UG/.5ML; UG/.5ML
0.5 SUSPENSION INTRAMUSCULAR ONCE
Refills: 0 | Status: DISCONTINUED | OUTPATIENT
Start: 2023-12-13 | End: 2023-12-15

## 2023-12-13 RX ORDER — ASPIRIN/CALCIUM CARB/MAGNESIUM 324 MG
81 TABLET ORAL DAILY
Refills: 0 | Status: DISCONTINUED | OUTPATIENT
Start: 2023-12-13 | End: 2023-12-15

## 2023-12-13 RX ADMIN — Medication 25 MILLIGRAM(S): at 17:35

## 2023-12-13 RX ADMIN — MAGNESIUM OXIDE 400 MG ORAL TABLET 800 MILLIGRAM(S): 241.3 TABLET ORAL at 05:56

## 2023-12-13 RX ADMIN — PANTOPRAZOLE SODIUM 40 MILLIGRAM(S): 20 TABLET, DELAYED RELEASE ORAL at 06:33

## 2023-12-13 RX ADMIN — ATORVASTATIN CALCIUM 40 MILLIGRAM(S): 80 TABLET, FILM COATED ORAL at 21:46

## 2023-12-13 RX ADMIN — HEPARIN SODIUM 1500 UNIT(S)/HR: 5000 INJECTION INTRAVENOUS; SUBCUTANEOUS at 20:13

## 2023-12-13 RX ADMIN — Medication 25 MILLIGRAM(S): at 06:33

## 2023-12-13 RX ADMIN — HEPARIN SODIUM 1500 UNIT(S)/HR: 5000 INJECTION INTRAVENOUS; SUBCUTANEOUS at 05:58

## 2023-12-13 RX ADMIN — Medication 81 MILLIGRAM(S): at 12:10

## 2023-12-13 RX ADMIN — HEPARIN SODIUM 1500 UNIT(S)/HR: 5000 INJECTION INTRAVENOUS; SUBCUTANEOUS at 18:02

## 2023-12-13 RX ADMIN — LISINOPRIL 20 MILLIGRAM(S): 2.5 TABLET ORAL at 06:34

## 2023-12-13 RX ADMIN — HEPARIN SODIUM 1500 UNIT(S)/HR: 5000 INJECTION INTRAVENOUS; SUBCUTANEOUS at 08:13

## 2023-12-13 RX ADMIN — Medication 325 MILLIGRAM(S): at 17:35

## 2023-12-13 RX ADMIN — HEPARIN SODIUM 1500 UNIT(S)/HR: 5000 INJECTION INTRAVENOUS; SUBCUTANEOUS at 12:10

## 2023-12-13 RX ADMIN — Medication 40 MILLIEQUIVALENT(S): at 05:56

## 2023-12-13 RX ADMIN — CLOPIDOGREL BISULFATE 75 MILLIGRAM(S): 75 TABLET, FILM COATED ORAL at 12:35

## 2023-12-13 NOTE — H&P ADULT - HISTORY OF PRESENT ILLNESS
63 yo M with PMhx of pAfib (on Eliquis) CAD s/p KUSHAL mLCx/OM1/pLAD (3/2021), HTN, HLD, IAIN (baseline Hgb 9-10, s/p hemoclips 4/2023 (cleared for AC by GI) presented to [facility] complaining of CP, fatigue, SOB, and palpitaitons suddenly onset after he ate lunch yesterday. He denies any exertional sx.     [facility] course:  Vital signs: /89 mmHg, , SpO2 99% on RA, T 97.5 F, RR 18.   Labs significnat for WBC 11.69 w left shift, H&h 9.3/30, K 3.5, Mg 1.7, , Trop I HS 1919.   EKG afib rate 129, LBBB. CXR w congestion.Pt was given Lopressor 25mg PO x1, diltiazem 10mg IV x1, and 1L NS bolus. Repeat EKG revealing conversion to NSR.  Pt transferred to [facility] for further management of afib and r/o ACS.     Upon arrival to [facility], vital signs /83, HR 69 bpm, T 98.1, SpO2 97% on RA, RR 19.      63 yo M with PMhx of pAfib (on Eliquis) CAD s/p KUSHAL mLCx/OM1/pLAD (3/2021), HTN, HLD, IAIN (baseline Hgb 9-10, s/p hemoclips 4/2023 (cleared for AC by GI) presented to [facility] complaining of CP, fatigue, SOB, and palpitaitons suddenly onset after he ate lunch yesterday. He denies any exertional sx.     [facility] course:  Vital signs: /89 mmHg, , SpO2 99% on RA, T 97.5 F, RR 18.   Labs significnat for WBC 11.69 w left shift, H&h 9.3/30, K 3.5, Mg 1.7, , Trop I HS 1919.   EKG afib rate 129, LBBB. CXR w congestion.Pt was given Lopressor 25mg PO x1, diltiazem 10mg IV x1, and 1L NS bolus. Repeat EKG revealing conversion to NSR.  Pt transferred to [facility] for further management of afib and r/o ACS.     Upon arrival to [facility], vital signs /83, HR 69 bpm, T 98.1, SpO2 97% on RA, RR 19.    63 yo M with PMhx of pAfib (on Eliquis) CAD s/p KUSHAL mLCx/OM1/pLAD (3/2021), HTN, HLD, IAIN (baseline Hgb 9-10, s/p hemoclips 4/2023 (cleared for AC by GI) presented to [facility] complaining of CP, fatigue, SOB, and palpitations suddenly onset after he ate lunch yesterday. He denies any exertional sx. Pt also endorses recent increased fatigue of late.     [facility] course:  Vital signs: /89 mmHg, , SpO2 99% on RA, T 97.5 F, RR 18.   Labs significnat for WBC 11.69 w left shift, H&h 9.3/30, K 3.5, Mg 1.7, , Trop I HS 1919.   EKG afib rate 129, LBBB. CXR w congestion.Pt was given Lopressor 25mg PO x1, diltiazem 10mg IV x1, and 1L NS bolus. Repeat EKG revealing conversion to NSR.  Pt transferred to [facility] for further management of afib and r/o ACS.     Upon arrival to [facility], vital signs /83, HR 69 bpm, T 98.1, SpO2 97% on RA, RR 19.    65 yo M with PMhx of pAfib (on Eliquis) CAD s/p KUSHAL mLCx/OM1/pLAD (3/2021), HTN, HLD, IAIN (baseline Hgb 9-10, s/p hemoclips 4/2023 (cleared for AC by GI) presented to [facility] complaining of CP, fatigue, SOB, and palpitations suddenly onset after he ate lunch yesterday. He denies any exertional sx. Pt also endorses recent increased fatigue of late.     [facility] course:  Vital signs: /89 mmHg, , SpO2 99% on RA, T 97.5 F, RR 18.   Labs significnat for WBC 11.69 w left shift, H&h 9.3/30, K 3.5, Mg 1.7, , Trop I HS 1919.   EKG afib rate 129, LBBB. CXR w congestion.Pt was given Lopressor 25mg PO x1, diltiazem 10mg IV x1, and 1L NS bolus. Repeat EKG revealing conversion to NSR.  Pt transferred to [facility] for further management of afib and r/o ACS.     Upon arrival to [facility], vital signs /83, HR 69 bpm, T 98.1, SpO2 97% on RA, RR 19.    65 yo M with PMhx of pAfib (on Eliquis) CAD s/p KUSHAL mLCx/OM1/pLAD (3/2021), HTN, HLD, IAIN (baseline Hgb 9-10, s/p hemoclips 4/2023 (cleared for AC by GI) presented to [facility] complaining of sudden onset of CP/SOB/palpitations after going for a walk and eating lunch at work yesterday. He describes his CP as substernal, non radiating, resolving after he received Lopressor/diltiazem at [facility]. This episode is described as similar to how he felt when he was diagnosed with afib & anemia in April. Pt also endorses recent increased fatigue of late. Denies any associated orthopnea, PND, n/v/d, dizziness/lightheadedness, diaphoresis, LE edema, fever, chills, recent sick contacts.     [facility] course:  Vital signs: /89 mmHg, , SpO2 99% on RA, T 97.5 F, RR 18.   Labs significant for WBC 11.69 w left shift, H&h 9.3/30, K 3.5, Mg 1.7, , Trop I HS 1919.   EKG afib rate 129, LBBB. CXR w congestion. He was given Lopressor 25mg PO x1, diltiazem 10mg IV x1, and 1L NS bolus. Repeat EKG revealing conversion to NSR.  Pt transferred to [facility] for further management of afib and r/o ACS.     Upon arrival to [facility], vital signs /83, EKG NSR 69 bpm, LBBB, T 98.1, SpO2 97% on RA, RR 19. Trop T 1417, initiated on hep gtt.    65 yo M with PMhx of pAfib (on Eliquis) CAD s/p KUSHAL mLCx/OM1/pLAD (3/2021), HTN, HLD, IAIN (baseline Hgb 9-10, s/p hemoclips 4/2023 (cleared for AC by GI) presented to [facility] complaining of sudden onset of CP/SOB/palpitations after going for a walk and eating lunch at work yesterday. He describes his CP as substernal, non radiating, resolving after he received Lopressor/diltiazem at [facility]. This episode is described as similar to how he felt when he was diagnosed with afib & anemia in April. Pt also endorses recent increased fatigue of late. Denies any associated orthopnea, PND, n/v/d, dizziness/lightheadedness, diaphoresis, LE edema, fever, chills, recent sick contacts.     [facility] course:  Vital signs: /89 mmHg, , SpO2 99% on RA, T 97.5 F, RR 18.   Labs significant for WBC 11.69 w left shift, H&h 9.3/30, K 3.5, Mg 1.7, , Trop I HS 1919.   EKG afib rate 129, LBBB. CXR w congestion. He was given Lopressor 25mg PO x1, diltiazem 10mg IV x1, and 1L NS bolus. Repeat EKG revealing conversion to NSR.  Pt transferred to [facility] for further management of afib and r/o ACS.     Upon arrival to [facility], vital signs /83, EKG NSR 69 bpm, LBBB, T 98.1, SpO2 97% on RA, RR 19. Trop T 1417, initiated on hep gtt.    65 yo M with PMhx of pAfib (on Eliquis) CAD s/p KUSHAL mLCx/OM1/pLAD (3/2021), HTN, HLD, IAIN (baseline Hgb 9-10, s/p hemoclips 4/2023 (cleared for AC by GI) presented to [facility] complaining of sudden onset of CP/SOB/palpitations after going for a walk and eating lunch at work yesterday. He describes his CP as substernal, non radiating, resolving after he received Lopressor/diltiazem at [facility]. This episode is described as similar to how he felt when he was diagnosed with afib & anemia in April. Pt also endorses recent increased fatigue of late. Denies any associated orthopnea, PND, n/v/d, dizziness/lightheadedness, diaphoresis, LE edema, fever, chills, recent sick contacts.     [facility] course:  Vital signs: /89 mmHg, , SpO2 99% on RA, T 97.5 F, RR 18.   Labs significant for WBC 11.69 w left shift, H&h 9.3/30, K 3.5, Mg 1.7, , Trop I HS 1919.   EKG afib rate 129, LBBB. CXR w congestion. He was given Lopressor 25mg PO x1, diltiazem 10mg IV x1, and 1L NS bolus. Repeat EKG revealing conversion to NSR.  Pt transferred to [facility] for further management of afib and r/o ACS.     Upon arrival to [facility], vital signs /83, EKG NSR 69 bpm, LBBB, T 98.1, SpO2 97% on RA, RR 19. Remains asx, CP free.    63 yo M with PMhx of pAfib (on Eliquis) CAD s/p KUSHAL mLCx/OM1/pLAD (3/2021), HTN, HLD, IAIN (baseline Hgb 9-10, s/p hemoclips 4/2023 (cleared for AC by GI) presented to [facility] complaining of sudden onset of CP/SOB/palpitations after going for a walk and eating lunch at work yesterday. He describes his CP as substernal, non radiating, resolving after he received Lopressor/diltiazem at [facility]. This episode is described as similar to how he felt when he was diagnosed with afib & anemia in April. Pt also endorses recent increased fatigue of late. Denies any associated orthopnea, PND, n/v/d, dizziness/lightheadedness, diaphoresis, LE edema, fever, chills, recent sick contacts.     [facility] course:  Vital signs: /89 mmHg, , SpO2 99% on RA, T 97.5 F, RR 18.   Labs significant for WBC 11.69 w left shift, H&h 9.3/30, K 3.5, Mg 1.7, , Trop I HS 1919.   EKG afib rate 129, LBBB. CXR w congestion. He was given Lopressor 25mg PO x1, diltiazem 10mg IV x1, and 1L NS bolus. Repeat EKG revealing conversion to NSR.  Pt transferred to [facility] for further management of afib and r/o ACS.     Upon arrival to [facility], vital signs /83, EKG NSR 69 bpm, LBBB, T 98.1, SpO2 97% on RA, RR 19. Remains asx, CP free.    65 yo M with PMhx of pAfib (on Eliquis, last dose 12/12 AM) CAD s/p KUSHAL mLCx/OM1/pLAD (3/2021), HTN, HLD, IAIN (baseline Hgb 9-10, s/p hemoclips 4/2023 (cleared for AC by GI) presented to [facility] complaining of sudden onset of CP/SOB/palpitations after going for a walk and eating lunch at work yesterday. He describes his CP as substernal, non radiating, resolving after he received Lopressor/diltiazem at [facility]. This episode is described as similar to how he felt when he was diagnosed with afib & anemia in April. Pt also endorses recent increased fatigue of late. Denies any associated orthopnea, PND, n/v/d, dizziness/lightheadedness, diaphoresis, LE edema, fever, chills, recent sick contacts.     [facility] course:  Vital signs: /89 mmHg, , SpO2 99% on RA, T 97.5 F, RR 18.   Labs significant for WBC 11.69 w left shift, H&h 9.3/30, K 3.5, Mg 1.7, , Trop I HS 1919.   EKG afib rate 129, LBBB. CXR w congestion. He was given Lopressor 25mg PO x1, diltiazem 10mg IV x1, and 1L NS bolus. Repeat EKG revealing conversion to NSR.  Pt transferred to [facility] for further management of afib and r/o ACS.     Upon arrival to [facility], vital signs /83, EKG NSR 69 bpm, LBBB, T 98.1, SpO2 97% on RA, RR 19. Remains asx, CP free.    65 yo M with PMhx of pAfib (on Eliquis, last dose 12/12 AM) CAD s/p KUSHAL mLCx/OM1/pLAD (3/2021), HTN, HLD, IAIN (baseline Hgb 9-10, s/p hemoclips 4/2023 (cleared for AC by GI) presented to [facility] complaining of sudden onset of CP/SOB/palpitations after going for a walk and eating lunch at work yesterday. He describes his CP as substernal, non radiating, resolving after he received Lopressor/diltiazem at [facility]. This episode is described as similar to how he felt when he was diagnosed with afib & anemia in April. Pt also endorses recent increased fatigue of late. Denies any associated orthopnea, PND, n/v/d, dizziness/lightheadedness, diaphoresis, LE edema, fever, chills, recent sick contacts.     [facility] course:  Vital signs: /89 mmHg, , SpO2 99% on RA, T 97.5 F, RR 18.   Labs significant for WBC 11.69 w left shift, H&h 9.3/30, K 3.5, Mg 1.7, , Trop I HS 1919.   EKG afib rate 129, LBBB. CXR w congestion. He was given Lopressor 25mg PO x1, diltiazem 10mg IV x1, and 1L NS bolus. Repeat EKG revealing conversion to NSR.  Pt transferred to [facility] for further management of afib and r/o ACS.     Upon arrival to [facility], vital signs /83, EKG NSR 69 bpm, LBBB, T 98.1, SpO2 97% on RA, RR 19. Remains asx, CP free.

## 2023-12-13 NOTE — DIETITIAN INITIAL EVALUATION ADULT - OTHER INFO
65 yo M PMhx predm, pAfib (on Eliquis, last dose 12/12 AM) CAD s/p KUSHAL mLCx/OM1/pLAD (3/2021), HTN, HLD, IAIN (baseline Hgb 9-10, s/p hemoclips 4/2023 (cleared for AC by GI) presented to [facility] complaining of sudden onset of CP/SOB/palpitations after going for a walk and eating lunch at work yesterday. Found to be in afib w RVR and r/i NSTEMI, transferred to [facility] for further management. Initiated on hep gtt. S/p PO lopressor and IV diltiazem now converted to NSR. Pending [facility] today.     Pt seen this afternoon  on 5UR. Reported good PO intake at this time/PTA. Cooks mostly at home, does take get takeout x2/week PTA. Denies issues chewing/swallowing, NKFA. Denies NVDC. Admit wt 184 pounds noted, overall consistent with UBW with reported 7 pounds +/-. No pain. No edema or pressure ulcers, Heladio 12. No Edema or pressure ulcer. Lipids and A1c WDL.   Please see below for nutritions recommendations.  63 yo M PMhx predm, pAfib (on Eliquis, last dose 12/12 AM) CAD s/p KUSHAL mLCx/OM1/pLAD (3/2021), HTN, HLD, IAIN (baseline Hgb 9-10, s/p hemoclips 4/2023 (cleared for AC by GI) presented to [facility] complaining of sudden onset of CP/SOB/palpitations after going for a walk and eating lunch at work yesterday. Found to be in afib w RVR and r/i NSTEMI, transferred to [facility] for further management. Initiated on hep gtt. S/p PO lopressor and IV diltiazem now converted to NSR. Pending [facility] today.     Pt seen this afternoon  on 5UR. Reported good PO intake at this time/PTA. Cooks mostly at home, does take get takeout x2/week PTA. Denies issues chewing/swallowing, NKFA. Denies NVDC. Admit wt 184 pounds noted, overall consistent with UBW with reported 7 pounds +/-. No pain. No edema or pressure ulcers, Heladio 12. No Edema or pressure ulcer. Lipids and A1c WDL.   Please see below for nutritions recommendations.

## 2023-12-13 NOTE — H&P ADULT - PROBLEM SELECTOR PLAN 4
Echo as above, EF 40-45%  - GDMT: Metoprolol Echo as above, EF 40-45%  - GDMT: Lisinopril 20mg qd, Toprol 25mg BID TTE as above, EF 40-45%  - GDMT: Lisinopril 20mg qd, Toprol 25mg BID

## 2023-12-13 NOTE — PATIENT PROFILE ADULT - HAVE YOU RECENTLY LOST WEIGHT WITHOUT TRYING?
Detail Level: Detailed
Quality 265: Biopsy Follow-Up: Biopsy results reviewed, communicated, tracked, and documented
No (0)

## 2023-12-13 NOTE — DIETITIAN INITIAL EVALUATION ADULT - NS FNS DIET ORDER
Diet, DASH/TLC:   Sodium & Cholesterol Restricted  1500mL Fluid Restriction ([id number]) (12-13-23 @ 13:41)   Diet, DASH/TLC:   Sodium & Cholesterol Restricted  1500mL Fluid Restriction ([id number]) (12-13-23 @ 13:41)

## 2023-12-13 NOTE — DIETITIAN INITIAL EVALUATION ADULT - ADD RECOMMEND
1. DASH diet, fluids per team. et tolerance, labs, weights.  2. Honor pt food preferences as able.   3. Monitor PO intake/appetite, GI distress, di   4. RD to remain available for additional nutrition interventions as needed.

## 2023-12-13 NOTE — PATIENT PROFILE ADULT - FALL HARM RISK - HARM RISK INTERVENTIONS
Communicate Risk of Fall with Harm to all staff/Reinforce activity limits and safety measures with patient and family/Tailored Fall Risk Interventions/Visual Cue: Yellow wristband and red socks/Bed in lowest position, wheels locked, appropriate side rails in place/Call bell, personal items and telephone in reach/Instruct patient to call for assistance before getting out of bed or chair/Non-slip footwear when patient is out of bed/[place] to call system/Physically safe environment - no spills, clutter or unnecessary equipment/Purposeful Proactive Rounding/Room/bathroom lighting operational, light cord in reach Communicate Risk of Fall with Harm to all staff/Reinforce activity limits and safety measures with patient and family/Tailored Fall Risk Interventions/Visual Cue: Yellow wristband and red socks/Bed in lowest position, wheels locked, appropriate side rails in place/Call bell, personal items and telephone in reach/Instruct patient to call for assistance before getting out of bed or chair/Non-slip footwear when patient is out of bed/[place] to call system/Physically safe environment - no spills, clutter or unnecessary equipment/Purposeful Proactive Rounding/Room/bathroom lighting operational, light cord in reach

## 2023-12-13 NOTE — DIETITIAN INITIAL EVALUATION ADULT - PERTINENT MEDS FT
MEDICATIONS  (STANDING):  aspirin enteric coated 81 milliGRAM(s) Oral daily  atorvastatin 40 milliGRAM(s) Oral at bedtime  clopidogrel Tablet 75 milliGRAM(s) Oral daily  ferrous    sulfate 325 milliGRAM(s) Oral daily  heparin   Injectable 6500 Unit(s) IV Push once  heparin  Infusion.  Unit(s)/Hr (15 mL/Hr) IV Continuous <Continuous>  influenza   Vaccine 0.5 milliLiter(s) IntraMuscular once  lisinopril 20 milliGRAM(s) Oral daily  metoprolol tartrate 25 milliGRAM(s) Oral two times a day  pantoprazole    Tablet 40 milliGRAM(s) Oral before breakfast    MEDICATIONS  (PRN):  heparin   Injectable 6500 Unit(s) IV Push every 6 hours PRN For aPTT less than 40  heparin   Injectable 3000 Unit(s) IV Push every 6 hours PRN For aPTT between 40 - 57

## 2023-12-13 NOTE — H&P ADULT - NSHPLABSRESULTS_GEN_ALL_CORE
EKG: NSR 69, LBBB                              7.8    11.87 )-----------( 340      ( 13 Dec 2023 04:25 )             24.2       12-13    137  |  106  |  12  ----------------------------<  115<H>  3.6   |  21<L>  |  0.67    Ca    8.7      13 Dec 2023 04:25  Mg     1.8     12-13    TPro  7.3  /  Alb  3.7  /  TBili  0.3  /  DBili  x   /  AST  37  /  ALT  19  /  AlkPhos  62  12-12      CARDIAC MARKERS ( 13 Dec 2023 04:25 )  x     / x     / 946 U/L / x     / 121.3 ng/mL      Urinalysis Basic - ( 13 Dec 2023 04:25 )    Color: x / Appearance: x / SG: x / pH: x  Gluc: 115 mg/dL / Ketone: x  / Bili: x / Urobili: x   Blood: x / Protein: x / Nitrite: x   Leuk Esterase: x / RBC: x / WBC x   Sq Epi: x / Non Sq Epi: x / Bacteria: x        EKG: EKG: NSR 68, LBBB                          7.8    11.87 )-----------( 340      ( 13 Dec 2023 04:25 )             24.2       12-13    137  |  106  |  12  ----------------------------<  115<H>  3.6   |  21<L>  |  0.67    Ca    8.7      13 Dec 2023 04:25  Mg     1.8     12-13    TPro  7.3  /  Alb  3.7  /  TBili  0.3  /  DBili  x   /  AST  37  /  ALT  19  /  AlkPhos  62  12-12      CARDIAC MARKERS ( 13 Dec 2023 04:25 )  x     / x     / 946 U/L / x     / 121.3 ng/mL      Urinalysis Basic - ( 13 Dec 2023 04:25 )    Color: x / Appearance: x / SG: x / pH: x  Gluc: 115 mg/dL / Ketone: x  / Bili: x / Urobili: x   Blood: x / Protein: x / Nitrite: x   Leuk Esterase: x / RBC: x / WBC x   Sq Epi: x / Non Sq Epi: x / Bacteria: x        EKG:

## 2023-12-13 NOTE — H&P ADULT - NS ATTEND AMEND GEN_ALL_CORE FT
65 yo M with PMhx of pAfib (on Eliquis, last dose 12/12 AM) CAD s/p KUSHAL mLCx/OM1/pLAD (3/2021), HTN, HLD, IAIN (baseline Hgb 9-10, s/p hemoclips 4/2023 (cleared for AC by GI) presented to [facility] complaining of sudden onset of CP/SOB/palpitations after going for a walk and eating lunch at work yesterday. He describes his CP as substernal, non radiating, resolving after he received Lopressor/diltiazem at [facility]. This episode is described as similar to how he felt when he was diagnosed with afib & anemia in April. Pt also endorses recent increased fatigue of late. Denies any associated orthopnea, PND, n/v/d, dizziness/lightheadedness, diaphoresis, LE edema, fever, chills, recent sick contacts.     1. AF RVR  Converted to sinus. Stop eliquis, continue hep gtt in anticipation of LHC.    2. NSTEMI  known CAD, prior CCTA with proximal LCX stenosis, not revascularized, now with mild CP and elevated troponins in the setting of AF RVR.  He does report intermittent exertional CP.  Plan to pursue LHC tomorrow as he did take eliquis on Monday. 63 yo M with PMhx of pAfib (on Eliquis, last dose 12/12 AM) CAD s/p KUSHAL mLCx/OM1/pLAD (3/2021), HTN, HLD, IAIN (baseline Hgb 9-10, s/p hemoclips 4/2023 (cleared for AC by GI) presented to [facility] complaining of sudden onset of CP/SOB/palpitations after going for a walk and eating lunch at work yesterday. He describes his CP as substernal, non radiating, resolving after he received Lopressor/diltiazem at [facility]. This episode is described as similar to how he felt when he was diagnosed with afib & anemia in April. Pt also endorses recent increased fatigue of late. Denies any associated orthopnea, PND, n/v/d, dizziness/lightheadedness, diaphoresis, LE edema, fever, chills, recent sick contacts.     1. AF RVR  Converted to sinus. Stop eliquis, continue hep gtt in anticipation of LHC.    2. NSTEMI  known CAD, prior CCTA with proximal LCX stenosis, not revascularized, now with mild CP and elevated troponins in the setting of AF RVR.  He does report intermittent exertional CP.  Plan to pursue LHC tomorrow as he did take eliquis on Monday.

## 2023-12-13 NOTE — DIETITIAN INITIAL EVALUATION ADULT - PROBLEM SELECTOR PLAN 1
Hx of CAD s/p KUSHAL mLCx/OM1/pLAD 3/2021 @ [facility]. Presents w CP/afib. Currently CP free now converted to NSR.  - Trop T 1417, , CKMB 121.3. EKG NSR 68, LBBB.  - Stress echo 11/3/23: Equivocal for ischemia.  Baseline inferior/posterior WMA. Mild LV dysfunction w inferior/posterior/lateral hypokinesis, limited 2/2 development of rapid afib  - TTE 10/6/23: EF 40-45%. LV/LA mildly dilated. LV wall thickness mildly inc. Mild MR/TR.  - CCTA 10/6/23: Patent stents pLAD/pLCx/OM1. Severe stenosis pLCx prior to stent. Calcific plaque obscures the lumen in pRCA/RI/mLCx/dLAD, unable to r/o significant stenosis  - Initiated on hep gtt   - Continue atorva 40mg qhs, Toprol 25mg BID  - Consented for cath, needs to be added to schedule/load/IVF Hx of CAD s/p KUSHAL mLCx/OM1/pLAD 3/2021 @ [facility]. Presents w CP/afib. Currently CP free now converted to NSR.  - Trop T 1417, , CKMB 121.3. EKG NSR 68, LBBB.  - Stress echo 11/3/23: Equivocal for ischemia.  Baseline inferior/posterior WMA. Mild LV dysfunction w inferior/posterior/lateral hypokinesis, limited 2/2 development of rapid afib  - TTE 10/6/23: EF 40-45%. LV/LA mildly dilated. LV wall thickness mildly inc. Mild MR/TR.  - CCTA 10/6/23: Patent stents pLAD/pLCx/OM1. Severe stenosis pLCx prior to stent. Calcific plaque obscures the lumen in pRCA/RI/mLCx/dLAD, unable to r/o significant stenosis  - Initiated on hep gtt   - Continue atorva 40mg qhs, Toprol 25mg BID  - Consented for cath, needs to be added to schedule/load/IVF

## 2023-12-13 NOTE — H&P ADULT - RESPIRATORY
Referral received from Destinee Stark. STEPHANIE with [facility] for Hyperthyroidism.    Please advise if ok to schedule.    Referral placed in box and copy given to Citlaly.    normal/clear to auscultation bilaterally/no wheezes/no rales/no rhonchi

## 2023-12-13 NOTE — H&P ADULT - ASSESSMENT
63 yo M with PMhx of pAfib (on Eliquis) CAD s/p KUSHAL mLCx/OM1/pLAD (3/2021), HTN, HLD, IAIN (baseline Hgb 9), s/p hemoclips 4/2023 (cleared for AC by GI) presented to [facility] complaining of sudden onset of CP/SOB/palpitations after going for a walk and eating lunch at work yesterday, found to be in afib w RVR and r/i NSTEMI, transferred to [facility] for further management. Initiated on hep gtt. S/p PO lopressor and IV diltiazem now converted to NSR.     -ASA 3, Mallampati 3  -VSS  -Pt compliant w/ home ASA 81mg, last dose 12/12/23. Will need loading dose Plavix 600mg PO x1 prior to cath.   -Pre cath IVF Hydration: Will need NS 250cc bolus then c/w maintenance NS @ 75cc/hr x 2hrs.  -Pre cath consented  -Suitable for moderate sedation    Risks & benefits of procedure and alternative therapy have been explained to the patient including but not limited to: allergic reaction, bleeding w/possible need for blood transfusion, infection, renal and vascular compromise, limb damage, arrhythmia, stroke, vessel dissection/perforation, Myocardial infarction, emergent CABG. Informed consent obtained and in chart.   63 yo M with PMhx of pAfib (on Eliquis) CAD s/p KUSHAL mLCx/OM1/pLAD (3/2021), HTN, HLD, IAIN (baseline Hgb 9), s/p hemoclips 4/2023 (cleared for AC by GI) presented to [facility] complaining of sudden onset of CP/SOB/palpitations after going for a walk and eating lunch at work yesterday, found to be in afib w RVR and r/i NSTEMI, transferred to [facility] for further management. Initiated on hep gtt. S/p PO lopressor and IV diltiazem now converted to NSR.     -ASA 3, Mallampati 3  -VSS  -Pt compliant w/ home ASA 81mg, last dose 12/12/23. Will need loading dose Plavix 600mg PO x1 prior to cath.   -Pre cath IVF Hydration: Will need NS 250cc bolus then c/w maintenance NS @ 75cc/hr x 2hrs.  -Pre cath consented  -Suitable for moderate sedation    Risks & benefits of procedure and alternative therapy have been explained to the patient including but not limited to: allergic reaction, bleeding w/possible need for blood transfusion, infection, renal and vascular compromise, limb damage, arrhythmia, stroke, vessel dissection/perforation, Myocardial infarction, emergent CABG. Informed consent obtained and in chart.   65 yo M with PMhx of pAfib (on Eliquis, last dose 12/12 AM), CAD s/p KUSHAL mLCx/OM1/pLAD (3/2021), HTN, HLD, IAIN (baseline Hgb 9), s/p hemoclips 4/2023 (cleared for AC by GI) presented to [facility] complaining of sudden onset of CP/SOB/palpitations after going for a walk and eating lunch at work yesterday, found to be in afib w RVR and r/i NSTEMI, transferred to [facility] for further management. Initiated on hep gtt. S/p PO lopressor and IV diltiazem now converted to NSR.     -ASA 3, Mallampati 3  -VSS  -Pt compliant w/ home ASA 81mg, last dose 12/12/23. Will need loading dose Plavix 600mg PO x1 prior to cath.   -Pre cath IVF Hydration: Will need NS 250cc bolus then c/w maintenance NS @ 75cc/hr x 2hrs.  -Pre cath consented  -Suitable for moderate sedation    Risks & benefits of procedure and alternative therapy have been explained to the patient including but not limited to: allergic reaction, bleeding w/possible need for blood transfusion, infection, renal and vascular compromise, limb damage, arrhythmia, stroke, vessel dissection/perforation, Myocardial infarction, emergent CABG. Informed consent obtained and in chart.   63 yo M with PMhx of pAfib (on Eliquis, last dose 12/12 AM), CAD s/p KUSHAL mLCx/OM1/pLAD (3/2021), HTN, HLD, IAIN (baseline Hgb 9), s/p hemoclips 4/2023 (cleared for AC by GI) presented to [facility] complaining of sudden onset of CP/SOB/palpitations after going for a walk and eating lunch at work yesterday, found to be in afib w RVR and r/i NSTEMI, transferred to [facility] for further management. Initiated on hep gtt. S/p PO lopressor and IV diltiazem now converted to NSR.     -ASA 3, Mallampati 3  -VSS  -Pt compliant w/ home ASA 81mg, last dose 12/12/23. Will need loading dose Plavix 600mg PO x1 prior to cath.   -Pre cath IVF Hydration: Will need NS 250cc bolus then c/w maintenance NS @ 75cc/hr x 2hrs.  -Pre cath consented  -Suitable for moderate sedation    Risks & benefits of procedure and alternative therapy have been explained to the patient including but not limited to: allergic reaction, bleeding w/possible need for blood transfusion, infection, renal and vascular compromise, limb damage, arrhythmia, stroke, vessel dissection/perforation, Myocardial infarction, emergent CABG. Informed consent obtained and in chart.

## 2023-12-13 NOTE — H&P ADULT - PROBLEM SELECTOR PLAN 1
S/p KUSHAL mLCx/OM1/pLAD (3/2021). P/w CP/afib. Currently CP free. HD stable.   - Trop T 1417, , CKMB 121.3. EKG as above.   - Stress echo 11/3/23: Equivocal for ischemia.  Baseline inferior and posterior WMA. Mild LV dysfunction w inferior/posterior/lateral hypokinesis.   - TTE 10/6/23: LV EF 40-45%. LV mildly dilated. LV wall thickness mildly inc. Normal RV size/fctn. LA mod dilated. Mild MR/TR.  - CCTA 10/6/23: Patent stents pLAD/pLCx/OM1. Severe stenosis pLCx prior to stent. Calcific plaque obscures the lumen in pRCA/RI/mLCx/dLAD, unable to r/o significant stenosis  - TTE as above  - Initiated on hep gtt   - Continue hep gtt, atorva 40mg qhs, Toprol 25mg BID  - Consented for cath, needs to be added to schedule. Hx of CAD s/p KUSHAL mLCx/OM1/pLAD 3/2021 @ [facility]. Presents w CP/afib. Currently CP free now converted to NSR.  - Trop T 1417, , CKMB 121.3. EKG NSR 68, LBBB.  - Stress echo 11/3/23: Equivocal for ischemia.  Baseline inferior/posterior WMA. Mild LV dysfunction w inferior/posterior/lateral hypokinesis  - TTE 10/6/23: EF 40-45%. LV/LA mildly dilated. LV wall thickness mildly inc. Mild MR/TR.  - CCTA 10/6/23: Patent stents pLAD/pLCx/OM1. Severe stenosis pLCx prior to stent. Calcific plaque obscures the lumen in pRCA/RI/mLCx/dLAD, unable to r/o significant stenosis  - TTE as above  - Initiated on hep gtt   - Continue atorva 40mg qhs, Toprol 25mg BID  - Consented for cath, needs to be added to schedule/load/IVF Hx of CAD s/p KUSHAL mLCx/OM1/pLAD 3/2021 @ [facility]. Presents w CP/afib. Currently CP free now converted to NSR.  - Trop T 1417, , CKMB 121.3. EKG NSR 68, LBBB.  - Stress echo 11/3/23: Equivocal for ischemia.  Baseline inferior/posterior WMA. Mild LV dysfunction w inferior/posterior/lateral hypokinesis  - TTE 10/6/23: EF 40-45%. LV/LA mildly dilated. LV wall thickness mildly inc. Mild MR/TR.  - CCTA 10/6/23: Patent stents pLAD/pLCx/OM1. Severe stenosis pLCx prior to stent. Calcific plaque obscures the lumen in pRCA/RI/mLCx/dLAD, unable to r/o significant stenosis  - TTE as above  - Initiated on hep gtt   - Continue atorva 40mg qhs, Toprol 25mg BID  - Consented for cath, needs to be added to schedule/load/IVF Hx of CAD s/p KUSHAL mLCx/OM1/pLAD 3/2021 @ [facility]. Presents w CP/afib. Currently CP free now converted to NSR.  - Trop T 1417, , CKMB 121.3. EKG NSR 68, LBBB.  - Stress echo 11/3/23: Equivocal for ischemia.  Baseline inferior/posterior WMA. Mild LV dysfunction w inferior/posterior/lateral hypokinesis  - TTE 10/6/23: EF 40-45%. LV/LA mildly dilated. LV wall thickness mildly inc. Mild MR/TR.  - CCTA 10/6/23: Patent stents pLAD/pLCx/OM1. Severe stenosis pLCx prior to stent. Calcific plaque obscures the lumen in pRCA/RI/mLCx/dLAD, unable to r/o significant stenosis  - Initiated on hep gtt   - Continue atorva 40mg qhs, Toprol 25mg BID  - Consented for cath, needs to be added to schedule/load/IVF Hx of CAD s/p KUSHAL mLCx/OM1/pLAD 3/2021 @ [facility]. Presents w CP/afib. Currently CP free now converted to NSR.  - Trop T 1417, , CKMB 121.3. EKG NSR 68, LBBB.  - Stress echo 11/3/23: Equivocal for ischemia.  Baseline inferior/posterior WMA. Mild LV dysfunction w inferior/posterior/lateral hypokinesis  - TTE 10/6/23: EF 40-45%. LV/LA mildly dilated. LV wall thickness mildly inc. Mild MR/TR.  - CCTA 10/6/23: Patent stents pLAD/pLCx/OM1. Severe stenosis pLCx prior to stent. Calcific plaque obscures the lumen in pRCA/RI/mLCx/dLAD, unable to r/o significant stenosis  - Initiated on hep gtt   - Continue atorva 40mg qhs, Toprol 25mg BID  - Consented for cath, needs to be added to schedule/load/IVF Hx of CAD s/p KUSHAL mLCx/OM1/pLAD 3/2021 @ [facility]. Presents w CP/afib. Currently CP free now converted to NSR.  - Trop T 1417, , CKMB 121.3. EKG NSR 68, LBBB.  - Stress echo 11/3/23: Equivocal for ischemia.  Baseline inferior/posterior WMA. Mild LV dysfunction w inferior/posterior/lateral hypokinesis, limited 2/2 development of rapid afib  - TTE 10/6/23: EF 40-45%. LV/LA mildly dilated. LV wall thickness mildly inc. Mild MR/TR.  - CCTA 10/6/23: Patent stents pLAD/pLCx/OM1. Severe stenosis pLCx prior to stent. Calcific plaque obscures the lumen in pRCA/RI/mLCx/dLAD, unable to r/o significant stenosis  - Initiated on hep gtt   - Continue atorva 40mg qhs, Toprol 25mg BID  - Consented for cath, needs to be added to schedule/load/IVF Hx of CAD s/p KUSHAL mLCx/OM1/pLAD 3/2021 @ [facility]. Presents w CP/afib. Currently CP free now converted to NSR.  - Trop T 1417, , CKMB 121.3. EKG NSR 68, LBBB.  - Stress echo 11/3/23: Equivocal for ischemia.  Baseline inferior/posterior WMA. Mild LV dysfunction w inferior/posterior/lateral hypokinesis, limited 2/2 development of rapid afib  - TTE 10/6/23: EF 40-45%. LV/LA mildly dilated. LV wall thickness mildly inc. Mild MR/TR.  - CCTA 10/6/23: Patent stents pLAD/pLCx/OM1. Severe stenosis pLCx prior to stent. Calcific plaque obscures the lumen in pRCA/RI/mLCx/dLAD, unable to r/o significant stenosis  - Initiated on hep gtt   - Continue atorva 40mg qhs, Toprol 25mg BID  - Consented for cath, needs to be added to schedule/load/IVF

## 2023-12-13 NOTE — DIETITIAN INITIAL EVALUATION ADULT - PROBLEM SELECTOR PLAN 3
Recent admission 4/2023 for IAIN (baseline Hgb 9), requiring 1uPRBC and IV iron. He was cleared for AC during this admission by GI. Was recommended for IV iron 200 mg x5 days as outpatient with hematology in addition to f/u w GI however pt never followed up.   [facility] 7.8 likely dilutional s/p 1L NS bolus. Currently denies any melena, hematochezia, hematuria  - EGD/Colonoscopy 4/2023 notable for a dieulafoy lesion at prox gastric body s/p 3 hemoclip with complete hemostasis  - F/u repeat CBC   - Maintain active T&S (next 12/16) Recent admission 4/2023 for IAIN (baseline Hgb 9), requiring 1uPRBC and IV iron. He was cleared for AC during this admission by GI. Was recommended for IV iron 200 mg x5 days as outpatient with hematology in addition to f/u w GI however pt never followed up.   [facility] to [facility] 7.8 likely dilutional s/p 1L NS bolus. Currently denies any melena, hematochezia, hematuria  - EGD/Colonoscopy 4/2023 notable for a dieulafoy lesion at prox gastric body s/p 3 hemoclip with complete hemostasis  - F/u repeat CBC   - Maintain active T&S (next 12/16)

## 2023-12-13 NOTE — DIETITIAN INITIAL EVALUATION ADULT - ENTER FROM (G/KG)
Visit Summary: Patient seen and evaluated at bedside.    Overnight Events: none    Exam:  T(C): 36.7 (09-16-18 @ 23:00), Max: 37.2 (09-16-18 @ 03:00)  HR: 89 (09-16-18 @ 23:45) (87 - 105)  BP: 100/53 (09-16-18 @ 17:45) (98/52 - 125/92)  RR: 26 (09-16-18 @ 23:00) (19 - 26)  SpO2: 99% (09-16-18 @ 23:45) (95% - 100%)  Wt(kg): --    no brainstem reflex  flaccid x4                        8.6    15.4  )-----------( 173      ( 16 Sep 2018 21:41 )             27.2     09-16    150<H>  |  116<H>  |  19  ----------------------------<  109<H>  3.5   |  23  |  0.55    Ca    8.5      16 Sep 2018 21:41  Phos  3.1     09-16  Mg     2.3     09-16 1.1

## 2023-12-13 NOTE — DIETITIAN INITIAL EVALUATION ADULT - OTHER CALCULATIONS
5'1''  pounds +-10%; %[id number]  IBW used to calculate energy needs due to pt's current body weight exceeding 120% of IBW  Adjust for age and current medical issues - fluids per team 5'1''  pounds +-10%; %[id number]  IBW used to calculate energy needs due to pt's current body weight exceeding 120% of IBW  Adjust for age and current medical issues - fluids per team

## 2023-12-13 NOTE — H&P ADULT - PROBLEM SELECTOR PLAN 3
Recent admission 4/2023 for IAIN, requiring 1uPRBC and IV iron. He was cleared for AC during this admission by GI. Was recommended for IV iron 200 mg x5 days as outpatient with hematology in addition to f/u w GI however pt never followed up.   [facility] 7.8 likely dilutional s/p 1L NS bolus. Currently denies any melena, hematochezia, hematuria  - F/u repeat CBC   - EGD/Colonoscopy 4/2023 notable for a dieulafoy lesion at prox gastric body s/p 3 hemoclip with complete hemostasis  - Maintain active T&S (next 12/16) Recent admission 4/2023 for IAIN, requiring 1uPRBC and IV iron. He was cleared for AC during this admission by GI. Was recommended for IV iron 200 mg x5 days as outpatient with hematology in addition to f/u w GI however pt never followed up.   [facility] 7.8 likely dilutional s/p 1L NS bolus. Currently denies any melena, hematochezia, hematuria  - F/u repeat CBC   - EGD/Colonoscopy 4/2023 notable for a dieulafoy lesion at prox gastric body s/p 3 hemoclip with complete hemostasis  - Maintain active T&S (next 12/16) Recent admission 4/2023 for IAIN (baseline Hgb 9), requiring 1uPRBC and IV iron. He was cleared for AC during this admission by GI. Was recommended for IV iron 200 mg x5 days as outpatient with hematology in addition to f/u w GI however pt never followed up.   [facility] 7.8 likely dilutional s/p 1L NS bolus. Currently denies any melena, hematochezia, hematuria  - EGD/Colonoscopy 4/2023 notable for a dieulafoy lesion at prox gastric body s/p 3 hemoclip with complete hemostasis  - F/u repeat CBC   - Maintain active T&S (next 12/16) Recent admission 4/2023 for IAIN (baseline Hgb 9), requiring 1uPRBC and IV iron. He was cleared for AC during this admission by GI. Was recommended for IV iron 200 mg x5 days as outpatient with hematology in addition to f/u w GI however pt never followed up.   [facility] 7.8 likely dilutional s/p 1L NS bolus. Currently denies any melena, hematochezia, hematuria  - EGD/Colonoscopy 4/2023 notable for a dieulafoy lesion at prox gastric body s/p 3 hemoclip with complete hemostasis  - F/u repeat CBC   - Maintain active T&S (next 12/16)

## 2023-12-13 NOTE — DIETITIAN INITIAL EVALUATION ADULT - PERTINENT LABORATORY DATA
12-13    137  |  106  |  12  ----------------------------<  115<H>  3.6   |  21<L>  |  0.67    Ca    8.7      13 Dec 2023 04:25  Mg     1.8     12-13    TPro  7.3  /  Alb  3.7  /  TBili  0.3  /  DBili  x   /  AST  37  /  ALT  19  /  AlkPhos  62  12-12  A1C with Estimated Average Glucose Result: 5.6 % (12-13-23 @ 04:25)  TTE 10/6/23: EF 40-45%. LV/LA mildly dilated. LV wall thickness mildly inc. Mild MR/TR.

## 2023-12-13 NOTE — H&P ADULT - PROBLEM SELECTOR PLAN 2
P/w CP/SOB/palpitations, initially in afib w RVR now converted to NSR [facility]  - [facility]   - AC: hep gtt  - Rate control: Toprol 25mg BID P/w CP/SOB/palpitations, initially in afib w RVR now converted to NSR [facility]  - [facility]   - AC: hep gtt  - Rate control: Toprol 25mg BID P/w CP/SOB/palpitations, initially in afib w RVR now converted to NSR [facility]  - EKG on arrival NSR 68, LBBB  - AC: hep gtt  - Rate control: Toprol 25mg BID P/w CP/SOB/palpitations, initially in afib w RVR now converted to NSR [facility]  - EKG on arrival NSR 68, LBBB  - AC: hep gtt  - Rate control: Toprol 25mg BID

## 2023-12-13 NOTE — H&P ADULT - PROBLEM SELECTOR PLAN 5
Continue lisinopril 20mg qd and Toprol 25mg BID    F: None  E: Replete if K<4 or Mag<2  N: DASH Diet  GIppx: Protonix  VTEppx: Hep gtt  Dispo: Pending clinical progression

## 2023-12-13 NOTE — DIETITIAN INITIAL EVALUATION ADULT - PROBLEM SELECTOR PLAN 2
P/w CP/SOB/palpitations, initially in afib w RVR now converted to NSR [facility]  - [facility] on [facility] NSR 68, LBBB  - AC: hep gtt  - Rate control: Toprol 25mg BID P/w CP/SOB/palpitations, initially in afib w RVR now converted to NSR [facility]  - EKG on arrival NSR 68, LBBB  - AC: hep gtt  - Rate control: Toprol 25mg BID

## 2023-12-14 LAB
ANION GAP SERPL CALC-SCNC: 13 MMOL/L — SIGNIFICANT CHANGE UP (ref 5–17)
ANION GAP SERPL CALC-SCNC: 13 MMOL/L — SIGNIFICANT CHANGE UP (ref 5–17)
APTT BLD: 72.8 SEC — HIGH (ref 24.5–35.6)
APTT BLD: 72.8 SEC — HIGH (ref 24.5–35.6)
BASOPHILS # BLD AUTO: 0.08 K/UL — SIGNIFICANT CHANGE UP (ref 0–0.2)
BASOPHILS # BLD AUTO: 0.08 K/UL — SIGNIFICANT CHANGE UP (ref 0–0.2)
BASOPHILS NFR BLD AUTO: 1 % — SIGNIFICANT CHANGE UP (ref 0–2)
BASOPHILS NFR BLD AUTO: 1 % — SIGNIFICANT CHANGE UP (ref 0–2)
BUN SERPL-MCNC: 12 MG/DL — SIGNIFICANT CHANGE UP (ref 7–23)
BUN SERPL-MCNC: 12 MG/DL — SIGNIFICANT CHANGE UP (ref 7–23)
CALCIUM SERPL-MCNC: 8.8 MG/DL — SIGNIFICANT CHANGE UP (ref 8.4–10.5)
CALCIUM SERPL-MCNC: 8.8 MG/DL — SIGNIFICANT CHANGE UP (ref 8.4–10.5)
CHLORIDE SERPL-SCNC: 105 MMOL/L — SIGNIFICANT CHANGE UP (ref 96–108)
CHLORIDE SERPL-SCNC: 105 MMOL/L — SIGNIFICANT CHANGE UP (ref 96–108)
CO2 SERPL-SCNC: 23 MMOL/L — SIGNIFICANT CHANGE UP (ref 22–31)
CO2 SERPL-SCNC: 23 MMOL/L — SIGNIFICANT CHANGE UP (ref 22–31)
CREAT SERPL-MCNC: 0.7 MG/DL — SIGNIFICANT CHANGE UP (ref 0.5–1.3)
CREAT SERPL-MCNC: 0.7 MG/DL — SIGNIFICANT CHANGE UP (ref 0.5–1.3)
EGFR: 104 ML/MIN/1.73M2 — SIGNIFICANT CHANGE UP
EGFR: 104 ML/MIN/1.73M2 — SIGNIFICANT CHANGE UP
EOSINOPHIL # BLD AUTO: 0.21 K/UL — SIGNIFICANT CHANGE UP (ref 0–0.5)
EOSINOPHIL # BLD AUTO: 0.21 K/UL — SIGNIFICANT CHANGE UP (ref 0–0.5)
EOSINOPHIL NFR BLD AUTO: 2.5 % — SIGNIFICANT CHANGE UP (ref 0–6)
EOSINOPHIL NFR BLD AUTO: 2.5 % — SIGNIFICANT CHANGE UP (ref 0–6)
GLUCOSE SERPL-MCNC: 112 MG/DL — HIGH (ref 70–99)
GLUCOSE SERPL-MCNC: 112 MG/DL — HIGH (ref 70–99)
HCT VFR BLD CALC: 25.5 % — LOW (ref 39–50)
HCT VFR BLD CALC: 25.5 % — LOW (ref 39–50)
HGB BLD-MCNC: 8 G/DL — LOW (ref 13–17)
HGB BLD-MCNC: 8 G/DL — LOW (ref 13–17)
IMM GRANULOCYTES NFR BLD AUTO: 0.5 % — SIGNIFICANT CHANGE UP (ref 0–0.9)
IMM GRANULOCYTES NFR BLD AUTO: 0.5 % — SIGNIFICANT CHANGE UP (ref 0–0.9)
LYMPHOCYTES # BLD AUTO: 0.93 K/UL — LOW (ref 1–3.3)
LYMPHOCYTES # BLD AUTO: 0.93 K/UL — LOW (ref 1–3.3)
LYMPHOCYTES # BLD AUTO: 11.1 % — LOW (ref 13–44)
LYMPHOCYTES # BLD AUTO: 11.1 % — LOW (ref 13–44)
MAGNESIUM SERPL-MCNC: 2 MG/DL — SIGNIFICANT CHANGE UP (ref 1.6–2.6)
MAGNESIUM SERPL-MCNC: 2 MG/DL — SIGNIFICANT CHANGE UP (ref 1.6–2.6)
MCHC RBC-ENTMCNC: 25.6 PG — LOW (ref 27–34)
MCHC RBC-ENTMCNC: 25.6 PG — LOW (ref 27–34)
MCHC RBC-ENTMCNC: 31.4 GM/DL — LOW (ref 32–36)
MCHC RBC-ENTMCNC: 31.4 GM/DL — LOW (ref 32–36)
MCV RBC AUTO: 81.5 FL — SIGNIFICANT CHANGE UP (ref 80–100)
MCV RBC AUTO: 81.5 FL — SIGNIFICANT CHANGE UP (ref 80–100)
MONOCYTES # BLD AUTO: 1.18 K/UL — HIGH (ref 0–0.9)
MONOCYTES # BLD AUTO: 1.18 K/UL — HIGH (ref 0–0.9)
MONOCYTES NFR BLD AUTO: 14 % — SIGNIFICANT CHANGE UP (ref 2–14)
MONOCYTES NFR BLD AUTO: 14 % — SIGNIFICANT CHANGE UP (ref 2–14)
NEUTROPHILS # BLD AUTO: 5.97 K/UL — SIGNIFICANT CHANGE UP (ref 1.8–7.4)
NEUTROPHILS # BLD AUTO: 5.97 K/UL — SIGNIFICANT CHANGE UP (ref 1.8–7.4)
NEUTROPHILS NFR BLD AUTO: 70.9 % — SIGNIFICANT CHANGE UP (ref 43–77)
NEUTROPHILS NFR BLD AUTO: 70.9 % — SIGNIFICANT CHANGE UP (ref 43–77)
NRBC # BLD: 0 /100 WBCS — SIGNIFICANT CHANGE UP (ref 0–0)
NRBC # BLD: 0 /100 WBCS — SIGNIFICANT CHANGE UP (ref 0–0)
PLATELET # BLD AUTO: 372 K/UL — SIGNIFICANT CHANGE UP (ref 150–400)
PLATELET # BLD AUTO: 372 K/UL — SIGNIFICANT CHANGE UP (ref 150–400)
POTASSIUM SERPL-MCNC: 3.8 MMOL/L — SIGNIFICANT CHANGE UP (ref 3.5–5.3)
POTASSIUM SERPL-MCNC: 3.8 MMOL/L — SIGNIFICANT CHANGE UP (ref 3.5–5.3)
POTASSIUM SERPL-SCNC: 3.8 MMOL/L — SIGNIFICANT CHANGE UP (ref 3.5–5.3)
POTASSIUM SERPL-SCNC: 3.8 MMOL/L — SIGNIFICANT CHANGE UP (ref 3.5–5.3)
RBC # BLD: 3.13 M/UL — LOW (ref 4.2–5.8)
RBC # BLD: 3.13 M/UL — LOW (ref 4.2–5.8)
RBC # FLD: 17.4 % — HIGH (ref 10.3–14.5)
RBC # FLD: 17.4 % — HIGH (ref 10.3–14.5)
SODIUM SERPL-SCNC: 141 MMOL/L — SIGNIFICANT CHANGE UP (ref 135–145)
SODIUM SERPL-SCNC: 141 MMOL/L — SIGNIFICANT CHANGE UP (ref 135–145)
T4 AB SER-ACNC: 7.77 UG/DL — SIGNIFICANT CHANGE UP (ref 4.5–11.7)
T4 AB SER-ACNC: 7.77 UG/DL — SIGNIFICANT CHANGE UP (ref 4.5–11.7)
TSH SERPL-MCNC: 3.48 UIU/ML — SIGNIFICANT CHANGE UP (ref 0.27–4.2)
TSH SERPL-MCNC: 3.48 UIU/ML — SIGNIFICANT CHANGE UP (ref 0.27–4.2)
WBC # BLD: 8.41 K/UL — SIGNIFICANT CHANGE UP (ref 3.8–10.5)
WBC # BLD: 8.41 K/UL — SIGNIFICANT CHANGE UP (ref 3.8–10.5)
WBC # FLD AUTO: 8.41 K/UL — SIGNIFICANT CHANGE UP (ref 3.8–10.5)
WBC # FLD AUTO: 8.41 K/UL — SIGNIFICANT CHANGE UP (ref 3.8–10.5)

## 2023-12-14 PROCEDURE — 92978 ENDOLUMINL IVUS OCT C 1ST: CPT | Mod: 26,LC

## 2023-12-14 PROCEDURE — 93458 L HRT ARTERY/VENTRICLE ANGIO: CPT | Mod: 26,59

## 2023-12-14 PROCEDURE — 92979 ENDOLUMINL IVUS OCT C EA: CPT | Mod: 26,LD

## 2023-12-14 PROCEDURE — 92920 PRQ TRLUML C ANGIOP 1ART&/BR: CPT | Mod: LD

## 2023-12-14 PROCEDURE — 92928 PRQ TCAT PLMT NTRAC ST 1 LES: CPT | Mod: LC

## 2023-12-14 PROCEDURE — 99223 1ST HOSP IP/OBS HIGH 75: CPT

## 2023-12-14 PROCEDURE — 99233 SBSQ HOSP IP/OBS HIGH 50: CPT

## 2023-12-14 PROCEDURE — 93571 IV DOP VEL&/PRESS C FLO 1ST: CPT | Mod: 26,52,LC

## 2023-12-14 PROCEDURE — 99152 MOD SED SAME PHYS/QHP 5/>YRS: CPT

## 2023-12-14 RX ORDER — APIXABAN 2.5 MG/1
5 TABLET, FILM COATED ORAL EVERY 12 HOURS
Refills: 0 | Status: DISCONTINUED | OUTPATIENT
Start: 2023-12-15 | End: 2023-12-15

## 2023-12-14 RX ORDER — POTASSIUM CHLORIDE 20 MEQ
20 PACKET (EA) ORAL ONCE
Refills: 0 | Status: COMPLETED | OUTPATIENT
Start: 2023-12-14 | End: 2023-12-14

## 2023-12-14 RX ORDER — SODIUM CHLORIDE 9 MG/ML
1000 INJECTION INTRAMUSCULAR; INTRAVENOUS; SUBCUTANEOUS
Refills: 0 | Status: DISCONTINUED | OUTPATIENT
Start: 2023-12-14 | End: 2023-12-15

## 2023-12-14 RX ADMIN — Medication 325 MILLIGRAM(S): at 06:41

## 2023-12-14 RX ADMIN — SODIUM CHLORIDE 50 MILLILITER(S): 9 INJECTION INTRAMUSCULAR; INTRAVENOUS; SUBCUTANEOUS at 08:43

## 2023-12-14 RX ADMIN — HEPARIN SODIUM 1500 UNIT(S)/HR: 5000 INJECTION INTRAVENOUS; SUBCUTANEOUS at 08:15

## 2023-12-14 RX ADMIN — ATORVASTATIN CALCIUM 40 MILLIGRAM(S): 80 TABLET, FILM COATED ORAL at 21:58

## 2023-12-14 RX ADMIN — Medication 25 MILLIGRAM(S): at 17:44

## 2023-12-14 RX ADMIN — Medication 25 MILLIGRAM(S): at 05:34

## 2023-12-14 RX ADMIN — Medication 20 MILLIEQUIVALENT(S): at 10:45

## 2023-12-14 RX ADMIN — SODIUM CHLORIDE 250 MILLILITER(S): 9 INJECTION INTRAMUSCULAR; INTRAVENOUS; SUBCUTANEOUS at 05:39

## 2023-12-14 RX ADMIN — SODIUM CHLORIDE 225 MILLILITER(S): 9 INJECTION INTRAMUSCULAR; INTRAVENOUS; SUBCUTANEOUS at 21:15

## 2023-12-14 RX ADMIN — LISINOPRIL 20 MILLIGRAM(S): 2.5 TABLET ORAL at 05:34

## 2023-12-14 RX ADMIN — Medication 81 MILLIGRAM(S): at 05:34

## 2023-12-14 RX ADMIN — HEPARIN SODIUM 1500 UNIT(S)/HR: 5000 INJECTION INTRAVENOUS; SUBCUTANEOUS at 08:13

## 2023-12-14 RX ADMIN — CLOPIDOGREL BISULFATE 75 MILLIGRAM(S): 75 TABLET, FILM COATED ORAL at 05:34

## 2023-12-14 RX ADMIN — PANTOPRAZOLE SODIUM 40 MILLIGRAM(S): 20 TABLET, DELAYED RELEASE ORAL at 06:42

## 2023-12-14 NOTE — CONSULT NOTE ADULT - TIME BILLING
Extensive outpatient chart review, discussion w/ outpatient provider, inpatient evaluation, diagnosis, treatment plan, coordination of care, and extensive counselling

## 2023-12-14 NOTE — PROGRESS NOTE ADULT - PROBLEM SELECTOR PLAN 2
P/w CP/SOB/palpitations, initially in afib w RVR now converted to NSR [facility]  - EKG on arrival NSR 68, LBBB  - AC: hep gtt  - Rate control: Toprol 25mg BID P/w CP/SOB/palpitations, initially in afib w RVR now converted to NSR [facility]  - EKG on arrival NSR 68, LBBB  - AC: hep gtt  - Rate control: Toprol 25mg BID

## 2023-12-14 NOTE — PROGRESS NOTE ADULT - PROBLEM SELECTOR PLAN 3
Recent admission 4/2023 for IAIN (baseline Hgb 9), requiring 1uPRBC and IV iron. He was cleared for AC during this admission by GI. Was recommended for IV iron 200 mg x5 days as outpatient with hematology in addition to f/u w GI however pt never followed up.   [facility] 7.8 likely dilutional s/p 1L NS bolus. Currently denies any melena, hematochezia, hematuria  - EGD/Colonoscopy 4/2023 notable for a dieulafoy lesion at prox gastric body s/p 3 hemoclip with complete hemostasis  - As per request IC, patient to receive 1unit PRBC today, consented  - GI cleared patient for A/C again this admission 12/14/23 and rec PPI daily   - Maintain active T&S (next 12/16) Recent admission 4/2023 for IAIN (baseline Hgb 9), requiring 1uPRBC and IV iron. He was cleared for AC during this admission by GI. Was recommended for IV iron 200 mg x5 days as outpatient with hematology in addition to f/u w GI however pt never followed up.   [facility] to [facility] 7.8 likely dilutional s/p 1L NS bolus. Currently denies any melena, hematochezia, hematuria  - EGD/Colonoscopy 4/2023 notable for a dieulafoy lesion at prox gastric body s/p 3 hemoclip with complete hemostasis  - As per request IC, patient to receive 1unit PRBC today, consented  - GI cleared patient for A/C again this admission 12/14/23 and rec PPI daily   - Maintain active T&S (next 12/16)

## 2023-12-14 NOTE — PROGRESS NOTE ADULT - ASSESSMENT
65 yo M with PMhx of pAfib (on Eliquis, last dose 12/12 AM), CAD s/p KUSHAL mLCx/OM1/pLAD (3/2021), HTN, HLD, IAIN (baseline Hgb 9), s/p hemoclips 4/2023 (cleared for AC by GI) presented to [facility] complaining of sudden onset of CP/SOB/palpitations after going for a walk and eating lunch at work yesterday, found to be in afib w RVR and r/i NSTEMI, transferred to [facility] for further management. Initiated on hep gtt. S/p PO lopressor and IV diltiazem now converted to NSR.  Patient cleared by GI for A/C and will undergo cardiac cath today 12/14/23 with Dr. Sanderson      63 yo M with PMhx of pAfib (on Eliquis, last dose 12/12 AM), CAD s/p KUSHAL mLCx/OM1/pLAD (3/2021), HTN, HLD, IAIN (baseline Hgb 9), s/p hemoclips 4/2023 (cleared for AC by GI) presented to [facility] complaining of sudden onset of CP/SOB/palpitations after going for a walk and eating lunch at work yesterday, found to be in afib w RVR and r/i NSTEMI, transferred to [facility] for further management. Initiated on hep gtt. S/p PO lopressor and IV diltiazem now converted to NSR.  Patient cleared by GI for A/C and will undergo cardiac cath today 12/14/23 with Dr. Sanderson

## 2023-12-14 NOTE — CONSULT NOTE ADULT - SUBJECTIVE AND OBJECTIVE BOX
*** DRAFT NOTE ***    HPI:  65 yo M with PMhx of pAfib (on Eliquis, last dose 12/12 AM) CAD s/p KUSHAL mLCx/OM1/pLAD (3/2021), HTN, HLD, IAIN (baseline Hgb 9-10, s/p hemoclips 4/2023 (cleared for AC by GI) presented to [facility] complaining of sudden onset of CP/SOB/palpitations after going for a walk and eating lunch at work yesterday. He describes his CP as substernal, non radiating, resolving after he received Lopressor/diltiazem at [facility]. This episode is described as similar to how he felt when he was diagnosed with afib & anemia in April. Pt also endorses recent increased fatigue of late. Denies any associated orthopnea, PND, n/v/d, dizziness/lightheadedness, diaphoresis, LE edema, fever, chills, recent sick contacts.     [facility] course:  Vital signs: /89 mmHg, , SpO2 99% on RA, T 97.5 F, RR 18.   Labs significant for WBC 11.69 w left shift, H&h 9.3/30, K 3.5, Mg 1.7, , Trop I HS 1919.   EKG afib rate 129, LBBB. CXR w congestion. He was given Lopressor 25mg PO x1, diltiazem 10mg IV x1, and 1L NS bolus. Repeat EKG revealing conversion to NSR.  Pt transferred to [facility] for further management of afib and r/o ACS.     Upon arrival to [facility], vital signs /83, EKG NSR 69 bpm, LBBB, T 98.1, SpO2 97% on RA, RR 19. Remains asx, CP free.    (13 Dec 2023 04:35)    Allergies    No Known Allergies    Intolerances      Home Medications:  aspirin 81 mg oral delayed release tablet: 1 tab(s) orally once a day (13 Dec 2023 06:04)  Crestor 10 mg oral tablet: 1 orally once a day (at bedtime) (13 Dec 2023 05:55)  lisinopril 20 mg oral tablet: 1 tab(s) orally once a day (13 Dec 2023 05:55)  metoprolol tartrate 25 mg oral tablet: 1 tab(s) orally 2 times a day (13 Dec 2023 05:54)  Plavix 75 mg oral tablet: 1 tab(s) orally once a day (13 Dec 2023 12:26)    MEDICATIONS:  MEDICATIONS  (STANDING):  aspirin enteric coated 81 milliGRAM(s) Oral daily  atorvastatin 40 milliGRAM(s) Oral at bedtime  clopidogrel Tablet 75 milliGRAM(s) Oral daily  ferrous    sulfate 325 milliGRAM(s) Oral daily  heparin   Injectable 6500 Unit(s) IV Push once  heparin  Infusion.  Unit(s)/Hr (15 mL/Hr) IV Continuous <Continuous>  influenza   Vaccine 0.5 milliLiter(s) IntraMuscular once  lisinopril 20 milliGRAM(s) Oral daily  metoprolol tartrate 25 milliGRAM(s) Oral two times a day  pantoprazole    Tablet 40 milliGRAM(s) Oral before breakfast  sodium chloride 0.9%. 1000 milliLiter(s) (50 mL/Hr) IV Continuous <Continuous>    MEDICATIONS  (PRN):  heparin   Injectable 6500 Unit(s) IV Push every 6 hours PRN For aPTT less than 40  heparin   Injectable 3000 Unit(s) IV Push every 6 hours PRN For aPTT between 40 - 57    PAST MEDICAL & SURGICAL HISTORY:  Hypertension      Hyperlipidemia      Prediabetes      Obese      Umbilical hernia      Erectile dysfunction      CAD (coronary artery disease)      H/O cardiomyopathy      H/O foot surgery  L      S/P scrotal varicocelectomy      Status post cardiac catheterization  4 stents          Vital Signs Last 24 Hrs  T(C): 36.8 (14 Dec 2023 08:23), Max: 36.8 (14 Dec 2023 08:23)  T(F): 98.2 (14 Dec 2023 08:23), Max: 98.2 (14 Dec 2023 08:23)  HR: 75 (14 Dec 2023 08:23) (69 - 82)  BP: 122/78 (14 Dec 2023 08:23) (122/78 - 142/78)  BP(mean): 95 (13 Dec 2023 16:43) (95 - 103)  RR: 18 (14 Dec 2023 08:23) (16 - 18)  SpO2: 96% (14 Dec 2023 08:23) (96% - 99%)    Parameters below as of 14 Dec 2023 08:23  Patient On (Oxygen Delivery Method): room air        12-13 @ 07:01  -  12-14 @ 07:00  --------------------------------------------------------  IN: 360 mL / OUT: 1750 mL / NET: -1390 mL    12-14 @ 07:01  -  12-14 @ 11:50  --------------------------------------------------------  IN: 0 mL / OUT: 300 mL / NET: -300 mL        PHYSICAL EXAM:  General: Alert, no acute distress  Lungs: Normal respiratory effort  Abdomen: Nondistended, soft, nontender, No rebound or guarding  Extremities: No edema  Neurological: Moving all extremities spontaneously    LABS:                        8.0    8.41  )-----------( 372      ( 14 Dec 2023 05:30 )             25.5     12-14    141  |  105  |  12  ----------------------------<  112<H>  3.8   |  23  |  0.70    Ca    8.8      14 Dec 2023 05:30  Mg     2.0     12-14    TPro  7.3  /  Alb  3.7  /  TBili  0.3  /  DBili  x   /  AST  37  /  ALT  19  /  AlkPhos  62  12-12    PT/INR - ( 13 Dec 2023 05:32 )   PT: 12.5 sec;   INR: 1.10          PTT - ( 14 Dec 2023 05:30 )  PTT:72.8 sec    RADIOLOGY & ADDITIONAL STUDIES:  Reviewed. *** DRAFT NOTE ***    HPI:  65 yo M with PMhx of pAfib (on Eliquis, last dose 12/12 AM) CAD s/p KUSHAL mLCx/OM1/pLAD (3/2021), HTN, HLD, IAIN (baseline Hgb 9-10, s/p hemoclips 4/2023 (cleared for AC by GI) presented to [facility] complaining of sudden onset of CP/SOB/palpitations after going for a walk and eating lunch at work yesterday. He describes his CP as substernal, non radiating, resolving after he received Lopressor/diltiazem at [facility]. This episode is described as similar to how he felt when he was diagnosed with afib & anemia in April. Pt also endorses recent increased fatigue of late. Denies any associated orthopnea, PND, n/v/d, dizziness/lightheadedness, diaphoresis, LE edema, fever, chills, recent sick contacts.     [facility] course:  Vital signs: /89 mmHg, , SpO2 99% on RA, T 97.5 F, RR 18.   Labs significant for WBC 11.69 w left shift, H&h 9.3/30, K 3.5, Mg 1.7, , Trop I HS 1919.   EKG afib rate 129, LBBB. CXR w congestion. He was given Lopressor 25mg PO x1, diltiazem 10mg IV x1, and 1L NS bolus. Repeat EKG revealing conversion to NSR.  Pt transferred to [facility] for further management of afib and r/o ACS.     Upon arrival to [facility], vital signs /83, EKG NSR 69 bpm, LBBB, T 98.1, SpO2 97% on RA, RR 19. Remains asx, CP free.    (13 Dec 2023 04:35)    Allergies    No Known Allergies    Intolerances      Home Medications:  aspirin 81 mg oral delayed release tablet: 1 tab(s) orally once a day (13 Dec 2023 06:04)  Crestor 10 mg oral tablet: 1 orally once a day (at bedtime) (13 Dec 2023 05:55)  lisinopril 20 mg oral tablet: 1 tab(s) orally once a day (13 Dec 2023 05:55)  metoprolol tartrate 25 mg oral tablet: 1 tab(s) orally 2 times a day (13 Dec 2023 05:54)  Plavix 75 mg oral tablet: 1 tab(s) orally once a day (13 Dec 2023 12:26)    MEDICATIONS:  MEDICATIONS  (STANDING):  aspirin enteric coated 81 milliGRAM(s) Oral daily  atorvastatin 40 milliGRAM(s) Oral at bedtime  clopidogrel Tablet 75 milliGRAM(s) Oral daily  ferrous    sulfate 325 milliGRAM(s) Oral daily  heparin   Injectable 6500 Unit(s) IV Push once  heparin  Infusion.  Unit(s)/Hr (15 mL/Hr) IV Continuous <Continuous>  influenza   Vaccine 0.5 milliLiter(s) IntraMuscular once  lisinopril 20 milliGRAM(s) Oral daily  metoprolol tartrate 25 milliGRAM(s) Oral two times a day  pantoprazole    Tablet 40 milliGRAM(s) Oral before breakfast  sodium chloride 0.9%. 1000 milliLiter(s) (50 mL/Hr) IV Continuous <Continuous>    MEDICATIONS  (PRN):  heparin   Injectable 6500 Unit(s) IV Push every 6 hours PRN For aPTT less than 40  heparin   Injectable 3000 Unit(s) IV Push every 6 hours PRN For aPTT between 40 - 57    PAST MEDICAL & SURGICAL HISTORY:  Hypertension      Hyperlipidemia      Prediabetes      Obese      Umbilical hernia      Erectile dysfunction      CAD (coronary artery disease)      H/O cardiomyopathy      H/O foot surgery  L      S/P scrotal varicocelectomy      Status post cardiac catheterization  4 stents          Vital Signs Last 24 Hrs  T(C): 36.8 (14 Dec 2023 08:23), Max: 36.8 (14 Dec 2023 08:23)  T(F): 98.2 (14 Dec 2023 08:23), Max: 98.2 (14 Dec 2023 08:23)  HR: 75 (14 Dec 2023 08:23) (69 - 82)  BP: 122/78 (14 Dec 2023 08:23) (122/78 - 142/78)  BP(mean): 95 (13 Dec 2023 16:43) (95 - 103)  RR: 18 (14 Dec 2023 08:23) (16 - 18)  SpO2: 96% (14 Dec 2023 08:23) (96% - 99%)    Parameters below as of 14 Dec 2023 08:23  Patient On (Oxygen Delivery Method): room air        12-13 @ 07:01  -  12-14 @ 07:00  --------------------------------------------------------  IN: 360 mL / OUT: 1750 mL / NET: -1390 mL    12-14 @ 07:01  -  12-14 @ 11:50  --------------------------------------------------------  IN: 0 mL / OUT: 300 mL / NET: -300 mL        PHYSICAL EXAM:  General: Alert, no acute distress  Lungs: Normal respiratory effort  Abdomen: Nondistended, soft, nontender, No rebound or guarding  Extremities: No edema  Neurological: Moving all extremities spontaneously    LABS:                        8.0    8.41  )-----------( 372      ( 14 Dec 2023 05:30 )             25.5     12-14    141  |  105  |  12  ----------------------------<  112<H>  3.8   |  23  |  0.70    Ca    8.8      14 Dec 2023 05:30  Mg     2.0     12-14    TPro  7.3  /  Alb  3.7  /  TBili  0.3  /  DBili  x   /  AST  37  /  ALT  19  /  AlkPhos  62  12-12    PT/INR - ( 13 Dec 2023 05:32 )   PT: 12.5 sec;   INR: 1.10          PTT - ( 14 Dec 2023 05:30 )  PTT:72.8 sec    RADIOLOGY & ADDITIONAL STUDIES:  Reviewed. *** DRAFT NOTE ***    HPI:  64M h/o CAD (s/p PCI x4 2021, on DAPT), afib (on apixaban), IAIN, prior UGIB (4/2023) p/w CP, SOB, and palpitations, found with afib w/ RVR and with plan to r/o ACS. GI is consulted regarding safety of AC.    Pt was admitted to [facility] 4/2023 in setting of intermittent dark stools and found w/ Dieulafoy (s/p clips)     yo M with PMhx of pAfib (on Eliquis, last dose 12/12 AM) CAD s/p KUSHAL mLCx/OM1/pLAD (3/2021), HTN, HLD, IAIN (baseline Hgb 9-10, s/p hemoclips 4/2023 (cleared for AC by GI) presented to [facility] complaining of sudden onset of CP/SOB/palpitations after going for a walk and eating lunch at work yesterday. He describes his CP as substernal, non radiating, resolving after he received Lopressor/diltiazem at [facility]. This episode is described as similar to how he felt when he was diagnosed with afib & anemia in April. Pt also endorses recent increased fatigue of late. Denies any associated orthopnea, PND, n/v/d, dizziness/lightheadedness, diaphoresis, LE edema, fever, chills, recent sick contacts.     [facility] course:  Vital signs: /89 mmHg, , SpO2 99% on RA, T 97.5 F, RR 18.   Labs significant for WBC 11.69 w left shift, H&h 9.3/30, K 3.5, Mg 1.7, , Trop I HS 1919.   EKG afib rate 129, LBBB. CXR w congestion. He was given Lopressor 25mg PO x1, diltiazem 10mg IV x1, and 1L NS bolus. Repeat EKG revealing conversion to NSR.  Pt transferred to [facility] for further management of afib and r/o ACS.     Upon arrival to [facility], vital signs /83, EKG NSR 69 bpm, LBBB, T 98.1, SpO2 97% on RA, RR 19. Remains asx, CP free.    (13 Dec 2023 04:35)    Allergies    No Known Allergies    Intolerances      Home Medications:  aspirin 81 mg oral delayed release tablet: 1 tab(s) orally once a day (13 Dec 2023 06:04)  Crestor 10 mg oral tablet: 1 orally once a day (at bedtime) (13 Dec 2023 05:55)  lisinopril 20 mg oral tablet: 1 tab(s) orally once a day (13 Dec 2023 05:55)  metoprolol tartrate 25 mg oral tablet: 1 tab(s) orally 2 times a day (13 Dec 2023 05:54)  Plavix 75 mg oral tablet: 1 tab(s) orally once a day (13 Dec 2023 12:26)    MEDICATIONS:  MEDICATIONS  (STANDING):  aspirin enteric coated 81 milliGRAM(s) Oral daily  atorvastatin 40 milliGRAM(s) Oral at bedtime  clopidogrel Tablet 75 milliGRAM(s) Oral daily  ferrous    sulfate 325 milliGRAM(s) Oral daily  heparin   Injectable 6500 Unit(s) IV Push once  heparin  Infusion.  Unit(s)/Hr (15 mL/Hr) IV Continuous <Continuous>  influenza   Vaccine 0.5 milliLiter(s) IntraMuscular once  lisinopril 20 milliGRAM(s) Oral daily  metoprolol tartrate 25 milliGRAM(s) Oral two times a day  pantoprazole    Tablet 40 milliGRAM(s) Oral before breakfast  sodium chloride 0.9%. 1000 milliLiter(s) (50 mL/Hr) IV Continuous <Continuous>    MEDICATIONS  (PRN):  heparin   Injectable 6500 Unit(s) IV Push every 6 hours PRN For aPTT less than 40  heparin   Injectable 3000 Unit(s) IV Push every 6 hours PRN For aPTT between 40 - 57    PAST MEDICAL & SURGICAL HISTORY:  Hypertension      Hyperlipidemia      Prediabetes      Obese      Umbilical hernia      Erectile dysfunction      CAD (coronary artery disease)      H/O cardiomyopathy      H/O foot surgery  L      S/P scrotal varicocelectomy      Status post cardiac catheterization  4 stents          Vital Signs Last 24 Hrs  T(C): 36.8 (14 Dec 2023 08:23), Max: 36.8 (14 Dec 2023 08:23)  T(F): 98.2 (14 Dec 2023 08:23), Max: 98.2 (14 Dec 2023 08:23)  HR: 75 (14 Dec 2023 08:23) (69 - 82)  BP: 122/78 (14 Dec 2023 08:23) (122/78 - 142/78)  BP(mean): 95 (13 Dec 2023 16:43) (95 - 103)  RR: 18 (14 Dec 2023 08:23) (16 - 18)  SpO2: 96% (14 Dec 2023 08:23) (96% - 99%)    Parameters below as of 14 Dec 2023 08:23  Patient On (Oxygen Delivery Method): room air        12-13 @ 07:01  -  12-14 @ 07:00  --------------------------------------------------------  IN: 360 mL / OUT: 1750 mL / NET: -1390 mL    12-14 @ 07:01  -  12-14 @ 11:50  --------------------------------------------------------  IN: 0 mL / OUT: 300 mL / NET: -300 mL        PHYSICAL EXAM:  General: Alert, no acute distress  Lungs: Normal respiratory effort  Abdomen: Nondistended, soft, nontender, No rebound or guarding  Extremities: No edema  Neurological: Moving all extremities spontaneously    LABS:                        8.0    8.41  )-----------( 372      ( 14 Dec 2023 05:30 )             25.5     12-14    141  |  105  |  12  ----------------------------<  112<H>  3.8   |  23  |  0.70    Ca    8.8      14 Dec 2023 05:30  Mg     2.0     12-14    TPro  7.3  /  Alb  3.7  /  TBili  0.3  /  DBili  x   /  AST  37  /  ALT  19  /  AlkPhos  62  12-12    PT/INR - ( 13 Dec 2023 05:32 )   PT: 12.5 sec;   INR: 1.10          PTT - ( 14 Dec 2023 05:30 )  PTT:72.8 sec    RADIOLOGY & ADDITIONAL STUDIES:  Reviewed. *** DRAFT NOTE ***    HPI:  64M h/o CAD (s/p PCI x4 2021, on DAPT), afib (on apixaban), IAIN, prior UGIB (4/2023) p/w CP, SOB, and palpitations, found with afib w/ RVR and with plan to r/o ACS. GI is consulted regarding safety of AC.    Pt was admitted to [facility] 4/2023 in setting of intermittent dark stools and found w/ Dieulafoy (s/p clips)     yo M with PMhx of pAfib (on Eliquis, last dose 12/12 AM) CAD s/p KUSHAL mLCx/OM1/pLAD (3/2021), HTN, HLD, IAIN (baseline Hgb 9-10, s/p hemoclips 4/2023 (cleared for AC by GI) presented to [facility] complaining of sudden onset of CP/SOB/palpitations after going for a walk and eating lunch at work yesterday. He describes his CP as substernal, non radiating, resolving after he received Lopressor/diltiazem at [facility]. This episode is described as similar to how he felt when he was diagnosed with afib & anemia in April. Pt also endorses recent increased fatigue of late. Denies any associated orthopnea, PND, n/v/d, dizziness/lightheadedness, diaphoresis, LE edema, fever, chills, recent sick contacts.     [facility] course:  Vital signs: /89 mmHg, , SpO2 99% on RA, T 97.5 F, RR 18.   Labs significant for WBC 11.69 w left shift, H&h 9.3/30, K 3.5, Mg 1.7, , Trop I HS 1919.   EKG afib rate 129, LBBB. CXR w congestion. He was given Lopressor 25mg PO x1, diltiazem 10mg IV x1, and 1L NS bolus. Repeat EKG revealing conversion to NSR.  Pt transferred to [facility] for further management of afib and r/o ACS.     Upon arrival to [facility], vital signs /83, EKG NSR 69 bpm, LBBB, T 98.1, SpO2 97% on RA, RR 19. Remains asx, CP free.    (13 Dec 2023 04:35)    Allergies    No Known Allergies    Intolerances      Home Medications:  aspirin 81 mg oral delayed release tablet: 1 tab(s) orally once a day (13 Dec 2023 06:04)  Crestor 10 mg oral tablet: 1 orally once a day (at bedtime) (13 Dec 2023 05:55)  lisinopril 20 mg oral tablet: 1 tab(s) orally once a day (13 Dec 2023 05:55)  metoprolol tartrate 25 mg oral tablet: 1 tab(s) orally 2 times a day (13 Dec 2023 05:54)  Plavix 75 mg oral tablet: 1 tab(s) orally once a day (13 Dec 2023 12:26)    MEDICATIONS:  MEDICATIONS  (STANDING):  aspirin enteric coated 81 milliGRAM(s) Oral daily  atorvastatin 40 milliGRAM(s) Oral at bedtime  clopidogrel Tablet 75 milliGRAM(s) Oral daily  ferrous    sulfate 325 milliGRAM(s) Oral daily  heparin   Injectable 6500 Unit(s) IV Push once  heparin  Infusion.  Unit(s)/Hr (15 mL/Hr) IV Continuous <Continuous>  influenza   Vaccine 0.5 milliLiter(s) IntraMuscular once  lisinopril 20 milliGRAM(s) Oral daily  metoprolol tartrate 25 milliGRAM(s) Oral two times a day  pantoprazole    Tablet 40 milliGRAM(s) Oral before breakfast  sodium chloride 0.9%. 1000 milliLiter(s) (50 mL/Hr) IV Continuous <Continuous>    MEDICATIONS  (PRN):  heparin   Injectable 6500 Unit(s) IV Push every 6 hours PRN For aPTT less than 40  heparin   Injectable 3000 Unit(s) IV Push every 6 hours PRN For aPTT between 40 - 57    PAST MEDICAL & SURGICAL HISTORY:  Hypertension      Hyperlipidemia      Prediabetes      Obese      Umbilical hernia      Erectile dysfunction      CAD (coronary artery disease)      H/O cardiomyopathy      H/O foot surgery  L      S/P scrotal varicocelectomy      Status post cardiac catheterization  4 stents          Vital Signs Last 24 Hrs  T(C): 36.8 (14 Dec 2023 08:23), Max: 36.8 (14 Dec 2023 08:23)  T(F): 98.2 (14 Dec 2023 08:23), Max: 98.2 (14 Dec 2023 08:23)  HR: 75 (14 Dec 2023 08:23) (69 - 82)  BP: 122/78 (14 Dec 2023 08:23) (122/78 - 142/78)  BP(mean): 95 (13 Dec 2023 16:43) (95 - 103)  RR: 18 (14 Dec 2023 08:23) (16 - 18)  SpO2: 96% (14 Dec 2023 08:23) (96% - 99%)    Parameters below as of 14 Dec 2023 08:23  Patient On (Oxygen Delivery Method): room air        12-13 @ 07:01  -  12-14 @ 07:00  --------------------------------------------------------  IN: 360 mL / OUT: 1750 mL / NET: -1390 mL    12-14 @ 07:01  -  12-14 @ 11:50  --------------------------------------------------------  IN: 0 mL / OUT: 300 mL / NET: -300 mL        PHYSICAL EXAM:  General: Alert, no acute distress  Lungs: Normal respiratory effort  Abdomen: Nondistended, soft, nontender, No rebound or guarding  Extremities: No edema  Neurological: Moving all extremities spontaneously    LABS:                        8.0    8.41  )-----------( 372      ( 14 Dec 2023 05:30 )             25.5     12-14    141  |  105  |  12  ----------------------------<  112<H>  3.8   |  23  |  0.70    Ca    8.8      14 Dec 2023 05:30  Mg     2.0     12-14    TPro  7.3  /  Alb  3.7  /  TBili  0.3  /  DBili  x   /  AST  37  /  ALT  19  /  AlkPhos  62  12-12    PT/INR - ( 13 Dec 2023 05:32 )   PT: 12.5 sec;   INR: 1.10          PTT - ( 14 Dec 2023 05:30 )  PTT:72.8 sec    RADIOLOGY & ADDITIONAL STUDIES:  Reviewed. HPI:  64M h/o CAD (s/p PCI x4 2021, on DAPT), afib (on apixaban), IAIN, prior UGIB (4/2023) p/w CP, SOB, and palpitations, found with afib w/ RVR and with plan to r/o ACS. GI is consulted regarding safety of AC.    Pt was admitted to [facility] 4/2023 in setting of intermittent dark stools and found w/ Dieulafoy (s/p clips) on EGD/colo. He notes he had been on DAPT since his PCI in 2021 and started apixaban in addition to DAPT 1 month ago and reports excellent adherence to medications. He denies any further episodes of melena, BRBPR, or hematemesis. Hgb stable at 8.     Allergies  No Known Allergies    Intolerances    Home Medications:  aspirin 81 mg oral delayed release tablet: 1 tab(s) orally once a day (13 Dec 2023 06:04)  Crestor 10 mg oral tablet: 1 orally once a day (at bedtime) (13 Dec 2023 05:55)  lisinopril 20 mg oral tablet: 1 tab(s) orally once a day (13 Dec 2023 05:55)  metoprolol tartrate 25 mg oral tablet: 1 tab(s) orally 2 times a day (13 Dec 2023 05:54)  Plavix 75 mg oral tablet: 1 tab(s) orally once a day (13 Dec 2023 12:26)    MEDICATIONS:  MEDICATIONS  (STANDING):  aspirin enteric coated 81 milliGRAM(s) Oral daily  atorvastatin 40 milliGRAM(s) Oral at bedtime  clopidogrel Tablet 75 milliGRAM(s) Oral daily  ferrous    sulfate 325 milliGRAM(s) Oral daily  heparin   Injectable 6500 Unit(s) IV Push once  heparin  Infusion.  Unit(s)/Hr (15 mL/Hr) IV Continuous <Continuous>  influenza   Vaccine 0.5 milliLiter(s) IntraMuscular once  lisinopril 20 milliGRAM(s) Oral daily  metoprolol tartrate 25 milliGRAM(s) Oral two times a day  pantoprazole    Tablet 40 milliGRAM(s) Oral before breakfast  sodium chloride 0.9%. 1000 milliLiter(s) (50 mL/Hr) IV Continuous <Continuous>    MEDICATIONS  (PRN):  heparin   Injectable 6500 Unit(s) IV Push every 6 hours PRN For aPTT less than 40  heparin   Injectable 3000 Unit(s) IV Push every 6 hours PRN For aPTT between 40 - 57    PAST MEDICAL & SURGICAL HISTORY:  Hypertension      Hyperlipidemia      Prediabetes      Obese      Umbilical hernia      Erectile dysfunction      CAD (coronary artery disease)      H/O cardiomyopathy      H/O foot surgery  L      S/P scrotal varicocelectomy      Status post cardiac catheterization  4 stents          Vital Signs Last 24 Hrs  T(C): 36.8 (14 Dec 2023 08:23), Max: 36.8 (14 Dec 2023 08:23)  T(F): 98.2 (14 Dec 2023 08:23), Max: 98.2 (14 Dec 2023 08:23)  HR: 75 (14 Dec 2023 08:23) (69 - 82)  BP: 122/78 (14 Dec 2023 08:23) (122/78 - 142/78)  BP(mean): 95 (13 Dec 2023 16:43) (95 - 103)  RR: 18 (14 Dec 2023 08:23) (16 - 18)  SpO2: 96% (14 Dec 2023 08:23) (96% - 99%)    Parameters below as of 14 Dec 2023 08:23  Patient On (Oxygen Delivery Method): room air        12-13 @ 07:01  -  12-14 @ 07:00  --------------------------------------------------------  IN: 360 mL / OUT: 1750 mL / NET: -1390 mL    12-14 @ 07:01  -  12-14 @ 11:50  --------------------------------------------------------  IN: 0 mL / OUT: 300 mL / NET: -300 mL        PHYSICAL EXAM:  General: Alert, no acute distress  Lungs: Normal respiratory effort  Abdomen: Nondistended, soft, nontender, No rebound or guarding  Extremities: No edema  Neurological: Moving all extremities spontaneously    LABS:                        8.0    8.41  )-----------( 372      ( 14 Dec 2023 05:30 )             25.5     12-14    141  |  105  |  12  ----------------------------<  112<H>  3.8   |  23  |  0.70    Ca    8.8      14 Dec 2023 05:30  Mg     2.0     12-14    TPro  7.3  /  Alb  3.7  /  TBili  0.3  /  DBili  x   /  AST  37  /  ALT  19  /  AlkPhos  62  12-12    PT/INR - ( 13 Dec 2023 05:32 )   PT: 12.5 sec;   INR: 1.10          PTT - ( 14 Dec 2023 05:30 )  PTT:72.8 sec    RADIOLOGY & ADDITIONAL STUDIES:  Reviewed. HPI:  64M h/o CAD (s/p PCI x4 2021, on DAPT), afib (on apixaban), IAIN, prior UGIB (4/2023) p/w CP, SOB, and palpitations, found with afib w/ RVR and with plan to r/o ACS. GI is consulted regarding safety of AC.    Pt was admitted to [facility] 4/2023 in setting of intermittent dark stools and found w/ Dieulafoy (s/p clips) on EGD/colo. He notes he had been on DAPT since his PCI in 2021 and started apixaban in addition to DAPT 1 month ago and reports excellent adherence to medications. He denies any further episodes of melena, BRBPR, or hematemesis. Hgb stable at 8.     Allergies  No Known Allergies    Intolerances    Home Medications:  aspirin 81 mg oral delayed release tablet: 1 tab(s) orally once a day (13 Dec 2023 06:04)  Crestor 10 mg oral tablet: 1 orally once a day (at bedtime) (13 Dec 2023 05:55)  lisinopril 20 mg oral tablet: 1 tab(s) orally once a day (13 Dec 2023 05:55)  metoprolol tartrate 25 mg oral tablet: 1 tab(s) orally 2 times a day (13 Dec 2023 05:54)  Plavix 75 mg oral tablet: 1 tab(s) orally once a day (13 Dec 2023 12:26)    MEDICATIONS:  MEDICATIONS  (STANDING):  aspirin enteric coated 81 milliGRAM(s) Oral daily  atorvastatin 40 milliGRAM(s) Oral at bedtime  clopidogrel Tablet 75 milliGRAM(s) Oral daily  ferrous    sulfate 325 milliGRAM(s) Oral daily  heparin   Injectable 6500 Unit(s) IV Push once  heparin  Infusion.  Unit(s)/Hr (15 mL/Hr) IV Continuous <Continuous>  influenza   Vaccine 0.5 milliLiter(s) IntraMuscular once  lisinopril 20 milliGRAM(s) Oral daily  metoprolol tartrate 25 milliGRAM(s) Oral two times a day  pantoprazole    Tablet 40 milliGRAM(s) Oral before breakfast  sodium chloride 0.9%. 1000 milliLiter(s) (50 mL/Hr) IV Continuous <Continuous>    MEDICATIONS  (PRN):  heparin   Injectable 6500 Unit(s) IV Push every 6 hours PRN For aPTT less than 40  heparin   Injectable 3000 Unit(s) IV Push every 6 hours PRN For aPTT between 40 - 57    PAST MEDICAL & SURGICAL HISTORY:  Hypertension      Hyperlipidemia      Prediabetes      Obese      Umbilical hernia      Erectile dysfunction      CAD (coronary artery disease)      H/O cardiomyopathy      H/O foot surgery  L      S/P scrotal varicocelectomy      Status post cardiac catheterization  4 stents          Vital Signs Last 24 Hrs  T(C): 36.8 (14 Dec 2023 08:23), Max: 36.8 (14 Dec 2023 08:23)  T(F): 98.2 (14 Dec 2023 08:23), Max: 98.2 (14 Dec 2023 08:23)  HR: 75 (14 Dec 2023 08:23) (69 - 82)  BP: 122/78 (14 Dec 2023 08:23) (122/78 - 142/78)  BP(mean): 95 (13 Dec 2023 16:43) (95 - 103)  RR: 18 (14 Dec 2023 08:23) (16 - 18)  SpO2: 96% (14 Dec 2023 08:23) (96% - 99%)    Parameters below as of 14 Dec 2023 08:23  Patient On (Oxygen Delivery Method): room air        12-13 @ 07:01  -  12-14 @ 07:00  --------------------------------------------------------  IN: 360 mL / OUT: 1750 mL / NET: -1390 mL    12-14 @ 07:01  -  12-14 @ 11:50  --------------------------------------------------------  IN: 0 mL / OUT: 300 mL / NET: -300 mL        PHYSICAL EXAM:  General: Alert, no acute distress  Lungs: Normal respiratory effort  Abdomen: Nondistended, soft, nontender, No rebound or guarding  Extremities: No edema  Neurological: Moving all extremities spontaneously    LABS:                        8.0    8.41  )-----------( 372      ( 14 Dec 2023 05:30 )             25.5     12-14    141  |  105  |  12  ----------------------------<  112<H>  3.8   |  23  |  0.70    Ca    8.8      14 Dec 2023 05:30  Mg     2.0     12-14    TPro  7.3  /  Alb  3.7  /  TBili  0.3  /  DBili  x   /  AST  37  /  ALT  19  /  AlkPhos  62  12-12    PT/INR - ( 13 Dec 2023 05:32 )   PT: 12.5 sec;   INR: 1.10          PTT - ( 14 Dec 2023 05:30 )  PTT:72.8 sec    RADIOLOGY & ADDITIONAL STUDIES:  Reviewed.

## 2023-12-14 NOTE — PROGRESS NOTE ADULT - PROBLEM SELECTOR PLAN 1
Hx of CAD s/p KUSHAL mLCx/OM1/pLAD 3/2021 @ [facility]. Presents w CP/afib. Currently CP free now converted to NSR.  - Trop T peaked at 1477, now downtrending; , CKMB 121.3. EKG NSR 68, LBBB.  - Stress echo 11/3/23: Equivocal for ischemia.  Baseline inferior/posterior WMA. Mild LV dysfunction w inferior/posterior/lateral hypokinesis, limited 2/2 development of rapid afib  - TTE 10/6/23: EF 40-45%. LV/LA mildly dilated. LV wall thickness mildly inc. Mild MR/TR.  - CCTA 10/6/23: Patent stents pLAD/pLCx/OM1. Severe stenosis pLCx prior to stent. Calcific plaque obscures the lumen in pRCA/RI/mLCx/dLAD, unable to r/o significant stenosis  - Initiated on hep gtt   - DAPT: Aspirin 81 mg daily, Plavix 75 mg daily   - Continue atorvastatin 40 mg daily   - Awaiting cardiac cath with Dr. Sanderson 12/14/23 Hx of CAD s/p KUSHAL mLCx/OM1/pLAD 3/2021 @ [facility]. Presents w CP/afib. Currently CP free now converted to NSR.  - Trop T peaked at 1477, now downtrending; , CKMB 121.3. EKG NSR 68, LBBB.  - Stress echo 11/3/23: Equivocal for ischemia.  Baseline inferior/posterior WMA. Mild LV dysfunction w inferior/posterior/lateral hypokinesis, limited 2/2 development of rapid afib  - TTE 10/6/23: EF 40-45%. LV/LA mildly dilated. LV wall thickness mildly inc. Mild MR/TR.  - CCTA 10/6/23: Patent stents pLAD/pLCx/OM1. Severe stenosis pLCx prior to stent. Calcific plaque obscures the lumen in pRCA/RI/mLCx/dLAD, unable to r/o significant stenosis  - Initiated on hep gtt   - DAPT: Aspirin 81 mg daily, Plavix 75 mg daily   - Continue atorvastatin 40 mg daily   - Awaiting cardiac cath with Dr. Sanderson 12/14/23

## 2023-12-14 NOTE — PROGRESS NOTE ADULT - SUBJECTIVE AND OBJECTIVE BOX
incomplete    Interventional Cardiology PA Adult Progress Note    Subjective Assessment:    ROS Negative except as per Subjective and HPI  	  MEDICATIONS:  carvedilol 12.5 milliGRAM(s) Oral every 12 hours  furosemide   Injectable 40 milliGRAM(s) IV Push two times a day  hydrALAZINE 75 milliGRAM(s) Oral three times a day  isosorbide   dinitrate Tablet (ISORDIL) 20 milliGRAM(s) Oral three times a day  acetaminophen     Tablet .. 650 milliGRAM(s) Oral every 6 hours PRN  atorvastatin 40 milliGRAM(s) Oral at bedtime  heparin   Injectable 7500 Unit(s) SubCutaneous every 8 hours  influenza   Vaccine 0.5 milliLiter(s) IntraMuscular once    [PHYSICAL EXAM:  TELEMETRY:  T(C): 37.2 (03-10-22 @ 06:04), Max: 37.2 (03-10-22 @ 06:04)  HR: 81 (03-10-22 @ 05:18) (74 - 93)  BP: 150/93 (03-10-22 @ 05:18) (122/66 - 163/98)  RR: 17 (03-10-22 @ 05:18) (17 - 18)  SpO2: 95% (03-10-22 @ 05:18) (95% - 97%)  Wt(kg): --  I&O's Summary    09 Mar 2022 07:01  -  10 Mar 2022 07:00  --------------------------------------------------------  IN: 660 mL / OUT: 3025 mL / NET: -2365 mL    10 Mar 2022 07:01  -  10 Mar 2022 08:11  --------------------------------------------------------  IN: 0 mL / OUT: 1000 mL / NET: -1000 mL    Bui:  Central/PICC/Mid Line:                                         Appearance: Normal	  HEENT:   Normal oral mucosa, PERRL, EOMI	  Neck: Supple, + JVD/ - JVD; Carotid Bruit   Cardiovascular: Normal S1 S2, No JVD, No murmurs,   Respiratory: Lungs clear to auscultation/Decreased Breath Sounds/No Rales, Rhonchi, Wheezing	  Gastrointestinal:  Soft, Non-tender, + BS	  Skin: No rashes, No ecchymoses, No cyanosis  Extremities: Normal range of motion, No clubbing, cyanosis or edema  Vascular: Peripheral pulses palpable 2+ bilaterally  Neurologic: Non-focal  Psychiatry: A & O x 3, Mood & affect appropriate      ECG:  	  RADIOLOGY:   DIAGNOSTIC TESTING:  [ ] Echocardiogram:    [ ]  Catheterization:   [ ] Stress Test:    [ ] VENECIA  OTHER: 	    LABS:	 	  CARDIAC MARKERS:              12.2   6.96  )-----------( 231      ( 10 Mar 2022 07:51 )             39.1     03-09    142  |  103  |  25<H>  ----------------------------<  116<H>  3.6   |  29  |  3.29<H>    Ca    8.6      09 Mar 2022 07:50  Mg     1.7     03-09      proBNP:   Lipid Profile:   HgA1c:   TSH:      incomplete  Interventional Cardiology PA Adult Progress Note    C.C.:     Subjective Assessment:      ROS Negative except as per Subjective and HPI  	  MEDICATIONS:  lisinopril 20 milliGRAM(s) Oral daily  metoprolol tartrate 25 milliGRAM(s) Oral two times a day          pantoprazole    Tablet 40 milliGRAM(s) Oral before breakfast    atorvastatin 40 milliGRAM(s) Oral at bedtime    aspirin enteric coated 81 milliGRAM(s) Oral daily  clopidogrel Tablet 75 milliGRAM(s) Oral daily  ferrous    sulfate 325 milliGRAM(s) Oral daily  heparin   Injectable 6500 Unit(s) IV Push once  heparin   Injectable 3000 Unit(s) IV Push every 6 hours PRN  heparin   Injectable 6500 Unit(s) IV Push every 6 hours PRN  heparin  Infusion.  Unit(s)/Hr IV Continuous <Continuous>  influenza   Vaccine 0.5 milliLiter(s) IntraMuscular once  sodium chloride 0.9%. 1000 milliLiter(s) IV Continuous <Continuous>      	    [PHYSICAL EXAM:  TELEMETRY:  T(C): 36.4 (12-14-23 @ 05:32), Max: 36.7 (12-13-23 @ 20:53)  HR: 82 (12-14-23 @ 05:32) (64 - 82)  BP: 142/78 (12-14-23 @ 05:32) (123/70 - 142/78)  RR: 17 (12-14-23 @ 05:32) (16 - 17)  SpO2: 98% (12-14-23 @ 05:32) (95% - 99%)  Wt(kg): --  I&O's Summary    13 Dec 2023 07:01  -  14 Dec 2023 07:00  --------------------------------------------------------  IN: 360 mL / OUT: 1750 mL / NET: -1390 mL        Bui:  Central/PICC/Mid Line:                                         Appearance: Normal	  HEENT:   Normal oral mucosa, PERRL, EOMI	  Neck: Supple, + JVD/ - JVD; Carotid Bruit   Cardiovascular: Normal S1 S2, No JVD, No murmurs,   Respiratory: Lungs clear to auscultation/Decreased Breath Sounds/No Rales, Rhonchi, Wheezing	  Gastrointestinal:  Soft, Non-tender, + BS	  Skin: No rashes, No ecchymoses, No cyanosis  Extremities: Normal range of motion, No clubbing, cyanosis or edema  Vascular: Peripheral pulses palpable 2+ bilaterally  Neurologic: Non-focal  Psychiatry: A & O x 3, Mood & affect appropriate      	    ECG:  	  RADIOLOGY:   DIAGNOSTIC TESTING:  [ ] Echocardiogram:  [ ]  Catheterization:  [ ] Stress Test:    [ ] VENECIA  OTHER: 	    LABS:	 	  CARDIAC MARKERS:                                  8.0    8.41  )-----------( 372      ( 14 Dec 2023 05:30 )             25.5     12-14    141  |  105  |  12  ----------------------------<  112<H>  3.8   |  23  |  0.70    Ca    8.8      14 Dec 2023 05:30  Mg     2.0     12-14    TPro  7.3  /  Alb  3.7  /  TBili  0.3  /  DBili  x   /  AST  37  /  ALT  19  /  AlkPhos  62  12-12    proBNP:   Lipid Profile:   HgA1c:   TSH:   PT/INR - ( 13 Dec 2023 05:32 )   PT: 12.5 sec;   INR: 1.10          PTT - ( 14 Dec 2023 05:30 )  PTT:72.8 sec    ASSESSMENT/PLAN: 	        DVT ppx:  Dispo:     Interventional Cardiology PA Adult Progress Note    C.C.: Chest Pain     Subjective Assessment: Patient seen and examined at bedside. Patient without chest pain, feels well, awaiting cardiac cath today. Patient denies Cp, palpitations, n/v, fever, chills, or other complaints.     ROS Negative except as per Subjective and HPI  	  MEDICATIONS:  lisinopril 20 milliGRAM(s) Oral daily  metoprolol tartrate 25 milliGRAM(s) Oral two times a day  pantoprazole    Tablet 40 milliGRAM(s) Oral before breakfast  atorvastatin 40 milliGRAM(s) Oral at bedtime  aspirin enteric coated 81 milliGRAM(s) Oral daily  clopidogrel Tablet 75 milliGRAM(s) Oral daily  ferrous    sulfate 325 milliGRAM(s) Oral daily  heparin   Injectable 6500 Unit(s) IV Push once  heparin   Injectable 3000 Unit(s) IV Push every 6 hours PRN  heparin   Injectable 6500 Unit(s) IV Push every 6 hours PRN  heparin  Infusion.  Unit(s)/Hr IV Continuous <Continuous>  influenza   Vaccine 0.5 milliLiter(s) IntraMuscular once  sodium chloride 0.9%. 1000 milliLiter(s) IV Continuous <Continuous>    [PHYSICAL EXAM:  TELEMETRY:  T(C): 36.4 (12-14-23 @ 05:32), Max: 36.7 (12-13-23 @ 20:53)  HR: 82 (12-14-23 @ 05:32) (64 - 82)  BP: 142/78 (12-14-23 @ 05:32) (123/70 - 142/78)  RR: 17 (12-14-23 @ 05:32) (16 - 17)  SpO2: 98% (12-14-23 @ 05:32) (95% - 99%)  Wt(kg): --  I&O's Summary    13 Dec 2023 07:01  -  14 Dec 2023 07:00  --------------------------------------------------------  IN: 360 mL / OUT: 1750 mL / NET: -1390 mL    Appearance: Normal, sitting comfortably in bed   HEENT:   Normal oral mucosa, PERRL, EOMI	  Neck: Supple,  - JVD; No Carotid Bruit   Cardiovascular: Normal S1 S2, No JVD, No murmurs,   Respiratory: Lungs clear to auscultation  Gastrointestinal:  Soft, Non-tender, + BS	  Skin: No rashes, No ecchymoses, No cyanosis  Extremities: Normal range of motion, No clubbing, cyanosis or edema  Vascular: Peripheral pulses palpable 2+ bilaterally  Neurologic: Non-focal  Psychiatry: A & O x 3, Mood & affect appropriate    	    ECG:  	  RADIOLOGY:   DIAGNOSTIC TESTING:  [ ] Echocardiogram:  [ ]  Catheterization:  [ ] Stress Test:    [ ] VENECIA  OTHER: 	    LABS:	 	  CARDIAC MARKERS:                                  8.0    8.41  )-----------( 372      ( 14 Dec 2023 05:30 )             25.5     12-14    141  |  105  |  12  ----------------------------<  112<H>  3.8   |  23  |  0.70    Ca    8.8      14 Dec 2023 05:30  Mg     2.0     12-14    TPro  7.3  /  Alb  3.7  /  TBili  0.3  /  DBili  x   /  AST  37  /  ALT  19  /  AlkPhos  62  12-12    proBNP:   Lipid Profile:   HgA1c:   TSH:   PT/INR - ( 13 Dec 2023 05:32 )   PT: 12.5 sec;   INR: 1.10          PTT - ( 14 Dec 2023 05:30 )  PTT:72.8 sec    ASSESSMENT/PLAN: 	        DVT ppx:  Dispo:     Interventional Cardiology PA Adult Progress Note    C.C.: Chest Pain     Subjective Assessment: Patient seen and examined at bedside. Patient without chest pain, feels well, awaiting cardiac cath today. Patient denies Cp, palpitations, n/v, fever, chills, or other complaints.     ROS Negative except as per Subjective and HPI  	  MEDICATIONS:  lisinopril 20 milliGRAM(s) Oral daily  metoprolol tartrate 25 milliGRAM(s) Oral two times a day  pantoprazole    Tablet 40 milliGRAM(s) Oral before breakfast  atorvastatin 40 milliGRAM(s) Oral at bedtime  aspirin enteric coated 81 milliGRAM(s) Oral daily  clopidogrel Tablet 75 milliGRAM(s) Oral daily  ferrous    sulfate 325 milliGRAM(s) Oral daily  heparin   Injectable 6500 Unit(s) IV Push once  heparin   Injectable 3000 Unit(s) IV Push every 6 hours PRN  heparin   Injectable 6500 Unit(s) IV Push every 6 hours PRN  heparin  Infusion.  Unit(s)/Hr IV Continuous <Continuous>  influenza   Vaccine 0.5 milliLiter(s) IntraMuscular once  sodium chloride 0.9%. 1000 milliLiter(s) IV Continuous <Continuous>    [PHYSICAL EXAM:  TELEMETRY:  T(C): 36.4 (12-14-23 @ 05:32), Max: 36.7 (12-13-23 @ 20:53)  HR: 82 (12-14-23 @ 05:32) (64 - 82)  BP: 142/78 (12-14-23 @ 05:32) (123/70 - 142/78)  RR: 17 (12-14-23 @ 05:32) (16 - 17)  SpO2: 98% (12-14-23 @ 05:32) (95% - 99%)  Wt(kg): --  I&O's Summary    13 Dec 2023 07:01  -  14 Dec 2023 07:00  --------------------------------------------------------  IN: 360 mL / OUT: 1750 mL / NET: -1390 mL    Appearance: Normal, sitting comfortably in bed   HEENT:   Normal oral mucosa, PERRL, EOMI	  Neck: Supple,  - JVD; No Carotid Bruit   Cardiovascular: Normal S1 S2, No JVD, No murmurs,   Respiratory: Lungs clear to auscultation  Gastrointestinal:  Soft, Non-tender, + BS	  Skin: No rashes, No ecchymoses, No cyanosis  Extremities: Normal range of motion, No clubbing, cyanosis or edema  Vascular: Peripheral pulses palpable 2+ bilaterally  Neurologic: Non-focal  Psychiatry: A & O x 3, Mood & affect appropriate    	    ECG:  	Originall afib @ 100s-110s with LBBB, now in NSR @ 61 bpm w/ LBBB    LABS:	 	  CARDIAC MARKERS: 1417 -> 1273             8.0    8.41  )-----------( 372      ( 14 Dec 2023 05:30 )             25.5     12-14    141  |  105  |  12  ----------------------------<  112<H>  3.8   |  23  |  0.70    Ca    8.8      14 Dec 2023 05:30  Mg     2.0     12-14    TPro  7.3  /  Alb  3.7  /  TBili  0.3  /  DBili  x   /  AST  37  /  ALT  19  /  AlkPhos  62  12-12    proBNP:  547  Lipid Profile:  total cholesterol: 111, HDL: 44, LDL :55   HgA1c: 5.6  TSH: 3.480  PT/INR - ( 13 Dec 2023 05:32 )   PT: 12.5 sec;   INR: 1.10     PTT - ( 14 Dec 2023 05:30 )  PTT:72.8 sec

## 2023-12-14 NOTE — PROGRESS NOTE ADULT - NS ATTEND AMEND GEN_ALL_CORE FT
63 yo M with PMhx of pAfib (on Eliquis, last dose 12/12 AM) CAD s/p KUSHAL mLCx/OM1/pLAD (3/2021), HTN, HLD, IAIN (baseline Hgb 9-10, s/p hemoclips 4/2023 (cleared for AC by GI) presented to [facility] complaining of sudden onset of CP/SOB/palpitations after going for a walk and eating lunch at work yesterday. He describes his CP as substernal, non radiating, resolving after he received Lopressor/diltiazem at [facility]. This episode is described as similar to how he felt when he was diagnosed with afib & anemia in April. Pt also endorses recent increased fatigue of late. Denies any associated orthopnea, PND, n/v/d, dizziness/lightheadedness, diaphoresis, LE edema, fever, chills, recent sick contacts.     1. AF RVR  Converted to sinus. Stop eliquis, continue hep gtt in anticipation of LHC.    2. NSTEMI  known CAD, prior CCTA with proximal LCX stenosis, not revascularized, now with mild CP and elevated troponins in the setting of AF RVR.  He does report intermittent exertional CP.  Plan to pursue LHC tomorrow as he did take eliquis on Monday.    3. GI bleed  prior gi bleed, delaufoy lesion s.p 3 clips, consult GI prior to LHC, transfuse 1 prbcs. 65 yo M with PMhx of pAfib (on Eliquis, last dose 12/12 AM) CAD s/p KUSHAL mLCx/OM1/pLAD (3/2021), HTN, HLD, IAIN (baseline Hgb 9-10, s/p hemoclips 4/2023 (cleared for AC by GI) presented to [facility] complaining of sudden onset of CP/SOB/palpitations after going for a walk and eating lunch at work yesterday. He describes his CP as substernal, non radiating, resolving after he received Lopressor/diltiazem at [facility]. This episode is described as similar to how he felt when he was diagnosed with afib & anemia in April. Pt also endorses recent increased fatigue of late. Denies any associated orthopnea, PND, n/v/d, dizziness/lightheadedness, diaphoresis, LE edema, fever, chills, recent sick contacts.     1. AF RVR  Converted to sinus. Stop eliquis, continue hep gtt in anticipation of LHC.    2. NSTEMI  known CAD, prior CCTA with proximal LCX stenosis, not revascularized, now with mild CP and elevated troponins in the setting of AF RVR.  He does report intermittent exertional CP.  Plan to pursue LHC tomorrow as he did take eliquis on Monday.    3. GI bleed  prior gi bleed, delaufoy lesion s.p 3 clips, consult GI prior to LHC, transfuse 1 prbcs.

## 2023-12-14 NOTE — CONSULT NOTE ADULT - ATTENDING COMMENTS
Agree w/ above.  Pt w/ hx of CAD s/p PCI on ASA and Plavix, as well as AFib on A/C. Admitted and found to be in AFib w/ RVR w/ plan to r/o ACS.  Pt has hx of GI bleed in the past and IAIN, for which he has undergone EGD/Colonoscopy in 1/2023 and 4/2023.   1/2023 was found to have some angioectasias that were treated.  4/2023 was found to have bleeding Dieulafoy's lesion that was treated.    In the interim, pt has been doing very well w/o any GI bleeding or other GI sx despite being on triple therapy.  Hb is low but stable from prior.    We do not routinely perform EGD/Colon for A/C clearance in the absence of overt GI bleeding.    Recommend that cardiology perform all diagnostic testing and therapeutic measures as required for care of this patient.  Continue w/ PO PPI daily.  If GI bleeding occurs as a result of therapy, we can perform appropriate endoscopic evaluation/therapy at this time.    This was discussed w/ patient at length, who agrees.

## 2023-12-14 NOTE — CONSULT NOTE ADULT - ASSESSMENT
64M h/o CAD (s/p PCI x4 2021, on DAPT), afib (on apixaban), IAIN, prior UGIB (4/2023) p/w CP, SOB, and palpitations, found with afib w/ RVR and with plan to r/o ACS. GI is consulted regarding safety of AC.    Pt without any clinical evidence of GI bleeding despite being on triple therapy for over 1 month. No GI contraindication to cardiac cath or continued AC, though if he is able to be weaned to dual therapy in the future this may reduce bleeding risk.     Recommendations:  - No GI contraindication to anti-PLT or AC  - Consider weaning from 3 blood thinners to 2 blood thinners in the future if possible to reduce bleeding risk    We will sign off, but please page if any further questions arise. Please see attending addendum for final recommendations.     Thanh (Brie) MD Chad  Gastroenterology Fellow  Pager (M-F 7a-5p): [phone]  Pager (after hours): Please call  for on-call fellow   64M h/o CAD (s/p PCI x4 2021, on DAPT), afib (on apixaban), IAIN, prior UGIB (4/2023) p/w CP, SOB, and palpitations, found with afib w/ RVR and with plan to r/o ACS. GI is consulted regarding safety of AC.    Pt without any clinical evidence of GI bleeding despite being on triple therapy for over 1 month. No GI contraindication to cardiac cath or continued AC, though if he is able to be weaned to dual therapy in the future this may reduce bleeding risk.     Recommendations:  - No GI contraindication to anti-PLT or AC  - Consider weaning from 3 blood thinners to 2 blood thinners in the future if possible to reduce bleeding risk    We will sign off, but please page if any further questions arise. Please see attending addendum for final recommendations.     Thanh (Brie) MD [facility]  Gastroenterology Fellow  Pager (M-F 7a-5p): [phone]  Pager (after hours): Please call  for on-call fellow

## 2023-12-15 ENCOUNTER — TRANSCRIPTION ENCOUNTER (OUTPATIENT)
Age: 63
End: 2023-12-15

## 2023-12-15 VITALS
OXYGEN SATURATION: 99 % | RESPIRATION RATE: 18 BRPM | SYSTOLIC BLOOD PRESSURE: 117 MMHG | HEART RATE: 77 BPM | TEMPERATURE: 98 F | DIASTOLIC BLOOD PRESSURE: 68 MMHG

## 2023-12-15 LAB
ALBUMIN SERPL ELPH-MCNC: 3.6 G/DL — SIGNIFICANT CHANGE UP (ref 3.3–5)
ALBUMIN SERPL ELPH-MCNC: 3.6 G/DL — SIGNIFICANT CHANGE UP (ref 3.3–5)
ALP SERPL-CCNC: 52 U/L — SIGNIFICANT CHANGE UP (ref 40–120)
ALP SERPL-CCNC: 52 U/L — SIGNIFICANT CHANGE UP (ref 40–120)
ALT FLD-CCNC: 19 U/L — SIGNIFICANT CHANGE UP (ref 10–45)
ALT FLD-CCNC: 19 U/L — SIGNIFICANT CHANGE UP (ref 10–45)
ANION GAP SERPL CALC-SCNC: 10 MMOL/L — SIGNIFICANT CHANGE UP (ref 5–17)
ANION GAP SERPL CALC-SCNC: 10 MMOL/L — SIGNIFICANT CHANGE UP (ref 5–17)
ANISOCYTOSIS BLD QL: SLIGHT — SIGNIFICANT CHANGE UP
ANISOCYTOSIS BLD QL: SLIGHT — SIGNIFICANT CHANGE UP
APTT BLD: 29.1 SEC — SIGNIFICANT CHANGE UP (ref 24.5–35.6)
APTT BLD: 29.1 SEC — SIGNIFICANT CHANGE UP (ref 24.5–35.6)
AST SERPL-CCNC: 65 U/L — HIGH (ref 10–40)
AST SERPL-CCNC: 65 U/L — HIGH (ref 10–40)
BASOPHILS # BLD AUTO: 0.09 K/UL — SIGNIFICANT CHANGE UP (ref 0–0.2)
BASOPHILS # BLD AUTO: 0.09 K/UL — SIGNIFICANT CHANGE UP (ref 0–0.2)
BASOPHILS NFR BLD AUTO: 0.9 % — SIGNIFICANT CHANGE UP (ref 0–2)
BASOPHILS NFR BLD AUTO: 0.9 % — SIGNIFICANT CHANGE UP (ref 0–2)
BILIRUB SERPL-MCNC: 0.7 MG/DL — SIGNIFICANT CHANGE UP (ref 0.2–1.2)
BILIRUB SERPL-MCNC: 0.7 MG/DL — SIGNIFICANT CHANGE UP (ref 0.2–1.2)
BUN SERPL-MCNC: 8 MG/DL — SIGNIFICANT CHANGE UP (ref 7–23)
BUN SERPL-MCNC: 8 MG/DL — SIGNIFICANT CHANGE UP (ref 7–23)
BURR CELLS BLD QL SMEAR: PRESENT — SIGNIFICANT CHANGE UP
BURR CELLS BLD QL SMEAR: PRESENT — SIGNIFICANT CHANGE UP
CALCIUM SERPL-MCNC: 8.7 MG/DL — SIGNIFICANT CHANGE UP (ref 8.4–10.5)
CALCIUM SERPL-MCNC: 8.7 MG/DL — SIGNIFICANT CHANGE UP (ref 8.4–10.5)
CHLORIDE SERPL-SCNC: 105 MMOL/L — SIGNIFICANT CHANGE UP (ref 96–108)
CHLORIDE SERPL-SCNC: 105 MMOL/L — SIGNIFICANT CHANGE UP (ref 96–108)
CO2 SERPL-SCNC: 23 MMOL/L — SIGNIFICANT CHANGE UP (ref 22–31)
CO2 SERPL-SCNC: 23 MMOL/L — SIGNIFICANT CHANGE UP (ref 22–31)
CREAT SERPL-MCNC: 0.79 MG/DL — SIGNIFICANT CHANGE UP (ref 0.5–1.3)
CREAT SERPL-MCNC: 0.79 MG/DL — SIGNIFICANT CHANGE UP (ref 0.5–1.3)
EGFR: 100 ML/MIN/1.73M2 — SIGNIFICANT CHANGE UP
EGFR: 100 ML/MIN/1.73M2 — SIGNIFICANT CHANGE UP
EOSINOPHIL # BLD AUTO: 0.09 K/UL — SIGNIFICANT CHANGE UP (ref 0–0.5)
EOSINOPHIL # BLD AUTO: 0.09 K/UL — SIGNIFICANT CHANGE UP (ref 0–0.5)
EOSINOPHIL NFR BLD AUTO: 0.9 % — SIGNIFICANT CHANGE UP (ref 0–6)
EOSINOPHIL NFR BLD AUTO: 0.9 % — SIGNIFICANT CHANGE UP (ref 0–6)
FOLATE SERPL-MCNC: >20 NG/ML — SIGNIFICANT CHANGE UP
FOLATE SERPL-MCNC: >20 NG/ML — SIGNIFICANT CHANGE UP
GIANT PLATELETS BLD QL SMEAR: PRESENT — SIGNIFICANT CHANGE UP
GIANT PLATELETS BLD QL SMEAR: PRESENT — SIGNIFICANT CHANGE UP
GLUCOSE SERPL-MCNC: 103 MG/DL — HIGH (ref 70–99)
GLUCOSE SERPL-MCNC: 103 MG/DL — HIGH (ref 70–99)
HCT VFR BLD CALC: 26.9 % — LOW (ref 39–50)
HCT VFR BLD CALC: 26.9 % — LOW (ref 39–50)
HGB BLD-MCNC: 8.6 G/DL — LOW (ref 13–17)
HGB BLD-MCNC: 8.6 G/DL — LOW (ref 13–17)
HYPOCHROMIA BLD QL: SLIGHT — SIGNIFICANT CHANGE UP
HYPOCHROMIA BLD QL: SLIGHT — SIGNIFICANT CHANGE UP
LYMPHOCYTES # BLD AUTO: 0.63 K/UL — LOW (ref 1–3.3)
LYMPHOCYTES # BLD AUTO: 0.63 K/UL — LOW (ref 1–3.3)
LYMPHOCYTES # BLD AUTO: 6.1 % — LOW (ref 13–44)
LYMPHOCYTES # BLD AUTO: 6.1 % — LOW (ref 13–44)
MACROCYTES BLD QL: SLIGHT — SIGNIFICANT CHANGE UP
MACROCYTES BLD QL: SLIGHT — SIGNIFICANT CHANGE UP
MAGNESIUM SERPL-MCNC: 1.9 MG/DL — SIGNIFICANT CHANGE UP (ref 1.6–2.6)
MAGNESIUM SERPL-MCNC: 1.9 MG/DL — SIGNIFICANT CHANGE UP (ref 1.6–2.6)
MANUAL SMEAR VERIFICATION: SIGNIFICANT CHANGE UP
MANUAL SMEAR VERIFICATION: SIGNIFICANT CHANGE UP
MCHC RBC-ENTMCNC: 25.9 PG — LOW (ref 27–34)
MCHC RBC-ENTMCNC: 25.9 PG — LOW (ref 27–34)
MCHC RBC-ENTMCNC: 32 GM/DL — SIGNIFICANT CHANGE UP (ref 32–36)
MCHC RBC-ENTMCNC: 32 GM/DL — SIGNIFICANT CHANGE UP (ref 32–36)
MCV RBC AUTO: 81 FL — SIGNIFICANT CHANGE UP (ref 80–100)
MCV RBC AUTO: 81 FL — SIGNIFICANT CHANGE UP (ref 80–100)
MONOCYTES # BLD AUTO: 0.82 K/UL — SIGNIFICANT CHANGE UP (ref 0–0.9)
MONOCYTES # BLD AUTO: 0.82 K/UL — SIGNIFICANT CHANGE UP (ref 0–0.9)
MONOCYTES NFR BLD AUTO: 7.9 % — SIGNIFICANT CHANGE UP (ref 2–14)
MONOCYTES NFR BLD AUTO: 7.9 % — SIGNIFICANT CHANGE UP (ref 2–14)
NEUTROPHILS # BLD AUTO: 8.75 K/UL — HIGH (ref 1.8–7.4)
NEUTROPHILS # BLD AUTO: 8.75 K/UL — HIGH (ref 1.8–7.4)
NEUTROPHILS NFR BLD AUTO: 84.2 % — HIGH (ref 43–77)
NEUTROPHILS NFR BLD AUTO: 84.2 % — HIGH (ref 43–77)
OVALOCYTES BLD QL SMEAR: SIGNIFICANT CHANGE UP
OVALOCYTES BLD QL SMEAR: SIGNIFICANT CHANGE UP
PLAT MORPH BLD: ABNORMAL
PLAT MORPH BLD: ABNORMAL
PLATELET # BLD AUTO: 342 K/UL — SIGNIFICANT CHANGE UP (ref 150–400)
PLATELET # BLD AUTO: 342 K/UL — SIGNIFICANT CHANGE UP (ref 150–400)
POIKILOCYTOSIS BLD QL AUTO: SIGNIFICANT CHANGE UP
POIKILOCYTOSIS BLD QL AUTO: SIGNIFICANT CHANGE UP
POLYCHROMASIA BLD QL SMEAR: SLIGHT — SIGNIFICANT CHANGE UP
POLYCHROMASIA BLD QL SMEAR: SLIGHT — SIGNIFICANT CHANGE UP
POTASSIUM SERPL-MCNC: 4.2 MMOL/L — SIGNIFICANT CHANGE UP (ref 3.5–5.3)
POTASSIUM SERPL-MCNC: 4.2 MMOL/L — SIGNIFICANT CHANGE UP (ref 3.5–5.3)
POTASSIUM SERPL-SCNC: 4.2 MMOL/L — SIGNIFICANT CHANGE UP (ref 3.5–5.3)
POTASSIUM SERPL-SCNC: 4.2 MMOL/L — SIGNIFICANT CHANGE UP (ref 3.5–5.3)
PROT SERPL-MCNC: 6.4 G/DL — SIGNIFICANT CHANGE UP (ref 6–8.3)
PROT SERPL-MCNC: 6.4 G/DL — SIGNIFICANT CHANGE UP (ref 6–8.3)
RBC # BLD: 3.32 M/UL — LOW (ref 4.2–5.8)
RBC # BLD: 3.32 M/UL — LOW (ref 4.2–5.8)
RBC # FLD: 16.6 % — HIGH (ref 10.3–14.5)
RBC # FLD: 16.6 % — HIGH (ref 10.3–14.5)
RBC BLD AUTO: ABNORMAL
RBC BLD AUTO: ABNORMAL
SODIUM SERPL-SCNC: 138 MMOL/L — SIGNIFICANT CHANGE UP (ref 135–145)
SODIUM SERPL-SCNC: 138 MMOL/L — SIGNIFICANT CHANGE UP (ref 135–145)
SPHEROCYTES BLD QL SMEAR: SLIGHT — SIGNIFICANT CHANGE UP
SPHEROCYTES BLD QL SMEAR: SLIGHT — SIGNIFICANT CHANGE UP
VIT B12 SERPL-MCNC: 398 PG/ML — SIGNIFICANT CHANGE UP (ref 232–1245)
VIT B12 SERPL-MCNC: 398 PG/ML — SIGNIFICANT CHANGE UP (ref 232–1245)
WBC # BLD: 10.39 K/UL — SIGNIFICANT CHANGE UP (ref 3.8–10.5)
WBC # BLD: 10.39 K/UL — SIGNIFICANT CHANGE UP (ref 3.8–10.5)
WBC # FLD AUTO: 10.39 K/UL — SIGNIFICANT CHANGE UP (ref 3.8–10.5)
WBC # FLD AUTO: 10.39 K/UL — SIGNIFICANT CHANGE UP (ref 3.8–10.5)

## 2023-12-15 PROCEDURE — C1769: CPT

## 2023-12-15 PROCEDURE — 85379 FIBRIN DEGRADATION QUANT: CPT

## 2023-12-15 PROCEDURE — 83036 HEMOGLOBIN GLYCOSYLATED A1C: CPT

## 2023-12-15 PROCEDURE — 83735 ASSAY OF MAGNESIUM: CPT

## 2023-12-15 PROCEDURE — 85027 COMPLETE CBC AUTOMATED: CPT

## 2023-12-15 PROCEDURE — 83540 ASSAY OF IRON: CPT

## 2023-12-15 PROCEDURE — 85610 PROTHROMBIN TIME: CPT

## 2023-12-15 PROCEDURE — 71045 X-RAY EXAM CHEST 1 VIEW: CPT

## 2023-12-15 PROCEDURE — 86923 COMPATIBILITY TEST ELECTRIC: CPT

## 2023-12-15 PROCEDURE — 82550 ASSAY OF CK (CPK): CPT

## 2023-12-15 PROCEDURE — 82746 ASSAY OF FOLIC ACID SERUM: CPT

## 2023-12-15 PROCEDURE — 85730 THROMBOPLASTIN TIME PARTIAL: CPT

## 2023-12-15 PROCEDURE — 82607 VITAMIN B-12: CPT

## 2023-12-15 PROCEDURE — 83550 IRON BINDING TEST: CPT

## 2023-12-15 PROCEDURE — 84436 ASSAY OF TOTAL THYROXINE: CPT

## 2023-12-15 PROCEDURE — C1725: CPT

## 2023-12-15 PROCEDURE — 86850 RBC ANTIBODY SCREEN: CPT

## 2023-12-15 PROCEDURE — 99285 EMERGENCY DEPT VISIT HI MDM: CPT | Mod: 25

## 2023-12-15 PROCEDURE — 93005 ELECTROCARDIOGRAM TRACING: CPT

## 2023-12-15 PROCEDURE — 96374 THER/PROPH/DIAG INJ IV PUSH: CPT

## 2023-12-15 PROCEDURE — 86901 BLOOD TYPING SEROLOGIC RH(D): CPT

## 2023-12-15 PROCEDURE — 84484 ASSAY OF TROPONIN QUANT: CPT

## 2023-12-15 PROCEDURE — 82728 ASSAY OF FERRITIN: CPT

## 2023-12-15 PROCEDURE — 83880 ASSAY OF NATRIURETIC PEPTIDE: CPT

## 2023-12-15 PROCEDURE — 84466 ASSAY OF TRANSFERRIN: CPT

## 2023-12-15 PROCEDURE — 82553 CREATINE MB FRACTION: CPT

## 2023-12-15 PROCEDURE — 82274 ASSAY TEST FOR BLOOD FECAL: CPT

## 2023-12-15 PROCEDURE — 84443 ASSAY THYROID STIM HORMONE: CPT

## 2023-12-15 PROCEDURE — 80061 LIPID PANEL: CPT

## 2023-12-15 PROCEDURE — 36430 TRANSFUSION BLD/BLD COMPNT: CPT

## 2023-12-15 PROCEDURE — C1753: CPT

## 2023-12-15 PROCEDURE — 86900 BLOOD TYPING SEROLOGIC ABO: CPT

## 2023-12-15 PROCEDURE — 99239 HOSP IP/OBS DSCHRG MGMT >30: CPT

## 2023-12-15 PROCEDURE — P9016: CPT

## 2023-12-15 PROCEDURE — C1894: CPT

## 2023-12-15 PROCEDURE — 80053 COMPREHEN METABOLIC PANEL: CPT

## 2023-12-15 PROCEDURE — 80048 BASIC METABOLIC PNL TOTAL CA: CPT

## 2023-12-15 PROCEDURE — 85025 COMPLETE CBC W/AUTO DIFF WBC: CPT

## 2023-12-15 PROCEDURE — C1874: CPT

## 2023-12-15 PROCEDURE — 36415 COLL VENOUS BLD VENIPUNCTURE: CPT

## 2023-12-15 PROCEDURE — C1887: CPT

## 2023-12-15 RX ORDER — ASPIRIN/CALCIUM CARB/MAGNESIUM 324 MG
1 TABLET ORAL
Qty: 30 | Refills: 6
Start: 2023-12-15 | End: 2024-07-11

## 2023-12-15 RX ORDER — APIXABAN 2.5 MG/1
1 TABLET, FILM COATED ORAL
Qty: 60 | Refills: 11
Start: 2023-12-15 | End: 2024-12-08

## 2023-12-15 RX ORDER — ASPIRIN/CALCIUM CARB/MAGNESIUM 324 MG
1 TABLET ORAL
Refills: 0 | DISCHARGE

## 2023-12-15 RX ORDER — CLOPIDOGREL BISULFATE 75 MG/1
1 TABLET, FILM COATED ORAL
Refills: 0 | DISCHARGE

## 2023-12-15 RX ORDER — CLOPIDOGREL BISULFATE 75 MG/1
1 TABLET, FILM COATED ORAL
Qty: 30 | Refills: 11
Start: 2023-12-15 | End: 2024-12-08

## 2023-12-15 RX ORDER — MAGNESIUM OXIDE 400 MG ORAL TABLET 241.3 MG
400 TABLET ORAL ONCE
Refills: 0 | Status: COMPLETED | OUTPATIENT
Start: 2023-12-15 | End: 2023-12-15

## 2023-12-15 RX ORDER — FERROUS SULFATE 325(65) MG
1 TABLET ORAL
Qty: 30 | Refills: 2
Start: 2023-12-15 | End: 2024-03-13

## 2023-12-15 RX ADMIN — PANTOPRAZOLE SODIUM 40 MILLIGRAM(S): 20 TABLET, DELAYED RELEASE ORAL at 06:12

## 2023-12-15 RX ADMIN — CLOPIDOGREL BISULFATE 75 MILLIGRAM(S): 75 TABLET, FILM COATED ORAL at 11:30

## 2023-12-15 RX ADMIN — APIXABAN 5 MILLIGRAM(S): 2.5 TABLET, FILM COATED ORAL at 06:12

## 2023-12-15 RX ADMIN — LISINOPRIL 20 MILLIGRAM(S): 2.5 TABLET ORAL at 06:12

## 2023-12-15 RX ADMIN — Medication 25 MILLIGRAM(S): at 06:12

## 2023-12-15 RX ADMIN — Medication 81 MILLIGRAM(S): at 11:30

## 2023-12-15 RX ADMIN — MAGNESIUM OXIDE 400 MG ORAL TABLET 400 MILLIGRAM(S): 241.3 TABLET ORAL at 11:29

## 2023-12-15 RX ADMIN — Medication 325 MILLIGRAM(S): at 11:31

## 2023-12-15 NOTE — DISCHARGE NOTE PROVIDER - CARE PROVIDERS DIRECT ADDRESSES
,[email or web address]

## 2023-12-15 NOTE — PROGRESS NOTE ADULT - PROBLEM SELECTOR PLAN 2
P/w CP/SOB/palpitations, initially in afib w RVR now converted to NSR [facility]  - EKG on arrival NSR 68, LBBB  - AC: hep gtt  - Rate control: Toprol 25mg BID P/w CP/SOB/palpitations, initially in afib w RVR now [facility]  - EKG on arrival NSR 68, LBBB  - AC: hep gtt  - Rate control: Toprol 25mg BID

## 2023-12-15 NOTE — DISCHARGE NOTE PROVIDER - NSDCCPCAREPLAN_GEN_ALL_CORE_FT
PRINCIPAL DISCHARGE DIAGNOSIS  Diagnosis: NSTEMI (non-ST elevation myocardial infarction)  Assessment and Plan of Treatment: You were found to have blockages in the arteries of your heart, also known as Coronary Artery Disease. You underwent a cardiac catheterization on 12/14/23 and received a stent to the oLCx artery.  PLEASE CONTINUE ASPIRIN 81MG DAILY AND PLAVIX 75MG DAILY. DO NOT STOP THESE MEDICATIONS FOR ANY REASON AS THEY ARE KEEPING YOUR STENT OPEN AND PREVENTING A HEART ATTACK.   Avoid strenuous activity or heavy lifting anything more than 5lbs for the next five days. Do not take a bath or swim for the next five days; you may shower. For any bleeding or hematoma formation (hardened blood collection under the skin) at the access site of your right wrist please hold pressure and go to the emergency room. Please follow up with Dr. Boone on 11/29/23 at 11:00am. For recurrent chest pain, please call your doctor or go to the emergency room.        SECONDARY DISCHARGE DIAGNOSES  Diagnosis: Afib  Assessment and Plan of Treatment: You have a known history of afib. Your heart was racing fast in afib and eventually converted back into normal rhythm with medications. Please comtinue Eliquis 5mg twice a day to continue to reduce risk of stroke. Also continue Metoprolol Tartrate 25mg twice a day.    Diagnosis: HTN (hypertension)  Assessment and Plan of Treatment: Please continue home medications Lisinopril, Metoprolol as listed to keep your blood pressure controlled. For blood pressure that is too high or too low please see your doctor or go to the emergency room as necessary.      Diagnosis: HLD (hyperlipidemia)  Assessment and Plan of Treatment: Please continue atorvastatin 40mg once a day at bedtime to keep your cholesterol low. High cholesterol contributes to heart disease.      Diagnosis: Anemia  Assessment and Plan of Treatment:      PRINCIPAL DISCHARGE DIAGNOSIS  Diagnosis: NSTEMI (non-ST elevation myocardial infarction)  Assessment and Plan of Treatment: You were found to have blockages in the arteries of your heart, also known as Coronary Artery Disease. You underwent a cardiac catheterization on 12/14/23 and received a stent to the oLCx artery.  PLEASE CONTINUE ASPIRIN 81MG DAILY AND PLAVIX 75MG DAILY. DO NOT STOP THESE MEDICATIONS FOR ANY REASON AS THEY ARE KEEPING YOUR STENT OPEN AND PREVENTING A HEART ATTACK.   Avoid strenuous activity or heavy lifting anything more than 5lbs for the next five days. Do not take a bath or swim for the next five days; you may shower. For any bleeding or hematoma formation (hardened blood collection under the skin) at the access site of your right wrist please hold pressure and go to the emergency room. Please follow up with Dr. Boone on 11/29/23 at 11:00am. For recurrent chest pain, please call your doctor or go to the emergency room.        SECONDARY DISCHARGE DIAGNOSES  Diagnosis: Afib  Assessment and Plan of Treatment: You have a known history of afib. Your heart was racing fast in afib and eventually converted back into normal rhythm with medications. Please comtinue Eliquis 5mg twice a day to continue to reduce risk of stroke. Also continue Metoprolol Tartrate 25mg twice a day.    Diagnosis: HTN (hypertension)  Assessment and Plan of Treatment: Please continue home medications Lisinopril, Metoprolol as listed to keep your blood pressure controlled. For blood pressure that is too high or too low please see your doctor or go to the emergency room as necessary.      Diagnosis: HLD (hyperlipidemia)  Assessment and Plan of Treatment: Please continue atorvastatin 40mg once a day at bedtime to keep your cholesterol low. High cholesterol contributes to heart disease.      Diagnosis: Anemia  Assessment and Plan of Treatment: You have a known history of iron deficiency anemia. You were seen by GI team and were cleared to continue your blood thinner. Please call GI team on the information provided to make a follow up visit with Dr. Tapia for continued maangement and care.     PRINCIPAL DISCHARGE DIAGNOSIS  Diagnosis: NSTEMI (non-ST elevation myocardial infarction)  Assessment and Plan of Treatment: You were found to have blockages in the arteries of your heart, also known as Coronary Artery Disease. You underwent a cardiac catheterization on 12/14/23 and received a stent to the oLCx artery.  PLEASE CONTINUE ASPIRIN 81MG DAILY AND PLAVIX 75MG DAILY. DO NOT STOP THESE MEDICATIONS FOR ANY REASON AS THEY ARE KEEPING YOUR STENT OPEN AND PREVENTING A HEART ATTACK.   For first three months, you will take three blood thinners including aspirin 81mg once a day, plavix 75mg once a day, and Eliquis 5mg twice a day. After three months, you will STOP aspirin and continue Eliquis and Plavix uninterrupted.   Avoid strenuous activity or heavy lifting anything more than 5lbs for the next five days. Do not take a bath or swim for the next five days; you may shower. For any bleeding or hematoma formation (hardened blood collection under the skin) at the access site of your right wrist please hold pressure and go to the emergency room. Please follow up with Dr. Boone on 12/29/23 at 11:30am. For recurrent chest pain, please call your doctor or go to the emergency room.        SECONDARY DISCHARGE DIAGNOSES  Diagnosis: Afib  Assessment and Plan of Treatment: You have a known history of afib. Your heart was racing fast in afib and eventually converted back into normal rhythm with medications. Please comtinue Eliquis 5mg twice a day to continue to reduce risk of stroke. Also continue Metoprolol Tartrate 25mg twice a day.    Diagnosis: HTN (hypertension)  Assessment and Plan of Treatment: Please continue home medications Lisinopril, Metoprolol as listed to keep your blood pressure controlled. For blood pressure that is too high or too low please see your doctor or go to the emergency room as necessary.      Diagnosis: HLD (hyperlipidemia)  Assessment and Plan of Treatment: Please continue atorvastatin 40mg once a day at bedtime to keep your cholesterol low. High cholesterol contributes to heart disease.      Diagnosis: Anemia  Assessment and Plan of Treatment: You have a known history of iron deficiency anemia. You were seen by GI team and were cleared to continue your blood thinner. Please call GI team on the information provided to make a follow up visit with Dr. Tapia for continued maangement and care.

## 2023-12-15 NOTE — PROGRESS NOTE ADULT - ASSESSMENT
65 yo M with PMhx of pAfib (on Eliquis, last dose 12/12 AM), CAD s/p KUSHAL mLCx/OM1/pLAD (3/2021), HTN, HLD, IAIN (baseline Hgb 9), s/p hemoclips 4/2023 (cleared for AC by GI) presented to [facility] complaining of sudden onset of CP/SOB/palpitations after going for a walk and eating lunch at work yesterday, found to be in afib w RVR and r/i NSTEMI, transferred to [facility] for further management. Initiated on hep gtt. S/p PO lopressor and IV diltiazem now converted to NSR.  Patient cleared by GI for A/C and will undergo cardiac cath today 12/14/23 with Dr. Sanderson      63 yo M with PMhx of pAfib (on Eliquis, last dose 12/12 AM), CAD s/p KUSHAL mLCx/OM1/pLAD (3/2021), HTN, HLD, IAIN (baseline Hgb 9), s/p hemoclips 4/2023 (cleared for AC by GI) presented to [facility] complaining of sudden onset of CP/SOB/palpitations after going for a walk and eating lunch at work yesterday, found to be in afib w RVR and r/i NSTEMI, transferred to [facility] for further management. Initiated on hep gtt. S/p PO lopressor and IV diltiazem now converted to NSR.  Patient cleared by GI for A/C and will undergo cardiac cath today 12/14/23 with Dr. Sanderson

## 2023-12-15 NOTE — PROGRESS NOTE ADULT - PROBLEM SELECTOR PLAN 4
TTE as above, EF 40-45%  - GDMT: Lisinopril 20mg qd, Toprol 25mg BID
TTE as above, EF 40-45%  - GDMT: Lisinopril 20mg qd, Toprol 25mg BID

## 2023-12-15 NOTE — DISCHARGE NOTE PROVIDER - PROVIDER TOKENS
PROVIDER:[TOKEN:[[id number]],FOLLOWUP:[2 weeks]],PROVIDER:[TOKEN:[[id number]],SCHEDULEDAPPT:[12/29/2023],SCHEDULEDAPPTTIME:[11:00 AM],ESTABLISHEDPATIENT:[T]] PROVIDER:[TOKEN:[[id number]],FOLLOWUP:[2 weeks]],PROVIDER:[TOKEN:[[id number]],SCHEDULEDAPPT:[12/29/2023],SCHEDULEDAPPTTIME:[11:00 AM],ESTABLISHEDPATIENT:[T]] PROVIDER:[TOKEN:[[id number]],FOLLOWUP:[2 weeks]],PROVIDER:[TOKEN:[[id number]],SCHEDULEDAPPT:[12/29/2023],SCHEDULEDAPPTTIME:[11:30 AM],ESTABLISHEDPATIENT:[T]] PROVIDER:[TOKEN:[[id number]],FOLLOWUP:[2 weeks]],PROVIDER:[TOKEN:[[id number]],SCHEDULEDAPPT:[12/29/2023],SCHEDULEDAPPTTIME:[11:30 AM],ESTABLISHEDPATIENT:[T]]

## 2023-12-15 NOTE — DISCHARGE NOTE PROVIDER - CARE PROVIDER_API CALL
Ishan Tapia  Gastroenterology  [address] [address]  Phone: [phone]  Fax: [phone]  Follow Up Time: 2 weeks    Dhaval Boone  Cardiovascular Disease  [address] [place] [address]  Phone: [phone]  Fax: [phone]  Established Patient  Scheduled Appointment: 12/29/2023 11:00 AM   Ishan Tapia  Gastroenterology  [address] [address]  Phone: [phone]  Fax: [phone]  Follow Up Time: 2 weeks    Dhaval Boone  Cardiovascular Disease  [address] [place] [address]  Phone: [phone]  Fax: [phone]  Established Patient  Scheduled Appointment: 12/29/2023 11:00 AM   Ishan Tapia  Gastroenterology  [address] [address]  Phone: [phone]  Fax: [phone]  Follow Up Time: 2 weeks    Dhaval Boone  Cardiovascular Disease  [address] [place] [address]  Phone: [phone]  Fax: [phone]  Established Patient  Scheduled Appointment: 12/29/2023 11:30 AM   Ishan Tapia  Gastroenterology  [address] [address]  Phone: [phone]  Fax: [phone]  Follow Up Time: 2 weeks    Dhaval Boone  Cardiovascular Disease  [address] [place] [address]  Phone: [phone]  Fax: [phone]  Established Patient  Scheduled Appointment: 12/29/2023 11:30 AM

## 2023-12-15 NOTE — PROGRESS NOTE ADULT - PROBLEM SELECTOR PLAN 5
Continue lisinopril 20mg qd and Toprol 25mg BID    F: None  E: Replete if K<4 or Mag<2  N: DASH Diet  GIppx: Protonix  VTEppx: Hep gtt  Dispo: Pending cardiac cath
Continue lisinopril 20mg qd and Toprol 25mg BID    F: None  E: Replete if K<4 or Mag<2  N: DASH Diet  GIppx: Protonix  VTEppx: Hep gtt  Dispo: Pending cardiac cath

## 2023-12-15 NOTE — DISCHARGE NOTE NURSING/CASE MANAGEMENT/SOCIAL WORK - NSDCPEFALRISK_GEN_ALL_CORE
For information on Fall & Injury Prevention, visit: [email or web address] OR  [email or web address] OR  https://www.cdc.gov/steadi/patient.html For information on Fall & Injury Prevention, visit: [email or web address] OR  [email or web address] OR  https://www.cdc.gov/steadi/patient.html

## 2023-12-15 NOTE — PROGRESS NOTE ADULT - SUBJECTIVE AND OBJECTIVE BOX
Cardiology PA Adult Progress Note    SUBJECTIVE ASSESSMENT:  	  MEDICATIONS:  lisinopril 20 milliGRAM(s) Oral daily  metoprolol tartrate 25 milliGRAM(s) Oral two times a day          pantoprazole    Tablet 40 milliGRAM(s) Oral before breakfast    atorvastatin 40 milliGRAM(s) Oral at bedtime    apixaban 5 milliGRAM(s) Oral every 12 hours  aspirin enteric coated 81 milliGRAM(s) Oral daily  clopidogrel Tablet 75 milliGRAM(s) Oral daily  ferrous    sulfate 325 milliGRAM(s) Oral daily  influenza   Vaccine 0.5 milliLiter(s) IntraMuscular once  sodium chloride 0.9%. 1000 milliLiter(s) IV Continuous <Continuous>    	  VITAL SIGNS:  T(C): 36.7 (12-14-23 @ 17:26), Max: 36.9 (12-14-23 @ 14:38)  HR: 81 (12-15-23 @ 06:10) (70 - 84)  BP: 134/83 (12-15-23 @ 06:10) (112/62 - 161/69)  RR: 16 (12-15-23 @ 04:15) (15 - 19)  SpO2: 99% (12-15-23 @ 04:15) (96% - 100%)  Wt(kg): --    I&O's Summary    14 Dec 2023 07:01  -  15 Dec 2023 07:00  --------------------------------------------------------  IN: 255 mL / OUT: 950 mL / NET: -695 mL                                           PHYSICAL EXAM:  Appearance: Normal	  HEENT: Normal oral mucosa, PERRL, EOMI	  Neck: Supple, + JVD/ - JVD; ___ Carotid Bruit   Cardiovascular: Normal S1 S2, No murmurs  Respiratory: Lungs clear to auscultation/Decreased Breath Sounds/No Rales, Rhonchi, Wheezing	  Gastrointestinal:  Soft, Non-tender, + BS	  Skin: No rashes, No ecchymoses, No cyanosis  Extremities: Normal range of motion, No clubbing, cyanosis or edema  Vascular: Peripheral pulses palpable 2+ bilaterally  Neurologic: Non-focal  Psychiatry: A & O x 3, Mood & affect appropriate    LABS:	 	                                  8.0    8.41  )-----------( 372      ( 14 Dec 2023 05:30 )             25.5     12-14    141  |  105  |  12  ----------------------------<  112<H>  3.8   |  23  |  0.70    Ca    8.8      14 Dec 2023 05:30  Mg     2.0     12-14      proBNP:   Lipid Profile:   HgA1c:   TSH:   PTT - ( 14 Dec 2023 05:30 )  PTT:72.8 sec

## 2023-12-15 NOTE — DISCHARGE NOTE NURSING/CASE MANAGEMENT/SOCIAL WORK - PATIENT PORTAL LINK FT
You can access the FollowMyHealth Patient Portal offered by [facility] by registering at the following website: [email or web address]. By joining [facility]’s FollowMyHealth portal, you will also be able to view your health information using other applications (apps) compatible with our system. You can access the FollowMyHealth Patient Portal offered by [facility] by registering at the following website: [email or web address]. By joining [facility]’s FollowMyHealth portal, you will also be able to view your health information using other applications (apps) compatible with our system.

## 2023-12-15 NOTE — PROGRESS NOTE ADULT - PROBLEM SELECTOR PLAN 3
Recent admission 4/2023 for IAIN (baseline Hgb 9), requiring 1uPRBC and IV iron. He was cleared for AC during this admission by GI. Was recommended for IV iron 200 mg x5 days as outpatient with hematology in addition to f/u w GI however pt never followed up.   [facility] 7.8 likely dilutional s/p 1L NS bolus. Currently denies any melena, hematochezia, hematuria  - EGD/Colonoscopy 4/2023 notable for a dieulafoy lesion at prox gastric body s/p 3 hemoclip with complete hemostasis  - As per request IC, patient to receive 1unit PRBC today, consented  - GI cleared patient for A/C again this admission 12/14/23 and rec PPI daily   - Maintain active T&S (next 12/16) Recent admission 4/2023 for IAIN (baseline Hgb 9), requiring 1uPRBC and IV iron. He was cleared for AC during this admission by GI. Was recommended for IV iron 200 mg x5 days as outpatient with hematology in addition to f/u w GI however pt never followed up.   [facility] 7.8 likely dilutional s/p 1L NS bolus. Currently denies any melena, hematochezia, hematuria  - EGD/Colonoscopy 4/2023 notable for a dieulafoy lesion at prox gastric body s/p 3 hemoclip with complete hemostasis  - As per request IC, patient to receive 1unit PRBC today, consented  - GI cleared patient for A/C again this admission 12/14/23 and rec PPI daily   - Maintain active T&S (next 12/16)

## 2023-12-15 NOTE — PROGRESS NOTE ADULT - PROBLEM SELECTOR PLAN 1
Hx of CAD s/p KUSHAL mLCx/OM1/pLAD 3/2021 @ [facility]. Presents w CP/afib. Currently CP free now converted to NSR.  - Trop T peaked at 1477, now downtrending; , CKMB 121.3. EKG NSR 68, LBBB.  - Stress echo 11/3/23: Equivocal for ischemia.  Baseline inferior/posterior WMA. Mild LV dysfunction w inferior/posterior/lateral hypokinesis, limited 2/2 development of rapid afib  - TTE 10/6/23: EF 40-45%. LV/LA mildly dilated. LV wall thickness mildly inc. Mild MR/TR.  - CCTA 10/6/23: Patent stents pLAD/pLCx/OM1. Severe stenosis pLCx prior to stent. Calcific plaque obscures the lumen in pRCA/RI/mLCx/dLAD, unable to r/o significant stenosis  - Initiated on hep gtt   - DAPT: Aspirin 81 mg daily, Plavix 75 mg daily   - Continue atorvastatin 40 mg daily   - Awaiting cardiac cath with Dr. Sanderson 12/14/23 Hx of CAD s/p KUSHAL mLCx/OM1/pLAD 3/2021 @ [facility]. Presents w CP/afib. Currently CP free now converted to NSR.  - Trop T peaked at 1477, now downtrending; , CKMB 121.3. EKG NSR 68, LBBB.  - Stress echo 11/3/23: Equivocal for ischemia.  Baseline inferior/posterior WMA. Mild LV dysfunction w inferior/posterior/lateral hypokinesis, limited 2/2 development of rapid afib  - TTE 10/6/23: EF 40-45%. LV/LA mildly dilated. LV wall thickness mildly inc. Mild MR/TR.  - CCTA 10/6/23: Patent stents pLAD/pLCx/OM1. Severe stenosis pLCx prior to stent. Calcific plaque obscures the lumen in pRCA/RI/mLCx/dLAD, unable to r/o significant stenosis  - Initiated on hep gtt   - DAPT: Aspirin 81 mg daily, Plavix 75 mg daily   - Continue atorvastatin 40 mg daily   - Awaiting cardiac cath with Dr. Sanderson 12/14/23

## 2023-12-15 NOTE — DISCHARGE NOTE PROVIDER - ATTENDING DISCHARGE PHYSICAL EXAMINATION:
63 yo M with PMhx of pAfib (on Eliquis, last dose 12/12 AM) CAD s/p KUSHAL mLCx/OM1/pLAD (3/2021), HTN, HLD, IAIN (baseline Hgb 9-10, s/p hemoclips 4/2023 (cleared for AC by GI) presented to [facility] complaining of sudden onset of CP/SOB/palpitations after going for a walk and eating lunch at work yesterday. He describes his CP as substernal, non radiating, resolving after he received Lopressor/diltiazem at [facility]. This episode is described as similar to how he felt when he was diagnosed with afib & anemia in April. Pt also endorses recent increased fatigue of late. Denies any associated orthopnea, PND, n/v/d, dizziness/lightheadedness, diaphoresis, LE edema, fever, chills, recent sick contacts.     1. AF RVR  Converted to sinus. Continue eliquis 5 mg bid and metoprolol 25 mg bid.    2. NSTEMI type 2  S/p cardiac cath (12/14/23): KUSHAL x1 oLCx; LM mild LI, LAD patent stent, RCA diffuse disease (small vessel); LVEDP 2mmHg, continue triple therapy for 3 months, risk of bleeding discussed with patient and wife, but given high complex intervention, IC prefer extended triple therapy for now.   Suggest close follow up with primary cardiologist in one week and cardiac rehab.    3. GI bleed  prior gi bleed, delaufoy lesion s.p 3 clips, consult GI prior to [facility], transfuse 1 prbcs.       65 yo M with PMhx of pAfib (on Eliquis, last dose 12/12 AM) CAD s/p KUSHAL mLCx/OM1/pLAD (3/2021), HTN, HLD, IAIN (baseline Hgb 9-10, s/p hemoclips 4/2023 (cleared for AC by GI) presented to [facility] complaining of sudden onset of CP/SOB/palpitations after going for a walk and eating lunch at work yesterday. He describes his CP as substernal, non radiating, resolving after he received Lopressor/diltiazem at [facility]. This episode is described as similar to how he felt when he was diagnosed with afib & anemia in April. Pt also endorses recent increased fatigue of late. Denies any associated orthopnea, PND, n/v/d, dizziness/lightheadedness, diaphoresis, LE edema, fever, chills, recent sick contacts.     1. AF RVR  Converted to sinus. Continue eliquis 5 mg bid and metoprolol 25 mg bid.    2. NSTEMI type 2  S/p cardiac cath (12/14/23): KUSHAL x1 oLCx; LM mild LI, LAD patent stent, RCA diffuse disease (small vessel); LVEDP 2mmHg, continue triple therapy for 3 months, risk of bleeding discussed with patient and wife, but given high complex intervention, IC prefer extended triple therapy for now.   Suggest close follow up with primary cardiologist in one week and cardiac rehab.    3. GI bleed  prior gi bleed, delaufoy lesion s.p 3 clips, consult GI prior to [facility], transfuse 1 prbcs.

## 2023-12-15 NOTE — DISCHARGE NOTE PROVIDER - NSDCMRMEDTOKEN_GEN_ALL_CORE_FT
aspirin 81 mg oral delayed release tablet: 1 tab(s) orally once a day  Crestor 10 mg oral tablet: 1 orally once a day (at bedtime)  Eliquis 5 mg oral tablet: 1 tab(s) orally 2 times a day  lisinopril 20 mg oral tablet: 1 tab(s) orally once a day  metoprolol tartrate 25 mg oral tablet: 1 tab(s) orally 2 times a day  pantoprazole 40 mg oral delayed release tablet: 1 tab(s) orally once a day (before a meal)  Plavix 75 mg oral tablet: 1 tab(s) orally once a day   aspirin 81 mg oral delayed release tablet: 1 tab(s) orally once a day  cardiac rehab, 3x/week x 12 weeks for diagnosis of CAD. Cardiologist Dr. Boone: cardiac rehab, 3x/week x 12 weeks for diagnosis of CAD. Cardiologist Dr. Boone  Crestor 10 mg oral tablet: 1 orally once a day (at bedtime)  Eliquis 5 mg oral tablet: 1 tab(s) orally 2 times a day  lisinopril 20 mg oral tablet: 1 tab(s) orally once a day  metoprolol tartrate 25 mg oral tablet: 1 tab(s) orally 2 times a day  pantoprazole 40 mg oral delayed release tablet: 1 tab(s) orally once a day (before a meal)  Plavix 75 mg oral tablet: 1 tab(s) orally once a day   Aspirin Enteric Coated 81 mg oral delayed release tablet: 1 tab(s) orally once a day  cardiac rehab, 3x/week x 12 weeks for diagnosis of CAD. Cardiologist Dr. Boone: cardiac rehab, 3x/week x 12 weeks for diagnosis of CAD. Cardiologist Dr. Boone  Crestor 10 mg oral tablet: 1 orally once a day (at bedtime)  Eliquis 5 mg oral tablet: 1 tab(s) orally 2 times a day  ferrous sulfate 325 mg (65 mg elemental iron) oral tablet: 1 tab(s) orally once a day  lisinopril 20 mg oral tablet: 1 tab(s) orally once a day  metoprolol tartrate 25 mg oral tablet: 1 tab(s) orally 2 times a day  pantoprazole 40 mg oral delayed release tablet: 1 tab(s) orally once a day (before a meal)  Plavix 75 mg oral tablet: 1 tab(s) orally once a day

## 2023-12-15 NOTE — DISCHARGE NOTE PROVIDER - HOSPITAL COURSE
65 yo M with PMhx of pAfib (on Eliquis, last dose 12/12 AM) CAD s/p KUSHAL mLCx/OM1/pLAD (3/2021), HTN, HLD, IAIN (baseline Hgb 9-10, s/p hemoclips 4/2023 (cleared for AC by GI) presented to [facility] complaining of sudden onset of CP/SOB/palpitations after going for a walk and eating lunch at work yesterday. He describes his CP as substernal, non radiating, resolving after he received Lopressor/diltiazem at [facility]. This episode is described as similar to how he felt when he was diagnosed with afib & anemia in April. Pt also endorses recent increased fatigue of late. Denies any associated orthopnea, PND, n/v/d, dizziness/lightheadedness, diaphoresis, LE edema, fever, chills, recent sick contacts.     [facility] course:  Vital signs: /89 mmHg, , SpO2 99% on RA, T 97.5 F, RR 18.   Labs significant for WBC 11.69 w left shift, H&h 9.3/30, K 3.5, Mg 1.7, , Trop I HS 1919.   EKG afib rate 129, LBBB. CXR w congestion. He was given Lopressor 25mg PO x1, diltiazem 10mg IV x1, and 1L NS bolus. Repeat EKG revealing conversion to NSR.  Pt transferred to [facility] for further management of afib and r/o ACS.     Upon arrival to [facility], vital signs /83, EKG NSR 69 bpm, LBBB, T 98.1, SpO2 97% on RA, RR 19. Remains asx, CP free.        63 yo M with PMhx of pAfib (on Eliquis, last dose 12/12 AM) CAD s/p KUSHAL mLCx/OM1/pLAD (3/2021), HTN, HLD, IAIN (baseline Hgb 9-10, s/p hemoclips 4/2023 (cleared for AC by GI) presented to [facility] complaining of sudden onset of CP/SOB/palpitations after going for a walk and eating lunch at work yesterday. He describes his CP as substernal, non radiating, resolving after he received Lopressor/diltiazem at [facility]. This episode is described as similar to how he felt when he was diagnosed with afib & anemia in April. Pt also endorses recent increased fatigue of late. Denies any associated orthopnea, PND, n/v/d, dizziness/lightheadedness, diaphoresis, LE edema, fever, chills, recent sick contacts.     [facility] course:  Vital signs: /89 mmHg, , SpO2 99% on RA, T 97.5 F, RR 18.   Labs significant for WBC 11.69 w left shift, H&h 9.3/30, K 3.5, Mg 1.7, , Trop I HS 1919.   EKG afib rate 129, LBBB. CXR w congestion. He was given Lopressor 25mg PO x1, diltiazem 10mg IV x1, and 1L NS bolus. Repeat EKG revealing conversion to NSR.  Pt transferred to [facility] for further management of afib and r/o ACS.     Upon arrival to [facility], vital signs /83, EKG NSR 69 bpm, LBBB, T 98.1, SpO2 97% on RA, RR 19. Remains asx, CP free.        63 yo M with PMhx of pAfib (on Eliquis, last dose 12/12 AM) CAD s/p KUSHAL mLCx/OM1/pLAD (3/2021), HTN, HLD, IAIN (baseline Hgb 9-10, s/p hemoclips 4/2023 (cleared for AC by GI) presented to [facility] complaining of sudden onset of CP/SOB/palpitations after going for a walk and eating lunch at work yesterday. He describes his CP as substernal, non radiating, resolving after he received Lopressor/diltiazem at [facility]. This episode is described as similar to how he felt when he was diagnosed with afib & anemia in April. Pt also endorses recent increased fatigue of late. Denies any associated orthopnea, PND, n/v/d, dizziness/lightheadedness, diaphoresis, LE edema, fever, chills, recent sick contacts.     [facility] course:  Vital signs: /89 mmHg, , SpO2 99% on RA, T 97.5 F, RR 18.   Labs significant for WBC 11.69 w left shift, H&h 9.3/30, K 3.5, Mg 1.7, , Trop I HS 1919.   EKG afib rate 129, LBBB. CXR w congestion. He was given Lopressor 25mg PO x1, diltiazem 10mg IV x1, and 1L NS bolus. Repeat EKG revealing conversion to NSR.  Pt transferred to [facility] for further management of afib and r/o ACS.     Upon arrival to [facility], vital signs /83, EKG NSR 69 bpm, LBBB, T 98.1, SpO2 97% on RA, RR 19. Remains asx, CP free....        65 yo M with PMhx of pAfib (on Eliquis, last dose 12/12 AM) CAD s/p KUSHAL mLCx/OM1/pLAD (3/2021), HTN, HLD, IAIN (baseline Hgb 9-10, s/p hemoclips 4/2023 (cleared for AC by GI) presented to [facility] complaining of sudden onset of CP/SOB/palpitations after going for a walk and eating lunch at work yesterday. He describes his CP as substernal, non radiating, resolving after he received Lopressor/diltiazem at [facility]. This episode is described as similar to how he felt when he was diagnosed with afib & anemia in April. Pt also endorses recent increased fatigue of late. Denies any associated orthopnea, PND, n/v/d, dizziness/lightheadedness, diaphoresis, LE edema, fever, chills, recent sick contacts.     [facility] course:  Vital signs: /89 mmHg, , SpO2 99% on RA, T 97.5 F, RR 18.   Labs significant for WBC 11.69 w left shift, H&h 9.3/30, K 3.5, Mg 1.7, , Trop I HS 1919.   EKG afib rate 129, LBBB. CXR w congestion. He was given Lopressor 25mg PO x1, diltiazem 10mg IV x1, and 1L NS bolus. Repeat EKG revealing conversion to NSR.  Pt transferred to [facility] for further management of afib and r/o ACS.     Upon arrival to [facility], vital signs /83, EKG NSR 69 bpm, LBBB, T 98.1, SpO2 97% on RA, RR 19. Remains asx, CP free....        65 yo M with PMhx of pAfib (on Eliquis, last dose 12/12 AM), CAD s/p KUSHAL mLCx/OM1/pLAD (3/2021), HTN, HLD, IAIN (baseline Hgb 9), s/p hemoclips 4/2023 (cleared for AC by GI) presented to [facility] complaining of sudden onset of CP/SOB/palpitations after going for a walk and eating lunch at work yesterday, found to be in afib w RVR and r/i NSTEMI.    Patient now transferred to [facility] for further management. Initiated on hep gtt. S/p PO lopressor and IV diltiazem now converted to NSR. He has hx of anemia previously requiring 1RBC unit  transfusion in April 2023. This admission, patient was seen by GI and cleared for A/C.     Patient underwent cardiac cath (12/14/23): KUSHAL x1 oLCx; LM mild LI, LAD patent stent, RCA diffuse disease (small vessel); LVEDP 2mmHg    Patient will continue ASA/PLAVIX/ELIQUIS___________    Pt seen and examined at bedside this AM without any complaints or events overnight, VSS, labs and telemetry reviewed and pt stable for discharge as discussed with Dr. Combs. Pt has received appropriate discharge instructions, including medication regimen, access site management and follow up with his cardiologist Dr. Boone on 11/29/23 at 11:00 am. Patient will also be contacting GI team to follow up with Dr. Tapia in 1-2 weeks.                  65 yo M with PMhx of pAfib (on Eliquis, last dose 12/12 AM), CAD s/p KUSHAL mLCx/OM1/pLAD (3/2021), HTN, HLD, IAIN (baseline Hgb 9), s/p hemoclips 4/2023 (cleared for AC by GI) presented to [facility] complaining of sudden onset of CP/SOB/palpitations after going for a walk and eating lunch at work yesterday, found to be in afib w RVR and r/i NSTEMI.    Patient now transferred to [facility] for further management. Initiated on hep gtt. S/p PO lopressor and IV diltiazem now converted to NSR. He has hx of anemia previously requiring 1RBC unit  transfusion in April 2023. This admission, patient was seen by GI and cleared for A/C.     Patient underwent cardiac cath (12/14/23): KUSHAL x1 oLCx; LM mild LI, LAD patent stent, RCA diffuse disease (small vessel); LVEDP 2mmHg    Patient will continue ASA/PLAVIX/ELIQUIS___________    Pt seen and examined at bedside this AM without any complaints or events overnight, VSS, labs and telemetry reviewed and pt stable for discharge as discussed with Dr. Combs. Pt has received appropriate discharge instructions, including medication regimen, access site management and follow up with his cardiologist Dr. Boone on 11/29/23 at 11:00 am. Patient will also be contacting GI team to follow up with Dr. Tapia in 1-2 weeks.                  65 yo M with PMhx of pAfib (on Eliquis, last dose 12/12 AM), CAD s/p KUSHAL mLCx/OM1/pLAD (3/2021), HTN, HLD, IAIN (baseline Hgb 9), s/p hemoclips 4/2023 (cleared for AC by GI) presented to [facility] complaining of sudden onset of CP/SOB/palpitations after going for a walk and eating lunch at work yesterday, found to be in afib w RVR and r/i NSTEMI.    Patient now transferred to [facility] for further management. Initiated on hep gtt. S/p PO lopressor and IV diltiazem now converted to NSR. He has hx of anemia previously requiring 1RBC unit  transfusion in April 2023. This admission, patient was seen by GI and cleared for A/C.     Patient underwent cardiac cath (12/14/23): KUSHAL x1 oLCx; LM mild LI, LAD patent stent, RCA diffuse disease (small vessel); LVEDP 2mmHg    Patient will continue ASA/PLAVIX/ELIQUIS___________    Pt seen and examined at bedside this AM without any complaints or events overnight, VSS, labs and telemetry reviewed and pt stable for discharge as discussed with Dr. Combs. Pt has received appropriate discharge instructions, including medication regimen, access site management and follow up with his cardiologist Dr. Boone on 11/29/23 at 11:00 am. Patient will also be contacting GI team to follow up with Dr. Tapia in 1-2 weeks.                  63 yo M with PMhx of pAfib (on Eliquis, last dose 12/12 AM), CAD s/p KUSHAL mLCx/OM1/pLAD (3/2021), HTN, HLD, IAIN (baseline Hgb 9), s/p hemoclips 4/2023 (cleared for AC by GI) presented to [facility] complaining of sudden onset of CP/SOB/palpitations after going for a walk and eating lunch at work yesterday, found to be in afib w RVR and r/i NSTEMI.    Patient now transferred to [facility] for further management. Initiated on hep gtt. S/p PO lopressor and IV diltiazem now converted to NSR. He has hx of anemia previously requiring 1RBC unit  transfusion in April 2023. This admission, patient was seen by GI and cleared for A/C.     Patient underwent cardiac cath (12/14/23): KUSHAL x1 oLCx; LM mild LI, LAD patent stent, RCA diffuse disease (small vessel); LVEDP 2mmHg    Patient will continue ASA/PLAVIX/ELIQUIS___________    Pt seen and examined at bedside this AM without any complaints or events overnight, VSS, labs and telemetry reviewed and pt stable for discharge as discussed with Dr. Combs. Pt has received appropriate discharge instructions, including medication regimen, access site management and follow up with his cardiologist Dr. Boone on 11/29/23 at 11:00 am. Patient will also be contacting GI team to follow up with Dr. Tapia in 1-2 weeks.                  65 yo M with PMhx of pAfib (on Eliquis, last dose 12/12 AM), CAD s/p KUSHAL mLCx/OM1/pLAD (3/2021), HTN, HLD, IAIN (baseline Hgb 9), s/p hemoclips 4/2023 (cleared for AC by GI) presented to [facility] complaining of sudden onset of CP/SOB/palpitations after going for a walk and eating lunch at work yesterday, found to be in afib w RVR and r/i NSTEMI.    Patient now transferred to [facility] for further management. Initiated on hep gtt. S/p PO lopressor and IV diltiazem now converted to NSR. He has hx of anemia previously requiring 1RBC unit  transfusion in April 2023. This admission, patient was seen by GI and cleared for A/C.     Patient underwent cardiac cath (12/14/23): KUSHAL x1 oLCx; LM mild LI, LAD patent stent, RCA diffuse disease (small vessel); LVEDP 2mmHg    Patient will continue Triple therapy for 3 months with aspirin/Plavix/Eliquis given the extent of interventional procedure done during cardiac cath. Afterwards, he will drop aspirin and continue Plavix and Eliquis.     GLP-1 receptor agonist/SGLT2 inhibitor meds discussed w/ patients and encouraged to discuss further with PMD or Endocrinologist at next visit.      Pt seen and examined at bedside this AM without any complaints or events overnight, VSS, labs and telemetry reviewed and pt stable for discharge as discussed with Dr. Combs. Pt has received appropriate discharge instructions, including medication regimen, access site management and follow up with his cardiologist Dr. Boone on 11/29/23 at 11:00 am. Patient will also be contacting GI team to follow up with Dr. Tapia in 1-2 weeks.                  65 yo M with PMhx of pAfib (on Eliquis, last dose 12/12 AM), CAD s/p KUSHAL mLCx/OM1/pLAD (3/2021), HTN, HLD, IAIN (baseline Hgb 9), s/p hemoclips 4/2023 (cleared for AC by GI) presented to [facility] complaining of sudden onset of CP/SOB/palpitations after going for a walk and eating lunch at work yesterday, found to be in afib w RVR and r/i NSTEMI.    Patient now transferred to [facility] for further management. Initiated on hep gtt. S/p PO lopressor and IV diltiazem now converted to NSR. He has hx of anemia previously requiring 1RBC unit  transfusion in April 2023. This admission, patient was seen by GI and cleared for A/C.     Patient underwent cardiac cath (12/14/23): KUSHAL x1 oLCx; LM mild LI, LAD patent stent, RCA diffuse disease (small vessel); LVEDP 2mmHg    Patient will continue Triple therapy for 3 months with aspirin/Plavix/Eliquis given the extent of interventional procedure done during cardiac cath. Afterwards, he will drop aspirin and continue Plavix and Eliquis.     GLP-1 receptor agonist/SGLT2 inhibitor meds discussed w/ patients and encouraged to discuss further with PMD or Endocrinologist at next visit.      Pt seen and examined at bedside this AM without any complaints or events overnight, VSS, labs and telemetry reviewed and pt stable for discharge as discussed with Dr. Combs. Pt has received appropriate discharge instructions, including medication regimen, access site management and follow up with his cardiologist Dr. Boone on 11/29/23 at 11:00 am. Patient will also be contacting GI team to follow up with Dr. Tapia in 1-2 weeks.                  63 yo M with PMhx of pAfib (on Eliquis, last dose 12/12 AM), CAD s/p KUSHAL mLCx/OM1/pLAD (3/2021), HTN, HLD, IAIN (baseline Hgb 9), s/p hemoclips 4/2023 (cleared for AC by GI) presented to [facility] complaining of sudden onset of CP/SOB/palpitations after going for a walk and eating lunch at work yesterday, found to be in afib w RVR and r/i NSTEMI.    Patient now transferred to [facility] for further management. Initiated on hep gtt. S/p PO lopressor and IV diltiazem now converted to NSR. He has hx of anemia previously requiring 1RBC unit  transfusion in April 2023. This admission, patient was seen by GI and cleared for A/C.     Patient underwent cardiac cath (12/14/23): KUSHAL x1 oLCx; LM mild LI, LAD patent stent, RCA diffuse disease (small vessel); LVEDP 2mmHg    Patient will continue Triple therapy for 3 months with aspirin/Plavix/Eliquis given the extent of interventional procedure done during cardiac cath. Afterwards, he will drop aspirin and continue Plavix and Eliquis.     GLP-1 receptor agonist/SGLT2 inhibitor meds discussed w/ patients and encouraged to discuss further with PMD or Endocrinologist at next visit.      Pt seen and examined at bedside this AM without any complaints or events overnight, VSS, labs and telemetry reviewed and pt stable for discharge as discussed with Dr. Combs. Pt has received appropriate discharge instructions, including medication regimen, access site management and follow up with his cardiologist Dr. Boone on 12/29/23 at 11:30 am. Patient will also be contacting GI team to follow up with Dr. Tapia in 1-2 weeks.                  63 yo M with PMhx of pAfib (on Eliquis, last dose 12/12 AM), CAD s/p KUSHAL mLCx/OM1/pLAD (3/2021), HTN, HLD, IAIN (baseline Hgb 9), s/p hemoclips 4/2023 (cleared for AC by GI) presented to [facility] complaining of sudden onset of CP/SOB/palpitations after going for a walk and eating lunch at work yesterday, found to be in afib w RVR and r/i NSTEMI.    Patient now transferred to [facility] for further management. Initiated on hep gtt. S/p PO lopressor and IV diltiazem now converted to NSR. He has hx of anemia previously requiring 1RBC unit  transfusion in April 2023. This admission, patient was seen by GI and cleared for A/C.     Patient underwent cardiac cath (12/14/23): KUSHAL x1 oLCx; LM mild LI, LAD patent stent, RCA diffuse disease (small vessel); LVEDP 2mmHg    Patient will continue Triple therapy for 3 months with aspirin/Plavix/Eliquis given the extent of interventional procedure done during cardiac cath. Afterwards, he will drop aspirin and continue Plavix and Eliquis.     GLP-1 receptor agonist/SGLT2 inhibitor meds discussed w/ patients and encouraged to discuss further with PMD or Endocrinologist at next visit.      Pt seen and examined at bedside this AM without any complaints or events overnight, VSS, labs and telemetry reviewed and pt stable for discharge as discussed with Dr. Combs. Pt has received appropriate discharge instructions, including medication regimen, access site management and follow up with his cardiologist Dr. Boone on 12/29/23 at 11:30 am. Patient will also be contacting GI team to follow up with Dr. Tapia in 1-2 weeks.

## 2023-12-22 DIAGNOSIS — I21.4 NON-ST ELEVATION (NSTEMI) MYOCARDIAL INFARCTION: ICD-10-CM

## 2023-12-22 DIAGNOSIS — Z79.82 LONG TERM (CURRENT) USE OF ASPIRIN: ICD-10-CM

## 2023-12-22 DIAGNOSIS — I50.22 CHRONIC SYSTOLIC (CONGESTIVE) HEART FAILURE: ICD-10-CM

## 2023-12-22 DIAGNOSIS — Z79.01 LONG TERM (CURRENT) USE OF ANTICOAGULANTS: ICD-10-CM

## 2023-12-22 DIAGNOSIS — Z87.891 PERSONAL HISTORY OF NICOTINE DEPENDENCE: ICD-10-CM

## 2023-12-22 DIAGNOSIS — E78.5 HYPERLIPIDEMIA, UNSPECIFIED: ICD-10-CM

## 2023-12-22 DIAGNOSIS — Z95.5 PRESENCE OF CORONARY ANGIOPLASTY IMPLANT AND GRAFT: ICD-10-CM

## 2023-12-22 DIAGNOSIS — D50.9 IRON DEFICIENCY ANEMIA, UNSPECIFIED: ICD-10-CM

## 2023-12-22 DIAGNOSIS — I25.84 CORONARY ATHEROSCLEROSIS DUE TO CALCIFIED CORONARY LESION: ICD-10-CM

## 2023-12-22 DIAGNOSIS — I11.0 HYPERTENSIVE HEART DISEASE WITH HEART FAILURE: ICD-10-CM

## 2023-12-22 DIAGNOSIS — I48.0 PAROXYSMAL ATRIAL FIBRILLATION: ICD-10-CM

## 2024-08-23 ENCOUNTER — RESULT REVIEW (OUTPATIENT)
Age: 64
End: 2024-08-23

## 2024-08-23 ENCOUNTER — OUTPATIENT (OUTPATIENT)
Dept: OUTPATIENT SERVICES | Facility: HOSPITAL | Age: 64
LOS: 1 days | End: 2024-08-23
Payer: COMMERCIAL

## 2024-08-23 ENCOUNTER — APPOINTMENT (OUTPATIENT)
Dept: CT IMAGING | Facility: HOSPITAL | Age: 64
End: 2024-08-23

## 2024-08-23 DIAGNOSIS — Z98.890 OTHER SPECIFIED POSTPROCEDURAL STATES: Chronic | ICD-10-CM

## 2024-08-23 LAB — POCT ISTAT CREATININE: 0.9 MG/DL — SIGNIFICANT CHANGE UP (ref 0.5–1.3)

## 2024-08-23 PROCEDURE — 82565 ASSAY OF CREATININE: CPT

## 2024-08-23 PROCEDURE — 74177 CT ABD & PELVIS W/CONTRAST: CPT

## 2024-08-23 PROCEDURE — 74177 CT ABD & PELVIS W/CONTRAST: CPT | Mod: 26

## 2025-05-15 NOTE — PROGRESS NOTE ADULT - PROBLEM SELECTOR PLAN 3
No Presented with Troponin T: 0.16, CKMB WNL. Non ACS troponin/myocardial injury, patient asymptomatic with EKG unremarkable for ST changes. LBBB present (chronic since 2018). EKG revealed aflutter with RVR. Likely due to anemia +/- demand from flutter/tachycardia in pt w/ underlying CAD. No history of CP recently or on admission     - Managing anemia as per above.   - Cardiology following - recs appreciated.

## 2025-06-26 NOTE — PATIENT PROFILE ADULT - PATIENT'S SEXUAL ORIENTATION
Assessment & Plan     (M48.061) Spinal stenosis of lumbar region without neurogenic claudication  (primary encounter diagnosis)  Comment: foraminal stenosis noted on imaging, see HPI  Ongoing, severe, unrelenting pain.  Not currently working with a pain or spine specialist.  Refer to spine specialist, recommend daily medications (aceteminophen 1000 mg po bid), and start amitriptyline and rare use benzodiazipine as noted below.  Plan: tamsulosin (FLOMAX) 0.4 MG capsule, LORazepam         (ATIVAN) 0.5 MG tablet, naloxone (NARCAN) 4         MG/0.1ML nasal spray          (M48.02) Cervical stenosis of spinal canal  Comment: also noted, although patient denies significant symptoms.  Plan: Spine  Referral, amitriptyline         (ELAVIL) 25 MG tablet, LORazepam (ATIVAN) 0.5         MG tablet, naloxone (NARCAN) 4 MG/0.1ML nasal         spray          (N40.0) Benign prostatic hyperplasia, unspecified whether lower urinary tract symptoms present  Comment: with frequent urination, recommended treatment to reduce urinary frequency symptoms given difficulty with mobility.  Plan: tamsulosin (FLOMAX) 0.4 MG capsule          (N18.31) CKD stage 3a, GFR 45-59 ml/min (H)  Comment:   GFR Estimate   Date Value Ref Range Status   04/08/2025 90 >60 mL/min/1.73m2 Final   03/01/2021 76 >60 mL/min/[1.73_m2] Final     Comment:     Non  GFR Calc  Starting 12/18/2018, serum creatinine based estimated GFR (eGFR) will be   calculated using the Chronic Kidney Disease Epidemiology Collaboration   (CKD-EPI) equation.       GFR, ESTIMATED POCT   Date Value Ref Range Status   10/24/2024 52 (L) >60 mL/min/1.73m2 Final     stable  Plan: Albumin Random Urine Quantitative with Creat         Ratio, CBC with platelets and differential        (Z90.5) S/p nephrectomy  (Z85.528) History of malignant neoplasm of kidney  Comment: routine labs due  Plan: Will fu as indicated.     (I10) Hypertension goal BP (blood pressure) <  140/90  Comment: not at goal, patient experiencing constant, severe pain.  Will check labs. Follow up blood pressure when pain under better control.  Plan: TSH WITH FREE T4 REFLEX, Comprehensive         metabolic panel (BMP + Alb, Alk Phos, ALT, AST,        Total. Bili, TP)          (R73.03) Pre-diabetes  Check annually, A1c normal today.  Plan: Hemoglobin A1c          (Z12.11) Screen for colon cancer  Recommended, but probably unable to tolerate until pain symptoms reduced.      Results for orders placed or performed in visit on 06/26/25   Hemoglobin A1c     Status: Normal   Result Value Ref Range    Estimated Average Glucose 114 <117 mg/dL    Hemoglobin A1C 5.6 0.0 - 5.6 %   CBC with platelets and differential     Status: Abnormal   Result Value Ref Range    WBC Count 7.5 4.0 - 11.0 10e3/uL    RBC Count 4.89 4.40 - 5.90 10e6/uL    Hemoglobin 14.2 13.3 - 17.7 g/dL    Hematocrit 45.2 40.0 - 53.0 %    MCV 92 78 - 100 fL    MCH 29.0 26.5 - 33.0 pg    MCHC 31.4 (L) 31.5 - 36.5 g/dL    RDW 14.1 10.0 - 15.0 %    Platelet Count 170 150 - 450 10e3/uL    % Neutrophils 77 %    % Lymphocytes 16 %    % Monocytes 6 %    % Eosinophils 1 %    % Basophils 0 %    % Immature Granulocytes 0 %    Absolute Neutrophils 5.7 1.6 - 8.3 10e3/uL    Absolute Lymphocytes 1.2 0.8 - 5.3 10e3/uL    Absolute Monocytes 0.4 0.0 - 1.3 10e3/uL    Absolute Eosinophils 0.1 0.0 - 0.7 10e3/uL    Absolute Basophils 0.0 0.0 - 0.2 10e3/uL    Absolute Immature Granulocytes 0.0 <=0.4 10e3/uL   CBC with platelets and differential     Status: Abnormal    Narrative    The following orders were created for panel order CBC with platelets and differential.  Procedure                               Abnormality         Status                     ---------                               -----------         ------                     CBC with platelets and ...[[id number]]  Abnormal            Final result                 Please view results for these tests on the  "individual orders.        BMI  Estimated body mass index is 33.12 kg/m  as calculated from the following:    Height as of this encounter: 1.72 m (5' 7.72\").    Weight as of this encounter: 98 kg (216 lb).   Weight management plan: encourage activity as tolerated.    Cheikh Fernandes is a 77 year old, presenting for the following health issues:  Medication Request (Pain medicaton)        6/26/2025     7:52 AM   Additional Questions   Roomed by Jorge Luis BERNAL   Accompanied by daughter     History of Present Illness       Reason for visit:  Primary visit   He is taking medications regularly.      Chronic back pain  Dom is having severe back pain, constant, impacting sleep, can't do any of his usual activities including bowling. No position is comfortable when lying down. Walking, sitting, everything causes back pain. He isn't taking acetaminophen regularly. He can't take ibuprofen due to s/p nephrectomy. Gabapentin does help with intermittent sciatic pain, hasn't helped low back pain.   Flexeril hasn't helped. Lidocaine patches haven't helped.    He's had pain since a fall with severe injury 2023, suffered a spine fracture. He's had spinal surgery 2/13/23 T8 - L1.  He's used to being very active, but now severely limited by pain, associated with exhaustion, extremely poor sleep.  He's doesn't want to take narcotics because he's already severely constipated.    He's had a bad year, pulmonary wedge resection for mass 3/13/25, CABG double bypass 3/15/25,  and cholecystectomy 3/30/25.    He's not any relief in back pain since surgery. He's had a follow up with the spine surgeon who told Dom that he wouldn't prescribe pain medication    Wt Readings from Last 4 Encounters:   06/26/25 98 kg (216 lb)   05/19/25 98 kg (216 lb)   05/01/25 98.8 kg (217 lb 12.8 oz)   04/18/25 99.2 kg (218 lb 12.8 oz)     Spine CT 6/1/2024  CANAL/FORAMINA: Multilevel disc height loss and facet hypertrophy. No high-grade spinal canal stenosis. " "Moderate to severe neural foraminal stenosis at L4-5 and L5-S1.     Cervical CT 2/12/23  CANAL/FORAMINA: Mild cervical degenerative change with mild spinal canal stenosis most significant at C6-C7 no severe spinal canal stenosis. Severe osseous foraminal stenosis on the left C2-C3 and C3-C4, bilaterally at C4-C5 at C6-C7. No canal or neural   foraminal stenosis.    Hypertension Follow-up    Do you check your blood pressure regularly outside of the clinic? No   Are you following a low salt diet? No  Are your blood pressures ever more than 140 on the top number (systolic) OR more   than 90 on the bottom number (diastolic), for example 140/90? Yes    BP Readings from Last 2 Encounters:   06/26/25 (!) 153/89   05/19/25 (!) 146/84     Chronic Kidney Disease Follow-up    S/p total nephrectomy for kidney cancer.  Do you take any over the counter pain medicine?: no nsaids    Impaired fasting glucose Follow-up    Patient is checking blood sugars: not at all  Diabetic concerns: None   Symptoms of hypoglycemia (low blood sugar): none   Paresthesias (numbness or burning in feet) or sores: No    Lab Results   Component Value Date    A1C 5.6 06/26/2025    A1C 5.8 01/14/2025    A1C 5.8 06/01/2024    A1C 5.7 12/16/2022    A1C 5.8 10/15/2019    A1C 6.0 03/06/2019          Review of Systems  Constitutional, HEENT, cardiovascular, pulmonary, gi and gu systems are negative, except as otherwise noted.      Objective    BP (!) 158/87 (BP Location: Right arm, Patient Position: Sitting, Cuff Size: Adult Regular)   Pulse (!) 49   Temp 97.2  F (36.2  C) (Temporal)   Resp 18   Ht 1.72 m (5' 7.72\")   Wt 98 kg (216 lb)   SpO2 98%   BMI 33.12 kg/m    Body mass index is 33.12 kg/m .  Physical Exam   GENERAL: alert and appears to be in pain, frequent position changes.  Uses large walking stick to help rise from seated and for ambulation.  Daughter Ravi accompanies him today.  EYES: Eyes grossly normal to inspection, PERRL and conjunctivae " and sclerae normal  RESP: lungs clear to auscultation - no rales, rhonchi or wheezes  CV: regular rate and rhythm, normal S1 S2, no S3 or S4, no murmur, click or rub, no peripheral edema  MS: no edema, no areas of decreased sensation noted, no swelling or bruising noted. Antalgic gait.  SKIN: no suspicious lesions or rashes  NEURO: Normal strength and tone, mentation intact and speech normal  PSYCH: mentation appears normal, affect distressed, appropriate for stated mood and symptoms     Recent Results (from the past 24 hours)   Hemoglobin A1c   Result Value Ref Range    Estimated Average Glucose 114 <117 mg/dL    Hemoglobin A1C 5.6 0.0 - 5.6 %   CBC with platelets and differential    Narrative    The following orders were created for panel order CBC with platelets and differential.  Procedure                               Abnormality         Status                     ---------                               -----------         ------                     CBC with platelets and ...[[id number]]  Abnormal            Final result                 Please view results for these tests on the individual orders.   CBC with platelets and differential   Result Value Ref Range    WBC Count 7.5 4.0 - 11.0 10e3/uL    RBC Count 4.89 4.40 - 5.90 10e6/uL    Hemoglobin 14.2 13.3 - 17.7 g/dL    Hematocrit 45.2 40.0 - 53.0 %    MCV 92 78 - 100 fL    MCH 29.0 26.5 - 33.0 pg    MCHC 31.4 (L) 31.5 - 36.5 g/dL    RDW 14.1 10.0 - 15.0 %    Platelet Count 170 150 - 450 10e3/uL    % Neutrophils 77 %    % Lymphocytes 16 %    % Monocytes 6 %    % Eosinophils 1 %    % Basophils 0 %    % Immature Granulocytes 0 %    Absolute Neutrophils 5.7 1.6 - 8.3 10e3/uL    Absolute Lymphocytes 1.2 0.8 - 5.3 10e3/uL    Absolute Monocytes 0.4 0.0 - 1.3 10e3/uL    Absolute Eosinophils 0.1 0.0 - 0.7 10e3/uL    Absolute Basophils 0.0 0.0 - 0.2 10e3/uL    Absolute Immature Granulocytes 0.0 <=0.4 10e3/uL           Signed Electronically by: Vernell Bucio,  MD     Heterosexual

## 2025-07-12 ENCOUNTER — INPATIENT (INPATIENT)
Facility: HOSPITAL | Age: 65
LOS: 17 days | Discharge: NURSING, REHAB OR LONG-TERM CARE | DRG: 185 | End: 2025-07-30
Attending: SURGERY | Admitting: SURGERY
Payer: COMMERCIAL

## 2025-07-12 VITALS
DIASTOLIC BLOOD PRESSURE: 71 MMHG | TEMPERATURE: 97 F | RESPIRATION RATE: 20 BRPM | SYSTOLIC BLOOD PRESSURE: 112 MMHG | OXYGEN SATURATION: 94 % | HEART RATE: 86 BPM

## 2025-07-12 DIAGNOSIS — Z98.890 OTHER SPECIFIED POSTPROCEDURAL STATES: Chronic | ICD-10-CM

## 2025-07-12 DIAGNOSIS — S22.49XA MULTIPLE FRACTURES OF RIBS, UNSPECIFIED SIDE, INITIAL ENCOUNTER FOR CLOSED FRACTURE: ICD-10-CM

## 2025-07-12 LAB
ADD ON TEST-SPECIMEN IN LAB: SIGNIFICANT CHANGE UP
ALBUMIN SERPL ELPH-MCNC: 4.3 G/DL — SIGNIFICANT CHANGE UP (ref 3.3–5)
ALP SERPL-CCNC: 71 U/L — SIGNIFICANT CHANGE UP (ref 40–120)
ALT FLD-CCNC: 81 U/L — HIGH (ref 10–45)
ANION GAP SERPL CALC-SCNC: 16 MMOL/L — SIGNIFICANT CHANGE UP (ref 5–17)
APTT BLD: 23.4 SEC — LOW (ref 26.1–36.8)
AST SERPL-CCNC: 101 U/L — HIGH (ref 10–40)
BASOPHILS # BLD AUTO: 0.04 K/UL — SIGNIFICANT CHANGE UP (ref 0–0.2)
BASOPHILS NFR BLD AUTO: 0.4 % — SIGNIFICANT CHANGE UP (ref 0–2)
BILIRUB SERPL-MCNC: 0.6 MG/DL — SIGNIFICANT CHANGE UP (ref 0.2–1.2)
BUN SERPL-MCNC: 10 MG/DL — SIGNIFICANT CHANGE UP (ref 7–23)
CALCIUM SERPL-MCNC: 9.2 MG/DL — SIGNIFICANT CHANGE UP (ref 8.4–10.5)
CHLORIDE SERPL-SCNC: 108 MMOL/L — SIGNIFICANT CHANGE UP (ref 96–108)
CO2 SERPL-SCNC: 20 MMOL/L — LOW (ref 22–31)
CREAT SERPL-MCNC: 0.89 MG/DL — SIGNIFICANT CHANGE UP (ref 0.5–1.3)
EGFR: 96 ML/MIN/1.73M2 — SIGNIFICANT CHANGE UP
EGFR: 96 ML/MIN/1.73M2 — SIGNIFICANT CHANGE UP
EOSINOPHIL # BLD AUTO: 0.09 K/UL — SIGNIFICANT CHANGE UP (ref 0–0.5)
EOSINOPHIL NFR BLD AUTO: 0.8 % — SIGNIFICANT CHANGE UP (ref 0–6)
GAS PNL BLDV: SIGNIFICANT CHANGE UP
GLUCOSE SERPL-MCNC: 130 MG/DL — HIGH (ref 70–99)
HCT VFR BLD CALC: 31.4 % — LOW (ref 39–50)
HCT VFR BLD CALC: 44 % — SIGNIFICANT CHANGE UP (ref 39–50)
HGB BLD-MCNC: 10 G/DL — LOW (ref 13–17)
HGB BLD-MCNC: 14.4 G/DL — SIGNIFICANT CHANGE UP (ref 13–17)
IMM GRANULOCYTES # BLD AUTO: 0.16 K/UL — HIGH (ref 0–0.07)
IMM GRANULOCYTES NFR BLD AUTO: 1.5 % — HIGH (ref 0–0.9)
INR BLD: 1.16 RATIO — SIGNIFICANT CHANGE UP (ref 0.85–1.16)
LIDOCAIN IGE QN: 32 U/L — SIGNIFICANT CHANGE UP (ref 7–60)
LYMPHOCYTES # BLD AUTO: 3.06 K/UL — SIGNIFICANT CHANGE UP (ref 1–3.3)
LYMPHOCYTES NFR BLD AUTO: 28.8 % — SIGNIFICANT CHANGE UP (ref 13–44)
MAGNESIUM SERPL-MCNC: 2 MG/DL — SIGNIFICANT CHANGE UP (ref 1.6–2.6)
MCHC RBC-ENTMCNC: 30.7 PG — SIGNIFICANT CHANGE UP (ref 27–34)
MCHC RBC-ENTMCNC: 30.8 PG — SIGNIFICANT CHANGE UP (ref 27–34)
MCHC RBC-ENTMCNC: 31.8 G/DL — LOW (ref 32–36)
MCHC RBC-ENTMCNC: 32.7 G/DL — SIGNIFICANT CHANGE UP (ref 32–36)
MCV RBC AUTO: 94 FL — SIGNIFICANT CHANGE UP (ref 80–100)
MCV RBC AUTO: 96.3 FL — SIGNIFICANT CHANGE UP (ref 80–100)
MONOCYTES # BLD AUTO: 1.14 K/UL — HIGH (ref 0–0.9)
MONOCYTES NFR BLD AUTO: 10.7 % — SIGNIFICANT CHANGE UP (ref 2–14)
NEUTROPHILS # BLD AUTO: 6.13 K/UL — SIGNIFICANT CHANGE UP (ref 1.8–7.4)
NEUTROPHILS NFR BLD AUTO: 57.8 % — SIGNIFICANT CHANGE UP (ref 43–77)
NRBC # BLD AUTO: 0 K/UL — SIGNIFICANT CHANGE UP (ref 0–0)
NRBC # BLD AUTO: 0 K/UL — SIGNIFICANT CHANGE UP (ref 0–0)
NRBC # FLD: 0 K/UL — SIGNIFICANT CHANGE UP (ref 0–0)
NRBC # FLD: 0 K/UL — SIGNIFICANT CHANGE UP (ref 0–0)
NRBC BLD AUTO-RTO: 0 /100 WBCS — SIGNIFICANT CHANGE UP (ref 0–0)
NRBC BLD AUTO-RTO: 0 /100 WBCS — SIGNIFICANT CHANGE UP (ref 0–0)
PHOSPHATE SERPL-MCNC: 2.7 MG/DL — SIGNIFICANT CHANGE UP (ref 2.5–4.5)
PLATELET # BLD AUTO: 285 K/UL — SIGNIFICANT CHANGE UP (ref 150–400)
PLATELET # BLD AUTO: 389 K/UL — SIGNIFICANT CHANGE UP (ref 150–400)
PMV BLD: 9.4 FL — SIGNIFICANT CHANGE UP (ref 7–13)
PMV BLD: 9.6 FL — SIGNIFICANT CHANGE UP (ref 7–13)
POTASSIUM SERPL-MCNC: 3.8 MMOL/L — SIGNIFICANT CHANGE UP (ref 3.5–5.3)
POTASSIUM SERPL-SCNC: 3.8 MMOL/L — SIGNIFICANT CHANGE UP (ref 3.5–5.3)
PROT SERPL-MCNC: 7.5 G/DL — SIGNIFICANT CHANGE UP (ref 6–8.3)
PROTHROM AB SERPL-ACNC: 13.2 SEC — SIGNIFICANT CHANGE UP (ref 9.9–13.4)
RBC # BLD: 3.26 M/UL — LOW (ref 4.2–5.8)
RBC # BLD: 4.68 M/UL — SIGNIFICANT CHANGE UP (ref 4.2–5.8)
RBC # FLD: 15.7 % — HIGH (ref 10.3–14.5)
RBC # FLD: 15.9 % — HIGH (ref 10.3–14.5)
SODIUM SERPL-SCNC: 144 MMOL/L — SIGNIFICANT CHANGE UP (ref 135–145)
WBC # BLD: 10.62 K/UL — HIGH (ref 3.8–10.5)
WBC # BLD: 18.82 K/UL — HIGH (ref 3.8–10.5)
WBC # FLD AUTO: 10.62 K/UL — HIGH (ref 3.8–10.5)
WBC # FLD AUTO: 18.82 K/UL — HIGH (ref 3.8–10.5)

## 2025-07-12 PROCEDURE — 99291 CRITICAL CARE FIRST HOUR: CPT

## 2025-07-12 PROCEDURE — 73060 X-RAY EXAM OF HUMERUS: CPT | Mod: 26

## 2025-07-12 PROCEDURE — 71260 CT THORAX DX C+: CPT | Mod: 26

## 2025-07-12 PROCEDURE — 72170 X-RAY EXAM OF PELVIS: CPT | Mod: 26

## 2025-07-12 PROCEDURE — 73110 X-RAY EXAM OF WRIST: CPT | Mod: 26,50

## 2025-07-12 PROCEDURE — 73562 X-RAY EXAM OF KNEE 3: CPT | Mod: 26,50

## 2025-07-12 PROCEDURE — 73070 X-RAY EXAM OF ELBOW: CPT | Mod: 26,50

## 2025-07-12 PROCEDURE — 72125 CT NECK SPINE W/O DYE: CPT | Mod: 26

## 2025-07-12 PROCEDURE — 93010 ELECTROCARDIOGRAM REPORT: CPT

## 2025-07-12 PROCEDURE — 72132 CT LUMBAR SPINE W/DYE: CPT | Mod: 26

## 2025-07-12 PROCEDURE — 70496 CT ANGIOGRAPHY HEAD: CPT | Mod: 26

## 2025-07-12 PROCEDURE — 73590 X-RAY EXAM OF LOWER LEG: CPT | Mod: 26,RT

## 2025-07-12 PROCEDURE — 73030 X-RAY EXAM OF SHOULDER: CPT | Mod: 26,50

## 2025-07-12 PROCEDURE — 99222 1ST HOSP IP/OBS MODERATE 55: CPT

## 2025-07-12 PROCEDURE — 70450 CT HEAD/BRAIN W/O DYE: CPT | Mod: 26,59

## 2025-07-12 PROCEDURE — 74177 CT ABD & PELVIS W/CONTRAST: CPT | Mod: 26

## 2025-07-12 PROCEDURE — 72129 CT CHEST SPINE W/DYE: CPT | Mod: 26

## 2025-07-12 PROCEDURE — 73130 X-RAY EXAM OF HAND: CPT | Mod: 26,50

## 2025-07-12 PROCEDURE — 71045 X-RAY EXAM CHEST 1 VIEW: CPT | Mod: 26

## 2025-07-12 PROCEDURE — 70498 CT ANGIOGRAPHY NECK: CPT | Mod: 26

## 2025-07-12 RX ORDER — FENTANYL CITRATE-0.9 % NACL/PF 100MCG/2ML
50 SYRINGE (ML) INTRAVENOUS ONCE
Refills: 0 | Status: DISCONTINUED | OUTPATIENT
Start: 2025-07-12 | End: 2025-07-12

## 2025-07-12 RX ORDER — POLYETHYLENE GLYCOL 3350 17 G/17G
17 POWDER, FOR SOLUTION ORAL EVERY 12 HOURS
Refills: 0 | Status: DISCONTINUED | OUTPATIENT
Start: 2025-07-12 | End: 2025-07-15

## 2025-07-12 RX ORDER — SODIUM CHLORIDE 9 G/1000ML
1000 INJECTION, SOLUTION INTRAVENOUS
Refills: 0 | Status: DISCONTINUED | OUTPATIENT
Start: 2025-07-12 | End: 2025-07-13

## 2025-07-12 RX ORDER — HYDROMORPHONE/SOD CHLOR,ISO/PF 2 MG/10 ML
0.5 SYRINGE (ML) INJECTION ONCE
Refills: 0 | Status: DISCONTINUED | OUTPATIENT
Start: 2025-07-12 | End: 2025-07-12

## 2025-07-12 RX ORDER — OXYCODONE HYDROCHLORIDE 30 MG/1
10 TABLET ORAL EVERY 4 HOURS
Refills: 0 | Status: DISCONTINUED | OUTPATIENT
Start: 2025-07-12 | End: 2025-07-13

## 2025-07-12 RX ORDER — CALCIUM GLUCONATE 20 MG/ML
2 INJECTION, SOLUTION INTRAVENOUS ONCE
Refills: 0 | Status: COMPLETED | OUTPATIENT
Start: 2025-07-12 | End: 2025-07-12

## 2025-07-12 RX ORDER — HYDROMORPHONE/SOD CHLOR,ISO/PF 2 MG/10 ML
0.5 SYRINGE (ML) INJECTION
Refills: 0 | Status: DISCONTINUED | OUTPATIENT
Start: 2025-07-12 | End: 2025-07-13

## 2025-07-12 RX ORDER — SODIUM CHLORIDE 9 G/1000ML
1000 INJECTION, SOLUTION INTRAVENOUS ONCE
Refills: 0 | Status: COMPLETED | OUTPATIENT
Start: 2025-07-12 | End: 2025-07-12

## 2025-07-12 RX ORDER — ACETAMINOPHEN 500 MG/5ML
1000 LIQUID (ML) ORAL EVERY 6 HOURS
Refills: 0 | Status: COMPLETED | OUTPATIENT
Start: 2025-07-12 | End: 2025-07-13

## 2025-07-12 RX ORDER — SENNA 187 MG
2 TABLET ORAL AT BEDTIME
Refills: 0 | Status: DISCONTINUED | OUTPATIENT
Start: 2025-07-12 | End: 2025-07-15

## 2025-07-12 RX ORDER — OXYCODONE HYDROCHLORIDE 30 MG/1
5 TABLET ORAL EVERY 4 HOURS
Refills: 0 | Status: DISCONTINUED | OUTPATIENT
Start: 2025-07-12 | End: 2025-07-13

## 2025-07-12 RX ORDER — LIDOCAINE HYDROCHLORIDE 20 MG/ML
1 JELLY TOPICAL EVERY 24 HOURS
Refills: 0 | Status: DISCONTINUED | OUTPATIENT
Start: 2025-07-12 | End: 2025-07-15

## 2025-07-12 RX ORDER — LIDOCAINE HCL/PF 10 MG/ML
5 VIAL (ML) INJECTION ONCE
Refills: 0 | Status: COMPLETED | OUTPATIENT
Start: 2025-07-12 | End: 2025-07-12

## 2025-07-12 RX ORDER — CEFAZOLIN SODIUM IN 0.9 % NACL 3 G/100 ML
2000 INTRAVENOUS SOLUTION, PIGGYBACK (ML) INTRAVENOUS ONCE
Refills: 0 | Status: COMPLETED | OUTPATIENT
Start: 2025-07-12 | End: 2025-07-12

## 2025-07-12 RX ADMIN — LIDOCAINE HYDROCHLORIDE 1 PATCH: 20 JELLY TOPICAL at 19:00

## 2025-07-12 RX ADMIN — Medication 100 MILLIGRAM(S): at 15:47

## 2025-07-12 RX ADMIN — Medication 0.5 MILLIGRAM(S): at 23:40

## 2025-07-12 RX ADMIN — LIDOCAINE HYDROCHLORIDE 1 PATCH: 20 JELLY TOPICAL at 17:35

## 2025-07-12 RX ADMIN — Medication 50 MICROGRAM(S): at 13:57

## 2025-07-12 RX ADMIN — Medication 0.5 MILLIGRAM(S): at 23:10

## 2025-07-12 RX ADMIN — Medication 400 MILLIGRAM(S): at 18:13

## 2025-07-12 RX ADMIN — SODIUM CHLORIDE 1000 MILLILITER(S): 9 INJECTION, SOLUTION INTRAVENOUS at 16:22

## 2025-07-12 RX ADMIN — Medication 0.5 MILLIGRAM(S): at 20:48

## 2025-07-12 RX ADMIN — Medication 0.5 MILLIGRAM(S): at 19:30

## 2025-07-12 RX ADMIN — Medication 50 MICROGRAM(S): at 13:27

## 2025-07-12 RX ADMIN — Medication 0.5 MILLIGRAM(S): at 21:18

## 2025-07-12 RX ADMIN — POLYETHYLENE GLYCOL 3350 17 GRAM(S): 17 POWDER, FOR SOLUTION ORAL at 18:13

## 2025-07-12 RX ADMIN — SODIUM CHLORIDE 100 MILLILITER(S): 9 INJECTION, SOLUTION INTRAVENOUS at 19:54

## 2025-07-12 RX ADMIN — Medication 1000 MILLIGRAM(S): at 18:45

## 2025-07-12 RX ADMIN — Medication 0.5 MILLIGRAM(S): at 16:50

## 2025-07-12 RX ADMIN — SODIUM CHLORIDE 1000 MILLILITER(S): 9 INJECTION, SOLUTION INTRAVENOUS at 20:39

## 2025-07-12 RX ADMIN — Medication 0.5 MILLIGRAM(S): at 16:21

## 2025-07-12 RX ADMIN — Medication 0.5 MILLIGRAM(S): at 19:00

## 2025-07-12 RX ADMIN — Medication 5 MILLILITER(S): at 18:57

## 2025-07-12 RX ADMIN — OXYCODONE HYDROCHLORIDE 10 MILLIGRAM(S): 30 TABLET ORAL at 21:18

## 2025-07-12 RX ADMIN — SODIUM CHLORIDE 125 MILLILITER(S): 9 INJECTION, SOLUTION INTRAVENOUS at 20:39

## 2025-07-12 RX ADMIN — Medication 50 MICROGRAM(S): at 15:35

## 2025-07-12 RX ADMIN — Medication 500 MILLILITER(S): at 13:45

## 2025-07-12 RX ADMIN — SODIUM CHLORIDE 1000 MILLILITER(S): 9 INJECTION, SOLUTION INTRAVENOUS at 18:58

## 2025-07-12 RX ADMIN — OXYCODONE HYDROCHLORIDE 10 MILLIGRAM(S): 30 TABLET ORAL at 20:48

## 2025-07-12 RX ADMIN — Medication 50 MICROGRAM(S): at 16:05

## 2025-07-12 RX ADMIN — CALCIUM GLUCONATE 200 GRAM(S): 20 INJECTION, SOLUTION INTRAVENOUS at 20:39

## 2025-07-13 LAB
ADD ON TEST-SPECIMEN IN LAB: SIGNIFICANT CHANGE UP
ALBUMIN SERPL ELPH-MCNC: 3 G/DL — LOW (ref 3.3–5)
ALBUMIN SERPL ELPH-MCNC: 3.2 G/DL — LOW (ref 3.3–5)
ALBUMIN SERPL ELPH-MCNC: 3.3 G/DL — SIGNIFICANT CHANGE UP (ref 3.3–5)
ALP SERPL-CCNC: 42 U/L — SIGNIFICANT CHANGE UP (ref 40–120)
ALP SERPL-CCNC: 45 U/L — SIGNIFICANT CHANGE UP (ref 40–120)
ALP SERPL-CCNC: 49 U/L — SIGNIFICANT CHANGE UP (ref 40–120)
ALT FLD-CCNC: 169 U/L — HIGH (ref 10–45)
ALT FLD-CCNC: 172 U/L — HIGH (ref 10–45)
ALT FLD-CCNC: 78 U/L — HIGH (ref 10–45)
AMPHET UR-MCNC: NEGATIVE — SIGNIFICANT CHANGE UP
ANION GAP SERPL CALC-SCNC: 13 MMOL/L — SIGNIFICANT CHANGE UP (ref 5–17)
ANION GAP SERPL CALC-SCNC: 13 MMOL/L — SIGNIFICANT CHANGE UP (ref 5–17)
ANION GAP SERPL CALC-SCNC: 15 MMOL/L — SIGNIFICANT CHANGE UP (ref 5–17)
APTT BLD: 27.1 SEC — SIGNIFICANT CHANGE UP (ref 26.1–36.8)
APTT BLD: 27.9 SEC — SIGNIFICANT CHANGE UP (ref 26.1–36.8)
AST SERPL-CCNC: 127 U/L — HIGH (ref 10–40)
AST SERPL-CCNC: 197 U/L — HIGH (ref 10–40)
AST SERPL-CCNC: 215 U/L — HIGH (ref 10–40)
BARBITURATES UR SCN-MCNC: NEGATIVE — SIGNIFICANT CHANGE UP
BENZODIAZ UR-MCNC: NEGATIVE — SIGNIFICANT CHANGE UP
BILIRUB DIRECT SERPL-MCNC: 0.3 MG/DL — SIGNIFICANT CHANGE UP (ref 0–0.3)
BILIRUB INDIRECT FLD-MCNC: 0.4 MG/DL — SIGNIFICANT CHANGE UP (ref 0.2–1)
BILIRUB SERPL-MCNC: 0.5 MG/DL — SIGNIFICANT CHANGE UP (ref 0.2–1.2)
BILIRUB SERPL-MCNC: 0.6 MG/DL — SIGNIFICANT CHANGE UP (ref 0.2–1.2)
BILIRUB SERPL-MCNC: 0.7 MG/DL — SIGNIFICANT CHANGE UP (ref 0.2–1.2)
BUN SERPL-MCNC: 17 MG/DL — SIGNIFICANT CHANGE UP (ref 7–23)
BUN SERPL-MCNC: 18 MG/DL — SIGNIFICANT CHANGE UP (ref 7–23)
BUN SERPL-MCNC: 18 MG/DL — SIGNIFICANT CHANGE UP (ref 7–23)
CALCIUM SERPL-MCNC: 8.2 MG/DL — LOW (ref 8.4–10.5)
CALCIUM SERPL-MCNC: 8.3 MG/DL — LOW (ref 8.4–10.5)
CALCIUM SERPL-MCNC: 8.4 MG/DL — SIGNIFICANT CHANGE UP (ref 8.4–10.5)
CHLORIDE SERPL-SCNC: 102 MMOL/L — SIGNIFICANT CHANGE UP (ref 96–108)
CHLORIDE SERPL-SCNC: 105 MMOL/L — SIGNIFICANT CHANGE UP (ref 96–108)
CHLORIDE SERPL-SCNC: 107 MMOL/L — SIGNIFICANT CHANGE UP (ref 96–108)
CO2 SERPL-SCNC: 20 MMOL/L — LOW (ref 22–31)
CO2 SERPL-SCNC: 20 MMOL/L — LOW (ref 22–31)
CO2 SERPL-SCNC: 22 MMOL/L — SIGNIFICANT CHANGE UP (ref 22–31)
COCAINE METAB.OTHER UR-MCNC: NEGATIVE — SIGNIFICANT CHANGE UP
CREAT SERPL-MCNC: 0.9 MG/DL — SIGNIFICANT CHANGE UP (ref 0.5–1.3)
CREAT SERPL-MCNC: 1.08 MG/DL — SIGNIFICANT CHANGE UP (ref 0.5–1.3)
CREAT SERPL-MCNC: 1.16 MG/DL — SIGNIFICANT CHANGE UP (ref 0.5–1.3)
EGFR: 71 ML/MIN/1.73M2 — SIGNIFICANT CHANGE UP
EGFR: 71 ML/MIN/1.73M2 — SIGNIFICANT CHANGE UP
EGFR: 77 ML/MIN/1.73M2 — SIGNIFICANT CHANGE UP
EGFR: 77 ML/MIN/1.73M2 — SIGNIFICANT CHANGE UP
EGFR: 96 ML/MIN/1.73M2 — SIGNIFICANT CHANGE UP
EGFR: 96 ML/MIN/1.73M2 — SIGNIFICANT CHANGE UP
GAS PNL BLDV: SIGNIFICANT CHANGE UP
GLUCOSE SERPL-MCNC: 122 MG/DL — HIGH (ref 70–99)
GLUCOSE SERPL-MCNC: 135 MG/DL — HIGH (ref 70–99)
GLUCOSE SERPL-MCNC: 161 MG/DL — HIGH (ref 70–99)
HCT VFR BLD CALC: 23.5 % — LOW (ref 39–50)
HCT VFR BLD CALC: 24.8 % — LOW (ref 39–50)
HCT VFR BLD CALC: 27.8 % — LOW (ref 39–50)
HCT VFR BLD CALC: 28 % — LOW (ref 39–50)
HCT VFR BLD CALC: 28.2 % — LOW (ref 39–50)
HGB BLD-MCNC: 7.7 G/DL — LOW (ref 13–17)
HGB BLD-MCNC: 7.9 G/DL — LOW (ref 13–17)
HGB BLD-MCNC: 9 G/DL — LOW (ref 13–17)
HGB BLD-MCNC: 9.1 G/DL — LOW (ref 13–17)
HGB BLD-MCNC: 9.2 G/DL — LOW (ref 13–17)
INR BLD: 1.25 RATIO — HIGH (ref 0.85–1.16)
INR BLD: 1.25 RATIO — HIGH (ref 0.85–1.16)
MAGNESIUM SERPL-MCNC: 1.9 MG/DL — SIGNIFICANT CHANGE UP (ref 1.6–2.6)
MAGNESIUM SERPL-MCNC: 2.2 MG/DL — SIGNIFICANT CHANGE UP (ref 1.6–2.6)
MAGNESIUM SERPL-MCNC: 2.6 MG/DL — SIGNIFICANT CHANGE UP (ref 1.6–2.6)
MCHC RBC-ENTMCNC: 30.3 PG — SIGNIFICANT CHANGE UP (ref 27–34)
MCHC RBC-ENTMCNC: 30.6 PG — SIGNIFICANT CHANGE UP (ref 27–34)
MCHC RBC-ENTMCNC: 30.6 PG — SIGNIFICANT CHANGE UP (ref 27–34)
MCHC RBC-ENTMCNC: 31 PG — SIGNIFICANT CHANGE UP (ref 27–34)
MCHC RBC-ENTMCNC: 31.2 PG — SIGNIFICANT CHANGE UP (ref 27–34)
MCHC RBC-ENTMCNC: 31.9 G/DL — LOW (ref 32–36)
MCHC RBC-ENTMCNC: 32.1 G/DL — SIGNIFICANT CHANGE UP (ref 32–36)
MCHC RBC-ENTMCNC: 32.3 G/DL — SIGNIFICANT CHANGE UP (ref 32–36)
MCHC RBC-ENTMCNC: 32.8 G/DL — SIGNIFICANT CHANGE UP (ref 32–36)
MCHC RBC-ENTMCNC: 33.1 G/DL — SIGNIFICANT CHANGE UP (ref 32–36)
MCV RBC AUTO: 94.2 FL — SIGNIFICANT CHANGE UP (ref 80–100)
MCV RBC AUTO: 94.3 FL — SIGNIFICANT CHANGE UP (ref 80–100)
MCV RBC AUTO: 94.8 FL — SIGNIFICANT CHANGE UP (ref 80–100)
MCV RBC AUTO: 94.9 FL — SIGNIFICANT CHANGE UP (ref 80–100)
MCV RBC AUTO: 96.1 FL — SIGNIFICANT CHANGE UP (ref 80–100)
METHADONE UR-MCNC: NEGATIVE — SIGNIFICANT CHANGE UP
NRBC # BLD AUTO: 0 K/UL — SIGNIFICANT CHANGE UP (ref 0–0)
NRBC # FLD: 0 K/UL — SIGNIFICANT CHANGE UP (ref 0–0)
NRBC BLD AUTO-RTO: 0 /100 WBCS — SIGNIFICANT CHANGE UP (ref 0–0)
OPIATES UR-MCNC: NEGATIVE — SIGNIFICANT CHANGE UP
OXYCODONE UR-MCNC: NEGATIVE — SIGNIFICANT CHANGE UP
PCP SPEC-MCNC: SIGNIFICANT CHANGE UP
PCP UR-MCNC: NEGATIVE — SIGNIFICANT CHANGE UP
PHOSPHATE SERPL-MCNC: 3.6 MG/DL — SIGNIFICANT CHANGE UP (ref 2.5–4.5)
PHOSPHATE SERPL-MCNC: 4.4 MG/DL — SIGNIFICANT CHANGE UP (ref 2.5–4.5)
PHOSPHATE SERPL-MCNC: 4.8 MG/DL — HIGH (ref 2.5–4.5)
PLATELET # BLD AUTO: 238 K/UL — SIGNIFICANT CHANGE UP (ref 150–400)
PLATELET # BLD AUTO: 240 K/UL — SIGNIFICANT CHANGE UP (ref 150–400)
PLATELET # BLD AUTO: 245 K/UL — SIGNIFICANT CHANGE UP (ref 150–400)
PLATELET # BLD AUTO: 265 K/UL — SIGNIFICANT CHANGE UP (ref 150–400)
PLATELET # BLD AUTO: 280 K/UL — SIGNIFICANT CHANGE UP (ref 150–400)
PMV BLD: 10 FL — SIGNIFICANT CHANGE UP (ref 7–13)
PMV BLD: 10.2 FL — SIGNIFICANT CHANGE UP (ref 7–13)
PMV BLD: 10.2 FL — SIGNIFICANT CHANGE UP (ref 7–13)
POTASSIUM SERPL-MCNC: 4.3 MMOL/L — SIGNIFICANT CHANGE UP (ref 3.5–5.3)
POTASSIUM SERPL-MCNC: 4.3 MMOL/L — SIGNIFICANT CHANGE UP (ref 3.5–5.3)
POTASSIUM SERPL-MCNC: 4.6 MMOL/L — SIGNIFICANT CHANGE UP (ref 3.5–5.3)
POTASSIUM SERPL-SCNC: 4.3 MMOL/L — SIGNIFICANT CHANGE UP (ref 3.5–5.3)
POTASSIUM SERPL-SCNC: 4.3 MMOL/L — SIGNIFICANT CHANGE UP (ref 3.5–5.3)
POTASSIUM SERPL-SCNC: 4.6 MMOL/L — SIGNIFICANT CHANGE UP (ref 3.5–5.3)
PROT SERPL-MCNC: 5.2 G/DL — LOW (ref 6–8.3)
PROT SERPL-MCNC: 5.3 G/DL — LOW (ref 6–8.3)
PROT SERPL-MCNC: 5.6 G/DL — LOW (ref 6–8.3)
PROTHROM AB SERPL-ACNC: 14.2 SEC — HIGH (ref 9.9–13.4)
PROTHROM AB SERPL-ACNC: 14.3 SEC — HIGH (ref 9.9–13.4)
RAPIDTEG MAXIMUM AMPLITUDE: 67 MM — SIGNIFICANT CHANGE UP (ref 52–70)
RBC # BLD: 2.48 M/UL — LOW (ref 4.2–5.8)
RBC # BLD: 2.58 M/UL — LOW (ref 4.2–5.8)
RBC # BLD: 2.95 M/UL — LOW (ref 4.2–5.8)
RBC # BLD: 2.97 M/UL — LOW (ref 4.2–5.8)
RBC # BLD: 2.97 M/UL — LOW (ref 4.2–5.8)
RBC # FLD: 15.8 % — HIGH (ref 10.3–14.5)
RBC # FLD: 15.9 % — HIGH (ref 10.3–14.5)
RBC # FLD: 16 % — HIGH (ref 10.3–14.5)
SODIUM SERPL-SCNC: 137 MMOL/L — SIGNIFICANT CHANGE UP (ref 135–145)
SODIUM SERPL-SCNC: 138 MMOL/L — SIGNIFICANT CHANGE UP (ref 135–145)
SODIUM SERPL-SCNC: 142 MMOL/L — SIGNIFICANT CHANGE UP (ref 135–145)
TEG FUNCTIONAL FIBRINOGEN: 23.4 MM — SIGNIFICANT CHANGE UP (ref 15–32)
TEG LY30 (LYSIS): 0 % — SIGNIFICANT CHANGE UP (ref 0–2.6)
TEG REACTION TIME: 6.1 MIN — SIGNIFICANT CHANGE UP (ref 4.6–9.1)
THC UR QL: POSITIVE
WBC # BLD: 10.99 K/UL — HIGH (ref 3.8–10.5)
WBC # BLD: 11.55 K/UL — HIGH (ref 3.8–10.5)
WBC # BLD: 12.86 K/UL — HIGH (ref 3.8–10.5)
WBC # BLD: 15.53 K/UL — HIGH (ref 3.8–10.5)
WBC # BLD: 15.93 K/UL — HIGH (ref 3.8–10.5)
WBC # FLD AUTO: 10.99 K/UL — HIGH (ref 3.8–10.5)
WBC # FLD AUTO: 11.55 K/UL — HIGH (ref 3.8–10.5)
WBC # FLD AUTO: 12.86 K/UL — HIGH (ref 3.8–10.5)
WBC # FLD AUTO: 15.53 K/UL — HIGH (ref 3.8–10.5)
WBC # FLD AUTO: 15.93 K/UL — HIGH (ref 3.8–10.5)

## 2025-07-13 PROCEDURE — 72148 MRI LUMBAR SPINE W/O DYE: CPT | Mod: 26

## 2025-07-13 PROCEDURE — 93306 TTE W/DOPPLER COMPLETE: CPT | Mod: 26

## 2025-07-13 PROCEDURE — 73200 CT UPPER EXTREMITY W/O DYE: CPT | Mod: 26,50

## 2025-07-13 PROCEDURE — 71045 X-RAY EXAM CHEST 1 VIEW: CPT | Mod: 26

## 2025-07-13 PROCEDURE — 73070 X-RAY EXAM OF ELBOW: CPT | Mod: 26,LT

## 2025-07-13 PROCEDURE — 99233 SBSQ HOSP IP/OBS HIGH 50: CPT

## 2025-07-13 PROCEDURE — 73060 X-RAY EXAM OF HUMERUS: CPT | Mod: 26,LT

## 2025-07-13 PROCEDURE — 99223 1ST HOSP IP/OBS HIGH 75: CPT

## 2025-07-13 PROCEDURE — 73090 X-RAY EXAM OF FOREARM: CPT | Mod: 26,LT

## 2025-07-13 PROCEDURE — 99222 1ST HOSP IP/OBS MODERATE 55: CPT | Mod: GC

## 2025-07-13 PROCEDURE — 72146 MRI CHEST SPINE W/O DYE: CPT | Mod: 26

## 2025-07-13 PROCEDURE — 76377 3D RENDER W/INTRP POSTPROCES: CPT | Mod: 26

## 2025-07-13 RX ORDER — HALOPERIDOL 10 MG/1
2.5 TABLET ORAL ONCE
Refills: 0 | Status: DISCONTINUED | OUTPATIENT
Start: 2025-07-13 | End: 2025-07-13

## 2025-07-13 RX ORDER — SODIUM CHLORIDE 9 G/1000ML
500 INJECTION, SOLUTION INTRAVENOUS ONCE
Refills: 0 | Status: COMPLETED | OUTPATIENT
Start: 2025-07-13 | End: 2025-07-13

## 2025-07-13 RX ORDER — ACETAMINOPHEN 500 MG/5ML
1000 LIQUID (ML) ORAL EVERY 6 HOURS
Refills: 0 | Status: COMPLETED | OUTPATIENT
Start: 2025-07-13 | End: 2025-07-14

## 2025-07-13 RX ORDER — BACITRACIN 500 UNIT/G
1 OINTMENT (GRAM) TOPICAL ONCE
Refills: 0 | Status: COMPLETED | OUTPATIENT
Start: 2025-07-13 | End: 2025-07-13

## 2025-07-13 RX ORDER — METOPROLOL SUCCINATE 50 MG/1
50 TABLET, EXTENDED RELEASE ORAL ONCE
Refills: 0 | Status: COMPLETED | OUTPATIENT
Start: 2025-07-13 | End: 2025-07-13

## 2025-07-13 RX ORDER — SODIUM CHLORIDE 9 G/1000ML
1000 INJECTION, SOLUTION INTRAVENOUS ONCE
Refills: 0 | Status: COMPLETED | OUTPATIENT
Start: 2025-07-13 | End: 2025-07-13

## 2025-07-13 RX ORDER — SODIUM CHLORIDE 9 G/1000ML
1000 INJECTION, SOLUTION INTRAVENOUS
Refills: 0 | Status: DISCONTINUED | OUTPATIENT
Start: 2025-07-13 | End: 2025-07-15

## 2025-07-13 RX ORDER — CALCIUM GLUCONATE 20 MG/ML
2 INJECTION, SOLUTION INTRAVENOUS ONCE
Refills: 0 | Status: COMPLETED | OUTPATIENT
Start: 2025-07-13 | End: 2025-07-13

## 2025-07-13 RX ORDER — LIDOCAINE HCL/EPINEPHRINE/PF 1 %-1:200K
30 AMPUL (ML) INJECTION ONCE
Refills: 0 | Status: COMPLETED | OUTPATIENT
Start: 2025-07-13 | End: 2025-07-13

## 2025-07-13 RX ORDER — MAGNESIUM SULFATE 500 MG/ML
2 SYRINGE (ML) INJECTION ONCE
Refills: 0 | Status: COMPLETED | OUTPATIENT
Start: 2025-07-13 | End: 2025-07-13

## 2025-07-13 RX ORDER — HYDROMORPHONE/SOD CHLOR,ISO/PF 2 MG/10 ML
0.5 SYRINGE (ML) INJECTION ONCE
Refills: 0 | Status: DISCONTINUED | OUTPATIENT
Start: 2025-07-13 | End: 2025-07-13

## 2025-07-13 RX ORDER — MAGNESIUM SULFATE 500 MG/ML
2 SYRINGE (ML) INJECTION ONCE
Refills: 0 | Status: DISCONTINUED | OUTPATIENT
Start: 2025-07-13 | End: 2025-07-13

## 2025-07-13 RX ORDER — SACUBITRIL AND VALSARTAN 6; 6 MG/1; MG/1
1 PELLET ORAL ONCE
Refills: 0 | Status: COMPLETED | OUTPATIENT
Start: 2025-07-13 | End: 2025-07-13

## 2025-07-13 RX ORDER — HYDROMORPHONE/SOD CHLOR,ISO/PF 2 MG/10 ML
0.5 SYRINGE (ML) INJECTION
Refills: 0 | Status: DISCONTINUED | OUTPATIENT
Start: 2025-07-13 | End: 2025-07-15

## 2025-07-13 RX ORDER — SACUBITRIL AND VALSARTAN 6; 6 MG/1; MG/1
1 PELLET ORAL
Refills: 0 | Status: DISCONTINUED | OUTPATIENT
Start: 2025-07-13 | End: 2025-07-15

## 2025-07-13 RX ORDER — ONDANSETRON HCL/PF 4 MG/2 ML
4 VIAL (ML) INJECTION ONCE
Refills: 0 | Status: COMPLETED | OUTPATIENT
Start: 2025-07-13 | End: 2025-07-13

## 2025-07-13 RX ORDER — BACITRACIN 500 UNIT/G
1 OINTMENT (GRAM) TOPICAL EVERY 24 HOURS
Refills: 0 | Status: DISCONTINUED | OUTPATIENT
Start: 2025-07-13 | End: 2025-07-15

## 2025-07-13 RX ORDER — METOPROLOL SUCCINATE 50 MG/1
50 TABLET, EXTENDED RELEASE ORAL EVERY 12 HOURS
Refills: 0 | Status: DISCONTINUED | OUTPATIENT
Start: 2025-07-13 | End: 2025-07-15

## 2025-07-13 RX ORDER — SODIUM CHLORIDE 9 G/1000ML
1000 INJECTION, SOLUTION INTRAVENOUS
Refills: 0 | Status: DISCONTINUED | OUTPATIENT
Start: 2025-07-13 | End: 2025-07-13

## 2025-07-13 RX ORDER — FENTANYL CITRATE-0.9 % NACL/PF 100MCG/2ML
50 SYRINGE (ML) INTRAVENOUS ONCE
Refills: 0 | Status: DISCONTINUED | OUTPATIENT
Start: 2025-07-13 | End: 2025-07-13

## 2025-07-13 RX ORDER — ROSUVASTATIN CALCIUM 20 MG/1
20 TABLET, FILM COATED ORAL AT BEDTIME
Refills: 0 | Status: DISCONTINUED | OUTPATIENT
Start: 2025-07-13 | End: 2025-07-15

## 2025-07-13 RX ORDER — HYDROMORPHONE/SOD CHLOR,ISO/PF 2 MG/10 ML
30 SYRINGE (ML) INJECTION
Refills: 0 | Status: DISCONTINUED | OUTPATIENT
Start: 2025-07-13 | End: 2025-07-15

## 2025-07-13 RX ADMIN — Medication 0.5 MILLIGRAM(S): at 16:52

## 2025-07-13 RX ADMIN — Medication 1 APPLICATION(S): at 01:00

## 2025-07-13 RX ADMIN — SODIUM CHLORIDE 1000 MILLILITER(S): 9 INJECTION, SOLUTION INTRAVENOUS at 01:00

## 2025-07-13 RX ADMIN — Medication 1000 MILLIGRAM(S): at 19:15

## 2025-07-13 RX ADMIN — LIDOCAINE HYDROCHLORIDE 1 PATCH: 20 JELLY TOPICAL at 19:48

## 2025-07-13 RX ADMIN — LIDOCAINE HYDROCHLORIDE 1 PATCH: 20 JELLY TOPICAL at 06:59

## 2025-07-13 RX ADMIN — Medication 30 MILLILITER(S): at 19:22

## 2025-07-13 RX ADMIN — LIDOCAINE HYDROCHLORIDE 1 PATCH: 20 JELLY TOPICAL at 18:50

## 2025-07-13 RX ADMIN — METOPROLOL SUCCINATE 50 MILLIGRAM(S): 50 TABLET, EXTENDED RELEASE ORAL at 18:49

## 2025-07-13 RX ADMIN — SODIUM CHLORIDE 125 MILLILITER(S): 9 INJECTION, SOLUTION INTRAVENOUS at 02:12

## 2025-07-13 RX ADMIN — Medication 0.5 MILLIGRAM(S): at 18:34

## 2025-07-13 RX ADMIN — Medication 0.5 MILLIGRAM(S): at 17:10

## 2025-07-13 RX ADMIN — Medication 0.5 MILLIGRAM(S): at 16:29

## 2025-07-13 RX ADMIN — ROSUVASTATIN CALCIUM 20 MILLIGRAM(S): 20 TABLET, FILM COATED ORAL at 22:25

## 2025-07-13 RX ADMIN — OXYCODONE HYDROCHLORIDE 5 MILLIGRAM(S): 30 TABLET ORAL at 00:36

## 2025-07-13 RX ADMIN — Medication 50 GRAM(S): at 02:55

## 2025-07-13 RX ADMIN — Medication 0.5 MILLIGRAM(S): at 01:47

## 2025-07-13 RX ADMIN — Medication 400 MILLIGRAM(S): at 00:40

## 2025-07-13 RX ADMIN — POLYETHYLENE GLYCOL 3350 17 GRAM(S): 17 POWDER, FOR SOLUTION ORAL at 18:50

## 2025-07-13 RX ADMIN — Medication 400 MILLIGRAM(S): at 06:00

## 2025-07-13 RX ADMIN — Medication 1000 MILLIGRAM(S): at 11:45

## 2025-07-13 RX ADMIN — SODIUM CHLORIDE 1000 MILLILITER(S): 9 INJECTION, SOLUTION INTRAVENOUS at 09:40

## 2025-07-13 RX ADMIN — Medication 30 MILLILITER(S): at 07:22

## 2025-07-13 RX ADMIN — SODIUM CHLORIDE 150 MILLILITER(S): 9 INJECTION, SOLUTION INTRAVENOUS at 09:40

## 2025-07-13 RX ADMIN — METOPROLOL SUCCINATE 50 MILLIGRAM(S): 50 TABLET, EXTENDED RELEASE ORAL at 14:37

## 2025-07-13 RX ADMIN — Medication 30 MILLILITER(S): at 01:49

## 2025-07-13 RX ADMIN — SACUBITRIL AND VALSARTAN 1 TABLET(S): 6; 6 PELLET ORAL at 11:24

## 2025-07-13 RX ADMIN — Medication 30 MILLILITER(S): at 09:24

## 2025-07-13 RX ADMIN — Medication 2 TABLET(S): at 22:59

## 2025-07-13 RX ADMIN — Medication 400 MILLIGRAM(S): at 11:25

## 2025-07-13 RX ADMIN — OXYCODONE HYDROCHLORIDE 5 MILLIGRAM(S): 30 TABLET ORAL at 00:06

## 2025-07-13 RX ADMIN — OXYCODONE HYDROCHLORIDE 10 MILLIGRAM(S): 30 TABLET ORAL at 00:52

## 2025-07-13 RX ADMIN — SACUBITRIL AND VALSARTAN 1 TABLET(S): 6; 6 PELLET ORAL at 18:49

## 2025-07-13 RX ADMIN — SODIUM CHLORIDE 75 MILLILITER(S): 9 INJECTION, SOLUTION INTRAVENOUS at 19:58

## 2025-07-13 RX ADMIN — SODIUM CHLORIDE 75 MILLILITER(S): 9 INJECTION, SOLUTION INTRAVENOUS at 14:40

## 2025-07-13 RX ADMIN — Medication 0.5 MILLIGRAM(S): at 10:22

## 2025-07-13 RX ADMIN — Medication 400 MILLIGRAM(S): at 18:51

## 2025-07-13 RX ADMIN — Medication 50 MICROGRAM(S): at 01:17

## 2025-07-13 RX ADMIN — Medication 0.5 MILLIGRAM(S): at 02:06

## 2025-07-13 RX ADMIN — Medication 4 MILLIGRAM(S): at 02:39

## 2025-07-13 RX ADMIN — Medication 50 MICROGRAM(S): at 01:47

## 2025-07-13 RX ADMIN — Medication 1 APPLICATION(S): at 06:01

## 2025-07-13 RX ADMIN — Medication 0.5 MILLIGRAM(S): at 18:19

## 2025-07-13 RX ADMIN — Medication 4 MILLIGRAM(S): at 00:51

## 2025-07-13 RX ADMIN — POLYETHYLENE GLYCOL 3350 17 GRAM(S): 17 POWDER, FOR SOLUTION ORAL at 06:00

## 2025-07-13 RX ADMIN — Medication 0.5 MILLIGRAM(S): at 12:58

## 2025-07-13 RX ADMIN — OXYCODONE HYDROCHLORIDE 10 MILLIGRAM(S): 30 TABLET ORAL at 01:22

## 2025-07-13 RX ADMIN — Medication 50 GRAM(S): at 00:51

## 2025-07-13 RX ADMIN — CALCIUM GLUCONATE 200 GRAM(S): 20 INJECTION, SOLUTION INTRAVENOUS at 06:00

## 2025-07-13 RX ADMIN — Medication 0.5 MILLIGRAM(S): at 01:17

## 2025-07-14 LAB
ALBUMIN SERPL ELPH-MCNC: 3.1 G/DL — LOW (ref 3.3–5)
ALBUMIN SERPL ELPH-MCNC: 3.1 G/DL — LOW (ref 3.3–5)
ALBUMIN SERPL ELPH-MCNC: 3.2 G/DL — LOW (ref 3.3–5)
ALP SERPL-CCNC: 47 U/L — SIGNIFICANT CHANGE UP (ref 40–120)
ALP SERPL-CCNC: 48 U/L — SIGNIFICANT CHANGE UP (ref 40–120)
ALP SERPL-CCNC: 53 U/L — SIGNIFICANT CHANGE UP (ref 40–120)
ALT FLD-CCNC: 116 U/L — HIGH (ref 10–45)
ALT FLD-CCNC: 126 U/L — HIGH (ref 10–45)
ALT FLD-CCNC: 142 U/L — HIGH (ref 10–45)
ANION GAP SERPL CALC-SCNC: 11 MMOL/L — SIGNIFICANT CHANGE UP (ref 5–17)
ANION GAP SERPL CALC-SCNC: 11 MMOL/L — SIGNIFICANT CHANGE UP (ref 5–17)
ANION GAP SERPL CALC-SCNC: 12 MMOL/L — SIGNIFICANT CHANGE UP (ref 5–17)
APTT BLD: 25.3 SEC — LOW (ref 26.1–36.8)
APTT BLD: 26.7 SEC — SIGNIFICANT CHANGE UP (ref 26.1–36.8)
AST SERPL-CCNC: 139 U/L — HIGH (ref 10–40)
AST SERPL-CCNC: 145 U/L — HIGH (ref 10–40)
AST SERPL-CCNC: 160 U/L — HIGH (ref 10–40)
BILIRUB SERPL-MCNC: 0.6 MG/DL — SIGNIFICANT CHANGE UP (ref 0.2–1.2)
BILIRUB SERPL-MCNC: 0.7 MG/DL — SIGNIFICANT CHANGE UP (ref 0.2–1.2)
BILIRUB SERPL-MCNC: 0.8 MG/DL — SIGNIFICANT CHANGE UP (ref 0.2–1.2)
BLD GP AB SCN SERPL QL: NEGATIVE — SIGNIFICANT CHANGE UP
BUN SERPL-MCNC: 10 MG/DL — SIGNIFICANT CHANGE UP (ref 7–23)
BUN SERPL-MCNC: 13 MG/DL — SIGNIFICANT CHANGE UP (ref 7–23)
BUN SERPL-MCNC: 16 MG/DL — SIGNIFICANT CHANGE UP (ref 7–23)
CALCIUM SERPL-MCNC: 8 MG/DL — LOW (ref 8.4–10.5)
CALCIUM SERPL-MCNC: 8.2 MG/DL — LOW (ref 8.4–10.5)
CALCIUM SERPL-MCNC: 8.3 MG/DL — LOW (ref 8.4–10.5)
CHLORIDE SERPL-SCNC: 101 MMOL/L — SIGNIFICANT CHANGE UP (ref 96–108)
CHLORIDE SERPL-SCNC: 101 MMOL/L — SIGNIFICANT CHANGE UP (ref 96–108)
CHLORIDE SERPL-SCNC: 102 MMOL/L — SIGNIFICANT CHANGE UP (ref 96–108)
CK MB BLD-MCNC: 0.6 % — SIGNIFICANT CHANGE UP (ref 0–3.5)
CK MB CFR SERPL CALC: 19.6 NG/ML — HIGH (ref 0–6.7)
CK SERPL-CCNC: 3166 U/L — HIGH (ref 30–200)
CO2 SERPL-SCNC: 23 MMOL/L — SIGNIFICANT CHANGE UP (ref 22–31)
CREAT SERPL-MCNC: 0.66 MG/DL — SIGNIFICANT CHANGE UP (ref 0.5–1.3)
CREAT SERPL-MCNC: 0.68 MG/DL — SIGNIFICANT CHANGE UP (ref 0.5–1.3)
CREAT SERPL-MCNC: 0.75 MG/DL — SIGNIFICANT CHANGE UP (ref 0.5–1.3)
EGFR: 101 ML/MIN/1.73M2 — SIGNIFICANT CHANGE UP
EGFR: 101 ML/MIN/1.73M2 — SIGNIFICANT CHANGE UP
EGFR: 104 ML/MIN/1.73M2 — SIGNIFICANT CHANGE UP
EGFR: 104 ML/MIN/1.73M2 — SIGNIFICANT CHANGE UP
EGFR: 105 ML/MIN/1.73M2 — SIGNIFICANT CHANGE UP
EGFR: 105 ML/MIN/1.73M2 — SIGNIFICANT CHANGE UP
GAS PNL BLDV: SIGNIFICANT CHANGE UP
GLUCOSE SERPL-MCNC: 109 MG/DL — HIGH (ref 70–99)
GLUCOSE SERPL-MCNC: 120 MG/DL — HIGH (ref 70–99)
GLUCOSE SERPL-MCNC: 133 MG/DL — HIGH (ref 70–99)
HCT VFR BLD CALC: 21 % — CRITICAL LOW (ref 39–50)
HCT VFR BLD CALC: 23.6 % — LOW (ref 39–50)
HCT VFR BLD CALC: 23.9 % — LOW (ref 39–50)
HCT VFR BLD CALC: 24.9 % — LOW (ref 39–50)
HGB BLD-MCNC: 7.1 G/DL — LOW (ref 13–17)
HGB BLD-MCNC: 7.9 G/DL — LOW (ref 13–17)
HGB BLD-MCNC: 7.9 G/DL — LOW (ref 13–17)
HGB BLD-MCNC: 8.1 G/DL — LOW (ref 13–17)
INR BLD: 1.16 RATIO — SIGNIFICANT CHANGE UP (ref 0.85–1.16)
INR BLD: 1.28 RATIO — HIGH (ref 0.85–1.16)
MAGNESIUM SERPL-MCNC: 2 MG/DL — SIGNIFICANT CHANGE UP (ref 1.6–2.6)
MAGNESIUM SERPL-MCNC: 2.1 MG/DL — SIGNIFICANT CHANGE UP (ref 1.6–2.6)
MAGNESIUM SERPL-MCNC: 2.5 MG/DL — SIGNIFICANT CHANGE UP (ref 1.6–2.6)
MCHC RBC-ENTMCNC: 30.2 PG — SIGNIFICANT CHANGE UP (ref 27–34)
MCHC RBC-ENTMCNC: 30.5 PG — SIGNIFICANT CHANGE UP (ref 27–34)
MCHC RBC-ENTMCNC: 30.6 PG — SIGNIFICANT CHANGE UP (ref 27–34)
MCHC RBC-ENTMCNC: 31.7 PG — SIGNIFICANT CHANGE UP (ref 27–34)
MCHC RBC-ENTMCNC: 32.5 G/DL — SIGNIFICANT CHANGE UP (ref 32–36)
MCHC RBC-ENTMCNC: 33.1 G/DL — SIGNIFICANT CHANGE UP (ref 32–36)
MCHC RBC-ENTMCNC: 33.5 G/DL — SIGNIFICANT CHANGE UP (ref 32–36)
MCHC RBC-ENTMCNC: 33.8 G/DL — SIGNIFICANT CHANGE UP (ref 32–36)
MCV RBC AUTO: 91.5 FL — SIGNIFICANT CHANGE UP (ref 80–100)
MCV RBC AUTO: 92.3 FL — SIGNIFICANT CHANGE UP (ref 80–100)
MCV RBC AUTO: 92.9 FL — SIGNIFICANT CHANGE UP (ref 80–100)
MCV RBC AUTO: 93.8 FL — SIGNIFICANT CHANGE UP (ref 80–100)
NRBC # BLD AUTO: 0 K/UL — SIGNIFICANT CHANGE UP (ref 0–0)
NRBC # FLD: 0 K/UL — SIGNIFICANT CHANGE UP (ref 0–0)
NRBC BLD AUTO-RTO: 0 /100 WBCS — SIGNIFICANT CHANGE UP (ref 0–0)
PHOSPHATE SERPL-MCNC: 2.3 MG/DL — LOW (ref 2.5–4.5)
PHOSPHATE SERPL-MCNC: 2.6 MG/DL — SIGNIFICANT CHANGE UP (ref 2.5–4.5)
PHOSPHATE SERPL-MCNC: 2.9 MG/DL — SIGNIFICANT CHANGE UP (ref 2.5–4.5)
PLATELET # BLD AUTO: 200 K/UL — SIGNIFICANT CHANGE UP (ref 150–400)
PLATELET # BLD AUTO: 208 K/UL — SIGNIFICANT CHANGE UP (ref 150–400)
PLATELET # BLD AUTO: 221 K/UL — SIGNIFICANT CHANGE UP (ref 150–400)
PLATELET # BLD AUTO: 224 K/UL — SIGNIFICANT CHANGE UP (ref 150–400)
PMV BLD: 10.1 FL — SIGNIFICANT CHANGE UP (ref 7–13)
PMV BLD: 10.1 FL — SIGNIFICANT CHANGE UP (ref 7–13)
PMV BLD: 10.2 FL — SIGNIFICANT CHANGE UP (ref 7–13)
PMV BLD: 9.9 FL — SIGNIFICANT CHANGE UP (ref 7–13)
POTASSIUM SERPL-MCNC: 4 MMOL/L — SIGNIFICANT CHANGE UP (ref 3.5–5.3)
POTASSIUM SERPL-MCNC: 4.1 MMOL/L — SIGNIFICANT CHANGE UP (ref 3.5–5.3)
POTASSIUM SERPL-MCNC: 4.3 MMOL/L — SIGNIFICANT CHANGE UP (ref 3.5–5.3)
POTASSIUM SERPL-SCNC: 4 MMOL/L — SIGNIFICANT CHANGE UP (ref 3.5–5.3)
POTASSIUM SERPL-SCNC: 4.1 MMOL/L — SIGNIFICANT CHANGE UP (ref 3.5–5.3)
POTASSIUM SERPL-SCNC: 4.3 MMOL/L — SIGNIFICANT CHANGE UP (ref 3.5–5.3)
PROT SERPL-MCNC: 5.5 G/DL — LOW (ref 6–8.3)
PROT SERPL-MCNC: 5.5 G/DL — LOW (ref 6–8.3)
PROT SERPL-MCNC: 5.7 G/DL — LOW (ref 6–8.3)
PROTHROM AB SERPL-ACNC: 13.2 SEC — SIGNIFICANT CHANGE UP (ref 9.9–13.4)
PROTHROM AB SERPL-ACNC: 14.7 SEC — HIGH (ref 9.9–13.4)
RBC # BLD: 2.24 M/UL — LOW (ref 4.2–5.8)
RBC # BLD: 2.58 M/UL — LOW (ref 4.2–5.8)
RBC # BLD: 2.59 M/UL — LOW (ref 4.2–5.8)
RBC # BLD: 2.68 M/UL — LOW (ref 4.2–5.8)
RBC # FLD: 15.7 % — HIGH (ref 10.3–14.5)
RBC # FLD: 15.8 % — HIGH (ref 10.3–14.5)
RBC # FLD: 15.8 % — HIGH (ref 10.3–14.5)
RBC # FLD: 15.9 % — HIGH (ref 10.3–14.5)
RH IG SCN BLD-IMP: POSITIVE — SIGNIFICANT CHANGE UP
SODIUM SERPL-SCNC: 135 MMOL/L — SIGNIFICANT CHANGE UP (ref 135–145)
SODIUM SERPL-SCNC: 136 MMOL/L — SIGNIFICANT CHANGE UP (ref 135–145)
SODIUM SERPL-SCNC: 136 MMOL/L — SIGNIFICANT CHANGE UP (ref 135–145)
TROPONIN T, HIGH SENSITIVITY RESULT: 119 NG/L — HIGH (ref 0–51)
WBC # BLD: 7.61 K/UL — SIGNIFICANT CHANGE UP (ref 3.8–10.5)
WBC # BLD: 8 K/UL — SIGNIFICANT CHANGE UP (ref 3.8–10.5)
WBC # BLD: 8.45 K/UL — SIGNIFICANT CHANGE UP (ref 3.8–10.5)
WBC # BLD: 8.6 K/UL — SIGNIFICANT CHANGE UP (ref 3.8–10.5)
WBC # FLD AUTO: 7.61 K/UL — SIGNIFICANT CHANGE UP (ref 3.8–10.5)
WBC # FLD AUTO: 8 K/UL — SIGNIFICANT CHANGE UP (ref 3.8–10.5)
WBC # FLD AUTO: 8.45 K/UL — SIGNIFICANT CHANGE UP (ref 3.8–10.5)
WBC # FLD AUTO: 8.6 K/UL — SIGNIFICANT CHANGE UP (ref 3.8–10.5)

## 2025-07-14 PROCEDURE — 99222 1ST HOSP IP/OBS MODERATE 55: CPT | Mod: 57

## 2025-07-14 PROCEDURE — 99233 SBSQ HOSP IP/OBS HIGH 50: CPT

## 2025-07-14 PROCEDURE — 99223 1ST HOSP IP/OBS HIGH 75: CPT

## 2025-07-14 PROCEDURE — 99254 IP/OBS CNSLTJ NEW/EST MOD 60: CPT | Mod: 57

## 2025-07-14 PROCEDURE — 71045 X-RAY EXAM CHEST 1 VIEW: CPT | Mod: 26

## 2025-07-14 RX ORDER — ESMOLOL HCL 100MG/10ML
20 VIAL (ML) INTRAVENOUS ONCE
Refills: 0 | Status: COMPLETED | OUTPATIENT
Start: 2025-07-14 | End: 2025-07-14

## 2025-07-14 RX ORDER — AMIODARONE HYDROCHLORIDE 50 MG/ML
150 INJECTION, SOLUTION INTRAVENOUS ONCE
Refills: 0 | Status: COMPLETED | OUTPATIENT
Start: 2025-07-14 | End: 2025-07-14

## 2025-07-14 RX ORDER — CALCIUM GLUCONATE 20 MG/ML
2 INJECTION, SOLUTION INTRAVENOUS ONCE
Refills: 0 | Status: COMPLETED | OUTPATIENT
Start: 2025-07-14 | End: 2025-07-14

## 2025-07-14 RX ORDER — BISACODYL 5 MG
10 TABLET, DELAYED RELEASE (ENTERIC COATED) ORAL ONCE
Refills: 0 | Status: DISCONTINUED | OUTPATIENT
Start: 2025-07-14 | End: 2025-07-14

## 2025-07-14 RX ORDER — MAGNESIUM SULFATE 500 MG/ML
2 SYRINGE (ML) INJECTION ONCE
Refills: 0 | Status: COMPLETED | OUTPATIENT
Start: 2025-07-14 | End: 2025-07-14

## 2025-07-14 RX ORDER — DILTIAZEM HYDROCHLORIDE 240 MG/1
10 TABLET, EXTENDED RELEASE ORAL ONCE
Refills: 0 | Status: COMPLETED | OUTPATIENT
Start: 2025-07-14 | End: 2025-07-14

## 2025-07-14 RX ORDER — METOPROLOL SUCCINATE 50 MG/1
5 TABLET, EXTENDED RELEASE ORAL ONCE
Refills: 0 | Status: COMPLETED | OUTPATIENT
Start: 2025-07-14 | End: 2025-07-14

## 2025-07-14 RX ORDER — SODIUM PHOSPHATE,DIBASIC DIHYD
30 POWDER (GRAM) MISCELLANEOUS ONCE
Refills: 0 | Status: COMPLETED | OUTPATIENT
Start: 2025-07-14 | End: 2025-07-14

## 2025-07-14 RX ORDER — ESMOLOL HCL 100MG/10ML
50 VIAL (ML) INTRAVENOUS
Qty: 2500 | Refills: 0 | Status: DISCONTINUED | OUTPATIENT
Start: 2025-07-14 | End: 2025-07-15

## 2025-07-14 RX ORDER — KETAMINE HCL IN 0.9 % NACL 50 MG/5 ML
100 SYRINGE (ML) INTRAVENOUS ONCE
Refills: 0 | Status: DISCONTINUED | OUTPATIENT
Start: 2025-07-14 | End: 2025-07-14

## 2025-07-14 RX ORDER — MIDAZOLAM IN 0.9 % SOD.CHLORID 1 MG/ML
4 PLASTIC BAG, INJECTION (ML) INTRAVENOUS ONCE
Refills: 0 | Status: DISCONTINUED | OUTPATIENT
Start: 2025-07-14 | End: 2025-07-14

## 2025-07-14 RX ORDER — BISACODYL 5 MG
10 TABLET, DELAYED RELEASE (ENTERIC COATED) ORAL ONCE
Refills: 0 | Status: DISCONTINUED | OUTPATIENT
Start: 2025-07-14 | End: 2025-07-15

## 2025-07-14 RX ORDER — ONDANSETRON HCL/PF 4 MG/2 ML
4 VIAL (ML) INJECTION ONCE
Refills: 0 | Status: COMPLETED | OUTPATIENT
Start: 2025-07-14 | End: 2025-07-14

## 2025-07-14 RX ORDER — SODIUM PHOSPHATE,DIBASIC DIHYD
15 POWDER (GRAM) MISCELLANEOUS ONCE
Refills: 0 | Status: COMPLETED | OUTPATIENT
Start: 2025-07-14 | End: 2025-07-14

## 2025-07-14 RX ADMIN — SODIUM CHLORIDE 75 MILLILITER(S): 9 INJECTION, SOLUTION INTRAVENOUS at 19:10

## 2025-07-14 RX ADMIN — METOPROLOL SUCCINATE 50 MILLIGRAM(S): 50 TABLET, EXTENDED RELEASE ORAL at 05:23

## 2025-07-14 RX ADMIN — Medication 20 MILLIGRAM(S): at 23:54

## 2025-07-14 RX ADMIN — Medication 25 GRAM(S): at 06:22

## 2025-07-14 RX ADMIN — LIDOCAINE HYDROCHLORIDE 1 PATCH: 20 JELLY TOPICAL at 19:13

## 2025-07-14 RX ADMIN — METOPROLOL SUCCINATE 5 MILLIGRAM(S): 50 TABLET, EXTENDED RELEASE ORAL at 21:05

## 2025-07-14 RX ADMIN — SACUBITRIL AND VALSARTAN 1 TABLET(S): 6; 6 PELLET ORAL at 17:03

## 2025-07-14 RX ADMIN — Medication 30 MILLILITER(S): at 19:07

## 2025-07-14 RX ADMIN — Medication 400 MILLIGRAM(S): at 05:23

## 2025-07-14 RX ADMIN — Medication 25 GRAM(S): at 21:10

## 2025-07-14 RX ADMIN — AMIODARONE HYDROCHLORIDE 600 MILLIGRAM(S): 50 INJECTION, SOLUTION INTRAVENOUS at 21:19

## 2025-07-14 RX ADMIN — Medication 255 MILLIMOLE(S): at 23:10

## 2025-07-14 RX ADMIN — Medication 30 MILLILITER(S): at 07:21

## 2025-07-14 RX ADMIN — CALCIUM GLUCONATE 200 GRAM(S): 20 INJECTION, SOLUTION INTRAVENOUS at 18:49

## 2025-07-14 RX ADMIN — METOPROLOL SUCCINATE 50 MILLIGRAM(S): 50 TABLET, EXTENDED RELEASE ORAL at 17:03

## 2025-07-14 RX ADMIN — Medication 255 MILLIMOLE(S): at 14:02

## 2025-07-14 RX ADMIN — POLYETHYLENE GLYCOL 3350 17 GRAM(S): 17 POWDER, FOR SOLUTION ORAL at 17:03

## 2025-07-14 RX ADMIN — Medication 40 MILLIGRAM(S): at 06:22

## 2025-07-14 RX ADMIN — Medication 0.5 MILLIGRAM(S): at 19:07

## 2025-07-14 RX ADMIN — POLYETHYLENE GLYCOL 3350 17 GRAM(S): 17 POWDER, FOR SOLUTION ORAL at 05:23

## 2025-07-14 RX ADMIN — Medication 1 APPLICATION(S): at 05:23

## 2025-07-14 RX ADMIN — Medication 0.5 MILLIGRAM(S): at 18:25

## 2025-07-14 RX ADMIN — Medication 19.9 MICROGRAM(S)/KG/MIN: at 22:44

## 2025-07-14 RX ADMIN — LIDOCAINE HYDROCHLORIDE 1 PATCH: 20 JELLY TOPICAL at 06:38

## 2025-07-14 RX ADMIN — DILTIAZEM HYDROCHLORIDE 10 MILLIGRAM(S): 240 TABLET, EXTENDED RELEASE ORAL at 21:41

## 2025-07-14 RX ADMIN — Medication 0.5 MILLIGRAM(S): at 04:19

## 2025-07-14 RX ADMIN — Medication 400 MILLIGRAM(S): at 11:37

## 2025-07-14 RX ADMIN — Medication 1000 MILLIGRAM(S): at 12:00

## 2025-07-14 RX ADMIN — LIDOCAINE HYDROCHLORIDE 1 PATCH: 20 JELLY TOPICAL at 17:04

## 2025-07-14 RX ADMIN — Medication 400 MILLIGRAM(S): at 00:36

## 2025-07-14 RX ADMIN — DILTIAZEM HYDROCHLORIDE 10 MILLIGRAM(S): 240 TABLET, EXTENDED RELEASE ORAL at 21:08

## 2025-07-14 RX ADMIN — Medication 4 MILLIGRAM(S): at 06:22

## 2025-07-14 RX ADMIN — SACUBITRIL AND VALSARTAN 1 TABLET(S): 6; 6 PELLET ORAL at 05:23

## 2025-07-15 ENCOUNTER — APPOINTMENT (OUTPATIENT)
Dept: THORACIC SURGERY | Facility: HOSPITAL | Age: 65
End: 2025-07-15

## 2025-07-15 ENCOUNTER — TRANSCRIPTION ENCOUNTER (OUTPATIENT)
Age: 65
End: 2025-07-15

## 2025-07-15 DIAGNOSIS — J94.2 HEMOTHORAX: ICD-10-CM

## 2025-07-15 LAB
ALBUMIN SERPL ELPH-MCNC: 2.8 G/DL — LOW (ref 3.3–5)
ALBUMIN SERPL ELPH-MCNC: 2.9 G/DL — LOW (ref 3.3–5)
ALP SERPL-CCNC: 46 U/L — SIGNIFICANT CHANGE UP (ref 40–120)
ALP SERPL-CCNC: 54 U/L — SIGNIFICANT CHANGE UP (ref 40–120)
ALT FLD-CCNC: 94 U/L — HIGH (ref 10–45)
ALT FLD-CCNC: 97 U/L — HIGH (ref 10–45)
ANION GAP SERPL CALC-SCNC: 10 MMOL/L — SIGNIFICANT CHANGE UP (ref 5–17)
ANION GAP SERPL CALC-SCNC: 14 MMOL/L — SIGNIFICANT CHANGE UP (ref 5–17)
APTT BLD: 25.7 SEC — LOW (ref 26.1–36.8)
APTT BLD: 27.6 SEC — SIGNIFICANT CHANGE UP (ref 26.1–36.8)
AST SERPL-CCNC: 110 U/L — HIGH (ref 10–40)
AST SERPL-CCNC: 160 U/L — HIGH (ref 10–40)
BILIRUB SERPL-MCNC: 0.8 MG/DL — SIGNIFICANT CHANGE UP (ref 0.2–1.2)
BILIRUB SERPL-MCNC: 1.2 MG/DL — SIGNIFICANT CHANGE UP (ref 0.2–1.2)
BUN SERPL-MCNC: 12 MG/DL — SIGNIFICANT CHANGE UP (ref 7–23)
BUN SERPL-MCNC: 18 MG/DL — SIGNIFICANT CHANGE UP (ref 7–23)
CALCIUM SERPL-MCNC: 7.7 MG/DL — LOW (ref 8.4–10.5)
CALCIUM SERPL-MCNC: 8 MG/DL — LOW (ref 8.4–10.5)
CHLORIDE SERPL-SCNC: 102 MMOL/L — SIGNIFICANT CHANGE UP (ref 96–108)
CHLORIDE SERPL-SCNC: 103 MMOL/L — SIGNIFICANT CHANGE UP (ref 96–108)
CK MB BLD-MCNC: 0.9 % — SIGNIFICANT CHANGE UP (ref 0–3.5)
CK MB BLD-MCNC: 1.8 % — SIGNIFICANT CHANGE UP (ref 0–3.5)
CK MB BLD-MCNC: 2.3 % — SIGNIFICANT CHANGE UP (ref 0–3.5)
CK MB BLD-MCNC: 3.2 % — SIGNIFICANT CHANGE UP (ref 0–3.5)
CK MB CFR SERPL CALC: 20.1 NG/ML — HIGH (ref 0–6.7)
CK MB CFR SERPL CALC: 48.3 NG/ML — HIGH (ref 0–6.7)
CK MB CFR SERPL CALC: 65.1 NG/ML — HIGH (ref 0–6.7)
CK MB CFR SERPL CALC: 85.3 NG/ML — HIGH (ref 0–6.7)
CK SERPL-CCNC: 2353 U/L — HIGH (ref 30–200)
CK SERPL-CCNC: 2663 U/L — HIGH (ref 30–200)
CK SERPL-CCNC: 2760 U/L — HIGH (ref 30–200)
CK SERPL-CCNC: 2824 U/L — HIGH (ref 30–200)
CO2 SERPL-SCNC: 22 MMOL/L — SIGNIFICANT CHANGE UP (ref 22–31)
CO2 SERPL-SCNC: 23 MMOL/L — SIGNIFICANT CHANGE UP (ref 22–31)
CREAT SERPL-MCNC: 0.71 MG/DL — SIGNIFICANT CHANGE UP (ref 0.5–1.3)
CREAT SERPL-MCNC: 0.74 MG/DL — SIGNIFICANT CHANGE UP (ref 0.5–1.3)
EGFR: 102 ML/MIN/1.73M2 — SIGNIFICANT CHANGE UP
EGFR: 102 ML/MIN/1.73M2 — SIGNIFICANT CHANGE UP
EGFR: 103 ML/MIN/1.73M2 — SIGNIFICANT CHANGE UP
EGFR: 103 ML/MIN/1.73M2 — SIGNIFICANT CHANGE UP
GAS PNL BLDA: SIGNIFICANT CHANGE UP
GAS PNL BLDV: SIGNIFICANT CHANGE UP
GLUCOSE SERPL-MCNC: 127 MG/DL — HIGH (ref 70–99)
GLUCOSE SERPL-MCNC: 135 MG/DL — HIGH (ref 70–99)
HCT VFR BLD CALC: 22.1 % — LOW (ref 39–50)
HCT VFR BLD CALC: 25.1 % — LOW (ref 39–50)
HCT VFR BLD CALC: 25.2 % — LOW (ref 39–50)
HCT VFR BLD CALC: 26.2 % — LOW (ref 39–50)
HGB BLD-MCNC: 7.4 G/DL — LOW (ref 13–17)
HGB BLD-MCNC: 8.3 G/DL — LOW (ref 13–17)
HGB BLD-MCNC: 8.4 G/DL — LOW (ref 13–17)
HGB BLD-MCNC: 8.8 G/DL — LOW (ref 13–17)
INR BLD: 1.14 RATIO — SIGNIFICANT CHANGE UP (ref 0.85–1.16)
INR BLD: 1.2 RATIO — HIGH (ref 0.85–1.16)
MAGNESIUM SERPL-MCNC: 2.2 MG/DL — SIGNIFICANT CHANGE UP (ref 1.6–2.6)
MAGNESIUM SERPL-MCNC: 2.3 MG/DL — SIGNIFICANT CHANGE UP (ref 1.6–2.6)
MCHC RBC-ENTMCNC: 30 PG — SIGNIFICANT CHANGE UP (ref 27–34)
MCHC RBC-ENTMCNC: 30.1 PG — SIGNIFICANT CHANGE UP (ref 27–34)
MCHC RBC-ENTMCNC: 30.4 PG — SIGNIFICANT CHANGE UP (ref 27–34)
MCHC RBC-ENTMCNC: 30.7 PG — SIGNIFICANT CHANGE UP (ref 27–34)
MCHC RBC-ENTMCNC: 33.1 G/DL — SIGNIFICANT CHANGE UP (ref 32–36)
MCHC RBC-ENTMCNC: 33.3 G/DL — SIGNIFICANT CHANGE UP (ref 32–36)
MCHC RBC-ENTMCNC: 33.5 G/DL — SIGNIFICANT CHANGE UP (ref 32–36)
MCHC RBC-ENTMCNC: 33.6 G/DL — SIGNIFICANT CHANGE UP (ref 32–36)
MCV RBC AUTO: 90.3 FL — SIGNIFICANT CHANGE UP (ref 80–100)
MCV RBC AUTO: 90.6 FL — SIGNIFICANT CHANGE UP (ref 80–100)
MCV RBC AUTO: 90.7 FL — SIGNIFICANT CHANGE UP (ref 80–100)
MCV RBC AUTO: 91.7 FL — SIGNIFICANT CHANGE UP (ref 80–100)
NRBC # BLD AUTO: 0 K/UL — SIGNIFICANT CHANGE UP (ref 0–0)
NRBC # BLD AUTO: 0.02 K/UL — HIGH (ref 0–0)
NRBC # FLD: 0 K/UL — SIGNIFICANT CHANGE UP (ref 0–0)
NRBC # FLD: 0.02 K/UL — HIGH (ref 0–0)
NRBC BLD AUTO-RTO: 0 /100 WBCS — SIGNIFICANT CHANGE UP (ref 0–0)
PHOSPHATE SERPL-MCNC: 3.6 MG/DL — SIGNIFICANT CHANGE UP (ref 2.5–4.5)
PHOSPHATE SERPL-MCNC: 3.7 MG/DL — SIGNIFICANT CHANGE UP (ref 2.5–4.5)
PLATELET # BLD AUTO: 196 K/UL — SIGNIFICANT CHANGE UP (ref 150–400)
PLATELET # BLD AUTO: 205 K/UL — SIGNIFICANT CHANGE UP (ref 150–400)
PLATELET # BLD AUTO: 208 K/UL — SIGNIFICANT CHANGE UP (ref 150–400)
PLATELET # BLD AUTO: 229 K/UL — SIGNIFICANT CHANGE UP (ref 150–400)
PMV BLD: 10 FL — SIGNIFICANT CHANGE UP (ref 7–13)
PMV BLD: 10.2 FL — SIGNIFICANT CHANGE UP (ref 7–13)
PMV BLD: 10.2 FL — SIGNIFICANT CHANGE UP (ref 7–13)
PMV BLD: 9.9 FL — SIGNIFICANT CHANGE UP (ref 7–13)
POTASSIUM SERPL-MCNC: 4.3 MMOL/L — SIGNIFICANT CHANGE UP (ref 3.5–5.3)
POTASSIUM SERPL-MCNC: 4.4 MMOL/L — SIGNIFICANT CHANGE UP (ref 3.5–5.3)
POTASSIUM SERPL-SCNC: 4.3 MMOL/L — SIGNIFICANT CHANGE UP (ref 3.5–5.3)
POTASSIUM SERPL-SCNC: 4.4 MMOL/L — SIGNIFICANT CHANGE UP (ref 3.5–5.3)
PROT SERPL-MCNC: 5.1 G/DL — LOW (ref 6–8.3)
PROT SERPL-MCNC: 5.3 G/DL — LOW (ref 6–8.3)
PROTHROM AB SERPL-ACNC: 13.1 SEC — SIGNIFICANT CHANGE UP (ref 9.9–13.4)
PROTHROM AB SERPL-ACNC: 13.7 SEC — HIGH (ref 9.9–13.4)
RBC # BLD: 2.41 M/UL — LOW (ref 4.2–5.8)
RBC # BLD: 2.77 M/UL — LOW (ref 4.2–5.8)
RBC # BLD: 2.79 M/UL — LOW (ref 4.2–5.8)
RBC # BLD: 2.89 M/UL — LOW (ref 4.2–5.8)
RBC # FLD: 15.9 % — HIGH (ref 10.3–14.5)
RBC # FLD: 16 % — HIGH (ref 10.3–14.5)
RBC # FLD: 16.4 % — HIGH (ref 10.3–14.5)
RBC # FLD: 16.9 % — HIGH (ref 10.3–14.5)
SODIUM SERPL-SCNC: 135 MMOL/L — SIGNIFICANT CHANGE UP (ref 135–145)
SODIUM SERPL-SCNC: 139 MMOL/L — SIGNIFICANT CHANGE UP (ref 135–145)
TROPONIN T, HIGH SENSITIVITY RESULT: 188 NG/L — HIGH (ref 0–51)
TROPONIN T, HIGH SENSITIVITY RESULT: 337 NG/L — HIGH (ref 0–51)
TROPONIN T, HIGH SENSITIVITY RESULT: 473 NG/L — HIGH (ref 0–51)
TROPONIN T, HIGH SENSITIVITY RESULT: 565 NG/L — HIGH (ref 0–51)
WBC # BLD: 8.14 K/UL — SIGNIFICANT CHANGE UP (ref 3.8–10.5)
WBC # BLD: 8.23 K/UL — SIGNIFICANT CHANGE UP (ref 3.8–10.5)
WBC # BLD: 8.36 K/UL — SIGNIFICANT CHANGE UP (ref 3.8–10.5)
WBC # BLD: 9.28 K/UL — SIGNIFICANT CHANGE UP (ref 3.8–10.5)
WBC # FLD AUTO: 8.14 K/UL — SIGNIFICANT CHANGE UP (ref 3.8–10.5)
WBC # FLD AUTO: 8.23 K/UL — SIGNIFICANT CHANGE UP (ref 3.8–10.5)
WBC # FLD AUTO: 8.36 K/UL — SIGNIFICANT CHANGE UP (ref 3.8–10.5)
WBC # FLD AUTO: 9.28 K/UL — SIGNIFICANT CHANGE UP (ref 3.8–10.5)

## 2025-07-15 PROCEDURE — 31624 DX BRONCHOSCOPE/LAVAGE: CPT

## 2025-07-15 PROCEDURE — 99233 SBSQ HOSP IP/OBS HIGH 50: CPT

## 2025-07-15 PROCEDURE — 99291 CRITICAL CARE FIRST HOUR: CPT | Mod: GC

## 2025-07-15 PROCEDURE — 76937 US GUIDE VASCULAR ACCESS: CPT | Mod: 26,59

## 2025-07-15 PROCEDURE — 71045 X-RAY EXAM CHEST 1 VIEW: CPT | Mod: 26

## 2025-07-15 PROCEDURE — 99291 CRITICAL CARE FIRST HOUR: CPT

## 2025-07-15 PROCEDURE — 36569 INSJ PICC 5 YR+ W/O IMAGING: CPT

## 2025-07-15 PROCEDURE — 32654 THORACOSCOPY CONTRL BLEEDING: CPT | Mod: RT

## 2025-07-15 PROCEDURE — 31645 BRNCHSC W/THER ASPIR 1ST: CPT

## 2025-07-15 PROCEDURE — 93010 ELECTROCARDIOGRAM REPORT: CPT

## 2025-07-15 DEVICE — KIT A-LINE 1LUM 20G X 12CM SAFE KIT: Type: IMPLANTABLE DEVICE | Status: FUNCTIONAL

## 2025-07-15 DEVICE — CHEST DRAIN THORACIC ARGYLE PVC 28FR STRAIGHT: Type: IMPLANTABLE DEVICE | Status: FUNCTIONAL

## 2025-07-15 RX ORDER — LIDOCAINE HYDROCHLORIDE 20 MG/ML
1 JELLY TOPICAL EVERY 24 HOURS
Refills: 0 | Status: DISCONTINUED | OUTPATIENT
Start: 2025-07-15 | End: 2025-07-30

## 2025-07-15 RX ORDER — DIGOXIN 250 UG/1
125 TABLET ORAL EVERY 24 HOURS
Refills: 0 | Status: DISCONTINUED | OUTPATIENT
Start: 2025-07-16 | End: 2025-07-18

## 2025-07-15 RX ORDER — SENNA 187 MG
2 TABLET ORAL AT BEDTIME
Refills: 0 | Status: DISCONTINUED | OUTPATIENT
Start: 2025-07-15 | End: 2025-07-16

## 2025-07-15 RX ORDER — ACETAMINOPHEN 500 MG/5ML
1000 LIQUID (ML) ORAL EVERY 6 HOURS
Refills: 0 | Status: COMPLETED | OUTPATIENT
Start: 2025-07-15 | End: 2025-07-16

## 2025-07-15 RX ORDER — CALCIUM GLUCONATE 20 MG/ML
2 INJECTION, SOLUTION INTRAVENOUS ONCE
Refills: 0 | Status: COMPLETED | OUTPATIENT
Start: 2025-07-15 | End: 2025-07-15

## 2025-07-15 RX ORDER — SODIUM CHLORIDE 9 G/1000ML
1000 INJECTION, SOLUTION INTRAVENOUS
Refills: 0 | Status: DISCONTINUED | OUTPATIENT
Start: 2025-07-15 | End: 2025-07-16

## 2025-07-15 RX ORDER — NOREPINEPHRINE BITARTRATE 8 MG
0.07 SOLUTION INTRAVENOUS
Qty: 8 | Refills: 0 | Status: DISCONTINUED | OUTPATIENT
Start: 2025-07-15 | End: 2025-07-15

## 2025-07-15 RX ORDER — METOPROLOL SUCCINATE 50 MG/1
50 TABLET, EXTENDED RELEASE ORAL ONCE
Refills: 0 | Status: COMPLETED | OUTPATIENT
Start: 2025-07-15 | End: 2025-07-15

## 2025-07-15 RX ORDER — SODIUM CHLORIDE 9 G/1000ML
500 INJECTION, SOLUTION INTRAVENOUS ONCE
Refills: 0 | Status: DISCONTINUED | OUTPATIENT
Start: 2025-07-15 | End: 2025-07-15

## 2025-07-15 RX ORDER — METOPROLOL SUCCINATE 50 MG/1
50 TABLET, EXTENDED RELEASE ORAL EVERY 8 HOURS
Refills: 0 | Status: DISCONTINUED | OUTPATIENT
Start: 2025-07-15 | End: 2025-07-15

## 2025-07-15 RX ORDER — METOPROLOL SUCCINATE 50 MG/1
5 TABLET, EXTENDED RELEASE ORAL EVERY 4 HOURS
Refills: 0 | Status: DISCONTINUED | OUTPATIENT
Start: 2025-07-15 | End: 2025-07-16

## 2025-07-15 RX ORDER — HYDROXYZINE HYDROCHLORIDE 25 MG/1
25 TABLET, FILM COATED ORAL ONCE
Refills: 0 | Status: DISCONTINUED | OUTPATIENT
Start: 2025-07-15 | End: 2025-07-15

## 2025-07-15 RX ORDER — SACUBITRIL AND VALSARTAN 6; 6 MG/1; MG/1
1 PELLET ORAL
Refills: 0 | Status: DISCONTINUED | OUTPATIENT
Start: 2025-07-15 | End: 2025-07-16

## 2025-07-15 RX ORDER — FENTANYL CITRATE-0.9 % NACL/PF 100MCG/2ML
0.5 SYRINGE (ML) INTRAVENOUS
Qty: 5000 | Refills: 0 | Status: DISCONTINUED | OUTPATIENT
Start: 2025-07-15 | End: 2025-07-17

## 2025-07-15 RX ORDER — DIGOXIN 250 UG/1
125 TABLET ORAL DAILY
Refills: 0 | Status: CANCELLED | OUTPATIENT
Start: 2025-07-16 | End: 2025-07-15

## 2025-07-15 RX ORDER — BACITRACIN 500 UNIT/G
1 OINTMENT (GRAM) TOPICAL EVERY 24 HOURS
Refills: 0 | Status: DISCONTINUED | OUTPATIENT
Start: 2025-07-15 | End: 2025-07-30

## 2025-07-15 RX ORDER — ACETAMINOPHEN 500 MG/5ML
1000 LIQUID (ML) ORAL EVERY 6 HOURS
Refills: 0 | Status: DISCONTINUED | OUTPATIENT
Start: 2025-07-15 | End: 2025-07-15

## 2025-07-15 RX ORDER — DIGOXIN 250 UG/1
125 TABLET ORAL EVERY 6 HOURS
Refills: 0 | Status: DISCONTINUED | OUTPATIENT
Start: 2025-07-15 | End: 2025-07-15

## 2025-07-15 RX ORDER — DIGOXIN 250 UG/1
250 TABLET ORAL ONCE
Refills: 0 | Status: COMPLETED | OUTPATIENT
Start: 2025-07-15 | End: 2025-07-15

## 2025-07-15 RX ORDER — POLYETHYLENE GLYCOL 3350 17 G/17G
17 POWDER, FOR SOLUTION ORAL EVERY 12 HOURS
Refills: 0 | Status: DISCONTINUED | OUTPATIENT
Start: 2025-07-15 | End: 2025-07-16

## 2025-07-15 RX ORDER — HYDROMORPHONE/SOD CHLOR,ISO/PF 2 MG/10 ML
0.5 SYRINGE (ML) INJECTION
Refills: 0 | Status: DISCONTINUED | OUTPATIENT
Start: 2025-07-15 | End: 2025-07-21

## 2025-07-15 RX ORDER — PROPOFOL 10 MG/ML
50 INJECTION, EMULSION INTRAVENOUS
Qty: 1000 | Refills: 0 | Status: DISCONTINUED | OUTPATIENT
Start: 2025-07-15 | End: 2025-07-17

## 2025-07-15 RX ADMIN — CALCIUM GLUCONATE 200 GRAM(S): 20 INJECTION, SOLUTION INTRAVENOUS at 13:53

## 2025-07-15 RX ADMIN — DIGOXIN 250 MICROGRAM(S): 250 TABLET ORAL at 04:19

## 2025-07-15 RX ADMIN — Medication 1000 MILLIGRAM(S): at 23:55

## 2025-07-15 RX ADMIN — Medication 1 APPLICATION(S): at 03:00

## 2025-07-15 RX ADMIN — Medication 400 MILLIGRAM(S): at 16:22

## 2025-07-15 RX ADMIN — Medication 19.9 MICROGRAM(S)/KG/MIN: at 07:34

## 2025-07-15 RX ADMIN — METOPROLOL SUCCINATE 50 MILLIGRAM(S): 50 TABLET, EXTENDED RELEASE ORAL at 16:22

## 2025-07-15 RX ADMIN — CALCIUM GLUCONATE 200 GRAM(S): 20 INJECTION, SOLUTION INTRAVENOUS at 22:12

## 2025-07-15 RX ADMIN — SACUBITRIL AND VALSARTAN 1 TABLET(S): 6; 6 PELLET ORAL at 05:50

## 2025-07-15 RX ADMIN — METOPROLOL SUCCINATE 5 MILLIGRAM(S): 50 TABLET, EXTENDED RELEASE ORAL at 22:11

## 2025-07-15 RX ADMIN — Medication 0.5 MILLIGRAM(S): at 04:18

## 2025-07-15 RX ADMIN — METOPROLOL SUCCINATE 50 MILLIGRAM(S): 50 TABLET, EXTENDED RELEASE ORAL at 05:51

## 2025-07-15 RX ADMIN — Medication 400 MILLIGRAM(S): at 23:25

## 2025-07-15 RX ADMIN — LIDOCAINE HYDROCHLORIDE 1 PATCH: 20 JELLY TOPICAL at 22:11

## 2025-07-15 RX ADMIN — DIGOXIN 250 MICROGRAM(S): 250 TABLET ORAL at 10:01

## 2025-07-15 RX ADMIN — LIDOCAINE HYDROCHLORIDE 1 PATCH: 20 JELLY TOPICAL at 05:37

## 2025-07-15 RX ADMIN — Medication 1 APPLICATION(S): at 05:51

## 2025-07-15 RX ADMIN — Medication 40 MILLIGRAM(S): at 06:15

## 2025-07-15 RX ADMIN — SODIUM CHLORIDE 75 MILLILITER(S): 9 INJECTION, SOLUTION INTRAVENOUS at 07:35

## 2025-07-15 RX ADMIN — Medication 0.5 MILLIGRAM(S): at 23:45

## 2025-07-15 RX ADMIN — Medication 0.5 MILLIGRAM(S): at 11:44

## 2025-07-15 RX ADMIN — PROPOFOL 21.8 MICROGRAM(S)/KG/MIN: 10 INJECTION, EMULSION INTRAVENOUS at 21:57

## 2025-07-15 RX ADMIN — SODIUM CHLORIDE 100 MILLILITER(S): 9 INJECTION, SOLUTION INTRAVENOUS at 21:57

## 2025-07-15 RX ADMIN — Medication 30 MILLILITER(S): at 07:31

## 2025-07-15 RX ADMIN — Medication 1000 MILLIGRAM(S): at 16:52

## 2025-07-16 LAB
ALBUMIN SERPL ELPH-MCNC: 3.1 G/DL — LOW (ref 3.3–5)
ALP SERPL-CCNC: 67 U/L — SIGNIFICANT CHANGE UP (ref 40–120)
ALT FLD-CCNC: 97 U/L — HIGH (ref 10–45)
ANION GAP SERPL CALC-SCNC: 13 MMOL/L — SIGNIFICANT CHANGE UP (ref 5–17)
APTT BLD: 25 SEC — LOW (ref 26.1–36.8)
AST SERPL-CCNC: 158 U/L — HIGH (ref 10–40)
BILIRUB SERPL-MCNC: 1.7 MG/DL — HIGH (ref 0.2–1.2)
BUN SERPL-MCNC: 17 MG/DL — SIGNIFICANT CHANGE UP (ref 7–23)
CA-I BLDA-SCNC: 1.16 MMOL/L — SIGNIFICANT CHANGE UP (ref 1.15–1.33)
CALCIUM SERPL-MCNC: 8.2 MG/DL — LOW (ref 8.4–10.5)
CHLORIDE SERPL-SCNC: 103 MMOL/L — SIGNIFICANT CHANGE UP (ref 96–108)
CK MB CFR SERPL CALC: 113.8 NG/ML — HIGH (ref 0–6.7)
CK MB CFR SERPL CALC: 56.2 NG/ML — HIGH (ref 0–6.7)
CO2 SERPL-SCNC: 21 MMOL/L — LOW (ref 22–31)
CREAT SERPL-MCNC: 0.7 MG/DL — SIGNIFICANT CHANGE UP (ref 0.5–1.3)
EGFR: 104 ML/MIN/1.73M2 — SIGNIFICANT CHANGE UP
EGFR: 104 ML/MIN/1.73M2 — SIGNIFICANT CHANGE UP
GAS PNL BLDA: SIGNIFICANT CHANGE UP
GAS PNL BLDA: SIGNIFICANT CHANGE UP
GLUCOSE SERPL-MCNC: 142 MG/DL — HIGH (ref 70–99)
HCT VFR BLD CALC: 26.5 % — LOW (ref 39–50)
HGB BLD-MCNC: 8.8 G/DL — LOW (ref 13–17)
INR BLD: 1.09 RATIO — SIGNIFICANT CHANGE UP (ref 0.85–1.16)
MAGNESIUM SERPL-MCNC: 2 MG/DL — SIGNIFICANT CHANGE UP (ref 1.6–2.6)
MCHC RBC-ENTMCNC: 29.6 PG — SIGNIFICANT CHANGE UP (ref 27–34)
MCHC RBC-ENTMCNC: 33.2 G/DL — SIGNIFICANT CHANGE UP (ref 32–36)
MCV RBC AUTO: 89.2 FL — SIGNIFICANT CHANGE UP (ref 80–100)
NRBC # BLD AUTO: 0.05 K/UL — HIGH (ref 0–0)
NRBC # FLD: 0.05 K/UL — HIGH (ref 0–0)
NRBC BLD AUTO-RTO: 0 /100 WBCS — SIGNIFICANT CHANGE UP (ref 0–0)
PHOSPHATE SERPL-MCNC: 3.2 MG/DL — SIGNIFICANT CHANGE UP (ref 2.5–4.5)
PLATELET # BLD AUTO: 240 K/UL — SIGNIFICANT CHANGE UP (ref 150–400)
PMV BLD: 10.1 FL — SIGNIFICANT CHANGE UP (ref 7–13)
POTASSIUM SERPL-MCNC: 4.8 MMOL/L — SIGNIFICANT CHANGE UP (ref 3.5–5.3)
POTASSIUM SERPL-SCNC: 4.8 MMOL/L — SIGNIFICANT CHANGE UP (ref 3.5–5.3)
PROT SERPL-MCNC: 5.3 G/DL — LOW (ref 6–8.3)
PROTHROM AB SERPL-ACNC: 12.5 SEC — SIGNIFICANT CHANGE UP (ref 9.9–13.4)
RBC # BLD: 2.97 M/UL — LOW (ref 4.2–5.8)
RBC # FLD: 17 % — HIGH (ref 10.3–14.5)
SODIUM SERPL-SCNC: 137 MMOL/L — SIGNIFICANT CHANGE UP (ref 135–145)
TROPONIN T, HIGH SENSITIVITY RESULT: 692 NG/L — HIGH (ref 0–51)
TROPONIN T, HIGH SENSITIVITY RESULT: 707 NG/L — HIGH (ref 0–51)
TROPONIN T, HIGH SENSITIVITY RESULT: 737 NG/L — HIGH (ref 0–51)
WBC # BLD: 8.25 K/UL — SIGNIFICANT CHANGE UP (ref 3.8–10.5)
WBC # FLD AUTO: 8.25 K/UL — SIGNIFICANT CHANGE UP (ref 3.8–10.5)

## 2025-07-16 PROCEDURE — 99291 CRITICAL CARE FIRST HOUR: CPT

## 2025-07-16 PROCEDURE — 71045 X-RAY EXAM CHEST 1 VIEW: CPT | Mod: 26

## 2025-07-16 PROCEDURE — 99231 SBSQ HOSP IP/OBS SF/LOW 25: CPT

## 2025-07-16 RX ORDER — METOPROLOL SUCCINATE 50 MG/1
50 TABLET, EXTENDED RELEASE ORAL EVERY 8 HOURS
Refills: 0 | Status: DISCONTINUED | OUTPATIENT
Start: 2025-07-16 | End: 2025-07-16

## 2025-07-16 RX ORDER — NOREPINEPHRINE BITARTRATE 8 MG
0.01 SOLUTION INTRAVENOUS
Qty: 8 | Refills: 0 | Status: DISCONTINUED | OUTPATIENT
Start: 2025-07-16 | End: 2025-07-16

## 2025-07-16 RX ORDER — SACUBITRIL AND VALSARTAN 6; 6 MG/1; MG/1
1 PELLET ORAL
Refills: 0 | Status: DISCONTINUED | OUTPATIENT
Start: 2025-07-16 | End: 2025-07-16

## 2025-07-16 RX ORDER — CYST/ALA/Q10/PHOS.SER/DHA/BROC 100-20-50
15 POWDER (GRAM) ORAL DAILY
Refills: 0 | Status: DISCONTINUED | OUTPATIENT
Start: 2025-07-16 | End: 2025-07-17

## 2025-07-16 RX ORDER — METOPROLOL SUCCINATE 50 MG/1
5 TABLET, EXTENDED RELEASE ORAL EVERY 6 HOURS
Refills: 0 | Status: DISCONTINUED | OUTPATIENT
Start: 2025-07-16 | End: 2025-07-16

## 2025-07-16 RX ORDER — SENNA 187 MG
2 TABLET ORAL AT BEDTIME
Refills: 0 | Status: DISCONTINUED | OUTPATIENT
Start: 2025-07-16 | End: 2025-07-16

## 2025-07-16 RX ORDER — MAGNESIUM SULFATE 500 MG/ML
2 SYRINGE (ML) INJECTION ONCE
Refills: 0 | Status: COMPLETED | OUTPATIENT
Start: 2025-07-16 | End: 2025-07-16

## 2025-07-16 RX ORDER — SENNA 187 MG
10 TABLET ORAL AT BEDTIME
Refills: 0 | Status: DISCONTINUED | OUTPATIENT
Start: 2025-07-16 | End: 2025-07-17

## 2025-07-16 RX ORDER — POLYETHYLENE GLYCOL 3350 17 G/17G
17 POWDER, FOR SOLUTION ORAL EVERY 12 HOURS
Refills: 0 | Status: DISCONTINUED | OUTPATIENT
Start: 2025-07-16 | End: 2025-07-17

## 2025-07-16 RX ORDER — METOPROLOL SUCCINATE 50 MG/1
25 TABLET, EXTENDED RELEASE ORAL EVERY 6 HOURS
Refills: 0 | Status: DISCONTINUED | OUTPATIENT
Start: 2025-07-16 | End: 2025-07-17

## 2025-07-16 RX ORDER — HYDROMORPHONE/SOD CHLOR,ISO/PF 2 MG/10 ML
0.5 SYRINGE (ML) INJECTION ONCE
Refills: 0 | Status: DISCONTINUED | OUTPATIENT
Start: 2025-07-16 | End: 2025-07-16

## 2025-07-16 RX ORDER — SACUBITRIL AND VALSARTAN 6; 6 MG/1; MG/1
1 PELLET ORAL
Refills: 0 | Status: DISCONTINUED | OUTPATIENT
Start: 2025-07-16 | End: 2025-07-17

## 2025-07-16 RX ORDER — ROSUVASTATIN CALCIUM 20 MG/1
20 TABLET, FILM COATED ORAL AT BEDTIME
Refills: 0 | Status: DISCONTINUED | OUTPATIENT
Start: 2025-07-16 | End: 2025-07-17

## 2025-07-16 RX ADMIN — PROPOFOL 21.8 MICROGRAM(S)/KG/MIN: 10 INJECTION, EMULSION INTRAVENOUS at 20:11

## 2025-07-16 RX ADMIN — PROPOFOL 21.8 MICROGRAM(S)/KG/MIN: 10 INJECTION, EMULSION INTRAVENOUS at 16:28

## 2025-07-16 RX ADMIN — SODIUM CHLORIDE 100 MILLILITER(S): 9 INJECTION, SOLUTION INTRAVENOUS at 20:10

## 2025-07-16 RX ADMIN — Medication 1000 MILLIGRAM(S): at 05:34

## 2025-07-16 RX ADMIN — LIDOCAINE HYDROCHLORIDE 1 PATCH: 20 JELLY TOPICAL at 07:41

## 2025-07-16 RX ADMIN — METOPROLOL SUCCINATE 50 MILLIGRAM(S): 50 TABLET, EXTENDED RELEASE ORAL at 21:32

## 2025-07-16 RX ADMIN — PROPOFOL 21.8 MICROGRAM(S)/KG/MIN: 10 INJECTION, EMULSION INTRAVENOUS at 10:27

## 2025-07-16 RX ADMIN — NOREPINEPHRINE BITARTRATE 1.37 MICROGRAM(S)/KG/MIN: 8 SOLUTION at 07:15

## 2025-07-16 RX ADMIN — PROPOFOL 21.8 MICROGRAM(S)/KG/MIN: 10 INJECTION, EMULSION INTRAVENOUS at 07:15

## 2025-07-16 RX ADMIN — Medication 1.82 MICROGRAM(S)/KG/HR: at 00:09

## 2025-07-16 RX ADMIN — Medication 0.5 MILLIGRAM(S): at 14:48

## 2025-07-16 RX ADMIN — Medication 100 MILLIGRAM(S): at 17:04

## 2025-07-16 RX ADMIN — Medication 1000 MILLIGRAM(S): at 17:30

## 2025-07-16 RX ADMIN — Medication 400 MILLIGRAM(S): at 05:04

## 2025-07-16 RX ADMIN — SACUBITRIL AND VALSARTAN 1 TABLET(S): 6; 6 PELLET ORAL at 05:05

## 2025-07-16 RX ADMIN — Medication 15 MILLILITER(S): at 05:04

## 2025-07-16 RX ADMIN — Medication 0.5 MILLIGRAM(S): at 02:47

## 2025-07-16 RX ADMIN — Medication 1 APPLICATION(S): at 05:06

## 2025-07-16 RX ADMIN — Medication 0.5 MILLIGRAM(S): at 05:49

## 2025-07-16 RX ADMIN — Medication 1.82 MICROGRAM(S)/KG/HR: at 07:15

## 2025-07-16 RX ADMIN — Medication 40 MILLIGRAM(S): at 11:28

## 2025-07-16 RX ADMIN — Medication 1 APPLICATION(S): at 05:04

## 2025-07-16 RX ADMIN — Medication 1000 MILLIGRAM(S): at 11:58

## 2025-07-16 RX ADMIN — Medication 50 GRAM(S): at 05:04

## 2025-07-16 RX ADMIN — Medication 400 MILLIGRAM(S): at 17:00

## 2025-07-16 RX ADMIN — Medication 0.5 MILLIGRAM(S): at 02:17

## 2025-07-16 RX ADMIN — Medication 1.82 MICROGRAM(S)/KG/HR: at 20:11

## 2025-07-16 RX ADMIN — Medication 0.5 MILLIGRAM(S): at 15:18

## 2025-07-16 RX ADMIN — METOPROLOL SUCCINATE 5 MILLIGRAM(S): 50 TABLET, EXTENDED RELEASE ORAL at 01:05

## 2025-07-16 RX ADMIN — POLYETHYLENE GLYCOL 3350 17 GRAM(S): 17 POWDER, FOR SOLUTION ORAL at 05:04

## 2025-07-16 RX ADMIN — NOREPINEPHRINE BITARTRATE 1.37 MICROGRAM(S)/KG/MIN: 8 SOLUTION at 02:41

## 2025-07-16 RX ADMIN — Medication 15 MILLILITER(S): at 17:04

## 2025-07-16 RX ADMIN — METOPROLOL SUCCINATE 50 MILLIGRAM(S): 50 TABLET, EXTENDED RELEASE ORAL at 13:27

## 2025-07-16 RX ADMIN — Medication 0.5 MILLIGRAM(S): at 00:15

## 2025-07-16 RX ADMIN — ROSUVASTATIN CALCIUM 20 MILLIGRAM(S): 20 TABLET, FILM COATED ORAL at 21:32

## 2025-07-16 RX ADMIN — LIDOCAINE HYDROCHLORIDE 1 PATCH: 20 JELLY TOPICAL at 21:32

## 2025-07-16 RX ADMIN — LIDOCAINE HYDROCHLORIDE 1 PATCH: 20 JELLY TOPICAL at 08:57

## 2025-07-16 RX ADMIN — Medication 0.5 MILLIGRAM(S): at 11:28

## 2025-07-16 RX ADMIN — SODIUM CHLORIDE 100 MILLILITER(S): 9 INJECTION, SOLUTION INTRAVENOUS at 07:16

## 2025-07-16 RX ADMIN — POLYETHYLENE GLYCOL 3350 17 GRAM(S): 17 POWDER, FOR SOLUTION ORAL at 17:00

## 2025-07-16 RX ADMIN — Medication 10 MILLILITER(S): at 21:32

## 2025-07-16 RX ADMIN — Medication 400 MILLIGRAM(S): at 11:29

## 2025-07-16 RX ADMIN — Medication 0.5 MILLIGRAM(S): at 11:58

## 2025-07-16 RX ADMIN — SACUBITRIL AND VALSARTAN 1 TABLET(S): 6; 6 PELLET ORAL at 18:00

## 2025-07-16 RX ADMIN — Medication 0.5 MILLIGRAM(S): at 06:19

## 2025-07-16 RX ADMIN — METOPROLOL SUCCINATE 50 MILLIGRAM(S): 50 TABLET, EXTENDED RELEASE ORAL at 05:05

## 2025-07-16 RX ADMIN — DIGOXIN 125 MICROGRAM(S): 250 TABLET ORAL at 05:05

## 2025-07-16 RX ADMIN — Medication 15 MILLILITER(S): at 17:00

## 2025-07-17 LAB
ALBUMIN SERPL ELPH-MCNC: 2.7 G/DL — LOW (ref 3.3–5)
ALP SERPL-CCNC: 73 U/L — SIGNIFICANT CHANGE UP (ref 40–120)
ALT FLD-CCNC: 83 U/L — HIGH (ref 10–45)
ANION GAP SERPL CALC-SCNC: 12 MMOL/L — SIGNIFICANT CHANGE UP (ref 5–17)
APTT BLD: 22.2 SEC — LOW (ref 26.1–36.8)
AST SERPL-CCNC: 113 U/L — HIGH (ref 10–40)
BILIRUB SERPL-MCNC: 1.3 MG/DL — HIGH (ref 0.2–1.2)
BLD GP AB SCN SERPL QL: NEGATIVE — SIGNIFICANT CHANGE UP
BUN SERPL-MCNC: 17 MG/DL — SIGNIFICANT CHANGE UP (ref 7–23)
CALCIUM SERPL-MCNC: 7.9 MG/DL — LOW (ref 8.4–10.5)
CHLORIDE SERPL-SCNC: 105 MMOL/L — SIGNIFICANT CHANGE UP (ref 96–108)
CK MB CFR SERPL CALC: 28.6 NG/ML — HIGH (ref 0–6.7)
CO2 SERPL-SCNC: 23 MMOL/L — SIGNIFICANT CHANGE UP (ref 22–31)
CREAT SERPL-MCNC: 0.65 MG/DL — SIGNIFICANT CHANGE UP (ref 0.5–1.3)
EGFR: 106 ML/MIN/1.73M2 — SIGNIFICANT CHANGE UP
EGFR: 106 ML/MIN/1.73M2 — SIGNIFICANT CHANGE UP
GAS PNL BLDA: SIGNIFICANT CHANGE UP
GAS PNL BLDA: SIGNIFICANT CHANGE UP
GLUCOSE SERPL-MCNC: 125 MG/DL — HIGH (ref 70–99)
HCT VFR BLD CALC: 24.2 % — LOW (ref 39–50)
HGB BLD-MCNC: 8.1 G/DL — LOW (ref 13–17)
INR BLD: 1.03 RATIO — SIGNIFICANT CHANGE UP (ref 0.85–1.16)
MAGNESIUM SERPL-MCNC: 2.2 MG/DL — SIGNIFICANT CHANGE UP (ref 1.6–2.6)
MCHC RBC-ENTMCNC: 30.2 PG — SIGNIFICANT CHANGE UP (ref 27–34)
MCHC RBC-ENTMCNC: 33.5 G/DL — SIGNIFICANT CHANGE UP (ref 32–36)
MCV RBC AUTO: 90.3 FL — SIGNIFICANT CHANGE UP (ref 80–100)
NRBC # BLD AUTO: 0.04 K/UL — HIGH (ref 0–0)
NRBC # FLD: 0.04 K/UL — HIGH (ref 0–0)
NRBC BLD AUTO-RTO: 0 /100 WBCS — SIGNIFICANT CHANGE UP (ref 0–0)
PHOSPHATE SERPL-MCNC: 2.1 MG/DL — LOW (ref 2.5–4.5)
PLATELET # BLD AUTO: 224 K/UL — SIGNIFICANT CHANGE UP (ref 150–400)
PMV BLD: 10.2 FL — SIGNIFICANT CHANGE UP (ref 7–13)
POTASSIUM SERPL-MCNC: 4.2 MMOL/L — SIGNIFICANT CHANGE UP (ref 3.5–5.3)
POTASSIUM SERPL-SCNC: 4.2 MMOL/L — SIGNIFICANT CHANGE UP (ref 3.5–5.3)
PROT SERPL-MCNC: 5.1 G/DL — LOW (ref 6–8.3)
PROTHROM AB SERPL-ACNC: 11.7 SEC — SIGNIFICANT CHANGE UP (ref 9.9–13.4)
RBC # BLD: 2.68 M/UL — LOW (ref 4.2–5.8)
RBC # FLD: 17 % — HIGH (ref 10.3–14.5)
RH IG SCN BLD-IMP: POSITIVE — SIGNIFICANT CHANGE UP
SODIUM SERPL-SCNC: 140 MMOL/L — SIGNIFICANT CHANGE UP (ref 135–145)
TROPONIN T, HIGH SENSITIVITY RESULT: 812 NG/L — HIGH (ref 0–51)
WBC # BLD: 8.23 K/UL — SIGNIFICANT CHANGE UP (ref 3.8–10.5)
WBC # FLD AUTO: 8.23 K/UL — SIGNIFICANT CHANGE UP (ref 3.8–10.5)

## 2025-07-17 PROCEDURE — 71045 X-RAY EXAM CHEST 1 VIEW: CPT | Mod: 26,76

## 2025-07-17 PROCEDURE — 72146 MRI CHEST SPINE W/O DYE: CPT | Mod: 26

## 2025-07-17 PROCEDURE — 93970 EXTREMITY STUDY: CPT | Mod: 26

## 2025-07-17 PROCEDURE — 99291 CRITICAL CARE FIRST HOUR: CPT

## 2025-07-17 RX ORDER — METOPROLOL SUCCINATE 50 MG/1
50 TABLET, EXTENDED RELEASE ORAL EVERY 6 HOURS
Refills: 0 | Status: DISCONTINUED | OUTPATIENT
Start: 2025-07-17 | End: 2025-07-18

## 2025-07-17 RX ORDER — SODIUM PHOSPHATE,DIBASIC DIHYD
30 POWDER (GRAM) MISCELLANEOUS ONCE
Refills: 0 | Status: COMPLETED | OUTPATIENT
Start: 2025-07-17 | End: 2025-07-17

## 2025-07-17 RX ORDER — ACETAMINOPHEN 500 MG/5ML
1000 LIQUID (ML) ORAL EVERY 6 HOURS
Refills: 0 | Status: COMPLETED | OUTPATIENT
Start: 2025-07-17 | End: 2025-07-18

## 2025-07-17 RX ORDER — OXYCODONE HYDROCHLORIDE 30 MG/1
5 TABLET ORAL EVERY 4 HOURS
Refills: 0 | Status: DISCONTINUED | OUTPATIENT
Start: 2025-07-17 | End: 2025-07-18

## 2025-07-17 RX ORDER — METOPROLOL SUCCINATE 50 MG/1
50 TABLET, EXTENDED RELEASE ORAL EVERY 6 HOURS
Refills: 0 | Status: DISCONTINUED | OUTPATIENT
Start: 2025-07-17 | End: 2025-07-17

## 2025-07-17 RX ORDER — SACUBITRIL AND VALSARTAN 6; 6 MG/1; MG/1
1 PELLET ORAL
Refills: 0 | Status: DISCONTINUED | OUTPATIENT
Start: 2025-07-17 | End: 2025-07-18

## 2025-07-17 RX ORDER — SENNA 187 MG
2 TABLET ORAL AT BEDTIME
Refills: 0 | Status: DISCONTINUED | OUTPATIENT
Start: 2025-07-17 | End: 2025-07-18

## 2025-07-17 RX ORDER — TAMSULOSIN HYDROCHLORIDE 0.4 MG/1
0.4 CAPSULE ORAL AT BEDTIME
Refills: 0 | Status: DISCONTINUED | OUTPATIENT
Start: 2025-07-17 | End: 2025-07-18

## 2025-07-17 RX ORDER — POLYETHYLENE GLYCOL 3350 17 G/17G
17 POWDER, FOR SOLUTION ORAL EVERY 12 HOURS
Refills: 0 | Status: DISCONTINUED | OUTPATIENT
Start: 2025-07-17 | End: 2025-07-18

## 2025-07-17 RX ORDER — OXYCODONE HYDROCHLORIDE 30 MG/1
10 TABLET ORAL EVERY 4 HOURS
Refills: 0 | Status: DISCONTINUED | OUTPATIENT
Start: 2025-07-17 | End: 2025-07-18

## 2025-07-17 RX ORDER — ROSUVASTATIN CALCIUM 20 MG/1
20 TABLET, FILM COATED ORAL AT BEDTIME
Refills: 0 | Status: DISCONTINUED | OUTPATIENT
Start: 2025-07-17 | End: 2025-07-18

## 2025-07-17 RX ORDER — CALCIUM GLUCONATE 20 MG/ML
2 INJECTION, SOLUTION INTRAVENOUS ONCE
Refills: 0 | Status: COMPLETED | OUTPATIENT
Start: 2025-07-17 | End: 2025-07-17

## 2025-07-17 RX ORDER — B1/B2/B3/B5/B6/B12/VIT C/FOLIC 500-0.5 MG
1 TABLET ORAL DAILY
Refills: 0 | Status: DISCONTINUED | OUTPATIENT
Start: 2025-07-17 | End: 2025-07-18

## 2025-07-17 RX ORDER — METOPROLOL SUCCINATE 50 MG/1
25 TABLET, EXTENDED RELEASE ORAL ONCE
Refills: 0 | Status: COMPLETED | OUTPATIENT
Start: 2025-07-17 | End: 2025-07-17

## 2025-07-17 RX ORDER — PHENYLEPHRINE HCL IN 0.9% NACL 0.5 MG/5ML
0.5 SYRINGE (ML) INTRAVENOUS
Qty: 40 | Refills: 0 | Status: DISCONTINUED | OUTPATIENT
Start: 2025-07-17 | End: 2025-07-17

## 2025-07-17 RX ADMIN — ROSUVASTATIN CALCIUM 20 MILLIGRAM(S): 20 TABLET, FILM COATED ORAL at 21:15

## 2025-07-17 RX ADMIN — Medication 0.5 MILLIGRAM(S): at 02:51

## 2025-07-17 RX ADMIN — Medication 15 MILLILITER(S): at 11:08

## 2025-07-17 RX ADMIN — LIDOCAINE HYDROCHLORIDE 1 PATCH: 20 JELLY TOPICAL at 07:53

## 2025-07-17 RX ADMIN — PROPOFOL 21.8 MICROGRAM(S)/KG/MIN: 10 INJECTION, EMULSION INTRAVENOUS at 09:03

## 2025-07-17 RX ADMIN — Medication 1 APPLICATION(S): at 05:25

## 2025-07-17 RX ADMIN — Medication 0.5 MILLIGRAM(S): at 09:30

## 2025-07-17 RX ADMIN — Medication 1 APPLICATION(S): at 05:23

## 2025-07-17 RX ADMIN — TAMSULOSIN HYDROCHLORIDE 0.4 MILLIGRAM(S): 0.4 CAPSULE ORAL at 22:28

## 2025-07-17 RX ADMIN — SACUBITRIL AND VALSARTAN 1 TABLET(S): 6; 6 PELLET ORAL at 18:59

## 2025-07-17 RX ADMIN — Medication 0.5 MILLIGRAM(S): at 19:42

## 2025-07-17 RX ADMIN — Medication 15 MILLILITER(S): at 05:23

## 2025-07-17 RX ADMIN — Medication 1000 MILLIGRAM(S): at 17:34

## 2025-07-17 RX ADMIN — Medication 1.82 MICROGRAM(S)/KG/HR: at 07:33

## 2025-07-17 RX ADMIN — PROPOFOL 21.8 MICROGRAM(S)/KG/MIN: 10 INJECTION, EMULSION INTRAVENOUS at 13:14

## 2025-07-17 RX ADMIN — Medication 255 MILLIMOLE(S): at 02:58

## 2025-07-17 RX ADMIN — METOPROLOL SUCCINATE 50 MILLIGRAM(S): 50 TABLET, EXTENDED RELEASE ORAL at 18:59

## 2025-07-17 RX ADMIN — Medication 0.5 MILLIGRAM(S): at 14:20

## 2025-07-17 RX ADMIN — Medication 2 TABLET(S): at 21:15

## 2025-07-17 RX ADMIN — SACUBITRIL AND VALSARTAN 1 TABLET(S): 6; 6 PELLET ORAL at 05:25

## 2025-07-17 RX ADMIN — POLYETHYLENE GLYCOL 3350 17 GRAM(S): 17 POWDER, FOR SOLUTION ORAL at 05:23

## 2025-07-17 RX ADMIN — LIDOCAINE HYDROCHLORIDE 1 PATCH: 20 JELLY TOPICAL at 09:08

## 2025-07-17 RX ADMIN — METOPROLOL SUCCINATE 25 MILLIGRAM(S): 50 TABLET, EXTENDED RELEASE ORAL at 05:23

## 2025-07-17 RX ADMIN — Medication 0.5 MILLIGRAM(S): at 13:50

## 2025-07-17 RX ADMIN — Medication 100 MILLIGRAM(S): at 11:08

## 2025-07-17 RX ADMIN — DIGOXIN 125 MICROGRAM(S): 250 TABLET ORAL at 05:41

## 2025-07-17 RX ADMIN — PROPOFOL 21.8 MICROGRAM(S)/KG/MIN: 10 INJECTION, EMULSION INTRAVENOUS at 07:33

## 2025-07-17 RX ADMIN — METOPROLOL SUCCINATE 25 MILLIGRAM(S): 50 TABLET, EXTENDED RELEASE ORAL at 12:30

## 2025-07-17 RX ADMIN — Medication 400 MILLIGRAM(S): at 17:04

## 2025-07-17 RX ADMIN — CALCIUM GLUCONATE 200 GRAM(S): 20 INJECTION, SOLUTION INTRAVENOUS at 18:16

## 2025-07-17 RX ADMIN — Medication 40 MILLIGRAM(S): at 11:08

## 2025-07-17 RX ADMIN — Medication 0.5 MILLIGRAM(S): at 03:21

## 2025-07-17 RX ADMIN — Medication 1000 MILLIGRAM(S): at 23:31

## 2025-07-17 RX ADMIN — METOPROLOL SUCCINATE 50 MILLIGRAM(S): 50 TABLET, EXTENDED RELEASE ORAL at 23:01

## 2025-07-17 RX ADMIN — Medication 0.5 MILLIGRAM(S): at 09:03

## 2025-07-17 RX ADMIN — Medication 0.5 MILLIGRAM(S): at 20:12

## 2025-07-17 RX ADMIN — LIDOCAINE HYDROCHLORIDE 1 PATCH: 20 JELLY TOPICAL at 21:15

## 2025-07-17 RX ADMIN — METOPROLOL SUCCINATE 25 MILLIGRAM(S): 50 TABLET, EXTENDED RELEASE ORAL at 11:09

## 2025-07-17 RX ADMIN — Medication 400 MILLIGRAM(S): at 23:01

## 2025-07-18 LAB
ADD ON TEST-SPECIMEN IN LAB: SIGNIFICANT CHANGE UP
ALBUMIN SERPL ELPH-MCNC: 2.8 G/DL — LOW (ref 3.3–5)
ALBUMIN SERPL ELPH-MCNC: 2.9 G/DL — LOW (ref 3.3–5)
ALBUMIN SERPL ELPH-MCNC: 3 G/DL — LOW (ref 3.3–5)
ALP SERPL-CCNC: 81 U/L — SIGNIFICANT CHANGE UP (ref 40–120)
ALP SERPL-CCNC: 82 U/L — SIGNIFICANT CHANGE UP (ref 40–120)
ALP SERPL-CCNC: 84 U/L — SIGNIFICANT CHANGE UP (ref 40–120)
ALT FLD-CCNC: 69 U/L — HIGH (ref 10–45)
ALT FLD-CCNC: 78 U/L — HIGH (ref 10–45)
ALT FLD-CCNC: 78 U/L — HIGH (ref 10–45)
ANION GAP SERPL CALC-SCNC: 12 MMOL/L — SIGNIFICANT CHANGE UP (ref 5–17)
ANION GAP SERPL CALC-SCNC: 15 MMOL/L — SIGNIFICANT CHANGE UP (ref 5–17)
ANION GAP SERPL CALC-SCNC: 15 MMOL/L — SIGNIFICANT CHANGE UP (ref 5–17)
APTT BLD: 22.9 SEC — LOW (ref 26.1–36.8)
APTT BLD: 23.2 SEC — LOW (ref 26.1–36.8)
AST SERPL-CCNC: 47 U/L — HIGH (ref 10–40)
AST SERPL-CCNC: 62 U/L — HIGH (ref 10–40)
AST SERPL-CCNC: 68 U/L — HIGH (ref 10–40)
BILIRUB SERPL-MCNC: 1.4 MG/DL — HIGH (ref 0.2–1.2)
BILIRUB SERPL-MCNC: 1.4 MG/DL — HIGH (ref 0.2–1.2)
BILIRUB SERPL-MCNC: 1.5 MG/DL — HIGH (ref 0.2–1.2)
BUN SERPL-MCNC: 14 MG/DL — SIGNIFICANT CHANGE UP (ref 7–23)
BUN SERPL-MCNC: 15 MG/DL — SIGNIFICANT CHANGE UP (ref 7–23)
BUN SERPL-MCNC: 15 MG/DL — SIGNIFICANT CHANGE UP (ref 7–23)
CALCIUM SERPL-MCNC: 8 MG/DL — LOW (ref 8.4–10.5)
CALCIUM SERPL-MCNC: 8.1 MG/DL — LOW (ref 8.4–10.5)
CALCIUM SERPL-MCNC: 8.2 MG/DL — LOW (ref 8.4–10.5)
CHLORIDE SERPL-SCNC: 102 MMOL/L — SIGNIFICANT CHANGE UP (ref 96–108)
CHLORIDE SERPL-SCNC: 103 MMOL/L — SIGNIFICANT CHANGE UP (ref 96–108)
CHLORIDE SERPL-SCNC: 104 MMOL/L — SIGNIFICANT CHANGE UP (ref 96–108)
CK MB CFR SERPL CALC: 6.3 NG/ML — SIGNIFICANT CHANGE UP (ref 0–6.7)
CK MB CFR SERPL CALC: 6.6 NG/ML — SIGNIFICANT CHANGE UP (ref 0–6.7)
CO2 SERPL-SCNC: 21 MMOL/L — LOW (ref 22–31)
CO2 SERPL-SCNC: 23 MMOL/L — SIGNIFICANT CHANGE UP (ref 22–31)
CO2 SERPL-SCNC: 23 MMOL/L — SIGNIFICANT CHANGE UP (ref 22–31)
CREAT SERPL-MCNC: 0.61 MG/DL — SIGNIFICANT CHANGE UP (ref 0.5–1.3)
CREAT SERPL-MCNC: 0.61 MG/DL — SIGNIFICANT CHANGE UP (ref 0.5–1.3)
CREAT SERPL-MCNC: 0.64 MG/DL — SIGNIFICANT CHANGE UP (ref 0.5–1.3)
EGFR: 106 ML/MIN/1.73M2 — SIGNIFICANT CHANGE UP
EGFR: 106 ML/MIN/1.73M2 — SIGNIFICANT CHANGE UP
EGFR: 108 ML/MIN/1.73M2 — SIGNIFICANT CHANGE UP
GAS PNL BLDA: SIGNIFICANT CHANGE UP
GLUCOSE SERPL-MCNC: 101 MG/DL — HIGH (ref 70–99)
GLUCOSE SERPL-MCNC: 110 MG/DL — HIGH (ref 70–99)
GLUCOSE SERPL-MCNC: 184 MG/DL — HIGH (ref 70–99)
HCT VFR BLD CALC: 26.1 % — LOW (ref 39–50)
HCT VFR BLD CALC: 28.2 % — LOW (ref 39–50)
HGB BLD-MCNC: 8.6 G/DL — LOW (ref 13–17)
HGB BLD-MCNC: 9.1 G/DL — LOW (ref 13–17)
INR BLD: 1.08 RATIO — SIGNIFICANT CHANGE UP (ref 0.85–1.16)
INR BLD: 1.1 RATIO — SIGNIFICANT CHANGE UP (ref 0.85–1.16)
MAGNESIUM SERPL-MCNC: 1.9 MG/DL — SIGNIFICANT CHANGE UP (ref 1.6–2.6)
MCHC RBC-ENTMCNC: 29.8 PG — SIGNIFICANT CHANGE UP (ref 27–34)
MCHC RBC-ENTMCNC: 30.3 PG — SIGNIFICANT CHANGE UP (ref 27–34)
MCHC RBC-ENTMCNC: 32.3 G/DL — SIGNIFICANT CHANGE UP (ref 32–36)
MCHC RBC-ENTMCNC: 33 G/DL — SIGNIFICANT CHANGE UP (ref 32–36)
MCV RBC AUTO: 91.9 FL — SIGNIFICANT CHANGE UP (ref 80–100)
MCV RBC AUTO: 92.5 FL — SIGNIFICANT CHANGE UP (ref 80–100)
NRBC # BLD AUTO: 0.14 K/UL — HIGH (ref 0–0)
NRBC # BLD AUTO: 0.17 K/UL — HIGH (ref 0–0)
NRBC # FLD: 0.14 K/UL — HIGH (ref 0–0)
NRBC # FLD: 0.17 K/UL — HIGH (ref 0–0)
NRBC BLD AUTO-RTO: 1 /100 WBCS — HIGH (ref 0–0)
NRBC BLD AUTO-RTO: 1 /100 WBCS — HIGH (ref 0–0)
NT-PROBNP SERPL-SCNC: 6106 PG/ML — HIGH (ref 0–300)
PHOSPHATE SERPL-MCNC: 2.8 MG/DL — SIGNIFICANT CHANGE UP (ref 2.5–4.5)
PHOSPHATE SERPL-MCNC: 3.2 MG/DL — SIGNIFICANT CHANGE UP (ref 2.5–4.5)
PHOSPHATE SERPL-MCNC: 3.7 MG/DL — SIGNIFICANT CHANGE UP (ref 2.5–4.5)
PLATELET # BLD AUTO: 245 K/UL — SIGNIFICANT CHANGE UP (ref 150–400)
PLATELET # BLD AUTO: 287 K/UL — SIGNIFICANT CHANGE UP (ref 150–400)
PMV BLD: 10.1 FL — SIGNIFICANT CHANGE UP (ref 7–13)
PMV BLD: 10.2 FL — SIGNIFICANT CHANGE UP (ref 7–13)
POTASSIUM SERPL-MCNC: 3.7 MMOL/L — SIGNIFICANT CHANGE UP (ref 3.5–5.3)
POTASSIUM SERPL-MCNC: 3.9 MMOL/L — SIGNIFICANT CHANGE UP (ref 3.5–5.3)
POTASSIUM SERPL-MCNC: 3.9 MMOL/L — SIGNIFICANT CHANGE UP (ref 3.5–5.3)
POTASSIUM SERPL-SCNC: 3.7 MMOL/L — SIGNIFICANT CHANGE UP (ref 3.5–5.3)
POTASSIUM SERPL-SCNC: 3.9 MMOL/L — SIGNIFICANT CHANGE UP (ref 3.5–5.3)
POTASSIUM SERPL-SCNC: 3.9 MMOL/L — SIGNIFICANT CHANGE UP (ref 3.5–5.3)
PROT SERPL-MCNC: 5.4 G/DL — LOW (ref 6–8.3)
PROT SERPL-MCNC: 5.6 G/DL — LOW (ref 6–8.3)
PROT SERPL-MCNC: 5.6 G/DL — LOW (ref 6–8.3)
PROTHROM AB SERPL-ACNC: 12.4 SEC — SIGNIFICANT CHANGE UP (ref 9.9–13.4)
PROTHROM AB SERPL-ACNC: 12.6 SEC — SIGNIFICANT CHANGE UP (ref 9.9–13.4)
RBC # BLD: 2.84 M/UL — LOW (ref 4.2–5.8)
RBC # BLD: 3.05 M/UL — LOW (ref 4.2–5.8)
RBC # FLD: 16.8 % — HIGH (ref 10.3–14.5)
RBC # FLD: 16.9 % — HIGH (ref 10.3–14.5)
SODIUM SERPL-SCNC: 138 MMOL/L — SIGNIFICANT CHANGE UP (ref 135–145)
SODIUM SERPL-SCNC: 139 MMOL/L — SIGNIFICANT CHANGE UP (ref 135–145)
SODIUM SERPL-SCNC: 141 MMOL/L — SIGNIFICANT CHANGE UP (ref 135–145)
TROPONIN T, HIGH SENSITIVITY RESULT: 1472 NG/L — HIGH (ref 0–51)
TROPONIN T, HIGH SENSITIVITY RESULT: 1754 NG/L — HIGH (ref 0–51)
WBC # BLD: 12.1 K/UL — HIGH (ref 3.8–10.5)
WBC # BLD: 13.42 K/UL — HIGH (ref 3.8–10.5)
WBC # FLD AUTO: 12.1 K/UL — HIGH (ref 3.8–10.5)
WBC # FLD AUTO: 13.42 K/UL — HIGH (ref 3.8–10.5)

## 2025-07-18 PROCEDURE — 93010 ELECTROCARDIOGRAM REPORT: CPT

## 2025-07-18 PROCEDURE — 71045 X-RAY EXAM CHEST 1 VIEW: CPT | Mod: 26

## 2025-07-18 PROCEDURE — 99291 CRITICAL CARE FIRST HOUR: CPT

## 2025-07-18 PROCEDURE — 71045 X-RAY EXAM CHEST 1 VIEW: CPT | Mod: 26,77,76

## 2025-07-18 RX ORDER — MAGNESIUM SULFATE 500 MG/ML
2 SYRINGE (ML) INJECTION ONCE
Refills: 0 | Status: COMPLETED | OUTPATIENT
Start: 2025-07-18 | End: 2025-07-18

## 2025-07-18 RX ORDER — PROPOFOL 10 MG/ML
20 INJECTION, EMULSION INTRAVENOUS
Qty: 1000 | Refills: 0 | Status: DISCONTINUED | OUTPATIENT
Start: 2025-07-18 | End: 2025-07-19

## 2025-07-18 RX ORDER — HYDROMORPHONE/SOD CHLOR,ISO/PF 2 MG/10 ML
1 SYRINGE (ML) INJECTION ONCE
Refills: 0 | Status: DISCONTINUED | OUTPATIENT
Start: 2025-07-18 | End: 2025-07-18

## 2025-07-18 RX ORDER — METOPROLOL SUCCINATE 50 MG/1
5 TABLET, EXTENDED RELEASE ORAL ONCE
Refills: 0 | Status: COMPLETED | OUTPATIENT
Start: 2025-07-18 | End: 2025-07-18

## 2025-07-18 RX ORDER — HYDROMORPHONE/SOD CHLOR,ISO/PF 2 MG/10 ML
0.5 SYRINGE (ML) INJECTION ONCE
Refills: 0 | Status: DISCONTINUED | OUTPATIENT
Start: 2025-07-18 | End: 2025-07-18

## 2025-07-18 RX ORDER — METOPROLOL SUCCINATE 50 MG/1
5 TABLET, EXTENDED RELEASE ORAL EVERY 4 HOURS
Refills: 0 | Status: DISCONTINUED | OUTPATIENT
Start: 2025-07-18 | End: 2025-07-20

## 2025-07-18 RX ORDER — ENOXAPARIN SODIUM 100 MG/ML
40 INJECTION SUBCUTANEOUS EVERY 24 HOURS
Refills: 0 | Status: DISCONTINUED | OUTPATIENT
Start: 2025-07-18 | End: 2025-07-24

## 2025-07-18 RX ORDER — METOPROLOL SUCCINATE 50 MG/1
5 TABLET, EXTENDED RELEASE ORAL ONCE
Refills: 0 | Status: DISCONTINUED | OUTPATIENT
Start: 2025-07-18 | End: 2025-07-18

## 2025-07-18 RX ORDER — SODIUM PHOSPHATE,DIBASIC DIHYD
15 POWDER (GRAM) MISCELLANEOUS ONCE
Refills: 0 | Status: COMPLETED | OUTPATIENT
Start: 2025-07-18 | End: 2025-07-18

## 2025-07-18 RX ORDER — CALCIUM GLUCONATE 20 MG/ML
2 INJECTION, SOLUTION INTRAVENOUS ONCE
Refills: 0 | Status: COMPLETED | OUTPATIENT
Start: 2025-07-18 | End: 2025-07-18

## 2025-07-18 RX ORDER — AMIODARONE HYDROCHLORIDE 50 MG/ML
150 INJECTION, SOLUTION INTRAVENOUS ONCE
Refills: 0 | Status: COMPLETED | OUTPATIENT
Start: 2025-07-18 | End: 2025-07-18

## 2025-07-18 RX ORDER — FUROSEMIDE 10 MG/ML
20 INJECTION INTRAMUSCULAR; INTRAVENOUS ONCE
Refills: 0 | Status: COMPLETED | OUTPATIENT
Start: 2025-07-18 | End: 2025-07-18

## 2025-07-18 RX ORDER — DIGOXIN 250 UG/1
125 TABLET ORAL DAILY
Refills: 0 | Status: DISCONTINUED | OUTPATIENT
Start: 2025-07-19 | End: 2025-07-23

## 2025-07-18 RX ORDER — PHENYLEPHRINE HCL IN 0.9% NACL 0.5 MG/5ML
1.5 SYRINGE (ML) INTRAVENOUS
Qty: 40 | Refills: 0 | Status: DISCONTINUED | OUTPATIENT
Start: 2025-07-18 | End: 2025-07-19

## 2025-07-18 RX ADMIN — Medication 0.5 MILLIGRAM(S): at 04:50

## 2025-07-18 RX ADMIN — Medication 1 MILLIGRAM(S): at 17:45

## 2025-07-18 RX ADMIN — Medication 0.5 MILLIGRAM(S): at 16:13

## 2025-07-18 RX ADMIN — Medication 0.5 MILLIGRAM(S): at 19:45

## 2025-07-18 RX ADMIN — Medication 0.5 MILLIGRAM(S): at 14:19

## 2025-07-18 RX ADMIN — Medication 0.5 MILLIGRAM(S): at 01:15

## 2025-07-18 RX ADMIN — LIDOCAINE HYDROCHLORIDE 1 PATCH: 20 JELLY TOPICAL at 21:01

## 2025-07-18 RX ADMIN — Medication 15 MILLILITER(S): at 18:00

## 2025-07-18 RX ADMIN — Medication 1000 MILLIGRAM(S): at 05:55

## 2025-07-18 RX ADMIN — DIGOXIN 125 MICROGRAM(S): 250 TABLET ORAL at 05:25

## 2025-07-18 RX ADMIN — Medication 25 GRAM(S): at 04:59

## 2025-07-18 RX ADMIN — Medication 41 MICROGRAM(S)/KG/MIN: at 19:39

## 2025-07-18 RX ADMIN — ENOXAPARIN SODIUM 40 MILLIGRAM(S): 100 INJECTION SUBCUTANEOUS at 17:24

## 2025-07-18 RX ADMIN — OXYCODONE HYDROCHLORIDE 5 MILLIGRAM(S): 30 TABLET ORAL at 03:05

## 2025-07-18 RX ADMIN — Medication 25 GRAM(S): at 16:13

## 2025-07-18 RX ADMIN — Medication 41 MICROGRAM(S)/KG/MIN: at 19:26

## 2025-07-18 RX ADMIN — Medication 0.5 MILLIGRAM(S): at 04:20

## 2025-07-18 RX ADMIN — Medication 0.5 MILLIGRAM(S): at 18:18

## 2025-07-18 RX ADMIN — POLYETHYLENE GLYCOL 3350 17 GRAM(S): 17 POWDER, FOR SOLUTION ORAL at 05:25

## 2025-07-18 RX ADMIN — METOPROLOL SUCCINATE 5 MILLIGRAM(S): 50 TABLET, EXTENDED RELEASE ORAL at 16:45

## 2025-07-18 RX ADMIN — Medication 100 MILLIEQUIVALENT(S): at 07:45

## 2025-07-18 RX ADMIN — OXYCODONE HYDROCHLORIDE 5 MILLIGRAM(S): 30 TABLET ORAL at 03:35

## 2025-07-18 RX ADMIN — METOPROLOL SUCCINATE 50 MILLIGRAM(S): 50 TABLET, EXTENDED RELEASE ORAL at 05:25

## 2025-07-18 RX ADMIN — CALCIUM GLUCONATE 200 GRAM(S): 20 INJECTION, SOLUTION INTRAVENOUS at 16:14

## 2025-07-18 RX ADMIN — Medication 0.5 MILLIGRAM(S): at 18:33

## 2025-07-18 RX ADMIN — Medication 0.5 MILLIGRAM(S): at 16:28

## 2025-07-18 RX ADMIN — AMIODARONE HYDROCHLORIDE 600 MILLIGRAM(S): 50 INJECTION, SOLUTION INTRAVENOUS at 04:58

## 2025-07-18 RX ADMIN — Medication 0.5 MILLIGRAM(S): at 20:15

## 2025-07-18 RX ADMIN — Medication 0.5 MILLIGRAM(S): at 00:45

## 2025-07-18 RX ADMIN — METOPROLOL SUCCINATE 5 MILLIGRAM(S): 50 TABLET, EXTENDED RELEASE ORAL at 21:00

## 2025-07-18 RX ADMIN — Medication 1 APPLICATION(S): at 04:20

## 2025-07-18 RX ADMIN — Medication 0.5 MILLIGRAM(S): at 17:14

## 2025-07-18 RX ADMIN — Medication 400 MILLIGRAM(S): at 11:00

## 2025-07-18 RX ADMIN — LIDOCAINE HYDROCHLORIDE 1 PATCH: 20 JELLY TOPICAL at 07:42

## 2025-07-18 RX ADMIN — Medication 100 MILLIEQUIVALENT(S): at 09:57

## 2025-07-18 RX ADMIN — Medication 0.5 MILLIGRAM(S): at 07:44

## 2025-07-18 RX ADMIN — Medication 0.5 MILLIGRAM(S): at 07:59

## 2025-07-18 RX ADMIN — Medication 255 MILLIMOLE(S): at 05:59

## 2025-07-18 RX ADMIN — Medication 400 MILLIGRAM(S): at 05:25

## 2025-07-18 RX ADMIN — CALCIUM GLUCONATE 200 GRAM(S): 20 INJECTION, SOLUTION INTRAVENOUS at 05:59

## 2025-07-18 RX ADMIN — Medication 1 APPLICATION(S): at 05:25

## 2025-07-18 RX ADMIN — PROPOFOL 8.74 MICROGRAM(S)/KG/MIN: 10 INJECTION, EMULSION INTRAVENOUS at 17:24

## 2025-07-18 RX ADMIN — Medication 100 MILLIEQUIVALENT(S): at 08:44

## 2025-07-18 RX ADMIN — Medication 0.5 MILLIGRAM(S): at 16:40

## 2025-07-18 RX ADMIN — LIDOCAINE HYDROCHLORIDE 1 PATCH: 20 JELLY TOPICAL at 09:59

## 2025-07-18 RX ADMIN — Medication 0.5 MILLIGRAM(S): at 16:25

## 2025-07-18 RX ADMIN — Medication 0.5 MILLIGRAM(S): at 23:49

## 2025-07-18 RX ADMIN — PROPOFOL 8.74 MICROGRAM(S)/KG/MIN: 10 INJECTION, EMULSION INTRAVENOUS at 19:39

## 2025-07-18 RX ADMIN — Medication 0.5 MILLIGRAM(S): at 23:19

## 2025-07-18 RX ADMIN — Medication 0.5 MILLIGRAM(S): at 16:59

## 2025-07-18 RX ADMIN — Medication 0.5 MILLIGRAM(S): at 14:04

## 2025-07-18 RX ADMIN — Medication 1000 MILLIGRAM(S): at 11:15

## 2025-07-18 RX ADMIN — Medication 1 MILLIGRAM(S): at 18:00

## 2025-07-18 RX ADMIN — FUROSEMIDE 20 MILLIGRAM(S): 10 INJECTION INTRAMUSCULAR; INTRAVENOUS at 11:53

## 2025-07-19 LAB
ALBUMIN SERPL ELPH-MCNC: 2.8 G/DL — LOW (ref 3.3–5)
ALP SERPL-CCNC: 75 U/L — SIGNIFICANT CHANGE UP (ref 40–120)
ALT FLD-CCNC: 57 U/L — HIGH (ref 10–45)
ANION GAP SERPL CALC-SCNC: 14 MMOL/L — SIGNIFICANT CHANGE UP (ref 5–17)
APTT BLD: 24 SEC — LOW (ref 26.1–36.8)
AST SERPL-CCNC: 36 U/L — SIGNIFICANT CHANGE UP (ref 10–40)
BILIRUB SERPL-MCNC: 1.5 MG/DL — HIGH (ref 0.2–1.2)
BUN SERPL-MCNC: 16 MG/DL — SIGNIFICANT CHANGE UP (ref 7–23)
CALCIUM SERPL-MCNC: 8.2 MG/DL — LOW (ref 8.4–10.5)
CHLORIDE SERPL-SCNC: 103 MMOL/L — SIGNIFICANT CHANGE UP (ref 96–108)
CK MB CFR SERPL CALC: 8.6 NG/ML — HIGH (ref 0–6.7)
CO2 SERPL-SCNC: 23 MMOL/L — SIGNIFICANT CHANGE UP (ref 22–31)
CREAT SERPL-MCNC: 0.64 MG/DL — SIGNIFICANT CHANGE UP (ref 0.5–1.3)
EGFR: 106 ML/MIN/1.73M2 — SIGNIFICANT CHANGE UP
EGFR: 106 ML/MIN/1.73M2 — SIGNIFICANT CHANGE UP
GAS PNL BLDV: SIGNIFICANT CHANGE UP
GLUCOSE SERPL-MCNC: 105 MG/DL — HIGH (ref 70–99)
GRAM STN FLD: ABNORMAL
HCT VFR BLD CALC: 28.7 % — LOW (ref 39–50)
HGB BLD-MCNC: 9 G/DL — LOW (ref 13–17)
INR BLD: 1.27 RATIO — HIGH (ref 0.85–1.16)
MAGNESIUM SERPL-MCNC: 2 MG/DL — SIGNIFICANT CHANGE UP (ref 1.6–2.6)
MCHC RBC-ENTMCNC: 29.6 PG — SIGNIFICANT CHANGE UP (ref 27–34)
MCHC RBC-ENTMCNC: 31.4 G/DL — LOW (ref 32–36)
MCV RBC AUTO: 94.4 FL — SIGNIFICANT CHANGE UP (ref 80–100)
NRBC # BLD AUTO: 0.13 K/UL — HIGH (ref 0–0)
NRBC # FLD: 0.13 K/UL — HIGH (ref 0–0)
NRBC BLD AUTO-RTO: 0 /100 WBCS — SIGNIFICANT CHANGE UP (ref 0–0)
PHOSPHATE SERPL-MCNC: 3.8 MG/DL — SIGNIFICANT CHANGE UP (ref 2.5–4.5)
PLATELET # BLD AUTO: 306 K/UL — SIGNIFICANT CHANGE UP (ref 150–400)
PMV BLD: 10.2 FL — SIGNIFICANT CHANGE UP (ref 7–13)
POTASSIUM SERPL-MCNC: 4.4 MMOL/L — SIGNIFICANT CHANGE UP (ref 3.5–5.3)
POTASSIUM SERPL-SCNC: 4.4 MMOL/L — SIGNIFICANT CHANGE UP (ref 3.5–5.3)
PROT SERPL-MCNC: 5.4 G/DL — LOW (ref 6–8.3)
PROTHROM AB SERPL-ACNC: 14.4 SEC — HIGH (ref 9.9–13.4)
RBC # BLD: 3.04 M/UL — LOW (ref 4.2–5.8)
RBC # FLD: 16.9 % — HIGH (ref 10.3–14.5)
SODIUM SERPL-SCNC: 140 MMOL/L — SIGNIFICANT CHANGE UP (ref 135–145)
SPECIMEN SOURCE: SIGNIFICANT CHANGE UP
WBC # BLD: 16.54 K/UL — HIGH (ref 3.8–10.5)
WBC # FLD AUTO: 16.54 K/UL — HIGH (ref 3.8–10.5)

## 2025-07-19 PROCEDURE — 99291 CRITICAL CARE FIRST HOUR: CPT

## 2025-07-19 PROCEDURE — 71045 X-RAY EXAM CHEST 1 VIEW: CPT | Mod: 26,76

## 2025-07-19 PROCEDURE — 93010 ELECTROCARDIOGRAM REPORT: CPT

## 2025-07-19 RX ORDER — MAGNESIUM SULFATE 500 MG/ML
2 SYRINGE (ML) INJECTION ONCE
Refills: 0 | Status: COMPLETED | OUTPATIENT
Start: 2025-07-19 | End: 2025-07-19

## 2025-07-19 RX ORDER — METOPROLOL SUCCINATE 50 MG/1
50 TABLET, EXTENDED RELEASE ORAL ONCE
Refills: 0 | Status: COMPLETED | OUTPATIENT
Start: 2025-07-19 | End: 2025-07-19

## 2025-07-19 RX ORDER — DIGOXIN 250 UG/1
250 TABLET ORAL ONCE
Refills: 0 | Status: COMPLETED | OUTPATIENT
Start: 2025-07-19 | End: 2025-07-19

## 2025-07-19 RX ORDER — METOPROLOL SUCCINATE 50 MG/1
50 TABLET, EXTENDED RELEASE ORAL ONCE
Refills: 0 | Status: DISCONTINUED | OUTPATIENT
Start: 2025-07-19 | End: 2025-07-19

## 2025-07-19 RX ORDER — HYDROMORPHONE/SOD CHLOR,ISO/PF 2 MG/10 ML
0.25 SYRINGE (ML) INJECTION
Refills: 0 | Status: DISCONTINUED | OUTPATIENT
Start: 2025-07-19 | End: 2025-07-20

## 2025-07-19 RX ORDER — ACETAMINOPHEN 500 MG/5ML
1000 LIQUID (ML) ORAL EVERY 6 HOURS
Refills: 0 | Status: DISCONTINUED | OUTPATIENT
Start: 2025-07-19 | End: 2025-07-20

## 2025-07-19 RX ORDER — DILTIAZEM HYDROCHLORIDE 240 MG/1
10 TABLET, EXTENDED RELEASE ORAL ONCE
Refills: 0 | Status: COMPLETED | OUTPATIENT
Start: 2025-07-19 | End: 2025-07-19

## 2025-07-19 RX ORDER — CALCIUM GLUCONATE 20 MG/ML
2 INJECTION, SOLUTION INTRAVENOUS ONCE
Refills: 0 | Status: COMPLETED | OUTPATIENT
Start: 2025-07-19 | End: 2025-07-19

## 2025-07-19 RX ADMIN — Medication 50 GRAM(S): at 18:59

## 2025-07-19 RX ADMIN — METOPROLOL SUCCINATE 5 MILLIGRAM(S): 50 TABLET, EXTENDED RELEASE ORAL at 01:28

## 2025-07-19 RX ADMIN — DIGOXIN 125 MICROGRAM(S): 250 TABLET ORAL at 12:02

## 2025-07-19 RX ADMIN — METOPROLOL SUCCINATE 5 MILLIGRAM(S): 50 TABLET, EXTENDED RELEASE ORAL at 09:16

## 2025-07-19 RX ADMIN — LIDOCAINE HYDROCHLORIDE 1 PATCH: 20 JELLY TOPICAL at 09:05

## 2025-07-19 RX ADMIN — Medication 0.5 MILLIGRAM(S): at 13:54

## 2025-07-19 RX ADMIN — DIGOXIN 250 MICROGRAM(S): 250 TABLET ORAL at 19:00

## 2025-07-19 RX ADMIN — Medication 40 MILLIGRAM(S): at 12:04

## 2025-07-19 RX ADMIN — ENOXAPARIN SODIUM 40 MILLIGRAM(S): 100 INJECTION SUBCUTANEOUS at 16:51

## 2025-07-19 RX ADMIN — Medication 0.5 MILLIGRAM(S): at 07:00

## 2025-07-19 RX ADMIN — Medication 0.5 MILLIGRAM(S): at 14:15

## 2025-07-19 RX ADMIN — Medication 25 GRAM(S): at 16:57

## 2025-07-19 RX ADMIN — LIDOCAINE HYDROCHLORIDE 1 PATCH: 20 JELLY TOPICAL at 07:26

## 2025-07-19 RX ADMIN — Medication 400 MILLIGRAM(S): at 22:48

## 2025-07-19 RX ADMIN — METOPROLOL SUCCINATE 5 MILLIGRAM(S): 50 TABLET, EXTENDED RELEASE ORAL at 13:51

## 2025-07-19 RX ADMIN — Medication 0.5 MILLIGRAM(S): at 18:18

## 2025-07-19 RX ADMIN — Medication 1 APPLICATION(S): at 05:08

## 2025-07-19 RX ADMIN — DILTIAZEM HYDROCHLORIDE 10 MILLIGRAM(S): 240 TABLET, EXTENDED RELEASE ORAL at 18:40

## 2025-07-19 RX ADMIN — Medication 0.5 MILLIGRAM(S): at 03:56

## 2025-07-19 RX ADMIN — DILTIAZEM HYDROCHLORIDE 10 MILLIGRAM(S): 240 TABLET, EXTENDED RELEASE ORAL at 19:00

## 2025-07-19 RX ADMIN — Medication 1000 MILLIGRAM(S): at 23:18

## 2025-07-19 RX ADMIN — CALCIUM GLUCONATE 200 GRAM(S): 20 INJECTION, SOLUTION INTRAVENOUS at 03:16

## 2025-07-19 RX ADMIN — METOPROLOL SUCCINATE 50 MILLIGRAM(S): 50 TABLET, EXTENDED RELEASE ORAL at 18:41

## 2025-07-19 RX ADMIN — METOPROLOL SUCCINATE 5 MILLIGRAM(S): 50 TABLET, EXTENDED RELEASE ORAL at 21:34

## 2025-07-19 RX ADMIN — Medication 0.5 MILLIGRAM(S): at 03:26

## 2025-07-19 RX ADMIN — Medication 0.5 MILLIGRAM(S): at 18:30

## 2025-07-19 RX ADMIN — Medication 0.5 MILLIGRAM(S): at 06:28

## 2025-07-19 RX ADMIN — METOPROLOL SUCCINATE 5 MILLIGRAM(S): 50 TABLET, EXTENDED RELEASE ORAL at 05:08

## 2025-07-19 RX ADMIN — METOPROLOL SUCCINATE 5 MILLIGRAM(S): 50 TABLET, EXTENDED RELEASE ORAL at 17:02

## 2025-07-19 RX ADMIN — LIDOCAINE HYDROCHLORIDE 1 PATCH: 20 JELLY TOPICAL at 21:34

## 2025-07-20 LAB
ALBUMIN SERPL ELPH-MCNC: 2.7 G/DL — LOW (ref 3.3–5)
ALP SERPL-CCNC: 77 U/L — SIGNIFICANT CHANGE UP (ref 40–120)
ALT FLD-CCNC: 46 U/L — HIGH (ref 10–45)
ANION GAP SERPL CALC-SCNC: 11 MMOL/L — SIGNIFICANT CHANGE UP (ref 5–17)
APTT BLD: 25 SEC — LOW (ref 26.1–36.8)
AST SERPL-CCNC: 39 U/L — SIGNIFICANT CHANGE UP (ref 10–40)
BILIRUB SERPL-MCNC: 1.3 MG/DL — HIGH (ref 0.2–1.2)
BUN SERPL-MCNC: 18 MG/DL — SIGNIFICANT CHANGE UP (ref 7–23)
CALCIUM SERPL-MCNC: 7.9 MG/DL — LOW (ref 8.4–10.5)
CHLORIDE SERPL-SCNC: 102 MMOL/L — SIGNIFICANT CHANGE UP (ref 96–108)
CK MB CFR SERPL CALC: 11 NG/ML — HIGH (ref 0–6.7)
CO2 SERPL-SCNC: 25 MMOL/L — SIGNIFICANT CHANGE UP (ref 22–31)
CREAT SERPL-MCNC: 0.6 MG/DL — SIGNIFICANT CHANGE UP (ref 0.5–1.3)
CULTURE RESULTS: ABNORMAL
EGFR: 108 ML/MIN/1.73M2 — SIGNIFICANT CHANGE UP
EGFR: 108 ML/MIN/1.73M2 — SIGNIFICANT CHANGE UP
GAS PNL BLDV: SIGNIFICANT CHANGE UP
GLUCOSE SERPL-MCNC: 112 MG/DL — HIGH (ref 70–99)
HCT VFR BLD CALC: 26.2 % — LOW (ref 39–50)
HGB BLD-MCNC: 8.4 G/DL — LOW (ref 13–17)
INR BLD: 1.13 RATIO — SIGNIFICANT CHANGE UP (ref 0.85–1.16)
MAGNESIUM SERPL-MCNC: 2.5 MG/DL — SIGNIFICANT CHANGE UP (ref 1.6–2.6)
MCHC RBC-ENTMCNC: 30.3 PG — SIGNIFICANT CHANGE UP (ref 27–34)
MCHC RBC-ENTMCNC: 32.1 G/DL — SIGNIFICANT CHANGE UP (ref 32–36)
MCV RBC AUTO: 94.6 FL — SIGNIFICANT CHANGE UP (ref 80–100)
NRBC # BLD AUTO: 0.09 K/UL — HIGH (ref 0–0)
NRBC # FLD: 0.09 K/UL — HIGH (ref 0–0)
NRBC BLD AUTO-RTO: 0 /100 WBCS — SIGNIFICANT CHANGE UP (ref 0–0)
PHOSPHATE SERPL-MCNC: 2.9 MG/DL — SIGNIFICANT CHANGE UP (ref 2.5–4.5)
PLATELET # BLD AUTO: 341 K/UL — SIGNIFICANT CHANGE UP (ref 150–400)
PMV BLD: 10 FL — SIGNIFICANT CHANGE UP (ref 7–13)
POTASSIUM SERPL-MCNC: 4.1 MMOL/L — SIGNIFICANT CHANGE UP (ref 3.5–5.3)
POTASSIUM SERPL-SCNC: 4.1 MMOL/L — SIGNIFICANT CHANGE UP (ref 3.5–5.3)
PROCALCITONIN SERPL-MCNC: 0.34 NG/ML — HIGH (ref 0.02–0.1)
PROT SERPL-MCNC: 5.3 G/DL — LOW (ref 6–8.3)
PROTHROM AB SERPL-ACNC: 13 SEC — SIGNIFICANT CHANGE UP (ref 9.9–13.4)
RBC # BLD: 2.77 M/UL — LOW (ref 4.2–5.8)
RBC # FLD: 16.8 % — HIGH (ref 10.3–14.5)
SODIUM SERPL-SCNC: 138 MMOL/L — SIGNIFICANT CHANGE UP (ref 135–145)
SPECIMEN SOURCE: SIGNIFICANT CHANGE UP
WBC # BLD: 13.43 K/UL — HIGH (ref 3.8–10.5)
WBC # FLD AUTO: 13.43 K/UL — HIGH (ref 3.8–10.5)

## 2025-07-20 PROCEDURE — 71045 X-RAY EXAM CHEST 1 VIEW: CPT | Mod: 26

## 2025-07-20 PROCEDURE — 99233 SBSQ HOSP IP/OBS HIGH 50: CPT

## 2025-07-20 PROCEDURE — 99291 CRITICAL CARE FIRST HOUR: CPT

## 2025-07-20 RX ORDER — METOPROLOL SUCCINATE 50 MG/1
50 TABLET, EXTENDED RELEASE ORAL EVERY 6 HOURS
Refills: 0 | Status: DISCONTINUED | OUTPATIENT
Start: 2025-07-20 | End: 2025-07-25

## 2025-07-20 RX ORDER — LIDOCAINE HYDROCHLORIDE 20 MG/ML
1 JELLY TOPICAL ONCE
Refills: 0 | Status: COMPLETED | OUTPATIENT
Start: 2025-07-20 | End: 2025-07-20

## 2025-07-20 RX ORDER — ACETAMINOPHEN 500 MG/5ML
1000 LIQUID (ML) ORAL EVERY 6 HOURS
Refills: 0 | Status: COMPLETED | OUTPATIENT
Start: 2025-07-20 | End: 2025-07-21

## 2025-07-20 RX ORDER — OXYCODONE HYDROCHLORIDE 30 MG/1
5 TABLET ORAL EVERY 4 HOURS
Refills: 0 | Status: DISCONTINUED | OUTPATIENT
Start: 2025-07-20 | End: 2025-07-25

## 2025-07-20 RX ORDER — SODIUM PHOSPHATE,DIBASIC DIHYD
15 POWDER (GRAM) MISCELLANEOUS ONCE
Refills: 0 | Status: COMPLETED | OUTPATIENT
Start: 2025-07-20 | End: 2025-07-20

## 2025-07-20 RX ORDER — LIDOCAINE HYDROCHLORIDE 20 MG/ML
1 JELLY TOPICAL EVERY 24 HOURS
Refills: 0 | Status: DISCONTINUED | OUTPATIENT
Start: 2025-07-20 | End: 2025-07-20

## 2025-07-20 RX ORDER — CALCIUM GLUCONATE 20 MG/ML
2 INJECTION, SOLUTION INTRAVENOUS ONCE
Refills: 0 | Status: COMPLETED | OUTPATIENT
Start: 2025-07-20 | End: 2025-07-20

## 2025-07-20 RX ORDER — METHOCARBAMOL 500 MG/1
500 TABLET, FILM COATED ORAL EVERY 6 HOURS
Refills: 0 | Status: DISCONTINUED | OUTPATIENT
Start: 2025-07-20 | End: 2025-07-30

## 2025-07-20 RX ADMIN — Medication 0.5 MILLIGRAM(S): at 09:26

## 2025-07-20 RX ADMIN — Medication 1000 MILLIGRAM(S): at 21:15

## 2025-07-20 RX ADMIN — Medication 0.5 MILLIGRAM(S): at 21:15

## 2025-07-20 RX ADMIN — DIGOXIN 125 MICROGRAM(S): 250 TABLET ORAL at 11:45

## 2025-07-20 RX ADMIN — LIDOCAINE HYDROCHLORIDE 1 PATCH: 20 JELLY TOPICAL at 07:40

## 2025-07-20 RX ADMIN — Medication 400 MILLIGRAM(S): at 20:45

## 2025-07-20 RX ADMIN — Medication 0.5 MILLIGRAM(S): at 06:00

## 2025-07-20 RX ADMIN — LIDOCAINE HYDROCHLORIDE 1 PATCH: 20 JELLY TOPICAL at 04:57

## 2025-07-20 RX ADMIN — Medication 1 APPLICATION(S): at 04:57

## 2025-07-20 RX ADMIN — Medication 0.5 MILLIGRAM(S): at 05:30

## 2025-07-20 RX ADMIN — LIDOCAINE HYDROCHLORIDE 1 PATCH: 20 JELLY TOPICAL at 21:56

## 2025-07-20 RX ADMIN — Medication 400 MILLIGRAM(S): at 04:48

## 2025-07-20 RX ADMIN — Medication 0.5 MILLIGRAM(S): at 20:45

## 2025-07-20 RX ADMIN — OXYCODONE HYDROCHLORIDE 5 MILLIGRAM(S): 30 TABLET ORAL at 18:57

## 2025-07-20 RX ADMIN — CALCIUM GLUCONATE 200 GRAM(S): 20 INJECTION, SOLUTION INTRAVENOUS at 02:00

## 2025-07-20 RX ADMIN — OXYCODONE HYDROCHLORIDE 5 MILLIGRAM(S): 30 TABLET ORAL at 23:34

## 2025-07-20 RX ADMIN — Medication 40 MILLIGRAM(S): at 11:45

## 2025-07-20 RX ADMIN — Medication 0.5 MILLIGRAM(S): at 08:56

## 2025-07-20 RX ADMIN — METOPROLOL SUCCINATE 5 MILLIGRAM(S): 50 TABLET, EXTENDED RELEASE ORAL at 05:21

## 2025-07-20 RX ADMIN — METOPROLOL SUCCINATE 50 MILLIGRAM(S): 50 TABLET, EXTENDED RELEASE ORAL at 17:42

## 2025-07-20 RX ADMIN — Medication 0.25 MILLIGRAM(S): at 13:52

## 2025-07-20 RX ADMIN — OXYCODONE HYDROCHLORIDE 5 MILLIGRAM(S): 30 TABLET ORAL at 22:34

## 2025-07-20 RX ADMIN — METOPROLOL SUCCINATE 5 MILLIGRAM(S): 50 TABLET, EXTENDED RELEASE ORAL at 09:33

## 2025-07-20 RX ADMIN — METOPROLOL SUCCINATE 5 MILLIGRAM(S): 50 TABLET, EXTENDED RELEASE ORAL at 02:12

## 2025-07-20 RX ADMIN — OXYCODONE HYDROCHLORIDE 5 MILLIGRAM(S): 30 TABLET ORAL at 18:27

## 2025-07-20 RX ADMIN — ENOXAPARIN SODIUM 40 MILLIGRAM(S): 100 INJECTION SUBCUTANEOUS at 17:42

## 2025-07-20 RX ADMIN — Medication 1000 MILLIGRAM(S): at 05:18

## 2025-07-20 RX ADMIN — LIDOCAINE HYDROCHLORIDE 1 PATCH: 20 JELLY TOPICAL at 09:40

## 2025-07-20 RX ADMIN — METOPROLOL SUCCINATE 5 MILLIGRAM(S): 50 TABLET, EXTENDED RELEASE ORAL at 13:48

## 2025-07-20 RX ADMIN — METHOCARBAMOL 500 MILLIGRAM(S): 500 TABLET, FILM COATED ORAL at 22:35

## 2025-07-20 RX ADMIN — LIDOCAINE HYDROCHLORIDE 1 PATCH: 20 JELLY TOPICAL at 16:30

## 2025-07-20 RX ADMIN — Medication 1 APPLICATION(S): at 06:28

## 2025-07-20 RX ADMIN — Medication 0.25 MILLIGRAM(S): at 14:22

## 2025-07-20 RX ADMIN — Medication 255 MILLIMOLE(S): at 02:38

## 2025-07-21 LAB
ALBUMIN SERPL ELPH-MCNC: 2.8 G/DL — LOW (ref 3.3–5)
ALP SERPL-CCNC: 99 U/L — SIGNIFICANT CHANGE UP (ref 40–120)
ALT FLD-CCNC: 54 U/L — HIGH (ref 10–45)
ANION GAP SERPL CALC-SCNC: 11 MMOL/L — SIGNIFICANT CHANGE UP (ref 5–17)
APTT BLD: 25.1 SEC — LOW (ref 26.1–36.8)
AST SERPL-CCNC: 49 U/L — HIGH (ref 10–40)
BILIRUB SERPL-MCNC: 1.4 MG/DL — HIGH (ref 0.2–1.2)
BLD GP AB SCN SERPL QL: NEGATIVE — SIGNIFICANT CHANGE UP
BUN SERPL-MCNC: 14 MG/DL — SIGNIFICANT CHANGE UP (ref 7–23)
CALCIUM SERPL-MCNC: 8 MG/DL — LOW (ref 8.4–10.5)
CHLORIDE SERPL-SCNC: 100 MMOL/L — SIGNIFICANT CHANGE UP (ref 96–108)
CK MB CFR SERPL CALC: 4.8 NG/ML — SIGNIFICANT CHANGE UP (ref 0–6.7)
CO2 SERPL-SCNC: 26 MMOL/L — SIGNIFICANT CHANGE UP (ref 22–31)
CREAT SERPL-MCNC: 0.6 MG/DL — SIGNIFICANT CHANGE UP (ref 0.5–1.3)
EGFR: 108 ML/MIN/1.73M2 — SIGNIFICANT CHANGE UP
EGFR: 108 ML/MIN/1.73M2 — SIGNIFICANT CHANGE UP
GAS PNL BLDV: SIGNIFICANT CHANGE UP
GAS PNL BLDV: SIGNIFICANT CHANGE UP
GLUCOSE SERPL-MCNC: 99 MG/DL — SIGNIFICANT CHANGE UP (ref 70–99)
HCT VFR BLD CALC: 26.2 % — LOW (ref 39–50)
HGB BLD-MCNC: 8.4 G/DL — LOW (ref 13–17)
INR BLD: 1.21 RATIO — HIGH (ref 0.85–1.16)
MAGNESIUM SERPL-MCNC: 1.8 MG/DL — SIGNIFICANT CHANGE UP (ref 1.6–2.6)
MCHC RBC-ENTMCNC: 30.5 PG — SIGNIFICANT CHANGE UP (ref 27–34)
MCHC RBC-ENTMCNC: 32.1 G/DL — SIGNIFICANT CHANGE UP (ref 32–36)
MCV RBC AUTO: 95.3 FL — SIGNIFICANT CHANGE UP (ref 80–100)
NRBC # BLD AUTO: 0.02 K/UL — HIGH (ref 0–0)
NRBC # FLD: 0.02 K/UL — HIGH (ref 0–0)
NRBC BLD AUTO-RTO: 0 /100 WBCS — SIGNIFICANT CHANGE UP (ref 0–0)
PHOSPHATE SERPL-MCNC: 3.1 MG/DL — SIGNIFICANT CHANGE UP (ref 2.5–4.5)
PLATELET # BLD AUTO: 353 K/UL — SIGNIFICANT CHANGE UP (ref 150–400)
PMV BLD: 9.7 FL — SIGNIFICANT CHANGE UP (ref 7–13)
POTASSIUM SERPL-MCNC: 3.9 MMOL/L — SIGNIFICANT CHANGE UP (ref 3.5–5.3)
POTASSIUM SERPL-SCNC: 3.9 MMOL/L — SIGNIFICANT CHANGE UP (ref 3.5–5.3)
PROT SERPL-MCNC: 5.3 G/DL — LOW (ref 6–8.3)
PROTHROM AB SERPL-ACNC: 13.8 SEC — HIGH (ref 9.9–13.4)
RBC # BLD: 2.75 M/UL — LOW (ref 4.2–5.8)
RBC # FLD: 17 % — HIGH (ref 10.3–14.5)
RH IG SCN BLD-IMP: POSITIVE — SIGNIFICANT CHANGE UP
SODIUM SERPL-SCNC: 137 MMOL/L — SIGNIFICANT CHANGE UP (ref 135–145)
WBC # BLD: 14.19 K/UL — HIGH (ref 3.8–10.5)
WBC # FLD AUTO: 14.19 K/UL — HIGH (ref 3.8–10.5)

## 2025-07-21 PROCEDURE — 82553 CREATINE MB FRACTION: CPT

## 2025-07-21 PROCEDURE — 87070 CULTURE OTHR SPECIMN AEROBIC: CPT

## 2025-07-21 PROCEDURE — P9040: CPT

## 2025-07-21 PROCEDURE — 71250 CT THORAX DX C-: CPT | Mod: 26

## 2025-07-21 PROCEDURE — 97530 THERAPEUTIC ACTIVITIES: CPT

## 2025-07-21 PROCEDURE — 83690 ASSAY OF LIPASE: CPT

## 2025-07-21 PROCEDURE — 72125 CT NECK SPINE W/O DYE: CPT

## 2025-07-21 PROCEDURE — 80053 COMPREHEN METABOLIC PANEL: CPT

## 2025-07-21 PROCEDURE — 84484 ASSAY OF TROPONIN QUANT: CPT

## 2025-07-21 PROCEDURE — 85027 COMPLETE CBC AUTOMATED: CPT

## 2025-07-21 PROCEDURE — 86850 RBC ANTIBODY SCREEN: CPT

## 2025-07-21 PROCEDURE — C1769: CPT

## 2025-07-21 PROCEDURE — 73110 X-RAY EXAM OF WRIST: CPT

## 2025-07-21 PROCEDURE — 80048 BASIC METABOLIC PNL TOTAL CA: CPT

## 2025-07-21 PROCEDURE — 85396 CLOTTING ASSAY WHOLE BLOOD: CPT

## 2025-07-21 PROCEDURE — C1889: CPT

## 2025-07-21 PROCEDURE — 94660 CPAP INITIATION&MGMT: CPT

## 2025-07-21 PROCEDURE — 94002 VENT MGMT INPAT INIT DAY: CPT

## 2025-07-21 PROCEDURE — 73562 X-RAY EXAM OF KNEE 3: CPT

## 2025-07-21 PROCEDURE — 84100 ASSAY OF PHOSPHORUS: CPT

## 2025-07-21 PROCEDURE — 80307 DRUG TEST PRSMV CHEM ANLYZR: CPT

## 2025-07-21 PROCEDURE — C8929: CPT

## 2025-07-21 PROCEDURE — 85014 HEMATOCRIT: CPT

## 2025-07-21 PROCEDURE — 97112 NEUROMUSCULAR REEDUCATION: CPT

## 2025-07-21 PROCEDURE — 71045 X-RAY EXAM CHEST 1 VIEW: CPT | Mod: 26,76

## 2025-07-21 PROCEDURE — 73130 X-RAY EXAM OF HAND: CPT

## 2025-07-21 PROCEDURE — 99291 CRITICAL CARE FIRST HOUR: CPT

## 2025-07-21 PROCEDURE — 85730 THROMBOPLASTIN TIME PARTIAL: CPT

## 2025-07-21 PROCEDURE — 90715 TDAP VACCINE 7 YRS/> IM: CPT

## 2025-07-21 PROCEDURE — 73030 X-RAY EXAM OF SHOULDER: CPT

## 2025-07-21 PROCEDURE — 70496 CT ANGIOGRAPHY HEAD: CPT

## 2025-07-21 PROCEDURE — 72148 MRI LUMBAR SPINE W/O DYE: CPT

## 2025-07-21 PROCEDURE — 83605 ASSAY OF LACTIC ACID: CPT

## 2025-07-21 PROCEDURE — 70450 CT HEAD/BRAIN W/O DYE: CPT

## 2025-07-21 PROCEDURE — 70498 CT ANGIOGRAPHY NECK: CPT

## 2025-07-21 PROCEDURE — 82330 ASSAY OF CALCIUM: CPT

## 2025-07-21 PROCEDURE — 93970 EXTREMITY STUDY: CPT

## 2025-07-21 PROCEDURE — 82550 ASSAY OF CK (CPK): CPT

## 2025-07-21 PROCEDURE — 73090 X-RAY EXAM OF FOREARM: CPT

## 2025-07-21 PROCEDURE — 72170 X-RAY EXAM OF PELVIS: CPT

## 2025-07-21 PROCEDURE — 73060 X-RAY EXAM OF HUMERUS: CPT

## 2025-07-21 PROCEDURE — 99232 SBSQ HOSP IP/OBS MODERATE 35: CPT | Mod: GC

## 2025-07-21 PROCEDURE — 85025 COMPLETE CBC W/AUTO DIFF WBC: CPT

## 2025-07-21 PROCEDURE — 74177 CT ABD & PELVIS W/CONTRAST: CPT

## 2025-07-21 PROCEDURE — 97166 OT EVAL MOD COMPLEX 45 MIN: CPT

## 2025-07-21 PROCEDURE — 84132 ASSAY OF SERUM POTASSIUM: CPT

## 2025-07-21 PROCEDURE — 80076 HEPATIC FUNCTION PANEL: CPT

## 2025-07-21 PROCEDURE — 86901 BLOOD TYPING SEROLOGIC RH(D): CPT

## 2025-07-21 PROCEDURE — 71250 CT THORAX DX C-: CPT

## 2025-07-21 PROCEDURE — 86923 COMPATIBILITY TEST ELECTRIC: CPT

## 2025-07-21 PROCEDURE — 97110 THERAPEUTIC EXERCISES: CPT

## 2025-07-21 PROCEDURE — 73590 X-RAY EXAM OF LOWER LEG: CPT

## 2025-07-21 PROCEDURE — P9016: CPT

## 2025-07-21 PROCEDURE — 36415 COLL VENOUS BLD VENIPUNCTURE: CPT

## 2025-07-21 PROCEDURE — 85610 PROTHROMBIN TIME: CPT

## 2025-07-21 PROCEDURE — 84295 ASSAY OF SERUM SODIUM: CPT

## 2025-07-21 PROCEDURE — 83735 ASSAY OF MAGNESIUM: CPT

## 2025-07-21 PROCEDURE — 82947 ASSAY GLUCOSE BLOOD QUANT: CPT

## 2025-07-21 PROCEDURE — 71045 X-RAY EXAM CHEST 1 VIEW: CPT

## 2025-07-21 PROCEDURE — 73070 X-RAY EXAM OF ELBOW: CPT

## 2025-07-21 PROCEDURE — 94003 VENT MGMT INPAT SUBQ DAY: CPT

## 2025-07-21 PROCEDURE — 84145 PROCALCITONIN (PCT): CPT

## 2025-07-21 PROCEDURE — 93005 ELECTROCARDIOGRAM TRACING: CPT

## 2025-07-21 PROCEDURE — 76377 3D RENDER W/INTRP POSTPROCES: CPT

## 2025-07-21 PROCEDURE — 85018 HEMOGLOBIN: CPT

## 2025-07-21 PROCEDURE — 82435 ASSAY OF BLOOD CHLORIDE: CPT

## 2025-07-21 PROCEDURE — 71260 CT THORAX DX C+: CPT

## 2025-07-21 PROCEDURE — 97163 PT EVAL HIGH COMPLEX 45 MIN: CPT

## 2025-07-21 PROCEDURE — 73200 CT UPPER EXTREMITY W/O DYE: CPT

## 2025-07-21 PROCEDURE — 83880 ASSAY OF NATRIURETIC PEPTIDE: CPT

## 2025-07-21 PROCEDURE — 87205 SMEAR GRAM STAIN: CPT

## 2025-07-21 PROCEDURE — 86900 BLOOD TYPING SEROLOGIC ABO: CPT

## 2025-07-21 PROCEDURE — 72146 MRI CHEST SPINE W/O DYE: CPT

## 2025-07-21 PROCEDURE — 82803 BLOOD GASES ANY COMBINATION: CPT

## 2025-07-21 RX ORDER — CALCIUM GLUCONATE 20 MG/ML
2 INJECTION, SOLUTION INTRAVENOUS ONCE
Refills: 0 | Status: COMPLETED | OUTPATIENT
Start: 2025-07-21 | End: 2025-07-21

## 2025-07-21 RX ORDER — OXYCODONE HYDROCHLORIDE 30 MG/1
10 TABLET ORAL EVERY 4 HOURS
Refills: 0 | Status: DISCONTINUED | OUTPATIENT
Start: 2025-07-21 | End: 2025-07-25

## 2025-07-21 RX ORDER — HYDROMORPHONE/SOD CHLOR,ISO/PF 2 MG/10 ML
0.2 SYRINGE (ML) INJECTION EVERY 4 HOURS
Refills: 0 | Status: DISCONTINUED | OUTPATIENT
Start: 2025-07-21 | End: 2025-07-21

## 2025-07-21 RX ORDER — HYDROMORPHONE/SOD CHLOR,ISO/PF 2 MG/10 ML
0.25 SYRINGE (ML) INJECTION EVERY 4 HOURS
Refills: 0 | Status: DISCONTINUED | OUTPATIENT
Start: 2025-07-21 | End: 2025-07-22

## 2025-07-21 RX ORDER — CALCIUM GLUCONATE 20 MG/ML
2 INJECTION, SOLUTION INTRAVENOUS ONCE
Refills: 0 | Status: COMPLETED | OUTPATIENT
Start: 2025-07-21 | End: 2025-07-22

## 2025-07-21 RX ADMIN — OXYCODONE HYDROCHLORIDE 10 MILLIGRAM(S): 30 TABLET ORAL at 19:51

## 2025-07-21 RX ADMIN — OXYCODONE HYDROCHLORIDE 5 MILLIGRAM(S): 30 TABLET ORAL at 11:47

## 2025-07-21 RX ADMIN — LIDOCAINE HYDROCHLORIDE 1 PATCH: 20 JELLY TOPICAL at 07:00

## 2025-07-21 RX ADMIN — Medication 0.25 MILLIGRAM(S): at 13:30

## 2025-07-21 RX ADMIN — OXYCODONE HYDROCHLORIDE 10 MILLIGRAM(S): 30 TABLET ORAL at 09:43

## 2025-07-21 RX ADMIN — CALCIUM GLUCONATE 200 GRAM(S): 20 INJECTION, SOLUTION INTRAVENOUS at 05:49

## 2025-07-21 RX ADMIN — Medication 1000 MILLIGRAM(S): at 02:34

## 2025-07-21 RX ADMIN — Medication 400 MILLIGRAM(S): at 19:24

## 2025-07-21 RX ADMIN — Medication 0.25 MILLIGRAM(S): at 12:56

## 2025-07-21 RX ADMIN — Medication 40 MILLIGRAM(S): at 11:47

## 2025-07-21 RX ADMIN — DIGOXIN 125 MICROGRAM(S): 250 TABLET ORAL at 11:55

## 2025-07-21 RX ADMIN — OXYCODONE HYDROCHLORIDE 5 MILLIGRAM(S): 30 TABLET ORAL at 06:17

## 2025-07-21 RX ADMIN — METOPROLOL SUCCINATE 50 MILLIGRAM(S): 50 TABLET, EXTENDED RELEASE ORAL at 17:37

## 2025-07-21 RX ADMIN — METOPROLOL SUCCINATE 50 MILLIGRAM(S): 50 TABLET, EXTENDED RELEASE ORAL at 11:47

## 2025-07-21 RX ADMIN — METOPROLOL SUCCINATE 50 MILLIGRAM(S): 50 TABLET, EXTENDED RELEASE ORAL at 05:17

## 2025-07-21 RX ADMIN — OXYCODONE HYDROCHLORIDE 10 MILLIGRAM(S): 30 TABLET ORAL at 20:21

## 2025-07-21 RX ADMIN — LIDOCAINE HYDROCHLORIDE 1 PATCH: 20 JELLY TOPICAL at 09:25

## 2025-07-21 RX ADMIN — OXYCODONE HYDROCHLORIDE 10 MILLIGRAM(S): 30 TABLET ORAL at 10:15

## 2025-07-21 RX ADMIN — OXYCODONE HYDROCHLORIDE 10 MILLIGRAM(S): 30 TABLET ORAL at 14:16

## 2025-07-21 RX ADMIN — ENOXAPARIN SODIUM 40 MILLIGRAM(S): 100 INJECTION SUBCUTANEOUS at 17:36

## 2025-07-21 RX ADMIN — Medication 400 MILLIGRAM(S): at 02:04

## 2025-07-21 RX ADMIN — METHOCARBAMOL 500 MILLIGRAM(S): 500 TABLET, FILM COATED ORAL at 05:17

## 2025-07-21 RX ADMIN — Medication 1000 MILLIGRAM(S): at 13:30

## 2025-07-21 RX ADMIN — LIDOCAINE HYDROCHLORIDE 1 PATCH: 20 JELLY TOPICAL at 21:40

## 2025-07-21 RX ADMIN — OXYCODONE HYDROCHLORIDE 5 MILLIGRAM(S): 30 TABLET ORAL at 23:06

## 2025-07-21 RX ADMIN — Medication 1 APPLICATION(S): at 06:06

## 2025-07-21 RX ADMIN — Medication 400 MILLIGRAM(S): at 08:47

## 2025-07-21 RX ADMIN — OXYCODONE HYDROCHLORIDE 5 MILLIGRAM(S): 30 TABLET ORAL at 12:15

## 2025-07-21 RX ADMIN — METOPROLOL SUCCINATE 50 MILLIGRAM(S): 50 TABLET, EXTENDED RELEASE ORAL at 23:04

## 2025-07-21 RX ADMIN — Medication 0.25 MILLIGRAM(S): at 07:07

## 2025-07-21 RX ADMIN — OXYCODONE HYDROCHLORIDE 5 MILLIGRAM(S): 30 TABLET ORAL at 17:37

## 2025-07-21 RX ADMIN — Medication 0.25 MILLIGRAM(S): at 06:35

## 2025-07-21 RX ADMIN — OXYCODONE HYDROCHLORIDE 5 MILLIGRAM(S): 30 TABLET ORAL at 18:10

## 2025-07-21 RX ADMIN — Medication 400 MILLIGRAM(S): at 13:01

## 2025-07-21 RX ADMIN — OXYCODONE HYDROCHLORIDE 5 MILLIGRAM(S): 30 TABLET ORAL at 23:36

## 2025-07-21 RX ADMIN — OXYCODONE HYDROCHLORIDE 10 MILLIGRAM(S): 30 TABLET ORAL at 15:00

## 2025-07-21 RX ADMIN — OXYCODONE HYDROCHLORIDE 5 MILLIGRAM(S): 30 TABLET ORAL at 05:17

## 2025-07-21 RX ADMIN — METOPROLOL SUCCINATE 50 MILLIGRAM(S): 50 TABLET, EXTENDED RELEASE ORAL at 01:10

## 2025-07-21 RX ADMIN — Medication 1 APPLICATION(S): at 05:17

## 2025-07-22 LAB
ADD ON TEST-SPECIMEN IN LAB: SIGNIFICANT CHANGE UP
ALBUMIN SERPL ELPH-MCNC: 2.7 G/DL — LOW (ref 3.3–5)
ALBUMIN SERPL ELPH-MCNC: 2.8 G/DL — LOW (ref 3.3–5)
ALP SERPL-CCNC: 136 U/L — HIGH (ref 40–120)
ALP SERPL-CCNC: 149 U/L — HIGH (ref 40–120)
ALT FLD-CCNC: 43 U/L — SIGNIFICANT CHANGE UP (ref 10–45)
ALT FLD-CCNC: 54 U/L — HIGH (ref 10–45)
ANION GAP SERPL CALC-SCNC: 14 MMOL/L — SIGNIFICANT CHANGE UP (ref 5–17)
ANION GAP SERPL CALC-SCNC: 15 MMOL/L — SIGNIFICANT CHANGE UP (ref 5–17)
APTT BLD: 25 SEC — LOW (ref 26.1–36.8)
APTT BLD: 27.6 SEC — SIGNIFICANT CHANGE UP (ref 26.1–36.8)
AST SERPL-CCNC: 29 U/L — SIGNIFICANT CHANGE UP (ref 10–40)
AST SERPL-CCNC: 45 U/L — HIGH (ref 10–40)
BILIRUB SERPL-MCNC: 1.5 MG/DL — HIGH (ref 0.2–1.2)
BILIRUB SERPL-MCNC: 1.8 MG/DL — HIGH (ref 0.2–1.2)
BUN SERPL-MCNC: 12 MG/DL — SIGNIFICANT CHANGE UP (ref 7–23)
BUN SERPL-MCNC: 15 MG/DL — SIGNIFICANT CHANGE UP (ref 7–23)
CALCIUM SERPL-MCNC: 7.9 MG/DL — LOW (ref 8.4–10.5)
CALCIUM SERPL-MCNC: 8.3 MG/DL — LOW (ref 8.4–10.5)
CHLORIDE SERPL-SCNC: 103 MMOL/L — SIGNIFICANT CHANGE UP (ref 96–108)
CHLORIDE SERPL-SCNC: 98 MMOL/L — SIGNIFICANT CHANGE UP (ref 96–108)
CO2 SERPL-SCNC: 22 MMOL/L — SIGNIFICANT CHANGE UP (ref 22–31)
CO2 SERPL-SCNC: 26 MMOL/L — SIGNIFICANT CHANGE UP (ref 22–31)
CREAT SERPL-MCNC: 0.65 MG/DL — SIGNIFICANT CHANGE UP (ref 0.5–1.3)
CREAT SERPL-MCNC: 0.65 MG/DL — SIGNIFICANT CHANGE UP (ref 0.5–1.3)
EGFR: 106 ML/MIN/1.73M2 — SIGNIFICANT CHANGE UP
GAS PNL BLDV: SIGNIFICANT CHANGE UP
GLUCOSE SERPL-MCNC: 110 MG/DL — HIGH (ref 70–99)
GLUCOSE SERPL-MCNC: 129 MG/DL — HIGH (ref 70–99)
GRAM STN FLD: ABNORMAL
HCT VFR BLD CALC: 27.1 % — LOW (ref 39–50)
HGB BLD-MCNC: 8.5 G/DL — LOW (ref 13–17)
INR BLD: 1.28 RATIO — HIGH (ref 0.85–1.16)
INR BLD: 1.34 RATIO — HIGH (ref 0.85–1.16)
MAGNESIUM SERPL-MCNC: 1.8 MG/DL — SIGNIFICANT CHANGE UP (ref 1.6–2.6)
MAGNESIUM SERPL-MCNC: 2.2 MG/DL — SIGNIFICANT CHANGE UP (ref 1.6–2.6)
MCHC RBC-ENTMCNC: 29.8 PG — SIGNIFICANT CHANGE UP (ref 27–34)
MCHC RBC-ENTMCNC: 31.4 G/DL — LOW (ref 32–36)
MCV RBC AUTO: 95.1 FL — SIGNIFICANT CHANGE UP (ref 80–100)
NRBC # BLD AUTO: 0.02 K/UL — HIGH (ref 0–0)
NRBC # FLD: 0.02 K/UL — HIGH (ref 0–0)
NRBC BLD AUTO-RTO: 0 /100 WBCS — SIGNIFICANT CHANGE UP (ref 0–0)
NRBC BLD AUTO-RTO: 0 /100 WBCS — SIGNIFICANT CHANGE UP (ref 0–0)
PHOSPHATE SERPL-MCNC: 3 MG/DL — SIGNIFICANT CHANGE UP (ref 2.5–4.5)
PHOSPHATE SERPL-MCNC: 3.1 MG/DL — SIGNIFICANT CHANGE UP (ref 2.5–4.5)
PLATELET # BLD AUTO: 396 K/UL — SIGNIFICANT CHANGE UP (ref 150–400)
PMV BLD: 9.9 FL — SIGNIFICANT CHANGE UP (ref 7–13)
POTASSIUM SERPL-MCNC: 3.6 MMOL/L — SIGNIFICANT CHANGE UP (ref 3.5–5.3)
POTASSIUM SERPL-MCNC: 3.9 MMOL/L — SIGNIFICANT CHANGE UP (ref 3.5–5.3)
POTASSIUM SERPL-SCNC: 3.6 MMOL/L — SIGNIFICANT CHANGE UP (ref 3.5–5.3)
POTASSIUM SERPL-SCNC: 3.9 MMOL/L — SIGNIFICANT CHANGE UP (ref 3.5–5.3)
PROCALCITONIN SERPL-MCNC: 0.27 NG/ML — HIGH (ref 0.02–0.1)
PROT SERPL-MCNC: 5.3 G/DL — LOW (ref 6–8.3)
PROT SERPL-MCNC: 5.7 G/DL — LOW (ref 6–8.3)
PROTHROM AB SERPL-ACNC: 14.6 SEC — HIGH (ref 9.9–13.4)
PROTHROM AB SERPL-ACNC: 15.4 SEC — HIGH (ref 9.9–13.4)
RBC # BLD: 2.85 M/UL — LOW (ref 4.2–5.8)
RBC # FLD: 17.2 % — HIGH (ref 10.3–14.5)
SODIUM SERPL-SCNC: 135 MMOL/L — SIGNIFICANT CHANGE UP (ref 135–145)
SODIUM SERPL-SCNC: 143 MMOL/L — SIGNIFICANT CHANGE UP (ref 135–145)
SPECIMEN SOURCE: SIGNIFICANT CHANGE UP
WBC # BLD: 30.46 K/UL — HIGH (ref 3.8–10.5)
WBC # FLD AUTO: 30.46 K/UL — HIGH (ref 3.8–10.5)

## 2025-07-22 PROCEDURE — 99291 CRITICAL CARE FIRST HOUR: CPT | Mod: GC

## 2025-07-22 PROCEDURE — 71045 X-RAY EXAM CHEST 1 VIEW: CPT | Mod: 26

## 2025-07-22 PROCEDURE — 99291 CRITICAL CARE FIRST HOUR: CPT

## 2025-07-22 RX ORDER — POLYETHYLENE GLYCOL 3350 17 G/17G
17 POWDER, FOR SOLUTION ORAL DAILY
Refills: 0 | Status: DISCONTINUED | OUTPATIENT
Start: 2025-07-22 | End: 2025-07-23

## 2025-07-22 RX ORDER — ACETAMINOPHEN 500 MG/5ML
1000 LIQUID (ML) ORAL EVERY 6 HOURS
Refills: 0 | Status: DISCONTINUED | OUTPATIENT
Start: 2025-07-22 | End: 2025-07-30

## 2025-07-22 RX ORDER — HYDROMORPHONE/SOD CHLOR,ISO/PF 2 MG/10 ML
0.5 SYRINGE (ML) INJECTION
Refills: 0 | Status: DISCONTINUED | OUTPATIENT
Start: 2025-07-22 | End: 2025-07-25

## 2025-07-22 RX ORDER — NALOXEGOL OXALATE 12.5 MG/1
25 TABLET, FILM COATED ORAL DAILY
Refills: 0 | Status: DISCONTINUED | OUTPATIENT
Start: 2025-07-22 | End: 2025-07-23

## 2025-07-22 RX ORDER — DIGOXIN 250 UG/1
125 TABLET ORAL ONCE
Refills: 0 | Status: COMPLETED | OUTPATIENT
Start: 2025-07-22 | End: 2025-07-22

## 2025-07-22 RX ORDER — PIPERACILLIN-TAZO-DEXTROSE,ISO 3.375G/5
3.38 IV SOLUTION, PIGGYBACK PREMIX FROZEN(ML) INTRAVENOUS ONCE
Refills: 0 | Status: COMPLETED | OUTPATIENT
Start: 2025-07-22 | End: 2025-07-22

## 2025-07-22 RX ORDER — SACUBITRIL AND VALSARTAN 6; 6 MG/1; MG/1
1 PELLET ORAL
Refills: 0 | Status: DISCONTINUED | OUTPATIENT
Start: 2025-07-22 | End: 2025-07-23

## 2025-07-22 RX ORDER — VANCOMYCIN HCL IN 5 % DEXTROSE 1.5G/250ML
1000 PLASTIC BAG, INJECTION (ML) INTRAVENOUS ONCE
Refills: 0 | Status: COMPLETED | OUTPATIENT
Start: 2025-07-22 | End: 2025-07-22

## 2025-07-22 RX ORDER — IBUPROFEN 200 MG
600 TABLET ORAL EVERY 6 HOURS
Refills: 0 | Status: COMPLETED | OUTPATIENT
Start: 2025-07-22 | End: 2025-07-22

## 2025-07-22 RX ORDER — SENNA 187 MG
2 TABLET ORAL AT BEDTIME
Refills: 0 | Status: DISCONTINUED | OUTPATIENT
Start: 2025-07-22 | End: 2025-07-23

## 2025-07-22 RX ORDER — PIPERACILLIN-TAZO-DEXTROSE,ISO 3.375G/5
3.38 IV SOLUTION, PIGGYBACK PREMIX FROZEN(ML) INTRAVENOUS EVERY 8 HOURS
Refills: 0 | Status: COMPLETED | OUTPATIENT
Start: 2025-07-22 | End: 2025-07-29

## 2025-07-22 RX ORDER — MAGNESIUM SULFATE 500 MG/ML
2 SYRINGE (ML) INJECTION ONCE
Refills: 0 | Status: COMPLETED | OUTPATIENT
Start: 2025-07-22 | End: 2025-07-22

## 2025-07-22 RX ORDER — CALCIUM GLUCONATE 20 MG/ML
2 INJECTION, SOLUTION INTRAVENOUS ONCE
Refills: 0 | Status: COMPLETED | OUTPATIENT
Start: 2025-07-22 | End: 2025-07-23

## 2025-07-22 RX ADMIN — LIDOCAINE HYDROCHLORIDE 1 PATCH: 20 JELLY TOPICAL at 21:28

## 2025-07-22 RX ADMIN — OXYCODONE HYDROCHLORIDE 10 MILLIGRAM(S): 30 TABLET ORAL at 19:46

## 2025-07-22 RX ADMIN — OXYCODONE HYDROCHLORIDE 10 MILLIGRAM(S): 30 TABLET ORAL at 03:28

## 2025-07-22 RX ADMIN — Medication 25 GRAM(S): at 20:09

## 2025-07-22 RX ADMIN — LIDOCAINE HYDROCHLORIDE 1 PATCH: 20 JELLY TOPICAL at 07:00

## 2025-07-22 RX ADMIN — Medication 25 GRAM(S): at 21:28

## 2025-07-22 RX ADMIN — OXYCODONE HYDROCHLORIDE 10 MILLIGRAM(S): 30 TABLET ORAL at 09:00

## 2025-07-22 RX ADMIN — DIGOXIN 125 MICROGRAM(S): 250 TABLET ORAL at 20:09

## 2025-07-22 RX ADMIN — OXYCODONE HYDROCHLORIDE 10 MILLIGRAM(S): 30 TABLET ORAL at 13:13

## 2025-07-22 RX ADMIN — OXYCODONE HYDROCHLORIDE 5 MILLIGRAM(S): 30 TABLET ORAL at 10:49

## 2025-07-22 RX ADMIN — ENOXAPARIN SODIUM 40 MILLIGRAM(S): 100 INJECTION SUBCUTANEOUS at 18:03

## 2025-07-22 RX ADMIN — LIDOCAINE HYDROCHLORIDE 1 PATCH: 20 JELLY TOPICAL at 09:00

## 2025-07-22 RX ADMIN — OXYCODONE HYDROCHLORIDE 10 MILLIGRAM(S): 30 TABLET ORAL at 14:00

## 2025-07-22 RX ADMIN — Medication 1000 MILLIGRAM(S): at 05:01

## 2025-07-22 RX ADMIN — Medication 600 MILLIGRAM(S): at 10:49

## 2025-07-22 RX ADMIN — Medication 250 MILLIGRAM(S): at 10:49

## 2025-07-22 RX ADMIN — SACUBITRIL AND VALSARTAN 1 TABLET(S): 6; 6 PELLET ORAL at 18:03

## 2025-07-22 RX ADMIN — Medication 1000 MILLIGRAM(S): at 11:30

## 2025-07-22 RX ADMIN — CALCIUM GLUCONATE 200 GRAM(S): 20 INJECTION, SOLUTION INTRAVENOUS at 00:29

## 2025-07-22 RX ADMIN — NALOXEGOL OXALATE 25 MILLIGRAM(S): 12.5 TABLET, FILM COATED ORAL at 11:37

## 2025-07-22 RX ADMIN — Medication 25 GRAM(S): at 13:13

## 2025-07-22 RX ADMIN — OXYCODONE HYDROCHLORIDE 10 MILLIGRAM(S): 30 TABLET ORAL at 08:26

## 2025-07-22 RX ADMIN — Medication 1 APPLICATION(S): at 05:02

## 2025-07-22 RX ADMIN — METOPROLOL SUCCINATE 50 MILLIGRAM(S): 50 TABLET, EXTENDED RELEASE ORAL at 11:37

## 2025-07-22 RX ADMIN — Medication 1 APPLICATION(S): at 05:01

## 2025-07-22 RX ADMIN — Medication 0.5 MILLIGRAM(S): at 15:00

## 2025-07-22 RX ADMIN — OXYCODONE HYDROCHLORIDE 10 MILLIGRAM(S): 30 TABLET ORAL at 02:58

## 2025-07-22 RX ADMIN — Medication 0.5 MILLIGRAM(S): at 14:36

## 2025-07-22 RX ADMIN — METOPROLOL SUCCINATE 50 MILLIGRAM(S): 50 TABLET, EXTENDED RELEASE ORAL at 05:02

## 2025-07-22 RX ADMIN — Medication 200 GRAM(S): at 10:49

## 2025-07-22 RX ADMIN — Medication 600 MILLIGRAM(S): at 15:00

## 2025-07-22 RX ADMIN — Medication 0.5 MILLIGRAM(S): at 22:52

## 2025-07-22 RX ADMIN — OXYCODONE HYDROCHLORIDE 5 MILLIGRAM(S): 30 TABLET ORAL at 05:32

## 2025-07-22 RX ADMIN — DIGOXIN 125 MICROGRAM(S): 250 TABLET ORAL at 11:37

## 2025-07-22 RX ADMIN — Medication 1000 MILLIGRAM(S): at 11:36

## 2025-07-22 RX ADMIN — Medication 1000 MILLIGRAM(S): at 18:41

## 2025-07-22 RX ADMIN — POLYETHYLENE GLYCOL 3350 17 GRAM(S): 17 POWDER, FOR SOLUTION ORAL at 11:36

## 2025-07-22 RX ADMIN — METHOCARBAMOL 500 MILLIGRAM(S): 500 TABLET, FILM COATED ORAL at 08:26

## 2025-07-22 RX ADMIN — OXYCODONE HYDROCHLORIDE 5 MILLIGRAM(S): 30 TABLET ORAL at 18:41

## 2025-07-22 RX ADMIN — Medication 25 GRAM(S): at 01:39

## 2025-07-22 RX ADMIN — Medication 1000 MILLIGRAM(S): at 23:05

## 2025-07-22 RX ADMIN — Medication 600 MILLIGRAM(S): at 14:36

## 2025-07-22 RX ADMIN — OXYCODONE HYDROCHLORIDE 5 MILLIGRAM(S): 30 TABLET ORAL at 11:30

## 2025-07-22 RX ADMIN — Medication 1000 MILLIGRAM(S): at 18:04

## 2025-07-22 RX ADMIN — Medication 40 MILLIGRAM(S): at 11:36

## 2025-07-22 RX ADMIN — OXYCODONE HYDROCHLORIDE 5 MILLIGRAM(S): 30 TABLET ORAL at 18:04

## 2025-07-22 RX ADMIN — METOPROLOL SUCCINATE 50 MILLIGRAM(S): 50 TABLET, EXTENDED RELEASE ORAL at 18:04

## 2025-07-22 RX ADMIN — Medication 600 MILLIGRAM(S): at 11:30

## 2025-07-22 RX ADMIN — OXYCODONE HYDROCHLORIDE 5 MILLIGRAM(S): 30 TABLET ORAL at 05:02

## 2025-07-22 RX ADMIN — OXYCODONE HYDROCHLORIDE 10 MILLIGRAM(S): 30 TABLET ORAL at 19:16

## 2025-07-22 RX ADMIN — Medication 0.5 MILLIGRAM(S): at 22:22

## 2025-07-23 LAB
ADD ON TEST-SPECIMEN IN LAB: SIGNIFICANT CHANGE UP
FLUAV AG NPH QL: SIGNIFICANT CHANGE UP
FLUBV AG NPH QL: SIGNIFICANT CHANGE UP
GAS PNL BLDV: SIGNIFICANT CHANGE UP
HCT VFR BLD CALC: 24.2 % — LOW (ref 39–50)
HCT VFR BLD CALC: 28.1 % — LOW (ref 39–50)
HGB BLD-MCNC: 7.8 G/DL — LOW (ref 13–17)
HGB BLD-MCNC: 9.2 G/DL — LOW (ref 13–17)
MCHC RBC-ENTMCNC: 30.4 PG — SIGNIFICANT CHANGE UP (ref 27–34)
MCHC RBC-ENTMCNC: 31.2 PG — SIGNIFICANT CHANGE UP (ref 27–34)
MCHC RBC-ENTMCNC: 32.2 G/DL — SIGNIFICANT CHANGE UP (ref 32–36)
MCHC RBC-ENTMCNC: 32.7 G/DL — SIGNIFICANT CHANGE UP (ref 32–36)
MCV RBC AUTO: 94.2 FL — SIGNIFICANT CHANGE UP (ref 80–100)
MCV RBC AUTO: 95.3 FL — SIGNIFICANT CHANGE UP (ref 80–100)
MRSA PCR RESULT.: SIGNIFICANT CHANGE UP
NRBC # BLD AUTO: 0 K/UL — SIGNIFICANT CHANGE UP (ref 0–0)
NRBC # BLD AUTO: 0.02 K/UL — HIGH (ref 0–0)
NRBC # FLD: 0 K/UL — SIGNIFICANT CHANGE UP (ref 0–0)
NRBC # FLD: 0.02 K/UL — HIGH (ref 0–0)
NRBC BLD AUTO-RTO: 0 /100 WBCS — SIGNIFICANT CHANGE UP (ref 0–0)
PLATELET # BLD AUTO: 450 K/UL — HIGH (ref 150–400)
PLATELET # BLD AUTO: 475 K/UL — HIGH (ref 150–400)
PMV BLD: 9.7 FL — SIGNIFICANT CHANGE UP (ref 7–13)
PMV BLD: 9.9 FL — SIGNIFICANT CHANGE UP (ref 7–13)
PROCALCITONIN SERPL-MCNC: 0.32 NG/ML — HIGH (ref 0.02–0.1)
RBC # BLD: 2.57 M/UL — LOW (ref 4.2–5.8)
RBC # BLD: 2.95 M/UL — LOW (ref 4.2–5.8)
RBC # FLD: 17.6 % — HIGH (ref 10.3–14.5)
RBC # FLD: 17.9 % — HIGH (ref 10.3–14.5)
RSV RNA NPH QL NAA+NON-PROBE: SIGNIFICANT CHANGE UP
S AUREUS DNA NOSE QL NAA+PROBE: SIGNIFICANT CHANGE UP
SARS-COV-2 RNA SPEC QL NAA+PROBE: SIGNIFICANT CHANGE UP
SOURCE RESPIRATORY: SIGNIFICANT CHANGE UP
WBC # BLD: 24.7 K/UL — HIGH (ref 3.8–10.5)
WBC # BLD: 40.88 K/UL — CRITICAL HIGH (ref 3.8–10.5)
WBC # FLD AUTO: 24.7 K/UL — HIGH (ref 3.8–10.5)
WBC # FLD AUTO: 40.88 K/UL — CRITICAL HIGH (ref 3.8–10.5)

## 2025-07-23 PROCEDURE — 76377 3D RENDER W/INTRP POSTPROCES: CPT

## 2025-07-23 PROCEDURE — 72170 X-RAY EXAM OF PELVIS: CPT

## 2025-07-23 PROCEDURE — 83880 ASSAY OF NATRIURETIC PEPTIDE: CPT

## 2025-07-23 PROCEDURE — 97530 THERAPEUTIC ACTIVITIES: CPT

## 2025-07-23 PROCEDURE — 70498 CT ANGIOGRAPHY NECK: CPT

## 2025-07-23 PROCEDURE — 86923 COMPATIBILITY TEST ELECTRIC: CPT

## 2025-07-23 PROCEDURE — 86901 BLOOD TYPING SEROLOGIC RH(D): CPT

## 2025-07-23 PROCEDURE — 94660 CPAP INITIATION&MGMT: CPT

## 2025-07-23 PROCEDURE — C8929: CPT

## 2025-07-23 PROCEDURE — 85396 CLOTTING ASSAY WHOLE BLOOD: CPT

## 2025-07-23 PROCEDURE — 83735 ASSAY OF MAGNESIUM: CPT

## 2025-07-23 PROCEDURE — 73060 X-RAY EXAM OF HUMERUS: CPT

## 2025-07-23 PROCEDURE — 36415 COLL VENOUS BLD VENIPUNCTURE: CPT

## 2025-07-23 PROCEDURE — 97110 THERAPEUTIC EXERCISES: CPT

## 2025-07-23 PROCEDURE — 73562 X-RAY EXAM OF KNEE 3: CPT

## 2025-07-23 PROCEDURE — C1769: CPT

## 2025-07-23 PROCEDURE — 83605 ASSAY OF LACTIC ACID: CPT

## 2025-07-23 PROCEDURE — 87449 NOS EACH ORGANISM AG IA: CPT

## 2025-07-23 PROCEDURE — 87040 BLOOD CULTURE FOR BACTERIA: CPT

## 2025-07-23 PROCEDURE — 73090 X-RAY EXAM OF FOREARM: CPT

## 2025-07-23 PROCEDURE — 82803 BLOOD GASES ANY COMBINATION: CPT

## 2025-07-23 PROCEDURE — 86850 RBC ANTIBODY SCREEN: CPT

## 2025-07-23 PROCEDURE — 99291 CRITICAL CARE FIRST HOUR: CPT

## 2025-07-23 PROCEDURE — 82435 ASSAY OF BLOOD CHLORIDE: CPT

## 2025-07-23 PROCEDURE — 85610 PROTHROMBIN TIME: CPT

## 2025-07-23 PROCEDURE — 73030 X-RAY EXAM OF SHOULDER: CPT

## 2025-07-23 PROCEDURE — 85730 THROMBOPLASTIN TIME PARTIAL: CPT

## 2025-07-23 PROCEDURE — 99291 CRITICAL CARE FIRST HOUR: CPT | Mod: GC

## 2025-07-23 PROCEDURE — 84132 ASSAY OF SERUM POTASSIUM: CPT

## 2025-07-23 PROCEDURE — 73130 X-RAY EXAM OF HAND: CPT

## 2025-07-23 PROCEDURE — 82947 ASSAY GLUCOSE BLOOD QUANT: CPT

## 2025-07-23 PROCEDURE — 82553 CREATINE MB FRACTION: CPT

## 2025-07-23 PROCEDURE — 71045 X-RAY EXAM CHEST 1 VIEW: CPT

## 2025-07-23 PROCEDURE — 72125 CT NECK SPINE W/O DYE: CPT

## 2025-07-23 PROCEDURE — 87070 CULTURE OTHR SPECIMN AEROBIC: CPT

## 2025-07-23 PROCEDURE — 93970 EXTREMITY STUDY: CPT

## 2025-07-23 PROCEDURE — 87637 SARSCOV2&INF A&B&RSV AMP PRB: CPT

## 2025-07-23 PROCEDURE — 80307 DRUG TEST PRSMV CHEM ANLYZR: CPT

## 2025-07-23 PROCEDURE — C1889: CPT

## 2025-07-23 PROCEDURE — 82550 ASSAY OF CK (CPK): CPT

## 2025-07-23 PROCEDURE — 84484 ASSAY OF TROPONIN QUANT: CPT

## 2025-07-23 PROCEDURE — 80053 COMPREHEN METABOLIC PANEL: CPT

## 2025-07-23 PROCEDURE — 84100 ASSAY OF PHOSPHORUS: CPT

## 2025-07-23 PROCEDURE — 73070 X-RAY EXAM OF ELBOW: CPT

## 2025-07-23 PROCEDURE — 97163 PT EVAL HIGH COMPLEX 45 MIN: CPT

## 2025-07-23 PROCEDURE — 97166 OT EVAL MOD COMPLEX 45 MIN: CPT

## 2025-07-23 PROCEDURE — 84145 PROCALCITONIN (PCT): CPT

## 2025-07-23 PROCEDURE — P9016: CPT

## 2025-07-23 PROCEDURE — 94002 VENT MGMT INPAT INIT DAY: CPT

## 2025-07-23 PROCEDURE — 87205 SMEAR GRAM STAIN: CPT

## 2025-07-23 PROCEDURE — 72148 MRI LUMBAR SPINE W/O DYE: CPT

## 2025-07-23 PROCEDURE — 84295 ASSAY OF SERUM SODIUM: CPT

## 2025-07-23 PROCEDURE — 97112 NEUROMUSCULAR REEDUCATION: CPT

## 2025-07-23 PROCEDURE — 94003 VENT MGMT INPAT SUBQ DAY: CPT

## 2025-07-23 PROCEDURE — 72146 MRI CHEST SPINE W/O DYE: CPT

## 2025-07-23 PROCEDURE — 85025 COMPLETE CBC W/AUTO DIFF WBC: CPT

## 2025-07-23 PROCEDURE — 86900 BLOOD TYPING SEROLOGIC ABO: CPT

## 2025-07-23 PROCEDURE — 85018 HEMOGLOBIN: CPT

## 2025-07-23 PROCEDURE — 85014 HEMATOCRIT: CPT

## 2025-07-23 PROCEDURE — 87641 MR-STAPH DNA AMP PROBE: CPT

## 2025-07-23 PROCEDURE — 85027 COMPLETE CBC AUTOMATED: CPT

## 2025-07-23 PROCEDURE — 73110 X-RAY EXAM OF WRIST: CPT

## 2025-07-23 PROCEDURE — 71045 X-RAY EXAM CHEST 1 VIEW: CPT | Mod: 26

## 2025-07-23 PROCEDURE — 87640 STAPH A DNA AMP PROBE: CPT

## 2025-07-23 PROCEDURE — 80048 BASIC METABOLIC PNL TOTAL CA: CPT

## 2025-07-23 PROCEDURE — 93005 ELECTROCARDIOGRAM TRACING: CPT

## 2025-07-23 PROCEDURE — 74177 CT ABD & PELVIS W/CONTRAST: CPT

## 2025-07-23 PROCEDURE — 70496 CT ANGIOGRAPHY HEAD: CPT

## 2025-07-23 PROCEDURE — 71250 CT THORAX DX C-: CPT

## 2025-07-23 PROCEDURE — 82330 ASSAY OF CALCIUM: CPT

## 2025-07-23 PROCEDURE — 70450 CT HEAD/BRAIN W/O DYE: CPT

## 2025-07-23 PROCEDURE — 71260 CT THORAX DX C+: CPT

## 2025-07-23 PROCEDURE — 93970 EXTREMITY STUDY: CPT | Mod: 26

## 2025-07-23 PROCEDURE — 90715 TDAP VACCINE 7 YRS/> IM: CPT

## 2025-07-23 PROCEDURE — P9040: CPT

## 2025-07-23 PROCEDURE — 73590 X-RAY EXAM OF LOWER LEG: CPT

## 2025-07-23 PROCEDURE — 83690 ASSAY OF LIPASE: CPT

## 2025-07-23 PROCEDURE — 73200 CT UPPER EXTREMITY W/O DYE: CPT

## 2025-07-23 PROCEDURE — 80076 HEPATIC FUNCTION PANEL: CPT

## 2025-07-23 RX ORDER — TAMSULOSIN HYDROCHLORIDE 0.4 MG/1
0.8 CAPSULE ORAL AT BEDTIME
Refills: 0 | Status: DISCONTINUED | OUTPATIENT
Start: 2025-07-23 | End: 2025-07-23

## 2025-07-23 RX ORDER — TAMSULOSIN HYDROCHLORIDE 0.4 MG/1
0.8 CAPSULE ORAL AT BEDTIME
Refills: 0 | Status: DISCONTINUED | OUTPATIENT
Start: 2025-07-24 | End: 2025-07-30

## 2025-07-23 RX ORDER — DIGOXIN 250 UG/1
125 TABLET ORAL DAILY
Refills: 0 | Status: DISCONTINUED | OUTPATIENT
Start: 2025-07-23 | End: 2025-07-30

## 2025-07-23 RX ORDER — SACUBITRIL AND VALSARTAN 6; 6 MG/1; MG/1
1 PELLET ORAL
Refills: 0 | Status: DISCONTINUED | OUTPATIENT
Start: 2025-07-23 | End: 2025-07-25

## 2025-07-23 RX ORDER — SIMETHICONE 80 MG
80 TABLET,CHEWABLE ORAL EVERY 8 HOURS
Refills: 0 | Status: DISCONTINUED | OUTPATIENT
Start: 2025-07-23 | End: 2025-07-25

## 2025-07-23 RX ORDER — TAMSULOSIN HYDROCHLORIDE 0.4 MG/1
0.8 CAPSULE ORAL ONCE
Refills: 0 | Status: COMPLETED | OUTPATIENT
Start: 2025-07-23 | End: 2025-07-23

## 2025-07-23 RX ORDER — CALCIUM GLUCONATE 20 MG/ML
2 INJECTION, SOLUTION INTRAVENOUS ONCE
Refills: 0 | Status: COMPLETED | OUTPATIENT
Start: 2025-07-23 | End: 2025-07-24

## 2025-07-23 RX ORDER — VANCOMYCIN HCL IN 5 % DEXTROSE 1.5G/250ML
1000 PLASTIC BAG, INJECTION (ML) INTRAVENOUS EVERY 12 HOURS
Refills: 0 | Status: DISCONTINUED | OUTPATIENT
Start: 2025-07-23 | End: 2025-07-25

## 2025-07-23 RX ADMIN — Medication 1000 MILLIGRAM(S): at 12:00

## 2025-07-23 RX ADMIN — LIDOCAINE HYDROCHLORIDE 1 PATCH: 20 JELLY TOPICAL at 10:00

## 2025-07-23 RX ADMIN — OXYCODONE HYDROCHLORIDE 5 MILLIGRAM(S): 30 TABLET ORAL at 20:17

## 2025-07-23 RX ADMIN — METOPROLOL SUCCINATE 50 MILLIGRAM(S): 50 TABLET, EXTENDED RELEASE ORAL at 05:37

## 2025-07-23 RX ADMIN — OXYCODONE HYDROCHLORIDE 10 MILLIGRAM(S): 30 TABLET ORAL at 06:07

## 2025-07-23 RX ADMIN — OXYCODONE HYDROCHLORIDE 5 MILLIGRAM(S): 30 TABLET ORAL at 03:32

## 2025-07-23 RX ADMIN — CALCIUM GLUCONATE 200 GRAM(S): 20 INJECTION, SOLUTION INTRAVENOUS at 02:48

## 2025-07-23 RX ADMIN — OXYCODONE HYDROCHLORIDE 10 MILLIGRAM(S): 30 TABLET ORAL at 15:55

## 2025-07-23 RX ADMIN — Medication 1000 MILLIGRAM(S): at 23:35

## 2025-07-23 RX ADMIN — METOPROLOL SUCCINATE 50 MILLIGRAM(S): 50 TABLET, EXTENDED RELEASE ORAL at 17:24

## 2025-07-23 RX ADMIN — Medication 100 MILLIEQUIVALENT(S): at 02:48

## 2025-07-23 RX ADMIN — OXYCODONE HYDROCHLORIDE 10 MILLIGRAM(S): 30 TABLET ORAL at 05:37

## 2025-07-23 RX ADMIN — OXYCODONE HYDROCHLORIDE 5 MILLIGRAM(S): 30 TABLET ORAL at 08:55

## 2025-07-23 RX ADMIN — Medication 0.5 MILLIGRAM(S): at 20:52

## 2025-07-23 RX ADMIN — Medication 40 MILLIEQUIVALENT(S): at 05:37

## 2025-07-23 RX ADMIN — METOPROLOL SUCCINATE 50 MILLIGRAM(S): 50 TABLET, EXTENDED RELEASE ORAL at 12:00

## 2025-07-23 RX ADMIN — Medication 25 GRAM(S): at 21:32

## 2025-07-23 RX ADMIN — Medication 1 APPLICATION(S): at 05:38

## 2025-07-23 RX ADMIN — SACUBITRIL AND VALSARTAN 1 TABLET(S): 6; 6 PELLET ORAL at 17:24

## 2025-07-23 RX ADMIN — Medication 1000 MILLIGRAM(S): at 17:54

## 2025-07-23 RX ADMIN — Medication 0.5 MILLIGRAM(S): at 20:22

## 2025-07-23 RX ADMIN — Medication 1000 MILLIGRAM(S): at 17:24

## 2025-07-23 RX ADMIN — Medication 100 MILLIEQUIVALENT(S): at 00:42

## 2025-07-23 RX ADMIN — Medication 250 MILLIGRAM(S): at 17:25

## 2025-07-23 RX ADMIN — OXYCODONE HYDROCHLORIDE 5 MILLIGRAM(S): 30 TABLET ORAL at 19:47

## 2025-07-23 RX ADMIN — OXYCODONE HYDROCHLORIDE 10 MILLIGRAM(S): 30 TABLET ORAL at 23:35

## 2025-07-23 RX ADMIN — Medication 1000 MILLIGRAM(S): at 12:30

## 2025-07-23 RX ADMIN — Medication 100 MILLIEQUIVALENT(S): at 03:49

## 2025-07-23 RX ADMIN — SACUBITRIL AND VALSARTAN 1 TABLET(S): 6; 6 PELLET ORAL at 08:56

## 2025-07-23 RX ADMIN — OXYCODONE HYDROCHLORIDE 10 MILLIGRAM(S): 30 TABLET ORAL at 16:25

## 2025-07-23 RX ADMIN — LIDOCAINE HYDROCHLORIDE 1 PATCH: 20 JELLY TOPICAL at 07:40

## 2025-07-23 RX ADMIN — OXYCODONE HYDROCHLORIDE 5 MILLIGRAM(S): 30 TABLET ORAL at 09:25

## 2025-07-23 RX ADMIN — TAMSULOSIN HYDROCHLORIDE 0.8 MILLIGRAM(S): 0.4 CAPSULE ORAL at 13:18

## 2025-07-23 RX ADMIN — Medication 25 GRAM(S): at 13:19

## 2025-07-23 RX ADMIN — Medication 1000 MILLIGRAM(S): at 23:05

## 2025-07-23 RX ADMIN — METOPROLOL SUCCINATE 50 MILLIGRAM(S): 50 TABLET, EXTENDED RELEASE ORAL at 23:05

## 2025-07-23 RX ADMIN — OXYCODONE HYDROCHLORIDE 10 MILLIGRAM(S): 30 TABLET ORAL at 23:05

## 2025-07-23 RX ADMIN — Medication 1 APPLICATION(S): at 06:07

## 2025-07-23 RX ADMIN — Medication 1000 MILLIGRAM(S): at 05:38

## 2025-07-23 RX ADMIN — DIGOXIN 125 MICROGRAM(S): 250 TABLET ORAL at 11:59

## 2025-07-23 RX ADMIN — METOPROLOL SUCCINATE 50 MILLIGRAM(S): 50 TABLET, EXTENDED RELEASE ORAL at 00:07

## 2025-07-23 RX ADMIN — Medication 80 MILLIGRAM(S): at 12:00

## 2025-07-23 RX ADMIN — Medication 40 MILLIGRAM(S): at 09:19

## 2025-07-23 RX ADMIN — LIDOCAINE HYDROCHLORIDE 1 PATCH: 20 JELLY TOPICAL at 21:32

## 2025-07-23 RX ADMIN — ENOXAPARIN SODIUM 40 MILLIGRAM(S): 100 INJECTION SUBCUTANEOUS at 17:24

## 2025-07-23 RX ADMIN — Medication 25 GRAM(S): at 05:38

## 2025-07-23 RX ADMIN — OXYCODONE HYDROCHLORIDE 5 MILLIGRAM(S): 30 TABLET ORAL at 03:02

## 2025-07-24 LAB
ALBUMIN SERPL ELPH-MCNC: 2.4 G/DL — LOW (ref 3.3–5)
ALBUMIN SERPL ELPH-MCNC: 2.5 G/DL — LOW (ref 3.3–5)
ALP SERPL-CCNC: 127 U/L — HIGH (ref 40–120)
ALP SERPL-CCNC: 135 U/L — HIGH (ref 40–120)
ALT FLD-CCNC: 30 U/L — SIGNIFICANT CHANGE UP (ref 10–45)
ALT FLD-CCNC: 34 U/L — SIGNIFICANT CHANGE UP (ref 10–45)
ANION GAP SERPL CALC-SCNC: 12 MMOL/L — SIGNIFICANT CHANGE UP (ref 5–17)
ANION GAP SERPL CALC-SCNC: 14 MMOL/L — SIGNIFICANT CHANGE UP (ref 5–17)
APTT BLD: 27.5 SEC — SIGNIFICANT CHANGE UP (ref 26.1–36.8)
APTT BLD: 28.3 SEC — SIGNIFICANT CHANGE UP (ref 26.1–36.8)
AST SERPL-CCNC: 21 U/L — SIGNIFICANT CHANGE UP (ref 10–40)
AST SERPL-CCNC: 24 U/L — SIGNIFICANT CHANGE UP (ref 10–40)
BILIRUB SERPL-MCNC: 1.5 MG/DL — HIGH (ref 0.2–1.2)
BILIRUB SERPL-MCNC: 1.5 MG/DL — HIGH (ref 0.2–1.2)
BLD GP AB SCN SERPL QL: NEGATIVE — SIGNIFICANT CHANGE UP
BUN SERPL-MCNC: 10 MG/DL — SIGNIFICANT CHANGE UP (ref 7–23)
BUN SERPL-MCNC: 8 MG/DL — SIGNIFICANT CHANGE UP (ref 7–23)
CALCIUM SERPL-MCNC: 8 MG/DL — LOW (ref 8.4–10.5)
CALCIUM SERPL-MCNC: 8 MG/DL — LOW (ref 8.4–10.5)
CHLORIDE SERPL-SCNC: 102 MMOL/L — SIGNIFICANT CHANGE UP (ref 96–108)
CHLORIDE SERPL-SCNC: 103 MMOL/L — SIGNIFICANT CHANGE UP (ref 96–108)
CO2 SERPL-SCNC: 23 MMOL/L — SIGNIFICANT CHANGE UP (ref 22–31)
CO2 SERPL-SCNC: 24 MMOL/L — SIGNIFICANT CHANGE UP (ref 22–31)
CREAT SERPL-MCNC: 0.59 MG/DL — SIGNIFICANT CHANGE UP (ref 0.5–1.3)
CREAT SERPL-MCNC: 0.59 MG/DL — SIGNIFICANT CHANGE UP (ref 0.5–1.3)
CULTURE RESULTS: ABNORMAL
DIGOXIN SERPL-MCNC: 1 NG/ML — SIGNIFICANT CHANGE UP (ref 0.8–2)
EGFR: 109 ML/MIN/1.73M2 — SIGNIFICANT CHANGE UP
GAS PNL BLDV: SIGNIFICANT CHANGE UP
GLUCOSE SERPL-MCNC: 105 MG/DL — HIGH (ref 70–99)
GLUCOSE SERPL-MCNC: 117 MG/DL — HIGH (ref 70–99)
HCT VFR BLD CALC: 24.6 % — LOW (ref 39–50)
HGB BLD-MCNC: 7.8 G/DL — LOW (ref 13–17)
INR BLD: 1.18 RATIO — HIGH (ref 0.85–1.16)
INR BLD: 1.22 RATIO — HIGH (ref 0.85–1.16)
MAGNESIUM SERPL-MCNC: 1.8 MG/DL — SIGNIFICANT CHANGE UP (ref 1.6–2.6)
MAGNESIUM SERPL-MCNC: 2 MG/DL — SIGNIFICANT CHANGE UP (ref 1.6–2.6)
MCHC RBC-ENTMCNC: 30.1 PG — SIGNIFICANT CHANGE UP (ref 27–34)
MCHC RBC-ENTMCNC: 31.7 G/DL — LOW (ref 32–36)
MCV RBC AUTO: 95 FL — SIGNIFICANT CHANGE UP (ref 80–100)
NRBC # BLD AUTO: 0 K/UL — SIGNIFICANT CHANGE UP (ref 0–0)
NRBC # FLD: 0 K/UL — SIGNIFICANT CHANGE UP (ref 0–0)
NRBC BLD AUTO-RTO: 0 /100 WBCS — SIGNIFICANT CHANGE UP (ref 0–0)
PHOSPHATE SERPL-MCNC: 2.6 MG/DL — SIGNIFICANT CHANGE UP (ref 2.5–4.5)
PHOSPHATE SERPL-MCNC: 6.3 MG/DL — HIGH (ref 2.5–4.5)
PLATELET # BLD AUTO: 453 K/UL — HIGH (ref 150–400)
PMV BLD: 9.9 FL — SIGNIFICANT CHANGE UP (ref 7–13)
POTASSIUM SERPL-MCNC: 3.7 MMOL/L — SIGNIFICANT CHANGE UP (ref 3.5–5.3)
POTASSIUM SERPL-MCNC: 5 MMOL/L — SIGNIFICANT CHANGE UP (ref 3.5–5.3)
POTASSIUM SERPL-SCNC: 3.7 MMOL/L — SIGNIFICANT CHANGE UP (ref 3.5–5.3)
POTASSIUM SERPL-SCNC: 5 MMOL/L — SIGNIFICANT CHANGE UP (ref 3.5–5.3)
PROT SERPL-MCNC: 5 G/DL — LOW (ref 6–8.3)
PROT SERPL-MCNC: 5.1 G/DL — LOW (ref 6–8.3)
PROTHROM AB SERPL-ACNC: 13.5 SEC — HIGH (ref 9.9–13.4)
PROTHROM AB SERPL-ACNC: 14 SEC — HIGH (ref 9.9–13.4)
RBC # BLD: 2.59 M/UL — LOW (ref 4.2–5.8)
RBC # FLD: 18 % — HIGH (ref 10.3–14.5)
RH IG SCN BLD-IMP: POSITIVE — SIGNIFICANT CHANGE UP
SODIUM SERPL-SCNC: 138 MMOL/L — SIGNIFICANT CHANGE UP (ref 135–145)
SODIUM SERPL-SCNC: 140 MMOL/L — SIGNIFICANT CHANGE UP (ref 135–145)
SPECIMEN SOURCE: SIGNIFICANT CHANGE UP
WBC # BLD: 19.26 K/UL — HIGH (ref 3.8–10.5)
WBC # FLD AUTO: 19.26 K/UL — HIGH (ref 3.8–10.5)

## 2025-07-24 PROCEDURE — 99291 CRITICAL CARE FIRST HOUR: CPT

## 2025-07-24 PROCEDURE — 99232 SBSQ HOSP IP/OBS MODERATE 35: CPT

## 2025-07-24 PROCEDURE — 71045 X-RAY EXAM CHEST 1 VIEW: CPT | Mod: 26,76

## 2025-07-24 PROCEDURE — 99233 SBSQ HOSP IP/OBS HIGH 50: CPT | Mod: GC

## 2025-07-24 RX ORDER — POTASSIUM PHOSPHATE, MONOBASIC POTASSIUM PHOSPHATE, DIBASIC INJECTION, 236; 224 MG/ML; MG/ML
15 SOLUTION, CONCENTRATE INTRAVENOUS ONCE
Refills: 0 | Status: COMPLETED | OUTPATIENT
Start: 2025-07-24 | End: 2025-07-24

## 2025-07-24 RX ORDER — APIXABAN 5 MG/1
5 TABLET, FILM COATED ORAL
Refills: 0 | Status: DISCONTINUED | OUTPATIENT
Start: 2025-07-24 | End: 2025-07-30

## 2025-07-24 RX ORDER — CALCIUM GLUCONATE 20 MG/ML
2 INJECTION, SOLUTION INTRAVENOUS ONCE
Refills: 0 | Status: COMPLETED | OUTPATIENT
Start: 2025-07-24 | End: 2025-07-24

## 2025-07-24 RX ORDER — ENOXAPARIN SODIUM 100 MG/ML
80 INJECTION SUBCUTANEOUS EVERY 12 HOURS
Refills: 0 | Status: DISCONTINUED | OUTPATIENT
Start: 2025-07-24 | End: 2025-07-24

## 2025-07-24 RX ORDER — MAGNESIUM SULFATE 500 MG/ML
2 SYRINGE (ML) INJECTION ONCE
Refills: 0 | Status: COMPLETED | OUTPATIENT
Start: 2025-07-24 | End: 2025-07-24

## 2025-07-24 RX ADMIN — SACUBITRIL AND VALSARTAN 1 TABLET(S): 6; 6 PELLET ORAL at 17:38

## 2025-07-24 RX ADMIN — METHOCARBAMOL 500 MILLIGRAM(S): 500 TABLET, FILM COATED ORAL at 16:58

## 2025-07-24 RX ADMIN — Medication 250 MILLIGRAM(S): at 17:39

## 2025-07-24 RX ADMIN — METOPROLOL SUCCINATE 50 MILLIGRAM(S): 50 TABLET, EXTENDED RELEASE ORAL at 17:38

## 2025-07-24 RX ADMIN — TAMSULOSIN HYDROCHLORIDE 0.8 MILLIGRAM(S): 0.4 CAPSULE ORAL at 21:07

## 2025-07-24 RX ADMIN — METOPROLOL SUCCINATE 50 MILLIGRAM(S): 50 TABLET, EXTENDED RELEASE ORAL at 05:12

## 2025-07-24 RX ADMIN — Medication 1 APPLICATION(S): at 05:13

## 2025-07-24 RX ADMIN — LIDOCAINE HYDROCHLORIDE 1 PATCH: 20 JELLY TOPICAL at 09:00

## 2025-07-24 RX ADMIN — OXYCODONE HYDROCHLORIDE 10 MILLIGRAM(S): 30 TABLET ORAL at 10:01

## 2025-07-24 RX ADMIN — OXYCODONE HYDROCHLORIDE 10 MILLIGRAM(S): 30 TABLET ORAL at 05:42

## 2025-07-24 RX ADMIN — OXYCODONE HYDROCHLORIDE 5 MILLIGRAM(S): 30 TABLET ORAL at 12:33

## 2025-07-24 RX ADMIN — OXYCODONE HYDROCHLORIDE 10 MILLIGRAM(S): 30 TABLET ORAL at 05:12

## 2025-07-24 RX ADMIN — Medication 250 MILLIGRAM(S): at 05:11

## 2025-07-24 RX ADMIN — Medication 50 GRAM(S): at 02:28

## 2025-07-24 RX ADMIN — Medication 25 GRAM(S): at 05:11

## 2025-07-24 RX ADMIN — Medication 25 GRAM(S): at 14:14

## 2025-07-24 RX ADMIN — CALCIUM GLUCONATE 200 GRAM(S): 20 INJECTION, SOLUTION INTRAVENOUS at 00:32

## 2025-07-24 RX ADMIN — SACUBITRIL AND VALSARTAN 1 TABLET(S): 6; 6 PELLET ORAL at 05:12

## 2025-07-24 RX ADMIN — Medication 1000 MILLIGRAM(S): at 12:01

## 2025-07-24 RX ADMIN — Medication 1000 MILLIGRAM(S): at 17:39

## 2025-07-24 RX ADMIN — DIGOXIN 125 MICROGRAM(S): 250 TABLET ORAL at 05:12

## 2025-07-24 RX ADMIN — LIDOCAINE HYDROCHLORIDE 1 PATCH: 20 JELLY TOPICAL at 21:08

## 2025-07-24 RX ADMIN — Medication 0.5 MILLIGRAM(S): at 14:44

## 2025-07-24 RX ADMIN — Medication 1000 MILLIGRAM(S): at 12:31

## 2025-07-24 RX ADMIN — POTASSIUM PHOSPHATE, MONOBASIC POTASSIUM PHOSPHATE, DIBASIC INJECTION, 62.5 MILLIMOLE(S): 236; 224 SOLUTION, CONCENTRATE INTRAVENOUS at 02:28

## 2025-07-24 RX ADMIN — Medication 40 MILLIGRAM(S): at 06:03

## 2025-07-24 RX ADMIN — OXYCODONE HYDROCHLORIDE 10 MILLIGRAM(S): 30 TABLET ORAL at 21:30

## 2025-07-24 RX ADMIN — LIDOCAINE HYDROCHLORIDE 1 PATCH: 20 JELLY TOPICAL at 07:00

## 2025-07-24 RX ADMIN — OXYCODONE HYDROCHLORIDE 10 MILLIGRAM(S): 30 TABLET ORAL at 09:31

## 2025-07-24 RX ADMIN — Medication 1000 MILLIGRAM(S): at 05:12

## 2025-07-24 RX ADMIN — APIXABAN 5 MILLIGRAM(S): 5 TABLET, FILM COATED ORAL at 19:20

## 2025-07-24 RX ADMIN — Medication 1 APPLICATION(S): at 05:12

## 2025-07-24 RX ADMIN — OXYCODONE HYDROCHLORIDE 5 MILLIGRAM(S): 30 TABLET ORAL at 12:03

## 2025-07-24 RX ADMIN — Medication 1000 MILLIGRAM(S): at 05:42

## 2025-07-24 RX ADMIN — Medication 0.5 MILLIGRAM(S): at 14:14

## 2025-07-24 RX ADMIN — CALCIUM GLUCONATE 200 GRAM(S): 20 INJECTION, SOLUTION INTRAVENOUS at 06:45

## 2025-07-24 RX ADMIN — OXYCODONE HYDROCHLORIDE 10 MILLIGRAM(S): 30 TABLET ORAL at 20:51

## 2025-07-24 RX ADMIN — Medication 25 GRAM(S): at 21:07

## 2025-07-24 RX ADMIN — METOPROLOL SUCCINATE 50 MILLIGRAM(S): 50 TABLET, EXTENDED RELEASE ORAL at 12:00

## 2025-07-25 LAB
ADD ON TEST-SPECIMEN IN LAB: SIGNIFICANT CHANGE UP
ALBUMIN SERPL ELPH-MCNC: 2.5 G/DL — LOW (ref 3.3–5)
ALBUMIN SERPL ELPH-MCNC: 2.6 G/DL — LOW (ref 3.3–5)
ALP SERPL-CCNC: 153 U/L — HIGH (ref 40–120)
ALP SERPL-CCNC: 169 U/L — HIGH (ref 40–120)
ALT FLD-CCNC: 30 U/L — SIGNIFICANT CHANGE UP (ref 10–45)
ALT FLD-CCNC: 32 U/L — SIGNIFICANT CHANGE UP (ref 10–45)
ANION GAP SERPL CALC-SCNC: 10 MMOL/L — SIGNIFICANT CHANGE UP (ref 5–17)
ANION GAP SERPL CALC-SCNC: 12 MMOL/L — SIGNIFICANT CHANGE UP (ref 5–17)
APTT BLD: 24.1 SEC — LOW (ref 26.1–36.8)
APTT BLD: 30.1 SEC — SIGNIFICANT CHANGE UP (ref 26.1–36.8)
AST SERPL-CCNC: 26 U/L — SIGNIFICANT CHANGE UP (ref 10–40)
AST SERPL-CCNC: 28 U/L — SIGNIFICANT CHANGE UP (ref 10–40)
BILIRUB SERPL-MCNC: 1.2 MG/DL — SIGNIFICANT CHANGE UP (ref 0.2–1.2)
BILIRUB SERPL-MCNC: 1.3 MG/DL — HIGH (ref 0.2–1.2)
BUN SERPL-MCNC: 8 MG/DL — SIGNIFICANT CHANGE UP (ref 7–23)
BUN SERPL-MCNC: 8 MG/DL — SIGNIFICANT CHANGE UP (ref 7–23)
CALCIUM SERPL-MCNC: 7.8 MG/DL — LOW (ref 8.4–10.5)
CALCIUM SERPL-MCNC: 7.9 MG/DL — LOW (ref 8.4–10.5)
CHLORIDE SERPL-SCNC: 101 MMOL/L — SIGNIFICANT CHANGE UP (ref 96–108)
CHLORIDE SERPL-SCNC: 102 MMOL/L — SIGNIFICANT CHANGE UP (ref 96–108)
CO2 SERPL-SCNC: 23 MMOL/L — SIGNIFICANT CHANGE UP (ref 22–31)
CO2 SERPL-SCNC: 24 MMOL/L — SIGNIFICANT CHANGE UP (ref 22–31)
CREAT SERPL-MCNC: 0.56 MG/DL — SIGNIFICANT CHANGE UP (ref 0.5–1.3)
CREAT SERPL-MCNC: 0.57 MG/DL — SIGNIFICANT CHANGE UP (ref 0.5–1.3)
EGFR: 110 ML/MIN/1.73M2 — SIGNIFICANT CHANGE UP
EGFR: 110 ML/MIN/1.73M2 — SIGNIFICANT CHANGE UP
EGFR: 111 ML/MIN/1.73M2 — SIGNIFICANT CHANGE UP
EGFR: 111 ML/MIN/1.73M2 — SIGNIFICANT CHANGE UP
FUNGITELL: <31 PG/ML — SIGNIFICANT CHANGE UP
GAS PNL BLDV: SIGNIFICANT CHANGE UP
GLUCOSE SERPL-MCNC: 101 MG/DL — HIGH (ref 70–99)
GLUCOSE SERPL-MCNC: 131 MG/DL — HIGH (ref 70–99)
HCT VFR BLD CALC: 26.7 % — LOW (ref 39–50)
HCT VFR BLD CALC: 26.7 % — LOW (ref 39–50)
HGB BLD-MCNC: 8.4 G/DL — LOW (ref 13–17)
HGB BLD-MCNC: 8.5 G/DL — LOW (ref 13–17)
INR BLD: 1.21 RATIO — HIGH (ref 0.85–1.16)
INR BLD: 1.32 RATIO — HIGH (ref 0.85–1.16)
MAGNESIUM SERPL-MCNC: 1.8 MG/DL — SIGNIFICANT CHANGE UP (ref 1.6–2.6)
MAGNESIUM SERPL-MCNC: 1.9 MG/DL — SIGNIFICANT CHANGE UP (ref 1.6–2.6)
MCHC RBC-ENTMCNC: 30.1 PG — SIGNIFICANT CHANGE UP (ref 27–34)
MCHC RBC-ENTMCNC: 30.7 PG — SIGNIFICANT CHANGE UP (ref 27–34)
MCHC RBC-ENTMCNC: 31.5 G/DL — LOW (ref 32–36)
MCHC RBC-ENTMCNC: 31.8 G/DL — LOW (ref 32–36)
MCV RBC AUTO: 95.7 FL — SIGNIFICANT CHANGE UP (ref 80–100)
MCV RBC AUTO: 96.4 FL — SIGNIFICANT CHANGE UP (ref 80–100)
NRBC # BLD AUTO: 0 K/UL — SIGNIFICANT CHANGE UP (ref 0–0)
NRBC # BLD AUTO: 0 K/UL — SIGNIFICANT CHANGE UP (ref 0–0)
NRBC # FLD: 0 K/UL — SIGNIFICANT CHANGE UP (ref 0–0)
NRBC # FLD: 0 K/UL — SIGNIFICANT CHANGE UP (ref 0–0)
NRBC BLD AUTO-RTO: 0 /100 WBCS — SIGNIFICANT CHANGE UP (ref 0–0)
NRBC BLD AUTO-RTO: 0 /100 WBCS — SIGNIFICANT CHANGE UP (ref 0–0)
PHOSPHATE SERPL-MCNC: 3 MG/DL — SIGNIFICANT CHANGE UP (ref 2.5–4.5)
PHOSPHATE SERPL-MCNC: 3.3 MG/DL — SIGNIFICANT CHANGE UP (ref 2.5–4.5)
PLATELET # BLD AUTO: 496 K/UL — HIGH (ref 150–400)
PLATELET # BLD AUTO: 505 K/UL — HIGH (ref 150–400)
PMV BLD: 9.5 FL — SIGNIFICANT CHANGE UP (ref 7–13)
PMV BLD: 9.8 FL — SIGNIFICANT CHANGE UP (ref 7–13)
POTASSIUM SERPL-MCNC: 3.8 MMOL/L — SIGNIFICANT CHANGE UP (ref 3.5–5.3)
POTASSIUM SERPL-MCNC: 4 MMOL/L — SIGNIFICANT CHANGE UP (ref 3.5–5.3)
POTASSIUM SERPL-SCNC: 3.8 MMOL/L — SIGNIFICANT CHANGE UP (ref 3.5–5.3)
POTASSIUM SERPL-SCNC: 4 MMOL/L — SIGNIFICANT CHANGE UP (ref 3.5–5.3)
PROCALCITONIN SERPL-MCNC: 0.22 NG/ML — HIGH (ref 0.02–0.1)
PROT SERPL-MCNC: 5.3 G/DL — LOW (ref 6–8.3)
PROT SERPL-MCNC: 5.5 G/DL — LOW (ref 6–8.3)
PROTHROM AB SERPL-ACNC: 13.9 SEC — HIGH (ref 9.9–13.4)
PROTHROM AB SERPL-ACNC: 15 SEC — HIGH (ref 9.9–13.4)
RBC # BLD: 2.77 M/UL — LOW (ref 4.2–5.8)
RBC # BLD: 2.79 M/UL — LOW (ref 4.2–5.8)
RBC # FLD: 18.4 % — HIGH (ref 10.3–14.5)
RBC # FLD: 19 % — HIGH (ref 10.3–14.5)
SODIUM SERPL-SCNC: 135 MMOL/L — SIGNIFICANT CHANGE UP (ref 135–145)
SODIUM SERPL-SCNC: 137 MMOL/L — SIGNIFICANT CHANGE UP (ref 135–145)
VANCOMYCIN TROUGH SERPL-MCNC: 4.2 UG/ML — LOW (ref 10–20)
WBC # BLD: 11.32 K/UL — HIGH (ref 3.8–10.5)
WBC # BLD: 14.82 K/UL — HIGH (ref 3.8–10.5)
WBC # FLD AUTO: 11.32 K/UL — HIGH (ref 3.8–10.5)
WBC # FLD AUTO: 14.82 K/UL — HIGH (ref 3.8–10.5)

## 2025-07-25 PROCEDURE — 99222 1ST HOSP IP/OBS MODERATE 55: CPT

## 2025-07-25 PROCEDURE — 99232 SBSQ HOSP IP/OBS MODERATE 35: CPT

## 2025-07-25 PROCEDURE — 71045 X-RAY EXAM CHEST 1 VIEW: CPT | Mod: 26

## 2025-07-25 PROCEDURE — 99231 SBSQ HOSP IP/OBS SF/LOW 25: CPT

## 2025-07-25 PROCEDURE — 99233 SBSQ HOSP IP/OBS HIGH 50: CPT | Mod: GC

## 2025-07-25 PROCEDURE — 71045 X-RAY EXAM CHEST 1 VIEW: CPT | Mod: 26,77

## 2025-07-25 RX ORDER — TAMSULOSIN HYDROCHLORIDE 0.4 MG/1
0.4 CAPSULE ORAL ONCE
Refills: 0 | Status: COMPLETED | OUTPATIENT
Start: 2025-07-25 | End: 2025-07-25

## 2025-07-25 RX ORDER — SACUBITRIL AND VALSARTAN 6; 6 MG/1; MG/1
0 PELLET ORAL
Refills: 0 | DISCHARGE

## 2025-07-25 RX ORDER — LIDOCAINE HYDROCHLORIDE 20 MG/ML
1 JELLY TOPICAL EVERY 24 HOURS
Refills: 0 | Status: DISCONTINUED | OUTPATIENT
Start: 2025-07-25 | End: 2025-07-30

## 2025-07-25 RX ORDER — CALCIUM GLUCONATE 20 MG/ML
2 INJECTION, SOLUTION INTRAVENOUS ONCE
Refills: 0 | Status: COMPLETED | OUTPATIENT
Start: 2025-07-25 | End: 2025-07-25

## 2025-07-25 RX ORDER — METOPROLOL SUCCINATE 50 MG/1
25 TABLET, EXTENDED RELEASE ORAL ONCE
Refills: 0 | Status: DISCONTINUED | OUTPATIENT
Start: 2025-07-25 | End: 2025-07-25

## 2025-07-25 RX ORDER — HYDROMORPHONE/SOD CHLOR,ISO/PF 2 MG/10 ML
0.5 SYRINGE (ML) INJECTION
Refills: 0 | Status: DISCONTINUED | OUTPATIENT
Start: 2025-07-25 | End: 2025-07-27

## 2025-07-25 RX ORDER — METOPROLOL SUCCINATE 50 MG/1
0 TABLET, EXTENDED RELEASE ORAL
Qty: 0 | Refills: 1 | DISCHARGE

## 2025-07-25 RX ORDER — APIXABAN 5 MG/1
0 TABLET, FILM COATED ORAL
Qty: 0 | Refills: 0 | DISCHARGE

## 2025-07-25 RX ORDER — DOXAZOSIN MESYLATE 8 MG/1
4 TABLET ORAL AT BEDTIME
Refills: 0 | Status: DISCONTINUED | OUTPATIENT
Start: 2025-07-25 | End: 2025-07-30

## 2025-07-25 RX ORDER — OXYCODONE HYDROCHLORIDE 30 MG/1
5 TABLET ORAL EVERY 4 HOURS
Refills: 0 | Status: DISCONTINUED | OUTPATIENT
Start: 2025-07-25 | End: 2025-07-30

## 2025-07-25 RX ORDER — OXYCODONE HYDROCHLORIDE 30 MG/1
10 TABLET ORAL EVERY 4 HOURS
Refills: 0 | Status: DISCONTINUED | OUTPATIENT
Start: 2025-07-25 | End: 2025-07-30

## 2025-07-25 RX ORDER — METOPROLOL SUCCINATE 50 MG/1
25 TABLET, EXTENDED RELEASE ORAL ONCE
Refills: 0 | Status: COMPLETED | OUTPATIENT
Start: 2025-07-25 | End: 2025-07-25

## 2025-07-25 RX ORDER — ROSUVASTATIN CALCIUM 20 MG/1
20 TABLET, FILM COATED ORAL AT BEDTIME
Refills: 0 | Status: DISCONTINUED | OUTPATIENT
Start: 2025-07-25 | End: 2025-07-30

## 2025-07-25 RX ORDER — METOPROLOL SUCCINATE 50 MG/1
50 TABLET, EXTENDED RELEASE ORAL EVERY 6 HOURS
Refills: 0 | Status: DISCONTINUED | OUTPATIENT
Start: 2025-07-26 | End: 2025-07-30

## 2025-07-25 RX ORDER — ASPIRIN 325 MG
81 TABLET ORAL DAILY
Refills: 0 | Status: DISCONTINUED | OUTPATIENT
Start: 2025-07-25 | End: 2025-07-30

## 2025-07-25 RX ORDER — MAGNESIUM SULFATE 500 MG/ML
2 SYRINGE (ML) INJECTION ONCE
Refills: 0 | Status: COMPLETED | OUTPATIENT
Start: 2025-07-25 | End: 2025-07-25

## 2025-07-25 RX ADMIN — OXYCODONE HYDROCHLORIDE 5 MILLIGRAM(S): 30 TABLET ORAL at 17:41

## 2025-07-25 RX ADMIN — Medication 1000 MILLIGRAM(S): at 05:45

## 2025-07-25 RX ADMIN — Medication 20 MILLIEQUIVALENT(S): at 03:00

## 2025-07-25 RX ADMIN — Medication 1 APPLICATION(S): at 05:11

## 2025-07-25 RX ADMIN — Medication 1000 MILLIGRAM(S): at 12:20

## 2025-07-25 RX ADMIN — Medication 250 MILLIGRAM(S): at 05:10

## 2025-07-25 RX ADMIN — METOPROLOL SUCCINATE 50 MILLIGRAM(S): 50 TABLET, EXTENDED RELEASE ORAL at 05:11

## 2025-07-25 RX ADMIN — OXYCODONE HYDROCHLORIDE 10 MILLIGRAM(S): 30 TABLET ORAL at 03:22

## 2025-07-25 RX ADMIN — OXYCODONE HYDROCHLORIDE 10 MILLIGRAM(S): 30 TABLET ORAL at 07:48

## 2025-07-25 RX ADMIN — Medication 1000 MILLIGRAM(S): at 23:35

## 2025-07-25 RX ADMIN — Medication 1000 MILLIGRAM(S): at 11:46

## 2025-07-25 RX ADMIN — OXYCODONE HYDROCHLORIDE 10 MILLIGRAM(S): 30 TABLET ORAL at 16:20

## 2025-07-25 RX ADMIN — LIDOCAINE HYDROCHLORIDE 1 PATCH: 20 JELLY TOPICAL at 22:02

## 2025-07-25 RX ADMIN — OXYCODONE HYDROCHLORIDE 10 MILLIGRAM(S): 30 TABLET ORAL at 11:50

## 2025-07-25 RX ADMIN — Medication 40 MILLIGRAM(S): at 05:11

## 2025-07-25 RX ADMIN — APIXABAN 5 MILLIGRAM(S): 5 TABLET, FILM COATED ORAL at 05:11

## 2025-07-25 RX ADMIN — OXYCODONE HYDROCHLORIDE 5 MILLIGRAM(S): 30 TABLET ORAL at 22:35

## 2025-07-25 RX ADMIN — OXYCODONE HYDROCHLORIDE 5 MILLIGRAM(S): 30 TABLET ORAL at 22:05

## 2025-07-25 RX ADMIN — LIDOCAINE HYDROCHLORIDE 1 PATCH: 20 JELLY TOPICAL at 12:37

## 2025-07-25 RX ADMIN — CALCIUM GLUCONATE 200 GRAM(S): 20 INJECTION, SOLUTION INTRAVENOUS at 06:31

## 2025-07-25 RX ADMIN — ROSUVASTATIN CALCIUM 20 MILLIGRAM(S): 20 TABLET, FILM COATED ORAL at 21:59

## 2025-07-25 RX ADMIN — OXYCODONE HYDROCHLORIDE 5 MILLIGRAM(S): 30 TABLET ORAL at 18:10

## 2025-07-25 RX ADMIN — Medication 81 MILLIGRAM(S): at 11:46

## 2025-07-25 RX ADMIN — METOPROLOL SUCCINATE 25 MILLIGRAM(S): 50 TABLET, EXTENDED RELEASE ORAL at 17:41

## 2025-07-25 RX ADMIN — Medication 1000 MILLIGRAM(S): at 17:40

## 2025-07-25 RX ADMIN — OXYCODONE HYDROCHLORIDE 10 MILLIGRAM(S): 30 TABLET ORAL at 15:50

## 2025-07-25 RX ADMIN — Medication 1000 MILLIGRAM(S): at 18:10

## 2025-07-25 RX ADMIN — OXYCODONE HYDROCHLORIDE 10 MILLIGRAM(S): 30 TABLET ORAL at 12:20

## 2025-07-25 RX ADMIN — Medication 1000 MILLIGRAM(S): at 01:00

## 2025-07-25 RX ADMIN — Medication 50 GRAM(S): at 03:00

## 2025-07-25 RX ADMIN — Medication 25 GRAM(S): at 13:41

## 2025-07-25 RX ADMIN — TAMSULOSIN HYDROCHLORIDE 0.4 MILLIGRAM(S): 0.4 CAPSULE ORAL at 11:50

## 2025-07-25 RX ADMIN — TAMSULOSIN HYDROCHLORIDE 0.8 MILLIGRAM(S): 0.4 CAPSULE ORAL at 21:59

## 2025-07-25 RX ADMIN — Medication 25 GRAM(S): at 21:58

## 2025-07-25 RX ADMIN — DIGOXIN 125 MICROGRAM(S): 250 TABLET ORAL at 05:11

## 2025-07-25 RX ADMIN — Medication 25 GRAM(S): at 05:10

## 2025-07-25 RX ADMIN — LIDOCAINE HYDROCHLORIDE 1 PATCH: 20 JELLY TOPICAL at 09:56

## 2025-07-25 RX ADMIN — Medication 1000 MILLIGRAM(S): at 05:11

## 2025-07-25 RX ADMIN — LIDOCAINE HYDROCHLORIDE 1 PATCH: 20 JELLY TOPICAL at 07:26

## 2025-07-25 RX ADMIN — Medication 1000 MILLIGRAM(S): at 23:05

## 2025-07-25 RX ADMIN — METOPROLOL SUCCINATE 50 MILLIGRAM(S): 50 TABLET, EXTENDED RELEASE ORAL at 00:25

## 2025-07-25 RX ADMIN — SACUBITRIL AND VALSARTAN 1 TABLET(S): 6; 6 PELLET ORAL at 05:11

## 2025-07-25 RX ADMIN — METOPROLOL SUCCINATE 50 MILLIGRAM(S): 50 TABLET, EXTENDED RELEASE ORAL at 23:06

## 2025-07-25 RX ADMIN — APIXABAN 5 MILLIGRAM(S): 5 TABLET, FILM COATED ORAL at 17:41

## 2025-07-25 RX ADMIN — DOXAZOSIN MESYLATE 4 MILLIGRAM(S): 8 TABLET ORAL at 23:06

## 2025-07-25 RX ADMIN — METOPROLOL SUCCINATE 50 MILLIGRAM(S): 50 TABLET, EXTENDED RELEASE ORAL at 11:47

## 2025-07-25 RX ADMIN — OXYCODONE HYDROCHLORIDE 10 MILLIGRAM(S): 30 TABLET ORAL at 03:02

## 2025-07-25 RX ADMIN — LIDOCAINE HYDROCHLORIDE 1 PATCH: 20 JELLY TOPICAL at 00:34

## 2025-07-25 RX ADMIN — Medication 1000 MILLIGRAM(S): at 00:30

## 2025-07-25 RX ADMIN — OXYCODONE HYDROCHLORIDE 10 MILLIGRAM(S): 30 TABLET ORAL at 08:18

## 2025-07-25 RX ADMIN — Medication 1 APPLICATION(S): at 05:19

## 2025-07-26 PROCEDURE — 99232 SBSQ HOSP IP/OBS MODERATE 35: CPT | Mod: GC

## 2025-07-26 PROCEDURE — 99232 SBSQ HOSP IP/OBS MODERATE 35: CPT

## 2025-07-26 PROCEDURE — 71045 X-RAY EXAM CHEST 1 VIEW: CPT | Mod: 26

## 2025-07-26 PROCEDURE — 99233 SBSQ HOSP IP/OBS HIGH 50: CPT

## 2025-07-26 RX ORDER — MAGNESIUM SULFATE 500 MG/ML
2 SYRINGE (ML) INJECTION ONCE
Refills: 0 | Status: COMPLETED | OUTPATIENT
Start: 2025-07-26 | End: 2025-07-26

## 2025-07-26 RX ADMIN — LIDOCAINE HYDROCHLORIDE 1 PATCH: 20 JELLY TOPICAL at 07:59

## 2025-07-26 RX ADMIN — TAMSULOSIN HYDROCHLORIDE 0.8 MILLIGRAM(S): 0.4 CAPSULE ORAL at 21:00

## 2025-07-26 RX ADMIN — DOXAZOSIN MESYLATE 4 MILLIGRAM(S): 8 TABLET ORAL at 21:02

## 2025-07-26 RX ADMIN — OXYCODONE HYDROCHLORIDE 10 MILLIGRAM(S): 30 TABLET ORAL at 14:55

## 2025-07-26 RX ADMIN — Medication 40 MILLIGRAM(S): at 06:48

## 2025-07-26 RX ADMIN — OXYCODONE HYDROCHLORIDE 5 MILLIGRAM(S): 30 TABLET ORAL at 07:20

## 2025-07-26 RX ADMIN — OXYCODONE HYDROCHLORIDE 10 MILLIGRAM(S): 30 TABLET ORAL at 22:15

## 2025-07-26 RX ADMIN — OXYCODONE HYDROCHLORIDE 10 MILLIGRAM(S): 30 TABLET ORAL at 09:02

## 2025-07-26 RX ADMIN — Medication 1000 MILLIGRAM(S): at 23:35

## 2025-07-26 RX ADMIN — OXYCODONE HYDROCHLORIDE 5 MILLIGRAM(S): 30 TABLET ORAL at 20:30

## 2025-07-26 RX ADMIN — APIXABAN 5 MILLIGRAM(S): 5 TABLET, FILM COATED ORAL at 07:17

## 2025-07-26 RX ADMIN — LIDOCAINE HYDROCHLORIDE 1 PATCH: 20 JELLY TOPICAL at 22:15

## 2025-07-26 RX ADMIN — Medication 1000 MILLIGRAM(S): at 12:13

## 2025-07-26 RX ADMIN — OXYCODONE HYDROCHLORIDE 10 MILLIGRAM(S): 30 TABLET ORAL at 23:00

## 2025-07-26 RX ADMIN — DIGOXIN 125 MICROGRAM(S): 250 TABLET ORAL at 05:57

## 2025-07-26 RX ADMIN — METOPROLOL SUCCINATE 50 MILLIGRAM(S): 50 TABLET, EXTENDED RELEASE ORAL at 05:57

## 2025-07-26 RX ADMIN — Medication 25 GRAM(S): at 21:00

## 2025-07-26 RX ADMIN — OXYCODONE HYDROCHLORIDE 10 MILLIGRAM(S): 30 TABLET ORAL at 10:02

## 2025-07-26 RX ADMIN — LIDOCAINE HYDROCHLORIDE 1 PATCH: 20 JELLY TOPICAL at 10:02

## 2025-07-26 RX ADMIN — Medication 25 GRAM(S): at 05:51

## 2025-07-26 RX ADMIN — OXYCODONE HYDROCHLORIDE 10 MILLIGRAM(S): 30 TABLET ORAL at 04:00

## 2025-07-26 RX ADMIN — LIDOCAINE HYDROCHLORIDE 1 PATCH: 20 JELLY TOPICAL at 12:15

## 2025-07-26 RX ADMIN — Medication 1 APPLICATION(S): at 07:07

## 2025-07-26 RX ADMIN — Medication 1000 MILLIGRAM(S): at 05:51

## 2025-07-26 RX ADMIN — OXYCODONE HYDROCHLORIDE 5 MILLIGRAM(S): 30 TABLET ORAL at 06:50

## 2025-07-26 RX ADMIN — Medication 81 MILLIGRAM(S): at 11:13

## 2025-07-26 RX ADMIN — LIDOCAINE HYDROCHLORIDE 1 PATCH: 20 JELLY TOPICAL at 01:12

## 2025-07-26 RX ADMIN — METOPROLOL SUCCINATE 50 MILLIGRAM(S): 50 TABLET, EXTENDED RELEASE ORAL at 23:36

## 2025-07-26 RX ADMIN — Medication 1000 MILLIGRAM(S): at 17:25

## 2025-07-26 RX ADMIN — LIDOCAINE HYDROCHLORIDE 1 PATCH: 20 JELLY TOPICAL at 23:35

## 2025-07-26 RX ADMIN — ROSUVASTATIN CALCIUM 20 MILLIGRAM(S): 20 TABLET, FILM COATED ORAL at 21:01

## 2025-07-26 RX ADMIN — Medication 25 GRAM(S): at 13:55

## 2025-07-26 RX ADMIN — OXYCODONE HYDROCHLORIDE 10 MILLIGRAM(S): 30 TABLET ORAL at 03:15

## 2025-07-26 RX ADMIN — Medication 1000 MILLIGRAM(S): at 11:13

## 2025-07-26 RX ADMIN — Medication 1000 MILLIGRAM(S): at 18:25

## 2025-07-26 RX ADMIN — OXYCODONE HYDROCHLORIDE 10 MILLIGRAM(S): 30 TABLET ORAL at 13:55

## 2025-07-26 RX ADMIN — APIXABAN 5 MILLIGRAM(S): 5 TABLET, FILM COATED ORAL at 17:26

## 2025-07-26 RX ADMIN — Medication 25 GRAM(S): at 01:33

## 2025-07-26 RX ADMIN — OXYCODONE HYDROCHLORIDE 5 MILLIGRAM(S): 30 TABLET ORAL at 20:00

## 2025-07-26 RX ADMIN — METOPROLOL SUCCINATE 50 MILLIGRAM(S): 50 TABLET, EXTENDED RELEASE ORAL at 17:27

## 2025-07-26 RX ADMIN — Medication 1000 MILLIGRAM(S): at 06:30

## 2025-07-27 PROCEDURE — 99232 SBSQ HOSP IP/OBS MODERATE 35: CPT

## 2025-07-27 PROCEDURE — 71045 X-RAY EXAM CHEST 1 VIEW: CPT | Mod: 26

## 2025-07-27 RX ADMIN — Medication 1000 MILLIGRAM(S): at 17:46

## 2025-07-27 RX ADMIN — OXYCODONE HYDROCHLORIDE 10 MILLIGRAM(S): 30 TABLET ORAL at 12:58

## 2025-07-27 RX ADMIN — OXYCODONE HYDROCHLORIDE 10 MILLIGRAM(S): 30 TABLET ORAL at 08:04

## 2025-07-27 RX ADMIN — LIDOCAINE HYDROCHLORIDE 1 PATCH: 20 JELLY TOPICAL at 08:00

## 2025-07-27 RX ADMIN — Medication 25 GRAM(S): at 14:21

## 2025-07-27 RX ADMIN — Medication 1 APPLICATION(S): at 05:20

## 2025-07-27 RX ADMIN — Medication 1000 MILLIGRAM(S): at 11:40

## 2025-07-27 RX ADMIN — TAMSULOSIN HYDROCHLORIDE 0.8 MILLIGRAM(S): 0.4 CAPSULE ORAL at 22:42

## 2025-07-27 RX ADMIN — APIXABAN 5 MILLIGRAM(S): 5 TABLET, FILM COATED ORAL at 05:21

## 2025-07-27 RX ADMIN — APIXABAN 5 MILLIGRAM(S): 5 TABLET, FILM COATED ORAL at 17:47

## 2025-07-27 RX ADMIN — Medication 1000 MILLIGRAM(S): at 23:34

## 2025-07-27 RX ADMIN — Medication 40 MILLIGRAM(S): at 07:51

## 2025-07-27 RX ADMIN — METOPROLOL SUCCINATE 50 MILLIGRAM(S): 50 TABLET, EXTENDED RELEASE ORAL at 18:08

## 2025-07-27 RX ADMIN — Medication 25 GRAM(S): at 05:17

## 2025-07-27 RX ADMIN — DIGOXIN 125 MICROGRAM(S): 250 TABLET ORAL at 05:21

## 2025-07-27 RX ADMIN — Medication 81 MILLIGRAM(S): at 11:40

## 2025-07-27 RX ADMIN — METHOCARBAMOL 500 MILLIGRAM(S): 500 TABLET, FILM COATED ORAL at 23:35

## 2025-07-27 RX ADMIN — METOPROLOL SUCCINATE 50 MILLIGRAM(S): 50 TABLET, EXTENDED RELEASE ORAL at 05:22

## 2025-07-27 RX ADMIN — OXYCODONE HYDROCHLORIDE 5 MILLIGRAM(S): 30 TABLET ORAL at 18:16

## 2025-07-27 RX ADMIN — Medication 1000 MILLIGRAM(S): at 12:20

## 2025-07-27 RX ADMIN — OXYCODONE HYDROCHLORIDE 5 MILLIGRAM(S): 30 TABLET ORAL at 17:46

## 2025-07-27 RX ADMIN — LIDOCAINE HYDROCHLORIDE 1 PATCH: 20 JELLY TOPICAL at 10:00

## 2025-07-27 RX ADMIN — LIDOCAINE HYDROCHLORIDE 1 PATCH: 20 JELLY TOPICAL at 23:35

## 2025-07-27 RX ADMIN — OXYCODONE HYDROCHLORIDE 10 MILLIGRAM(S): 30 TABLET ORAL at 03:13

## 2025-07-27 RX ADMIN — LIDOCAINE HYDROCHLORIDE 1 PATCH: 20 JELLY TOPICAL at 11:00

## 2025-07-27 RX ADMIN — OXYCODONE HYDROCHLORIDE 10 MILLIGRAM(S): 30 TABLET ORAL at 13:28

## 2025-07-27 RX ADMIN — Medication 1000 MILLIGRAM(S): at 00:35

## 2025-07-27 RX ADMIN — DOXAZOSIN MESYLATE 4 MILLIGRAM(S): 8 TABLET ORAL at 22:42

## 2025-07-27 RX ADMIN — Medication 1000 MILLIGRAM(S): at 06:12

## 2025-07-27 RX ADMIN — Medication 1000 MILLIGRAM(S): at 18:16

## 2025-07-27 RX ADMIN — OXYCODONE HYDROCHLORIDE 10 MILLIGRAM(S): 30 TABLET ORAL at 20:16

## 2025-07-27 RX ADMIN — OXYCODONE HYDROCHLORIDE 10 MILLIGRAM(S): 30 TABLET ORAL at 08:34

## 2025-07-27 RX ADMIN — OXYCODONE HYDROCHLORIDE 10 MILLIGRAM(S): 30 TABLET ORAL at 03:43

## 2025-07-27 RX ADMIN — OXYCODONE HYDROCHLORIDE 10 MILLIGRAM(S): 30 TABLET ORAL at 20:46

## 2025-07-27 RX ADMIN — Medication 25 GRAM(S): at 22:41

## 2025-07-27 RX ADMIN — ROSUVASTATIN CALCIUM 20 MILLIGRAM(S): 20 TABLET, FILM COATED ORAL at 22:42

## 2025-07-27 RX ADMIN — OXYCODONE HYDROCHLORIDE 5 MILLIGRAM(S): 30 TABLET ORAL at 23:34

## 2025-07-27 RX ADMIN — Medication 1000 MILLIGRAM(S): at 05:19

## 2025-07-27 RX ADMIN — LIDOCAINE HYDROCHLORIDE 1 PATCH: 20 JELLY TOPICAL at 07:30

## 2025-07-28 LAB
ANION GAP SERPL CALC-SCNC: 14 MMOL/L — SIGNIFICANT CHANGE UP (ref 5–17)
BLD GP AB SCN SERPL QL: NEGATIVE — SIGNIFICANT CHANGE UP
BUN SERPL-MCNC: 13 MG/DL — SIGNIFICANT CHANGE UP (ref 7–23)
CALCIUM SERPL-MCNC: 8.6 MG/DL — SIGNIFICANT CHANGE UP (ref 8.4–10.5)
CHLORIDE SERPL-SCNC: 103 MMOL/L — SIGNIFICANT CHANGE UP (ref 96–108)
CO2 SERPL-SCNC: 22 MMOL/L — SIGNIFICANT CHANGE UP (ref 22–31)
CREAT SERPL-MCNC: 0.54 MG/DL — SIGNIFICANT CHANGE UP (ref 0.5–1.3)
CULTURE RESULTS: SIGNIFICANT CHANGE UP
CULTURE RESULTS: SIGNIFICANT CHANGE UP
EGFR: 112 ML/MIN/1.73M2 — SIGNIFICANT CHANGE UP
EGFR: 112 ML/MIN/1.73M2 — SIGNIFICANT CHANGE UP
GLUCOSE SERPL-MCNC: 101 MG/DL — HIGH (ref 70–99)
HCT VFR BLD CALC: 29.4 % — LOW (ref 39–50)
HGB BLD-MCNC: 9 G/DL — LOW (ref 13–17)
MAGNESIUM SERPL-MCNC: 2.1 MG/DL — SIGNIFICANT CHANGE UP (ref 1.6–2.6)
MCHC RBC-ENTMCNC: 29.7 PG — SIGNIFICANT CHANGE UP (ref 27–34)
MCHC RBC-ENTMCNC: 30.6 G/DL — LOW (ref 32–36)
MCV RBC AUTO: 97 FL — SIGNIFICANT CHANGE UP (ref 80–100)
NRBC # BLD AUTO: 0 K/UL — SIGNIFICANT CHANGE UP (ref 0–0)
NRBC # FLD: 0 K/UL — SIGNIFICANT CHANGE UP (ref 0–0)
NRBC BLD AUTO-RTO: 0 /100 WBCS — SIGNIFICANT CHANGE UP (ref 0–0)
PHOSPHATE SERPL-MCNC: 3.3 MG/DL — SIGNIFICANT CHANGE UP (ref 2.5–4.5)
PLATELET # BLD AUTO: 597 K/UL — HIGH (ref 150–400)
PMV BLD: 9.6 FL — SIGNIFICANT CHANGE UP (ref 7–13)
POTASSIUM SERPL-MCNC: 4.1 MMOL/L — SIGNIFICANT CHANGE UP (ref 3.5–5.3)
POTASSIUM SERPL-SCNC: 4.1 MMOL/L — SIGNIFICANT CHANGE UP (ref 3.5–5.3)
RBC # BLD: 3.03 M/UL — LOW (ref 4.2–5.8)
RBC # FLD: 19.9 % — HIGH (ref 10.3–14.5)
RH IG SCN BLD-IMP: POSITIVE — SIGNIFICANT CHANGE UP
SODIUM SERPL-SCNC: 139 MMOL/L — SIGNIFICANT CHANGE UP (ref 135–145)
SPECIMEN SOURCE: SIGNIFICANT CHANGE UP
SPECIMEN SOURCE: SIGNIFICANT CHANGE UP
WBC # BLD: 7.04 K/UL — SIGNIFICANT CHANGE UP (ref 3.8–10.5)
WBC # FLD AUTO: 7.04 K/UL — SIGNIFICANT CHANGE UP (ref 3.8–10.5)

## 2025-07-28 PROCEDURE — 99232 SBSQ HOSP IP/OBS MODERATE 35: CPT

## 2025-07-28 PROCEDURE — 71045 X-RAY EXAM CHEST 1 VIEW: CPT | Mod: 26

## 2025-07-28 PROCEDURE — 99233 SBSQ HOSP IP/OBS HIGH 50: CPT

## 2025-07-28 RX ORDER — FUROSEMIDE 10 MG/ML
20 INJECTION INTRAMUSCULAR; INTRAVENOUS DAILY
Refills: 0 | Status: COMPLETED | OUTPATIENT
Start: 2025-07-28 | End: 2025-07-29

## 2025-07-28 RX ORDER — LIDOCAINE HYDROCHLORIDE 20 MG/ML
1 JELLY TOPICAL DAILY
Refills: 0 | Status: DISCONTINUED | OUTPATIENT
Start: 2025-07-28 | End: 2025-07-30

## 2025-07-28 RX ADMIN — LIDOCAINE HYDROCHLORIDE 1 PATCH: 20 JELLY TOPICAL at 11:00

## 2025-07-28 RX ADMIN — Medication 40 MILLIGRAM(S): at 06:24

## 2025-07-28 RX ADMIN — Medication 1000 MILLIGRAM(S): at 23:37

## 2025-07-28 RX ADMIN — FUROSEMIDE 20 MILLIGRAM(S): 10 INJECTION INTRAMUSCULAR; INTRAVENOUS at 12:46

## 2025-07-28 RX ADMIN — Medication 1000 MILLIGRAM(S): at 18:44

## 2025-07-28 RX ADMIN — OXYCODONE HYDROCHLORIDE 10 MILLIGRAM(S): 30 TABLET ORAL at 21:52

## 2025-07-28 RX ADMIN — METOPROLOL SUCCINATE 50 MILLIGRAM(S): 50 TABLET, EXTENDED RELEASE ORAL at 23:08

## 2025-07-28 RX ADMIN — OXYCODONE HYDROCHLORIDE 5 MILLIGRAM(S): 30 TABLET ORAL at 10:56

## 2025-07-28 RX ADMIN — Medication 25 GRAM(S): at 14:29

## 2025-07-28 RX ADMIN — TAMSULOSIN HYDROCHLORIDE 0.8 MILLIGRAM(S): 0.4 CAPSULE ORAL at 21:22

## 2025-07-28 RX ADMIN — OXYCODONE HYDROCHLORIDE 5 MILLIGRAM(S): 30 TABLET ORAL at 06:27

## 2025-07-28 RX ADMIN — Medication 1 APPLICATION(S): at 06:25

## 2025-07-28 RX ADMIN — METOPROLOL SUCCINATE 50 MILLIGRAM(S): 50 TABLET, EXTENDED RELEASE ORAL at 06:24

## 2025-07-28 RX ADMIN — Medication 81 MILLIGRAM(S): at 12:46

## 2025-07-28 RX ADMIN — APIXABAN 5 MILLIGRAM(S): 5 TABLET, FILM COATED ORAL at 17:44

## 2025-07-28 RX ADMIN — Medication 25 GRAM(S): at 21:21

## 2025-07-28 RX ADMIN — Medication 1000 MILLIGRAM(S): at 13:46

## 2025-07-28 RX ADMIN — OXYCODONE HYDROCHLORIDE 10 MILLIGRAM(S): 30 TABLET ORAL at 07:22

## 2025-07-28 RX ADMIN — METOPROLOL SUCCINATE 50 MILLIGRAM(S): 50 TABLET, EXTENDED RELEASE ORAL at 17:42

## 2025-07-28 RX ADMIN — Medication 1000 MILLIGRAM(S): at 17:44

## 2025-07-28 RX ADMIN — LIDOCAINE HYDROCHLORIDE 1 PATCH: 20 JELLY TOPICAL at 19:00

## 2025-07-28 RX ADMIN — OXYCODONE HYDROCHLORIDE 10 MILLIGRAM(S): 30 TABLET ORAL at 21:22

## 2025-07-28 RX ADMIN — OXYCODONE HYDROCHLORIDE 10 MILLIGRAM(S): 30 TABLET ORAL at 17:14

## 2025-07-28 RX ADMIN — METOPROLOL SUCCINATE 50 MILLIGRAM(S): 50 TABLET, EXTENDED RELEASE ORAL at 12:45

## 2025-07-28 RX ADMIN — DIGOXIN 125 MICROGRAM(S): 250 TABLET ORAL at 06:24

## 2025-07-28 RX ADMIN — LIDOCAINE HYDROCHLORIDE 1 PATCH: 20 JELLY TOPICAL at 23:09

## 2025-07-28 RX ADMIN — Medication 1000 MILLIGRAM(S): at 06:54

## 2025-07-28 RX ADMIN — OXYCODONE HYDROCHLORIDE 5 MILLIGRAM(S): 30 TABLET ORAL at 00:04

## 2025-07-28 RX ADMIN — OXYCODONE HYDROCHLORIDE 5 MILLIGRAM(S): 30 TABLET ORAL at 11:26

## 2025-07-28 RX ADMIN — Medication 1000 MILLIGRAM(S): at 23:07

## 2025-07-28 RX ADMIN — Medication 1 APPLICATION(S): at 05:30

## 2025-07-28 RX ADMIN — LIDOCAINE HYDROCHLORIDE 1 PATCH: 20 JELLY TOPICAL at 07:29

## 2025-07-28 RX ADMIN — OXYCODONE HYDROCHLORIDE 10 MILLIGRAM(S): 30 TABLET ORAL at 07:52

## 2025-07-28 RX ADMIN — ROSUVASTATIN CALCIUM 20 MILLIGRAM(S): 20 TABLET, FILM COATED ORAL at 21:21

## 2025-07-28 RX ADMIN — OXYCODONE HYDROCHLORIDE 5 MILLIGRAM(S): 30 TABLET ORAL at 06:54

## 2025-07-28 RX ADMIN — Medication 1000 MILLIGRAM(S): at 06:24

## 2025-07-28 RX ADMIN — Medication 1000 MILLIGRAM(S): at 00:04

## 2025-07-28 RX ADMIN — DOXAZOSIN MESYLATE 4 MILLIGRAM(S): 8 TABLET ORAL at 21:21

## 2025-07-28 RX ADMIN — Medication 1000 MILLIGRAM(S): at 12:46

## 2025-07-28 RX ADMIN — Medication 25 GRAM(S): at 06:25

## 2025-07-28 RX ADMIN — METOPROLOL SUCCINATE 50 MILLIGRAM(S): 50 TABLET, EXTENDED RELEASE ORAL at 00:09

## 2025-07-28 RX ADMIN — OXYCODONE HYDROCHLORIDE 10 MILLIGRAM(S): 30 TABLET ORAL at 16:44

## 2025-07-28 RX ADMIN — APIXABAN 5 MILLIGRAM(S): 5 TABLET, FILM COATED ORAL at 06:24

## 2025-07-28 RX ADMIN — LIDOCAINE HYDROCHLORIDE 1 PATCH: 20 JELLY TOPICAL at 15:05

## 2025-07-29 PROCEDURE — 99232 SBSQ HOSP IP/OBS MODERATE 35: CPT

## 2025-07-29 RX ORDER — SACUBITRIL AND VALSARTAN 6; 6 MG/1; MG/1
1 PELLET ORAL
Refills: 0 | Status: DISCONTINUED | OUTPATIENT
Start: 2025-07-29 | End: 2025-07-30

## 2025-07-29 RX ORDER — SACUBITRIL AND VALSARTAN 6; 6 MG/1; MG/1
1 PELLET ORAL
Refills: 0 | Status: DISCONTINUED | OUTPATIENT
Start: 2025-07-29 | End: 2025-07-29

## 2025-07-29 RX ADMIN — OXYCODONE HYDROCHLORIDE 5 MILLIGRAM(S): 30 TABLET ORAL at 15:15

## 2025-07-29 RX ADMIN — METOPROLOL SUCCINATE 50 MILLIGRAM(S): 50 TABLET, EXTENDED RELEASE ORAL at 17:57

## 2025-07-29 RX ADMIN — OXYCODONE HYDROCHLORIDE 5 MILLIGRAM(S): 30 TABLET ORAL at 17:56

## 2025-07-29 RX ADMIN — LIDOCAINE HYDROCHLORIDE 1 PATCH: 20 JELLY TOPICAL at 21:53

## 2025-07-29 RX ADMIN — LIDOCAINE HYDROCHLORIDE 1 PATCH: 20 JELLY TOPICAL at 03:00

## 2025-07-29 RX ADMIN — DIGOXIN 125 MICROGRAM(S): 250 TABLET ORAL at 05:27

## 2025-07-29 RX ADMIN — OXYCODONE HYDROCHLORIDE 10 MILLIGRAM(S): 30 TABLET ORAL at 17:00

## 2025-07-29 RX ADMIN — Medication 1 APPLICATION(S): at 05:38

## 2025-07-29 RX ADMIN — ROSUVASTATIN CALCIUM 20 MILLIGRAM(S): 20 TABLET, FILM COATED ORAL at 21:53

## 2025-07-29 RX ADMIN — Medication 1000 MILLIGRAM(S): at 05:27

## 2025-07-29 RX ADMIN — OXYCODONE HYDROCHLORIDE 10 MILLIGRAM(S): 30 TABLET ORAL at 16:07

## 2025-07-29 RX ADMIN — METOPROLOL SUCCINATE 50 MILLIGRAM(S): 50 TABLET, EXTENDED RELEASE ORAL at 05:39

## 2025-07-29 RX ADMIN — OXYCODONE HYDROCHLORIDE 5 MILLIGRAM(S): 30 TABLET ORAL at 08:16

## 2025-07-29 RX ADMIN — OXYCODONE HYDROCHLORIDE 10 MILLIGRAM(S): 30 TABLET ORAL at 06:04

## 2025-07-29 RX ADMIN — Medication 1000 MILLIGRAM(S): at 12:15

## 2025-07-29 RX ADMIN — OXYCODONE HYDROCHLORIDE 5 MILLIGRAM(S): 30 TABLET ORAL at 09:00

## 2025-07-29 RX ADMIN — Medication 1000 MILLIGRAM(S): at 13:00

## 2025-07-29 RX ADMIN — TAMSULOSIN HYDROCHLORIDE 0.8 MILLIGRAM(S): 0.4 CAPSULE ORAL at 21:52

## 2025-07-29 RX ADMIN — Medication 1000 MILLIGRAM(S): at 23:59

## 2025-07-29 RX ADMIN — OXYCODONE HYDROCHLORIDE 5 MILLIGRAM(S): 30 TABLET ORAL at 14:19

## 2025-07-29 RX ADMIN — OXYCODONE HYDROCHLORIDE 10 MILLIGRAM(S): 30 TABLET ORAL at 05:34

## 2025-07-29 RX ADMIN — OXYCODONE HYDROCHLORIDE 10 MILLIGRAM(S): 30 TABLET ORAL at 10:46

## 2025-07-29 RX ADMIN — Medication 25 GRAM(S): at 14:19

## 2025-07-29 RX ADMIN — LIDOCAINE HYDROCHLORIDE 1 PATCH: 20 JELLY TOPICAL at 12:17

## 2025-07-29 RX ADMIN — APIXABAN 5 MILLIGRAM(S): 5 TABLET, FILM COATED ORAL at 05:29

## 2025-07-29 RX ADMIN — Medication 40 MILLIGRAM(S): at 05:27

## 2025-07-29 RX ADMIN — METHOCARBAMOL 500 MILLIGRAM(S): 500 TABLET, FILM COATED ORAL at 09:42

## 2025-07-29 RX ADMIN — OXYCODONE HYDROCHLORIDE 10 MILLIGRAM(S): 30 TABLET ORAL at 21:53

## 2025-07-29 RX ADMIN — LIDOCAINE HYDROCHLORIDE 1 PATCH: 20 JELLY TOPICAL at 19:00

## 2025-07-29 RX ADMIN — DOXAZOSIN MESYLATE 4 MILLIGRAM(S): 8 TABLET ORAL at 21:52

## 2025-07-29 RX ADMIN — LIDOCAINE HYDROCHLORIDE 1 PATCH: 20 JELLY TOPICAL at 11:00

## 2025-07-29 RX ADMIN — OXYCODONE HYDROCHLORIDE 10 MILLIGRAM(S): 30 TABLET ORAL at 22:23

## 2025-07-29 RX ADMIN — FUROSEMIDE 20 MILLIGRAM(S): 10 INJECTION INTRAMUSCULAR; INTRAVENOUS at 05:28

## 2025-07-29 RX ADMIN — APIXABAN 5 MILLIGRAM(S): 5 TABLET, FILM COATED ORAL at 17:56

## 2025-07-29 RX ADMIN — SACUBITRIL AND VALSARTAN 1 TABLET(S): 6; 6 PELLET ORAL at 21:52

## 2025-07-29 RX ADMIN — LIDOCAINE HYDROCHLORIDE 1 PATCH: 20 JELLY TOPICAL at 08:17

## 2025-07-29 RX ADMIN — METOPROLOL SUCCINATE 50 MILLIGRAM(S): 50 TABLET, EXTENDED RELEASE ORAL at 12:15

## 2025-07-29 RX ADMIN — OXYCODONE HYDROCHLORIDE 10 MILLIGRAM(S): 30 TABLET ORAL at 12:00

## 2025-07-29 RX ADMIN — Medication 1000 MILLIGRAM(S): at 05:57

## 2025-07-29 RX ADMIN — OXYCODONE HYDROCHLORIDE 5 MILLIGRAM(S): 30 TABLET ORAL at 18:55

## 2025-07-29 RX ADMIN — Medication 1 APPLICATION(S): at 05:30

## 2025-07-29 RX ADMIN — Medication 81 MILLIGRAM(S): at 12:15

## 2025-07-29 RX ADMIN — SACUBITRIL AND VALSARTAN 1 TABLET(S): 6; 6 PELLET ORAL at 14:35

## 2025-07-29 RX ADMIN — Medication 25 GRAM(S): at 05:27

## 2025-07-29 RX ADMIN — Medication 1000 MILLIGRAM(S): at 18:55

## 2025-07-29 RX ADMIN — Medication 1000 MILLIGRAM(S): at 17:56

## 2025-07-30 ENCOUNTER — TRANSCRIPTION ENCOUNTER (OUTPATIENT)
Age: 65
End: 2025-07-30

## 2025-07-30 ENCOUNTER — INPATIENT (INPATIENT)
Facility: HOSPITAL | Age: 65
LOS: 9 days | Discharge: HOME | DRG: 552 | End: 2025-08-09
Attending: PHYSICAL MEDICINE & REHABILITATION | Admitting: PHYSICAL MEDICINE & REHABILITATION
Payer: COMMERCIAL

## 2025-07-30 VITALS
DIASTOLIC BLOOD PRESSURE: 69 MMHG | HEIGHT: 66 IN | OXYGEN SATURATION: 98 % | WEIGHT: 179.9 LBS | TEMPERATURE: 98 F | SYSTOLIC BLOOD PRESSURE: 130 MMHG | HEART RATE: 65 BPM | RESPIRATION RATE: 14 BRPM

## 2025-07-30 VITALS
HEART RATE: 72 BPM | SYSTOLIC BLOOD PRESSURE: 111 MMHG | DIASTOLIC BLOOD PRESSURE: 53 MMHG | RESPIRATION RATE: 18 BRPM | OXYGEN SATURATION: 91 %

## 2025-07-30 DIAGNOSIS — S22.009A UNSPECIFIED FRACTURE OF UNSPECIFIED THORACIC VERTEBRA, INITIAL ENCOUNTER FOR CLOSED FRACTURE: ICD-10-CM

## 2025-07-30 DIAGNOSIS — Z98.890 OTHER SPECIFIED POSTPROCEDURAL STATES: Chronic | ICD-10-CM

## 2025-07-30 LAB — SARS-COV-2 RNA SPEC QL NAA+PROBE: SIGNIFICANT CHANGE UP

## 2025-07-30 PROCEDURE — 82550 ASSAY OF CK (CPK): CPT

## 2025-07-30 PROCEDURE — 94002 VENT MGMT INPAT INIT DAY: CPT

## 2025-07-30 PROCEDURE — 86901 BLOOD TYPING SEROLOGIC RH(D): CPT

## 2025-07-30 PROCEDURE — 83690 ASSAY OF LIPASE: CPT

## 2025-07-30 PROCEDURE — 86923 COMPATIBILITY TEST ELECTRIC: CPT

## 2025-07-30 PROCEDURE — 83880 ASSAY OF NATRIURETIC PEPTIDE: CPT

## 2025-07-30 PROCEDURE — 73070 X-RAY EXAM OF ELBOW: CPT

## 2025-07-30 PROCEDURE — 84132 ASSAY OF SERUM POTASSIUM: CPT

## 2025-07-30 PROCEDURE — 80202 ASSAY OF VANCOMYCIN: CPT

## 2025-07-30 PROCEDURE — 83735 ASSAY OF MAGNESIUM: CPT

## 2025-07-30 PROCEDURE — P9016: CPT

## 2025-07-30 PROCEDURE — 80048 BASIC METABOLIC PNL TOTAL CA: CPT

## 2025-07-30 PROCEDURE — 85730 THROMBOPLASTIN TIME PARTIAL: CPT

## 2025-07-30 PROCEDURE — 85027 COMPLETE CBC AUTOMATED: CPT

## 2025-07-30 PROCEDURE — 80076 HEPATIC FUNCTION PANEL: CPT

## 2025-07-30 PROCEDURE — 71260 CT THORAX DX C+: CPT

## 2025-07-30 PROCEDURE — 87040 BLOOD CULTURE FOR BACTERIA: CPT

## 2025-07-30 PROCEDURE — C1769: CPT

## 2025-07-30 PROCEDURE — 73030 X-RAY EXAM OF SHOULDER: CPT

## 2025-07-30 PROCEDURE — 82803 BLOOD GASES ANY COMBINATION: CPT

## 2025-07-30 PROCEDURE — 80307 DRUG TEST PRSMV CHEM ANLYZR: CPT

## 2025-07-30 PROCEDURE — 86850 RBC ANTIBODY SCREEN: CPT

## 2025-07-30 PROCEDURE — 83605 ASSAY OF LACTIC ACID: CPT

## 2025-07-30 PROCEDURE — 84295 ASSAY OF SERUM SODIUM: CPT

## 2025-07-30 PROCEDURE — 97112 NEUROMUSCULAR REEDUCATION: CPT

## 2025-07-30 PROCEDURE — 73562 X-RAY EXAM OF KNEE 3: CPT

## 2025-07-30 PROCEDURE — 82330 ASSAY OF CALCIUM: CPT

## 2025-07-30 PROCEDURE — 82553 CREATINE MB FRACTION: CPT

## 2025-07-30 PROCEDURE — 87070 CULTURE OTHR SPECIMN AEROBIC: CPT

## 2025-07-30 PROCEDURE — 87637 SARSCOV2&INF A&B&RSV AMP PRB: CPT

## 2025-07-30 PROCEDURE — 71045 X-RAY EXAM CHEST 1 VIEW: CPT

## 2025-07-30 PROCEDURE — 85025 COMPLETE CBC W/AUTO DIFF WBC: CPT

## 2025-07-30 PROCEDURE — 73130 X-RAY EXAM OF HAND: CPT

## 2025-07-30 PROCEDURE — 93005 ELECTROCARDIOGRAM TRACING: CPT

## 2025-07-30 PROCEDURE — 96374 THER/PROPH/DIAG INJ IV PUSH: CPT

## 2025-07-30 PROCEDURE — 87635 SARS-COV-2 COVID-19 AMP PRB: CPT

## 2025-07-30 PROCEDURE — 93970 EXTREMITY STUDY: CPT

## 2025-07-30 PROCEDURE — 94660 CPAP INITIATION&MGMT: CPT

## 2025-07-30 PROCEDURE — 87205 SMEAR GRAM STAIN: CPT

## 2025-07-30 PROCEDURE — 97535 SELF CARE MNGMENT TRAINING: CPT

## 2025-07-30 PROCEDURE — 94003 VENT MGMT INPAT SUBQ DAY: CPT

## 2025-07-30 PROCEDURE — 85018 HEMOGLOBIN: CPT

## 2025-07-30 PROCEDURE — 70498 CT ANGIOGRAPHY NECK: CPT

## 2025-07-30 PROCEDURE — 36415 COLL VENOUS BLD VENIPUNCTURE: CPT

## 2025-07-30 PROCEDURE — 72146 MRI CHEST SPINE W/O DYE: CPT

## 2025-07-30 PROCEDURE — 99285 EMERGENCY DEPT VISIT HI MDM: CPT | Mod: 25

## 2025-07-30 PROCEDURE — 86900 BLOOD TYPING SEROLOGIC ABO: CPT

## 2025-07-30 PROCEDURE — 73090 X-RAY EXAM OF FOREARM: CPT

## 2025-07-30 PROCEDURE — 84484 ASSAY OF TROPONIN QUANT: CPT

## 2025-07-30 PROCEDURE — C1889: CPT

## 2025-07-30 PROCEDURE — 85610 PROTHROMBIN TIME: CPT

## 2025-07-30 PROCEDURE — 73110 X-RAY EXAM OF WRIST: CPT

## 2025-07-30 PROCEDURE — 97530 THERAPEUTIC ACTIVITIES: CPT

## 2025-07-30 PROCEDURE — 74177 CT ABD & PELVIS W/CONTRAST: CPT

## 2025-07-30 PROCEDURE — 72148 MRI LUMBAR SPINE W/O DYE: CPT

## 2025-07-30 PROCEDURE — 87640 STAPH A DNA AMP PROBE: CPT

## 2025-07-30 PROCEDURE — 85014 HEMATOCRIT: CPT

## 2025-07-30 PROCEDURE — C8929: CPT

## 2025-07-30 PROCEDURE — 36430 TRANSFUSION BLD/BLD COMPNT: CPT

## 2025-07-30 PROCEDURE — 71250 CT THORAX DX C-: CPT

## 2025-07-30 PROCEDURE — 70450 CT HEAD/BRAIN W/O DYE: CPT

## 2025-07-30 PROCEDURE — 82435 ASSAY OF BLOOD CHLORIDE: CPT

## 2025-07-30 PROCEDURE — 84145 PROCALCITONIN (PCT): CPT

## 2025-07-30 PROCEDURE — 82947 ASSAY GLUCOSE BLOOD QUANT: CPT

## 2025-07-30 PROCEDURE — 99232 SBSQ HOSP IP/OBS MODERATE 35: CPT

## 2025-07-30 PROCEDURE — 80053 COMPREHEN METABOLIC PANEL: CPT

## 2025-07-30 PROCEDURE — 73590 X-RAY EXAM OF LOWER LEG: CPT

## 2025-07-30 PROCEDURE — 90715 TDAP VACCINE 7 YRS/> IM: CPT

## 2025-07-30 PROCEDURE — 70496 CT ANGIOGRAPHY HEAD: CPT

## 2025-07-30 PROCEDURE — 72125 CT NECK SPINE W/O DYE: CPT

## 2025-07-30 PROCEDURE — 97110 THERAPEUTIC EXERCISES: CPT

## 2025-07-30 PROCEDURE — P9040: CPT

## 2025-07-30 PROCEDURE — 90471 IMMUNIZATION ADMIN: CPT

## 2025-07-30 PROCEDURE — 76377 3D RENDER W/INTRP POSTPROCES: CPT

## 2025-07-30 PROCEDURE — 73200 CT UPPER EXTREMITY W/O DYE: CPT

## 2025-07-30 PROCEDURE — 87449 NOS EACH ORGANISM AG IA: CPT

## 2025-07-30 PROCEDURE — 97163 PT EVAL HIGH COMPLEX 45 MIN: CPT

## 2025-07-30 PROCEDURE — 84100 ASSAY OF PHOSPHORUS: CPT

## 2025-07-30 PROCEDURE — 73060 X-RAY EXAM OF HUMERUS: CPT

## 2025-07-30 PROCEDURE — 85396 CLOTTING ASSAY WHOLE BLOOD: CPT

## 2025-07-30 PROCEDURE — 72170 X-RAY EXAM OF PELVIS: CPT

## 2025-07-30 PROCEDURE — 97166 OT EVAL MOD COMPLEX 45 MIN: CPT

## 2025-07-30 PROCEDURE — 87641 MR-STAPH DNA AMP PROBE: CPT

## 2025-07-30 PROCEDURE — 80162 ASSAY OF DIGOXIN TOTAL: CPT

## 2025-07-30 PROCEDURE — 96375 TX/PRO/DX INJ NEW DRUG ADDON: CPT

## 2025-07-30 RX ORDER — OXYCODONE HYDROCHLORIDE 30 MG/1
1 TABLET ORAL
Qty: 0 | Refills: 0 | DISCHARGE
Start: 2025-07-30

## 2025-07-30 RX ORDER — OXYCODONE HYDROCHLORIDE 30 MG/1
2.5 TABLET ORAL EVERY 6 HOURS
Refills: 0 | Status: DISCONTINUED | OUTPATIENT
Start: 2025-07-30 | End: 2025-07-31

## 2025-07-30 RX ORDER — METOPROLOL SUCCINATE 50 MG/1
25 TABLET, EXTENDED RELEASE ORAL
Refills: 0 | Status: DISCONTINUED | OUTPATIENT
Start: 2025-07-30 | End: 2025-08-09

## 2025-07-30 RX ORDER — BACITRACIN 500 UNIT/G
1 OINTMENT (GRAM) TOPICAL
Qty: 0 | Refills: 0 | DISCHARGE
Start: 2025-07-30

## 2025-07-30 RX ORDER — DOXAZOSIN MESYLATE 8 MG/1
4 TABLET ORAL AT BEDTIME
Refills: 0 | Status: DISCONTINUED | OUTPATIENT
Start: 2025-07-30 | End: 2025-07-31

## 2025-07-30 RX ORDER — SACUBITRIL AND VALSARTAN 6; 6 MG/1; MG/1
1 PELLET ORAL
Refills: 0 | Status: DISCONTINUED | OUTPATIENT
Start: 2025-07-30 | End: 2025-07-31

## 2025-07-30 RX ORDER — MELATONIN 5 MG
3 TABLET ORAL AT BEDTIME
Refills: 0 | Status: DISCONTINUED | OUTPATIENT
Start: 2025-07-30 | End: 2025-08-09

## 2025-07-30 RX ORDER — APIXABAN 2.5 MG/1
5 TABLET, FILM COATED ORAL
Refills: 0 | Status: DISCONTINUED | OUTPATIENT
Start: 2025-07-30 | End: 2025-08-09

## 2025-07-30 RX ORDER — ACETAMINOPHEN 500 MG/5ML
2 LIQUID (ML) ORAL
Qty: 0 | Refills: 0 | DISCHARGE
Start: 2025-07-30

## 2025-07-30 RX ORDER — SENNA 187 MG
2 TABLET ORAL AT BEDTIME
Refills: 0 | Status: DISCONTINUED | OUTPATIENT
Start: 2025-07-30 | End: 2025-08-09

## 2025-07-30 RX ORDER — TAMSULOSIN HYDROCHLORIDE 0.4 MG/1
2 CAPSULE ORAL
Qty: 0 | Refills: 0 | DISCHARGE
Start: 2025-07-30

## 2025-07-30 RX ORDER — DIGOXIN 250 UG/1
1 TABLET ORAL
Qty: 0 | Refills: 0 | DISCHARGE
Start: 2025-07-30

## 2025-07-30 RX ORDER — ACETAMINOPHEN 500 MG/5ML
975 LIQUID (ML) ORAL EVERY 6 HOURS
Refills: 0 | Status: DISCONTINUED | OUTPATIENT
Start: 2025-07-30 | End: 2025-07-30

## 2025-07-30 RX ORDER — ACETAMINOPHEN 500 MG/5ML
975 LIQUID (ML) ORAL EVERY 8 HOURS
Refills: 0 | Status: DISCONTINUED | OUTPATIENT
Start: 2025-07-30 | End: 2025-07-30

## 2025-07-30 RX ORDER — LIDOCAINE HYDROCHLORIDE 20 MG/ML
1 JELLY TOPICAL EVERY 24 HOURS
Refills: 0 | Status: DISCONTINUED | OUTPATIENT
Start: 2025-07-30 | End: 2025-08-05

## 2025-07-30 RX ORDER — METHOCARBAMOL 500 MG/1
500 TABLET, FILM COATED ORAL EVERY 6 HOURS
Refills: 0 | Status: DISCONTINUED | OUTPATIENT
Start: 2025-07-30 | End: 2025-08-05

## 2025-07-30 RX ORDER — ACETAMINOPHEN 500 MG/5ML
975 LIQUID (ML) ORAL EVERY 6 HOURS
Refills: 0 | Status: DISCONTINUED | OUTPATIENT
Start: 2025-07-30 | End: 2025-08-09

## 2025-07-30 RX ORDER — OXYCODONE HYDROCHLORIDE 30 MG/1
2.5 TABLET ORAL EVERY 8 HOURS
Refills: 0 | Status: DISCONTINUED | OUTPATIENT
Start: 2025-07-30 | End: 2025-07-30

## 2025-07-30 RX ORDER — METHOCARBAMOL 500 MG/1
1 TABLET, FILM COATED ORAL
Qty: 0 | Refills: 0 | DISCHARGE
Start: 2025-07-30

## 2025-07-30 RX ORDER — OXYCODONE HYDROCHLORIDE 30 MG/1
5 TABLET ORAL EVERY 6 HOURS
Refills: 0 | Status: DISCONTINUED | OUTPATIENT
Start: 2025-07-30 | End: 2025-07-31

## 2025-07-30 RX ORDER — ASPIRIN 325 MG
81 TABLET ORAL DAILY
Refills: 0 | Status: DISCONTINUED | OUTPATIENT
Start: 2025-07-31 | End: 2025-08-09

## 2025-07-30 RX ORDER — LIDOCAINE HYDROCHLORIDE 20 MG/ML
1 JELLY TOPICAL
Qty: 0 | Refills: 0 | DISCHARGE
Start: 2025-07-30

## 2025-07-30 RX ORDER — POLYETHYLENE GLYCOL 3350 17 G/17G
17 POWDER, FOR SOLUTION ORAL
Refills: 0 | Status: DISCONTINUED | OUTPATIENT
Start: 2025-07-30 | End: 2025-08-09

## 2025-07-30 RX ORDER — OXYCODONE HYDROCHLORIDE 30 MG/1
5 TABLET ORAL EVERY 8 HOURS
Refills: 0 | Status: DISCONTINUED | OUTPATIENT
Start: 2025-07-30 | End: 2025-07-30

## 2025-07-30 RX ORDER — DIGOXIN 250 UG/1
125 TABLET ORAL DAILY
Refills: 0 | Status: DISCONTINUED | OUTPATIENT
Start: 2025-07-31 | End: 2025-08-09

## 2025-07-30 RX ORDER — DOXAZOSIN MESYLATE 8 MG/1
1 TABLET ORAL
Qty: 0 | Refills: 0 | DISCHARGE
Start: 2025-07-30

## 2025-07-30 RX ADMIN — OXYCODONE HYDROCHLORIDE 5 MILLIGRAM(S): 30 TABLET ORAL at 18:22

## 2025-07-30 RX ADMIN — OXYCODONE HYDROCHLORIDE 10 MILLIGRAM(S): 30 TABLET ORAL at 13:00

## 2025-07-30 RX ADMIN — Medication 1 APPLICATION(S): at 06:11

## 2025-07-30 RX ADMIN — METOPROLOL SUCCINATE 50 MILLIGRAM(S): 50 TABLET, EXTENDED RELEASE ORAL at 06:10

## 2025-07-30 RX ADMIN — OXYCODONE HYDROCHLORIDE 5 MILLIGRAM(S): 30 TABLET ORAL at 23:50

## 2025-07-30 RX ADMIN — SACUBITRIL AND VALSARTAN 1 TABLET(S): 6; 6 PELLET ORAL at 08:10

## 2025-07-30 RX ADMIN — Medication 1 APPLICATION(S): at 06:12

## 2025-07-30 RX ADMIN — Medication 40 MILLIGRAM(S): at 06:09

## 2025-07-30 RX ADMIN — LIDOCAINE HYDROCHLORIDE 1 PATCH: 20 JELLY TOPICAL at 00:07

## 2025-07-30 RX ADMIN — Medication 1000 MILLIGRAM(S): at 12:18

## 2025-07-30 RX ADMIN — OXYCODONE HYDROCHLORIDE 10 MILLIGRAM(S): 30 TABLET ORAL at 08:10

## 2025-07-30 RX ADMIN — METOPROLOL SUCCINATE 50 MILLIGRAM(S): 50 TABLET, EXTENDED RELEASE ORAL at 12:18

## 2025-07-30 RX ADMIN — SACUBITRIL AND VALSARTAN 1 TABLET(S): 6; 6 PELLET ORAL at 18:21

## 2025-07-30 RX ADMIN — Medication 1000 MILLIGRAM(S): at 06:39

## 2025-07-30 RX ADMIN — APIXABAN 5 MILLIGRAM(S): 2.5 TABLET, FILM COATED ORAL at 18:22

## 2025-07-30 RX ADMIN — METOPROLOL SUCCINATE 50 MILLIGRAM(S): 50 TABLET, EXTENDED RELEASE ORAL at 00:00

## 2025-07-30 RX ADMIN — Medication 1000 MILLIGRAM(S): at 00:29

## 2025-07-30 RX ADMIN — OXYCODONE HYDROCHLORIDE 5 MILLIGRAM(S): 30 TABLET ORAL at 18:23

## 2025-07-30 RX ADMIN — DIGOXIN 125 MICROGRAM(S): 250 TABLET ORAL at 06:10

## 2025-07-30 RX ADMIN — Medication 1000 MILLIGRAM(S): at 06:09

## 2025-07-30 RX ADMIN — Medication 81 MILLIGRAM(S): at 12:18

## 2025-07-30 RX ADMIN — OXYCODONE HYDROCHLORIDE 10 MILLIGRAM(S): 30 TABLET ORAL at 09:00

## 2025-07-30 RX ADMIN — METOPROLOL SUCCINATE 25 MILLIGRAM(S): 50 TABLET, EXTENDED RELEASE ORAL at 18:22

## 2025-07-30 RX ADMIN — Medication 1000 MILLIGRAM(S): at 13:00

## 2025-07-30 RX ADMIN — DOXAZOSIN MESYLATE 4 MILLIGRAM(S): 8 TABLET ORAL at 21:32

## 2025-07-30 RX ADMIN — OXYCODONE HYDROCHLORIDE 10 MILLIGRAM(S): 30 TABLET ORAL at 12:17

## 2025-07-30 RX ADMIN — APIXABAN 5 MILLIGRAM(S): 5 TABLET, FILM COATED ORAL at 06:10

## 2025-07-31 LAB
24R-OH-CALCIDIOL SERPL-MCNC: 16.2 NG/ML — LOW
ALBUMIN SERPL ELPH-MCNC: 2.3 G/DL — LOW (ref 3.3–5)
ALP SERPL-CCNC: 147 U/L — HIGH (ref 40–120)
ALT FLD-CCNC: 43 U/L — SIGNIFICANT CHANGE UP (ref 10–45)
ANION GAP SERPL CALC-SCNC: 9 MMOL/L — SIGNIFICANT CHANGE UP (ref 5–17)
ANISOCYTOSIS BLD QL: SLIGHT — SIGNIFICANT CHANGE UP
AST SERPL-CCNC: 35 U/L — SIGNIFICANT CHANGE UP (ref 10–40)
BASOPHILS # BLD AUTO: 0.03 K/UL — SIGNIFICANT CHANGE UP (ref 0–0.2)
BASOPHILS NFR BLD AUTO: 0.4 % — SIGNIFICANT CHANGE UP (ref 0–2)
BILIRUB SERPL-MCNC: 0.6 MG/DL — SIGNIFICANT CHANGE UP (ref 0.2–1.2)
BUN SERPL-MCNC: 13 MG/DL — SIGNIFICANT CHANGE UP (ref 7–23)
CALCIUM SERPL-MCNC: 8.2 MG/DL — LOW (ref 8.4–10.5)
CHLORIDE SERPL-SCNC: 106 MMOL/L — SIGNIFICANT CHANGE UP (ref 96–108)
CO2 SERPL-SCNC: 25 MMOL/L — SIGNIFICANT CHANGE UP (ref 22–31)
CREAT SERPL-MCNC: 0.46 MG/DL — LOW (ref 0.5–1.3)
EGFR: 118 ML/MIN/1.73M2 — SIGNIFICANT CHANGE UP
EGFR: 118 ML/MIN/1.73M2 — SIGNIFICANT CHANGE UP
EOSINOPHIL # BLD AUTO: 0.29 K/UL — SIGNIFICANT CHANGE UP (ref 0–0.5)
EOSINOPHIL NFR BLD AUTO: 4.3 % — SIGNIFICANT CHANGE UP (ref 0–6)
GLUCOSE SERPL-MCNC: 94 MG/DL — SIGNIFICANT CHANGE UP (ref 70–99)
HCT VFR BLD CALC: 30 % — LOW (ref 39–50)
HGB BLD-MCNC: 9.4 G/DL — LOW (ref 13–17)
HYPOCHROMIA BLD QL: SLIGHT — SIGNIFICANT CHANGE UP
IMM GRANULOCYTES NFR BLD AUTO: 0.3 % — SIGNIFICANT CHANGE UP (ref 0–0.9)
LYMPHOCYTES # BLD AUTO: 0.92 K/UL — LOW (ref 1–3.3)
LYMPHOCYTES # BLD AUTO: 13.5 % — SIGNIFICANT CHANGE UP (ref 13–44)
MANUAL SMEAR VERIFICATION: SIGNIFICANT CHANGE UP
MCHC RBC-ENTMCNC: 30.4 PG — SIGNIFICANT CHANGE UP (ref 27–34)
MCHC RBC-ENTMCNC: 31.3 G/DL — LOW (ref 32–36)
MCV RBC AUTO: 97.1 FL — SIGNIFICANT CHANGE UP (ref 80–100)
MONOCYTES # BLD AUTO: 0.72 K/UL — SIGNIFICANT CHANGE UP (ref 0–0.9)
MONOCYTES NFR BLD AUTO: 10.6 % — SIGNIFICANT CHANGE UP (ref 2–14)
NEUTROPHILS # BLD AUTO: 4.82 K/UL — SIGNIFICANT CHANGE UP (ref 1.8–7.4)
NEUTROPHILS NFR BLD AUTO: 70.9 % — SIGNIFICANT CHANGE UP (ref 43–77)
NRBC BLD AUTO-RTO: 0 /100 WBCS — SIGNIFICANT CHANGE UP (ref 0–0)
PLAT MORPH BLD: NORMAL — SIGNIFICANT CHANGE UP
PLATELET # BLD AUTO: 690 K/UL — HIGH (ref 150–400)
POIKILOCYTOSIS BLD QL AUTO: SLIGHT — SIGNIFICANT CHANGE UP
POTASSIUM SERPL-MCNC: 3.8 MMOL/L — SIGNIFICANT CHANGE UP (ref 3.5–5.3)
POTASSIUM SERPL-SCNC: 3.8 MMOL/L — SIGNIFICANT CHANGE UP (ref 3.5–5.3)
PROT SERPL-MCNC: 6.1 G/DL — SIGNIFICANT CHANGE UP (ref 6–8.3)
RBC # BLD: 3.09 M/UL — LOW (ref 4.2–5.8)
RBC # FLD: 20.7 % — HIGH (ref 10.3–14.5)
RBC BLD AUTO: ABNORMAL
SODIUM SERPL-SCNC: 140 MMOL/L — SIGNIFICANT CHANGE UP (ref 135–145)
WBC # BLD: 6.8 K/UL — SIGNIFICANT CHANGE UP (ref 3.8–10.5)
WBC # FLD AUTO: 6.8 K/UL — SIGNIFICANT CHANGE UP (ref 3.8–10.5)

## 2025-07-31 PROCEDURE — 99233 SBSQ HOSP IP/OBS HIGH 50: CPT

## 2025-07-31 PROCEDURE — 90791 PSYCH DIAGNOSTIC EVALUATION: CPT

## 2025-07-31 RX ORDER — DOXAZOSIN MESYLATE 8 MG/1
2 TABLET ORAL AT BEDTIME
Refills: 0 | Status: DISCONTINUED | OUTPATIENT
Start: 2025-07-31 | End: 2025-08-09

## 2025-07-31 RX ORDER — OXYCODONE HYDROCHLORIDE 30 MG/1
2.5 TABLET ORAL EVERY 4 HOURS
Refills: 0 | Status: DISCONTINUED | OUTPATIENT
Start: 2025-07-31 | End: 2025-08-05

## 2025-07-31 RX ORDER — OXYCODONE HYDROCHLORIDE 30 MG/1
5 TABLET ORAL EVERY 4 HOURS
Refills: 0 | Status: DISCONTINUED | OUTPATIENT
Start: 2025-07-31 | End: 2025-08-05

## 2025-07-31 RX ADMIN — SACUBITRIL AND VALSARTAN 1 TABLET(S): 6; 6 PELLET ORAL at 05:23

## 2025-07-31 RX ADMIN — DIGOXIN 125 MICROGRAM(S): 250 TABLET ORAL at 05:23

## 2025-07-31 RX ADMIN — LIDOCAINE HYDROCHLORIDE 1 PATCH: 20 JELLY TOPICAL at 18:17

## 2025-07-31 RX ADMIN — OXYCODONE HYDROCHLORIDE 5 MILLIGRAM(S): 30 TABLET ORAL at 06:43

## 2025-07-31 RX ADMIN — OXYCODONE HYDROCHLORIDE 5 MILLIGRAM(S): 30 TABLET ORAL at 18:37

## 2025-07-31 RX ADMIN — OXYCODONE HYDROCHLORIDE 5 MILLIGRAM(S): 30 TABLET ORAL at 14:32

## 2025-07-31 RX ADMIN — LIDOCAINE HYDROCHLORIDE 1 PATCH: 20 JELLY TOPICAL at 23:30

## 2025-07-31 RX ADMIN — METOPROLOL SUCCINATE 25 MILLIGRAM(S): 50 TABLET, EXTENDED RELEASE ORAL at 17:48

## 2025-07-31 RX ADMIN — APIXABAN 5 MILLIGRAM(S): 2.5 TABLET, FILM COATED ORAL at 17:48

## 2025-07-31 RX ADMIN — METOPROLOL SUCCINATE 25 MILLIGRAM(S): 50 TABLET, EXTENDED RELEASE ORAL at 05:23

## 2025-07-31 RX ADMIN — DOXAZOSIN MESYLATE 2 MILLIGRAM(S): 8 TABLET ORAL at 22:37

## 2025-07-31 RX ADMIN — METHOCARBAMOL 500 MILLIGRAM(S): 500 TABLET, FILM COATED ORAL at 09:00

## 2025-07-31 RX ADMIN — LIDOCAINE HYDROCHLORIDE 1 PATCH: 20 JELLY TOPICAL at 11:12

## 2025-07-31 RX ADMIN — Medication 81 MILLIGRAM(S): at 11:06

## 2025-07-31 RX ADMIN — Medication 975 MILLIGRAM(S): at 09:32

## 2025-07-31 RX ADMIN — LIDOCAINE HYDROCHLORIDE 1 PATCH: 20 JELLY TOPICAL at 19:25

## 2025-07-31 RX ADMIN — OXYCODONE HYDROCHLORIDE 5 MILLIGRAM(S): 30 TABLET ORAL at 23:10

## 2025-07-31 RX ADMIN — Medication 2 TABLET(S): at 22:37

## 2025-07-31 RX ADMIN — OXYCODONE HYDROCHLORIDE 5 MILLIGRAM(S): 30 TABLET ORAL at 05:22

## 2025-07-31 RX ADMIN — Medication 975 MILLIGRAM(S): at 09:00

## 2025-07-31 RX ADMIN — APIXABAN 5 MILLIGRAM(S): 2.5 TABLET, FILM COATED ORAL at 05:23

## 2025-07-31 RX ADMIN — OXYCODONE HYDROCHLORIDE 5 MILLIGRAM(S): 30 TABLET ORAL at 00:45

## 2025-07-31 RX ADMIN — OXYCODONE HYDROCHLORIDE 5 MILLIGRAM(S): 30 TABLET ORAL at 22:37

## 2025-07-31 RX ADMIN — OXYCODONE HYDROCHLORIDE 5 MILLIGRAM(S): 30 TABLET ORAL at 14:42

## 2025-08-01 PROCEDURE — 97161 PT EVAL LOW COMPLEX 20 MIN: CPT | Mod: GP

## 2025-08-01 PROCEDURE — 97530 THERAPEUTIC ACTIVITIES: CPT | Mod: GP

## 2025-08-01 PROCEDURE — 97165 OT EVAL LOW COMPLEX 30 MIN: CPT | Mod: GO

## 2025-08-01 PROCEDURE — 97116 GAIT TRAINING THERAPY: CPT | Mod: GP

## 2025-08-01 PROCEDURE — 87635 SARS-COV-2 COVID-19 AMP PRB: CPT

## 2025-08-01 PROCEDURE — 82306 VITAMIN D 25 HYDROXY: CPT

## 2025-08-01 PROCEDURE — 85025 COMPLETE CBC W/AUTO DIFF WBC: CPT

## 2025-08-01 PROCEDURE — 80053 COMPREHEN METABOLIC PANEL: CPT

## 2025-08-01 PROCEDURE — 99232 SBSQ HOSP IP/OBS MODERATE 35: CPT

## 2025-08-01 RX ORDER — SODIUM CHLORIDE 0.65 %
2 AEROSOL, SPRAY (ML) NASAL
Refills: 0 | Status: DISCONTINUED | OUTPATIENT
Start: 2025-08-01 | End: 2025-08-09

## 2025-08-01 RX ORDER — SODIUM CHLORIDE 0.65 %
2 AEROSOL, SPRAY (ML) NASAL
Refills: 0 | Status: DISCONTINUED | OUTPATIENT
Start: 2025-08-01 | End: 2025-08-01

## 2025-08-01 RX ADMIN — LIDOCAINE HYDROCHLORIDE 1 PATCH: 20 JELLY TOPICAL at 19:58

## 2025-08-01 RX ADMIN — Medication 81 MILLIGRAM(S): at 12:12

## 2025-08-01 RX ADMIN — OXYCODONE HYDROCHLORIDE 5 MILLIGRAM(S): 30 TABLET ORAL at 10:34

## 2025-08-01 RX ADMIN — LIDOCAINE HYDROCHLORIDE 1 PATCH: 20 JELLY TOPICAL at 18:16

## 2025-08-01 RX ADMIN — LIDOCAINE HYDROCHLORIDE 1 PATCH: 20 JELLY TOPICAL at 23:47

## 2025-08-01 RX ADMIN — OXYCODONE HYDROCHLORIDE 5 MILLIGRAM(S): 30 TABLET ORAL at 17:10

## 2025-08-01 RX ADMIN — METOPROLOL SUCCINATE 25 MILLIGRAM(S): 50 TABLET, EXTENDED RELEASE ORAL at 18:13

## 2025-08-01 RX ADMIN — METOPROLOL SUCCINATE 25 MILLIGRAM(S): 50 TABLET, EXTENDED RELEASE ORAL at 05:56

## 2025-08-01 RX ADMIN — LIDOCAINE HYDROCHLORIDE 1 PATCH: 20 JELLY TOPICAL at 21:42

## 2025-08-01 RX ADMIN — Medication 3 MILLIGRAM(S): at 21:42

## 2025-08-01 RX ADMIN — OXYCODONE HYDROCHLORIDE 5 MILLIGRAM(S): 30 TABLET ORAL at 05:56

## 2025-08-01 RX ADMIN — Medication 975 MILLIGRAM(S): at 09:59

## 2025-08-01 RX ADMIN — Medication 2 SPRAY(S): at 12:12

## 2025-08-01 RX ADMIN — Medication 2000 UNIT(S): at 12:12

## 2025-08-01 RX ADMIN — APIXABAN 5 MILLIGRAM(S): 2.5 TABLET, FILM COATED ORAL at 18:13

## 2025-08-01 RX ADMIN — Medication 2 SPRAY(S): at 21:42

## 2025-08-01 RX ADMIN — Medication 975 MILLIGRAM(S): at 09:29

## 2025-08-01 RX ADMIN — OXYCODONE HYDROCHLORIDE 5 MILLIGRAM(S): 30 TABLET ORAL at 22:20

## 2025-08-01 RX ADMIN — LIDOCAINE HYDROCHLORIDE 1 PATCH: 20 JELLY TOPICAL at 12:11

## 2025-08-01 RX ADMIN — LIDOCAINE HYDROCHLORIDE 1 PATCH: 20 JELLY TOPICAL at 19:59

## 2025-08-01 RX ADMIN — Medication 975 MILLIGRAM(S): at 02:10

## 2025-08-01 RX ADMIN — DOXAZOSIN MESYLATE 2 MILLIGRAM(S): 8 TABLET ORAL at 22:18

## 2025-08-01 RX ADMIN — LIDOCAINE HYDROCHLORIDE 1 PATCH: 20 JELLY TOPICAL at 07:25

## 2025-08-01 RX ADMIN — OXYCODONE HYDROCHLORIDE 5 MILLIGRAM(S): 30 TABLET ORAL at 11:04

## 2025-08-01 RX ADMIN — Medication 2 SPRAY(S): at 18:14

## 2025-08-01 RX ADMIN — OXYCODONE HYDROCHLORIDE 5 MILLIGRAM(S): 30 TABLET ORAL at 16:40

## 2025-08-01 RX ADMIN — APIXABAN 5 MILLIGRAM(S): 2.5 TABLET, FILM COATED ORAL at 05:56

## 2025-08-01 RX ADMIN — LIDOCAINE HYDROCHLORIDE 1 PATCH: 20 JELLY TOPICAL at 07:00

## 2025-08-01 RX ADMIN — Medication 975 MILLIGRAM(S): at 01:40

## 2025-08-01 RX ADMIN — DIGOXIN 125 MICROGRAM(S): 250 TABLET ORAL at 05:56

## 2025-08-01 RX ADMIN — OXYCODONE HYDROCHLORIDE 5 MILLIGRAM(S): 30 TABLET ORAL at 21:42

## 2025-08-02 PROCEDURE — 99232 SBSQ HOSP IP/OBS MODERATE 35: CPT

## 2025-08-02 RX ADMIN — OXYCODONE HYDROCHLORIDE 5 MILLIGRAM(S): 30 TABLET ORAL at 17:42

## 2025-08-02 RX ADMIN — METHOCARBAMOL 500 MILLIGRAM(S): 500 TABLET, FILM COATED ORAL at 21:32

## 2025-08-02 RX ADMIN — OXYCODONE HYDROCHLORIDE 5 MILLIGRAM(S): 30 TABLET ORAL at 02:12

## 2025-08-02 RX ADMIN — DOXAZOSIN MESYLATE 2 MILLIGRAM(S): 8 TABLET ORAL at 21:31

## 2025-08-02 RX ADMIN — Medication 2 SPRAY(S): at 05:43

## 2025-08-02 RX ADMIN — Medication 975 MILLIGRAM(S): at 13:12

## 2025-08-02 RX ADMIN — OXYCODONE HYDROCHLORIDE 5 MILLIGRAM(S): 30 TABLET ORAL at 02:50

## 2025-08-02 RX ADMIN — LIDOCAINE HYDROCHLORIDE 1 PATCH: 20 JELLY TOPICAL at 06:12

## 2025-08-02 RX ADMIN — OXYCODONE HYDROCHLORIDE 5 MILLIGRAM(S): 30 TABLET ORAL at 21:31

## 2025-08-02 RX ADMIN — METHOCARBAMOL 500 MILLIGRAM(S): 500 TABLET, FILM COATED ORAL at 02:12

## 2025-08-02 RX ADMIN — APIXABAN 5 MILLIGRAM(S): 2.5 TABLET, FILM COATED ORAL at 17:36

## 2025-08-02 RX ADMIN — Medication 2 SPRAY(S): at 17:39

## 2025-08-02 RX ADMIN — OXYCODONE HYDROCHLORIDE 5 MILLIGRAM(S): 30 TABLET ORAL at 08:26

## 2025-08-02 RX ADMIN — METOPROLOL SUCCINATE 25 MILLIGRAM(S): 50 TABLET, EXTENDED RELEASE ORAL at 05:43

## 2025-08-02 RX ADMIN — OXYCODONE HYDROCHLORIDE 5 MILLIGRAM(S): 30 TABLET ORAL at 07:56

## 2025-08-02 RX ADMIN — Medication 2000 UNIT(S): at 12:42

## 2025-08-02 RX ADMIN — APIXABAN 5 MILLIGRAM(S): 2.5 TABLET, FILM COATED ORAL at 05:43

## 2025-08-02 RX ADMIN — LIDOCAINE HYDROCHLORIDE 1 PATCH: 20 JELLY TOPICAL at 17:35

## 2025-08-02 RX ADMIN — Medication 81 MILLIGRAM(S): at 12:42

## 2025-08-02 RX ADMIN — DIGOXIN 125 MICROGRAM(S): 250 TABLET ORAL at 05:43

## 2025-08-02 RX ADMIN — METOPROLOL SUCCINATE 25 MILLIGRAM(S): 50 TABLET, EXTENDED RELEASE ORAL at 17:36

## 2025-08-02 RX ADMIN — Medication 2 SPRAY(S): at 12:41

## 2025-08-02 RX ADMIN — Medication 975 MILLIGRAM(S): at 12:42

## 2025-08-02 RX ADMIN — OXYCODONE HYDROCHLORIDE 5 MILLIGRAM(S): 30 TABLET ORAL at 18:12

## 2025-08-03 PROCEDURE — 99232 SBSQ HOSP IP/OBS MODERATE 35: CPT

## 2025-08-03 RX ADMIN — Medication 81 MILLIGRAM(S): at 11:19

## 2025-08-03 RX ADMIN — OXYCODONE HYDROCHLORIDE 5 MILLIGRAM(S): 30 TABLET ORAL at 02:47

## 2025-08-03 RX ADMIN — LIDOCAINE HYDROCHLORIDE 1 PATCH: 20 JELLY TOPICAL at 19:59

## 2025-08-03 RX ADMIN — Medication 2 SPRAY(S): at 06:53

## 2025-08-03 RX ADMIN — Medication 3 MILLIGRAM(S): at 22:53

## 2025-08-03 RX ADMIN — Medication 975 MILLIGRAM(S): at 11:18

## 2025-08-03 RX ADMIN — Medication 975 MILLIGRAM(S): at 11:19

## 2025-08-03 RX ADMIN — OXYCODONE HYDROCHLORIDE 5 MILLIGRAM(S): 30 TABLET ORAL at 22:52

## 2025-08-03 RX ADMIN — OXYCODONE HYDROCHLORIDE 5 MILLIGRAM(S): 30 TABLET ORAL at 17:15

## 2025-08-03 RX ADMIN — DOXAZOSIN MESYLATE 2 MILLIGRAM(S): 8 TABLET ORAL at 22:53

## 2025-08-03 RX ADMIN — LIDOCAINE HYDROCHLORIDE 1 PATCH: 20 JELLY TOPICAL at 22:54

## 2025-08-03 RX ADMIN — OXYCODONE HYDROCHLORIDE 5 MILLIGRAM(S): 30 TABLET ORAL at 06:52

## 2025-08-03 RX ADMIN — Medication 2 SPRAY(S): at 17:03

## 2025-08-03 RX ADMIN — Medication 2 SPRAY(S): at 11:19

## 2025-08-03 RX ADMIN — APIXABAN 5 MILLIGRAM(S): 2.5 TABLET, FILM COATED ORAL at 17:15

## 2025-08-03 RX ADMIN — OXYCODONE HYDROCHLORIDE 5 MILLIGRAM(S): 30 TABLET ORAL at 23:50

## 2025-08-03 RX ADMIN — OXYCODONE HYDROCHLORIDE 5 MILLIGRAM(S): 30 TABLET ORAL at 18:14

## 2025-08-03 RX ADMIN — DIGOXIN 125 MICROGRAM(S): 250 TABLET ORAL at 06:52

## 2025-08-03 RX ADMIN — METOPROLOL SUCCINATE 25 MILLIGRAM(S): 50 TABLET, EXTENDED RELEASE ORAL at 17:14

## 2025-08-03 RX ADMIN — Medication 2000 UNIT(S): at 11:19

## 2025-08-03 RX ADMIN — METOPROLOL SUCCINATE 25 MILLIGRAM(S): 50 TABLET, EXTENDED RELEASE ORAL at 06:52

## 2025-08-03 RX ADMIN — APIXABAN 5 MILLIGRAM(S): 2.5 TABLET, FILM COATED ORAL at 06:53

## 2025-08-04 LAB
ALBUMIN SERPL ELPH-MCNC: 2.8 G/DL — LOW (ref 3.3–5)
ALP SERPL-CCNC: 142 U/L — HIGH (ref 40–120)
ALT FLD-CCNC: 34 U/L — SIGNIFICANT CHANGE UP (ref 10–45)
ANION GAP SERPL CALC-SCNC: 11 MMOL/L — SIGNIFICANT CHANGE UP (ref 5–17)
AST SERPL-CCNC: 25 U/L — SIGNIFICANT CHANGE UP (ref 10–40)
BILIRUB SERPL-MCNC: 0.7 MG/DL — SIGNIFICANT CHANGE UP (ref 0.2–1.2)
BUN SERPL-MCNC: 17 MG/DL — SIGNIFICANT CHANGE UP (ref 7–23)
CALCIUM SERPL-MCNC: 8.8 MG/DL — SIGNIFICANT CHANGE UP (ref 8.4–10.5)
CHLORIDE SERPL-SCNC: 105 MMOL/L — SIGNIFICANT CHANGE UP (ref 96–108)
CO2 SERPL-SCNC: 26 MMOL/L — SIGNIFICANT CHANGE UP (ref 22–31)
CREAT SERPL-MCNC: 0.71 MG/DL — SIGNIFICANT CHANGE UP (ref 0.5–1.3)
EGFR: 103 ML/MIN/1.73M2 — SIGNIFICANT CHANGE UP
EGFR: 103 ML/MIN/1.73M2 — SIGNIFICANT CHANGE UP
GLUCOSE SERPL-MCNC: 102 MG/DL — HIGH (ref 70–99)
HCT VFR BLD CALC: 31.2 % — LOW (ref 39–50)
HGB BLD-MCNC: 9.8 G/DL — LOW (ref 13–17)
MCHC RBC-ENTMCNC: 31.2 PG — SIGNIFICANT CHANGE UP (ref 27–34)
MCHC RBC-ENTMCNC: 31.4 G/DL — LOW (ref 32–36)
MCV RBC AUTO: 99.4 FL — SIGNIFICANT CHANGE UP (ref 80–100)
NRBC BLD AUTO-RTO: 0 /100 WBCS — SIGNIFICANT CHANGE UP (ref 0–0)
PLATELET # BLD AUTO: 677 K/UL — HIGH (ref 150–400)
POTASSIUM SERPL-MCNC: 4.2 MMOL/L — SIGNIFICANT CHANGE UP (ref 3.5–5.3)
POTASSIUM SERPL-SCNC: 4.2 MMOL/L — SIGNIFICANT CHANGE UP (ref 3.5–5.3)
PROT SERPL-MCNC: 6.7 G/DL — SIGNIFICANT CHANGE UP (ref 6–8.3)
RBC # BLD: 3.14 M/UL — LOW (ref 4.2–5.8)
RBC # FLD: 20.2 % — HIGH (ref 10.3–14.5)
SODIUM SERPL-SCNC: 142 MMOL/L — SIGNIFICANT CHANGE UP (ref 135–145)
WBC # BLD: 7.37 K/UL — SIGNIFICANT CHANGE UP (ref 3.8–10.5)
WBC # FLD AUTO: 7.37 K/UL — SIGNIFICANT CHANGE UP (ref 3.8–10.5)

## 2025-08-04 PROCEDURE — 99232 SBSQ HOSP IP/OBS MODERATE 35: CPT

## 2025-08-04 RX ADMIN — LIDOCAINE HYDROCHLORIDE 1 PATCH: 20 JELLY TOPICAL at 08:51

## 2025-08-04 RX ADMIN — LIDOCAINE HYDROCHLORIDE 1 PATCH: 20 JELLY TOPICAL at 21:29

## 2025-08-04 RX ADMIN — OXYCODONE HYDROCHLORIDE 5 MILLIGRAM(S): 30 TABLET ORAL at 06:46

## 2025-08-04 RX ADMIN — Medication 975 MILLIGRAM(S): at 17:17

## 2025-08-04 RX ADMIN — METOPROLOL SUCCINATE 25 MILLIGRAM(S): 50 TABLET, EXTENDED RELEASE ORAL at 06:46

## 2025-08-04 RX ADMIN — Medication 2 SPRAY(S): at 06:47

## 2025-08-04 RX ADMIN — Medication 2 SPRAY(S): at 22:00

## 2025-08-04 RX ADMIN — Medication 2000 UNIT(S): at 11:07

## 2025-08-04 RX ADMIN — OXYCODONE HYDROCHLORIDE 5 MILLIGRAM(S): 30 TABLET ORAL at 23:37

## 2025-08-04 RX ADMIN — OXYCODONE HYDROCHLORIDE 5 MILLIGRAM(S): 30 TABLET ORAL at 15:02

## 2025-08-04 RX ADMIN — APIXABAN 5 MILLIGRAM(S): 2.5 TABLET, FILM COATED ORAL at 06:47

## 2025-08-04 RX ADMIN — LIDOCAINE HYDROCHLORIDE 1 PATCH: 20 JELLY TOPICAL at 11:06

## 2025-08-04 RX ADMIN — Medication 81 MILLIGRAM(S): at 11:07

## 2025-08-04 RX ADMIN — Medication 2 SPRAY(S): at 17:12

## 2025-08-04 RX ADMIN — LIDOCAINE HYDROCHLORIDE 1 PATCH: 20 JELLY TOPICAL at 21:28

## 2025-08-04 RX ADMIN — Medication 2 SPRAY(S): at 11:07

## 2025-08-04 RX ADMIN — METHOCARBAMOL 500 MILLIGRAM(S): 500 TABLET, FILM COATED ORAL at 23:32

## 2025-08-04 RX ADMIN — APIXABAN 5 MILLIGRAM(S): 2.5 TABLET, FILM COATED ORAL at 17:15

## 2025-08-04 RX ADMIN — DOXAZOSIN MESYLATE 2 MILLIGRAM(S): 8 TABLET ORAL at 21:22

## 2025-08-04 RX ADMIN — Medication 2 SPRAY(S): at 00:38

## 2025-08-04 RX ADMIN — METOPROLOL SUCCINATE 25 MILLIGRAM(S): 50 TABLET, EXTENDED RELEASE ORAL at 17:14

## 2025-08-04 RX ADMIN — OXYCODONE HYDROCHLORIDE 5 MILLIGRAM(S): 30 TABLET ORAL at 15:45

## 2025-08-04 RX ADMIN — Medication 975 MILLIGRAM(S): at 18:51

## 2025-08-04 RX ADMIN — DIGOXIN 125 MICROGRAM(S): 250 TABLET ORAL at 06:47

## 2025-08-05 PROCEDURE — 99232 SBSQ HOSP IP/OBS MODERATE 35: CPT

## 2025-08-05 PROCEDURE — 90832 PSYTX W PT 30 MINUTES: CPT

## 2025-08-05 RX ORDER — OXYCODONE HYDROCHLORIDE 30 MG/1
2.5 TABLET ORAL EVERY 4 HOURS
Refills: 0 | Status: DISCONTINUED | OUTPATIENT
Start: 2025-08-05 | End: 2025-08-09

## 2025-08-05 RX ORDER — GABAPENTIN 400 MG/1
100 CAPSULE ORAL AT BEDTIME
Refills: 0 | Status: DISCONTINUED | OUTPATIENT
Start: 2025-08-05 | End: 2025-08-06

## 2025-08-05 RX ORDER — OXYCODONE HYDROCHLORIDE 30 MG/1
5 TABLET ORAL EVERY 4 HOURS
Refills: 0 | Status: DISCONTINUED | OUTPATIENT
Start: 2025-08-05 | End: 2025-08-09

## 2025-08-05 RX ORDER — METHOCARBAMOL 500 MG/1
500 TABLET, FILM COATED ORAL EVERY 6 HOURS
Refills: 0 | Status: DISCONTINUED | OUTPATIENT
Start: 2025-08-05 | End: 2025-08-09

## 2025-08-05 RX ADMIN — DIGOXIN 125 MICROGRAM(S): 250 TABLET ORAL at 05:47

## 2025-08-05 RX ADMIN — METHOCARBAMOL 500 MILLIGRAM(S): 500 TABLET, FILM COATED ORAL at 17:10

## 2025-08-05 RX ADMIN — METOPROLOL SUCCINATE 25 MILLIGRAM(S): 50 TABLET, EXTENDED RELEASE ORAL at 17:10

## 2025-08-05 RX ADMIN — Medication 2 SPRAY(S): at 17:11

## 2025-08-05 RX ADMIN — Medication 2 TABLET(S): at 21:03

## 2025-08-05 RX ADMIN — LIDOCAINE HYDROCHLORIDE 1 PATCH: 20 JELLY TOPICAL at 10:05

## 2025-08-05 RX ADMIN — Medication 975 MILLIGRAM(S): at 16:45

## 2025-08-05 RX ADMIN — OXYCODONE HYDROCHLORIDE 5 MILLIGRAM(S): 30 TABLET ORAL at 12:30

## 2025-08-05 RX ADMIN — Medication 2000 UNIT(S): at 11:18

## 2025-08-05 RX ADMIN — APIXABAN 5 MILLIGRAM(S): 2.5 TABLET, FILM COATED ORAL at 05:47

## 2025-08-05 RX ADMIN — METOPROLOL SUCCINATE 25 MILLIGRAM(S): 50 TABLET, EXTENDED RELEASE ORAL at 05:47

## 2025-08-05 RX ADMIN — LIDOCAINE HYDROCHLORIDE 1 PATCH: 20 JELLY TOPICAL at 07:10

## 2025-08-05 RX ADMIN — Medication 975 MILLIGRAM(S): at 04:25

## 2025-08-05 RX ADMIN — OXYCODONE HYDROCHLORIDE 5 MILLIGRAM(S): 30 TABLET ORAL at 06:35

## 2025-08-05 RX ADMIN — OXYCODONE HYDROCHLORIDE 5 MILLIGRAM(S): 30 TABLET ORAL at 00:25

## 2025-08-05 RX ADMIN — GABAPENTIN 100 MILLIGRAM(S): 400 CAPSULE ORAL at 21:02

## 2025-08-05 RX ADMIN — Medication 2 SPRAY(S): at 11:19

## 2025-08-05 RX ADMIN — OXYCODONE HYDROCHLORIDE 5 MILLIGRAM(S): 30 TABLET ORAL at 13:20

## 2025-08-05 RX ADMIN — Medication 975 MILLIGRAM(S): at 15:47

## 2025-08-05 RX ADMIN — DOXAZOSIN MESYLATE 2 MILLIGRAM(S): 8 TABLET ORAL at 21:02

## 2025-08-05 RX ADMIN — Medication 2 SPRAY(S): at 23:35

## 2025-08-05 RX ADMIN — METHOCARBAMOL 500 MILLIGRAM(S): 500 TABLET, FILM COATED ORAL at 23:35

## 2025-08-05 RX ADMIN — Medication 3 MILLIGRAM(S): at 21:05

## 2025-08-05 RX ADMIN — Medication 975 MILLIGRAM(S): at 05:20

## 2025-08-05 RX ADMIN — OXYCODONE HYDROCHLORIDE 5 MILLIGRAM(S): 30 TABLET ORAL at 05:48

## 2025-08-05 RX ADMIN — Medication 81 MILLIGRAM(S): at 11:18

## 2025-08-05 RX ADMIN — APIXABAN 5 MILLIGRAM(S): 2.5 TABLET, FILM COATED ORAL at 17:10

## 2025-08-05 RX ADMIN — METHOCARBAMOL 500 MILLIGRAM(S): 500 TABLET, FILM COATED ORAL at 11:17

## 2025-08-05 RX ADMIN — OXYCODONE HYDROCHLORIDE 2.5 MILLIGRAM(S): 30 TABLET ORAL at 23:34

## 2025-08-05 RX ADMIN — Medication 2 SPRAY(S): at 05:47

## 2025-08-06 ENCOUNTER — TRANSCRIPTION ENCOUNTER (OUTPATIENT)
Age: 65
End: 2025-08-06

## 2025-08-06 PROCEDURE — 99232 SBSQ HOSP IP/OBS MODERATE 35: CPT

## 2025-08-06 RX ORDER — ACETAMINOPHEN 500 MG/5ML
3 LIQUID (ML) ORAL
Qty: 0 | Refills: 0 | DISCHARGE
Start: 2025-08-06

## 2025-08-06 RX ORDER — DOXAZOSIN MESYLATE 8 MG/1
1 TABLET ORAL
Qty: 30 | Refills: 0
Start: 2025-08-06 | End: 2025-09-04

## 2025-08-06 RX ORDER — GABAPENTIN 400 MG/1
1 CAPSULE ORAL
Qty: 30 | Refills: 0
Start: 2025-08-06 | End: 2025-09-04

## 2025-08-06 RX ORDER — POLYETHYLENE GLYCOL 3350 17 G/17G
17 POWDER, FOR SOLUTION ORAL
Qty: 0 | Refills: 0 | DISCHARGE
Start: 2025-08-06

## 2025-08-06 RX ORDER — SODIUM CHLORIDE 0.65 %
2 AEROSOL, SPRAY (ML) NASAL
Qty: 0 | Refills: 0 | DISCHARGE
Start: 2025-08-06

## 2025-08-06 RX ORDER — SACUBITRIL AND VALSARTAN 6; 6 MG/1; MG/1
1 PELLET ORAL
Refills: 0 | DISCHARGE

## 2025-08-06 RX ORDER — APIXABAN 2.5 MG/1
1 TABLET, FILM COATED ORAL
Qty: 60 | Refills: 0
Start: 2025-08-06 | End: 2025-09-04

## 2025-08-06 RX ORDER — SENNA 187 MG
2 TABLET ORAL
Qty: 0 | Refills: 0 | DISCHARGE
Start: 2025-08-06

## 2025-08-06 RX ORDER — MELATONIN 5 MG
1 TABLET ORAL
Qty: 0 | Refills: 0 | DISCHARGE
Start: 2025-08-06

## 2025-08-06 RX ORDER — OXYCODONE HYDROCHLORIDE 30 MG/1
1 TABLET ORAL
Qty: 42 | Refills: 0
Start: 2025-08-06 | End: 2025-08-12

## 2025-08-06 RX ORDER — ASPIRIN 325 MG
1 TABLET ORAL
Qty: 30 | Refills: 0
Start: 2025-08-06 | End: 2025-09-04

## 2025-08-06 RX ORDER — ROSUVASTATIN CALCIUM 20 MG/1
0 TABLET, FILM COATED ORAL
Qty: 0 | Refills: 3 | DISCHARGE

## 2025-08-06 RX ORDER — METOPROLOL SUCCINATE 50 MG/1
1 TABLET, EXTENDED RELEASE ORAL
Qty: 60 | Refills: 0
Start: 2025-08-06 | End: 2025-09-04

## 2025-08-06 RX ORDER — DIGOXIN 250 UG/1
1 TABLET ORAL
Qty: 30 | Refills: 0
Start: 2025-08-06 | End: 2025-09-04

## 2025-08-06 RX ORDER — GABAPENTIN 400 MG/1
200 CAPSULE ORAL ONCE
Refills: 0 | Status: COMPLETED | OUTPATIENT
Start: 2025-08-06 | End: 2025-08-06

## 2025-08-06 RX ORDER — NALOXONE HYDROCHLORIDE 0.4 MG/ML
1 INJECTION, SOLUTION INTRAMUSCULAR; INTRAVENOUS; SUBCUTANEOUS
Qty: 1 | Refills: 0
Start: 2025-08-06 | End: 2025-08-07

## 2025-08-06 RX ORDER — METHOCARBAMOL 500 MG/1
1 TABLET, FILM COATED ORAL
Qty: 120 | Refills: 0
Start: 2025-08-06 | End: 2025-09-04

## 2025-08-06 RX ORDER — GABAPENTIN 400 MG/1
200 CAPSULE ORAL THREE TIMES A DAY
Refills: 0 | Status: DISCONTINUED | OUTPATIENT
Start: 2025-08-06 | End: 2025-08-09

## 2025-08-06 RX ADMIN — Medication 975 MILLIGRAM(S): at 01:39

## 2025-08-06 RX ADMIN — APIXABAN 5 MILLIGRAM(S): 2.5 TABLET, FILM COATED ORAL at 05:11

## 2025-08-06 RX ADMIN — OXYCODONE HYDROCHLORIDE 5 MILLIGRAM(S): 30 TABLET ORAL at 08:29

## 2025-08-06 RX ADMIN — Medication 81 MILLIGRAM(S): at 12:22

## 2025-08-06 RX ADMIN — METHOCARBAMOL 500 MILLIGRAM(S): 500 TABLET, FILM COATED ORAL at 23:51

## 2025-08-06 RX ADMIN — Medication 975 MILLIGRAM(S): at 18:03

## 2025-08-06 RX ADMIN — OXYCODONE HYDROCHLORIDE 5 MILLIGRAM(S): 30 TABLET ORAL at 23:50

## 2025-08-06 RX ADMIN — APIXABAN 5 MILLIGRAM(S): 2.5 TABLET, FILM COATED ORAL at 18:03

## 2025-08-06 RX ADMIN — GABAPENTIN 200 MILLIGRAM(S): 400 CAPSULE ORAL at 22:27

## 2025-08-06 RX ADMIN — METHOCARBAMOL 500 MILLIGRAM(S): 500 TABLET, FILM COATED ORAL at 18:03

## 2025-08-06 RX ADMIN — Medication 2 SPRAY(S): at 23:50

## 2025-08-06 RX ADMIN — Medication 2 SPRAY(S): at 18:42

## 2025-08-06 RX ADMIN — Medication 2000 UNIT(S): at 12:22

## 2025-08-06 RX ADMIN — METHOCARBAMOL 500 MILLIGRAM(S): 500 TABLET, FILM COATED ORAL at 12:22

## 2025-08-06 RX ADMIN — Medication 2 SPRAY(S): at 05:10

## 2025-08-06 RX ADMIN — METHOCARBAMOL 500 MILLIGRAM(S): 500 TABLET, FILM COATED ORAL at 05:11

## 2025-08-06 RX ADMIN — Medication 975 MILLIGRAM(S): at 08:30

## 2025-08-06 RX ADMIN — METOPROLOL SUCCINATE 25 MILLIGRAM(S): 50 TABLET, EXTENDED RELEASE ORAL at 18:42

## 2025-08-06 RX ADMIN — DOXAZOSIN MESYLATE 2 MILLIGRAM(S): 8 TABLET ORAL at 22:28

## 2025-08-06 RX ADMIN — GABAPENTIN 200 MILLIGRAM(S): 400 CAPSULE ORAL at 18:42

## 2025-08-06 RX ADMIN — DIGOXIN 125 MICROGRAM(S): 250 TABLET ORAL at 05:11

## 2025-08-06 RX ADMIN — Medication 2 SPRAY(S): at 12:22

## 2025-08-06 RX ADMIN — Medication 3 MILLIGRAM(S): at 23:50

## 2025-08-07 LAB
ALBUMIN SERPL ELPH-MCNC: 2.9 G/DL — LOW (ref 3.3–5)
ALP SERPL-CCNC: 128 U/L — HIGH (ref 40–120)
ALT FLD-CCNC: 31 U/L — SIGNIFICANT CHANGE UP (ref 10–45)
ANION GAP SERPL CALC-SCNC: 11 MMOL/L — SIGNIFICANT CHANGE UP (ref 5–17)
AST SERPL-CCNC: 18 U/L — SIGNIFICANT CHANGE UP (ref 10–40)
BILIRUB SERPL-MCNC: 0.5 MG/DL — SIGNIFICANT CHANGE UP (ref 0.2–1.2)
BUN SERPL-MCNC: 18 MG/DL — SIGNIFICANT CHANGE UP (ref 7–23)
CALCIUM SERPL-MCNC: 8.6 MG/DL — SIGNIFICANT CHANGE UP (ref 8.4–10.5)
CHLORIDE SERPL-SCNC: 103 MMOL/L — SIGNIFICANT CHANGE UP (ref 96–108)
CO2 SERPL-SCNC: 26 MMOL/L — SIGNIFICANT CHANGE UP (ref 22–31)
CREAT SERPL-MCNC: 0.59 MG/DL — SIGNIFICANT CHANGE UP (ref 0.5–1.3)
EGFR: 109 ML/MIN/1.73M2 — SIGNIFICANT CHANGE UP
EGFR: 109 ML/MIN/1.73M2 — SIGNIFICANT CHANGE UP
GLUCOSE SERPL-MCNC: 113 MG/DL — HIGH (ref 70–99)
HCT VFR BLD CALC: 31.7 % — LOW (ref 39–50)
HGB BLD-MCNC: 10.1 G/DL — LOW (ref 13–17)
MCHC RBC-ENTMCNC: 30.9 PG — SIGNIFICANT CHANGE UP (ref 27–34)
MCHC RBC-ENTMCNC: 31.9 G/DL — LOW (ref 32–36)
MCV RBC AUTO: 96.9 FL — SIGNIFICANT CHANGE UP (ref 80–100)
NRBC BLD AUTO-RTO: 0 /100 WBCS — SIGNIFICANT CHANGE UP (ref 0–0)
PLATELET # BLD AUTO: 603 K/UL — HIGH (ref 150–400)
POTASSIUM SERPL-MCNC: 3.7 MMOL/L — SIGNIFICANT CHANGE UP (ref 3.5–5.3)
POTASSIUM SERPL-SCNC: 3.7 MMOL/L — SIGNIFICANT CHANGE UP (ref 3.5–5.3)
PROT SERPL-MCNC: 6.5 G/DL — SIGNIFICANT CHANGE UP (ref 6–8.3)
RBC # BLD: 3.27 M/UL — LOW (ref 4.2–5.8)
RBC # FLD: 19.2 % — HIGH (ref 10.3–14.5)
SODIUM SERPL-SCNC: 140 MMOL/L — SIGNIFICANT CHANGE UP (ref 135–145)
WBC # BLD: 5.63 K/UL — SIGNIFICANT CHANGE UP (ref 3.8–10.5)
WBC # FLD AUTO: 5.63 K/UL — SIGNIFICANT CHANGE UP (ref 3.8–10.5)

## 2025-08-07 RX ORDER — GABAPENTIN 400 MG/1
2 CAPSULE ORAL
Qty: 0 | Refills: 0 | DISCHARGE
Start: 2025-08-07

## 2025-08-07 RX ORDER — ROSUVASTATIN CALCIUM 20 MG/1
1 TABLET, FILM COATED ORAL
Qty: 0 | Refills: 0 | DISCHARGE
Start: 2025-08-07

## 2025-08-07 RX ORDER — ROSUVASTATIN CALCIUM 20 MG/1
20 TABLET, FILM COATED ORAL AT BEDTIME
Refills: 0 | Status: DISCONTINUED | OUTPATIENT
Start: 2025-08-07 | End: 2025-08-09

## 2025-08-07 RX ADMIN — METOPROLOL SUCCINATE 25 MILLIGRAM(S): 50 TABLET, EXTENDED RELEASE ORAL at 17:29

## 2025-08-07 RX ADMIN — Medication 2 SPRAY(S): at 17:31

## 2025-08-07 RX ADMIN — METHOCARBAMOL 500 MILLIGRAM(S): 500 TABLET, FILM COATED ORAL at 06:04

## 2025-08-07 RX ADMIN — GABAPENTIN 200 MILLIGRAM(S): 400 CAPSULE ORAL at 06:03

## 2025-08-07 RX ADMIN — Medication 975 MILLIGRAM(S): at 09:10

## 2025-08-07 RX ADMIN — OXYCODONE HYDROCHLORIDE 5 MILLIGRAM(S): 30 TABLET ORAL at 00:25

## 2025-08-07 RX ADMIN — Medication 2 TABLET(S): at 22:41

## 2025-08-07 RX ADMIN — METOPROLOL SUCCINATE 25 MILLIGRAM(S): 50 TABLET, EXTENDED RELEASE ORAL at 06:04

## 2025-08-07 RX ADMIN — OXYCODONE HYDROCHLORIDE 5 MILLIGRAM(S): 30 TABLET ORAL at 12:21

## 2025-08-07 RX ADMIN — APIXABAN 5 MILLIGRAM(S): 2.5 TABLET, FILM COATED ORAL at 17:29

## 2025-08-07 RX ADMIN — METHOCARBAMOL 500 MILLIGRAM(S): 500 TABLET, FILM COATED ORAL at 17:29

## 2025-08-07 RX ADMIN — OXYCODONE HYDROCHLORIDE 5 MILLIGRAM(S): 30 TABLET ORAL at 11:51

## 2025-08-07 RX ADMIN — Medication 2 SPRAY(S): at 23:23

## 2025-08-07 RX ADMIN — Medication 975 MILLIGRAM(S): at 08:40

## 2025-08-07 RX ADMIN — APIXABAN 5 MILLIGRAM(S): 2.5 TABLET, FILM COATED ORAL at 06:04

## 2025-08-07 RX ADMIN — GABAPENTIN 200 MILLIGRAM(S): 400 CAPSULE ORAL at 22:40

## 2025-08-07 RX ADMIN — Medication 2 SPRAY(S): at 11:51

## 2025-08-07 RX ADMIN — METHOCARBAMOL 500 MILLIGRAM(S): 500 TABLET, FILM COATED ORAL at 11:50

## 2025-08-07 RX ADMIN — Medication 2000 UNIT(S): at 11:51

## 2025-08-07 RX ADMIN — Medication 40 MILLIGRAM(S): at 06:03

## 2025-08-07 RX ADMIN — OXYCODONE HYDROCHLORIDE 5 MILLIGRAM(S): 30 TABLET ORAL at 23:23

## 2025-08-07 RX ADMIN — ROSUVASTATIN CALCIUM 20 MILLIGRAM(S): 20 TABLET, FILM COATED ORAL at 22:40

## 2025-08-07 RX ADMIN — GABAPENTIN 200 MILLIGRAM(S): 400 CAPSULE ORAL at 15:00

## 2025-08-07 RX ADMIN — DOXAZOSIN MESYLATE 2 MILLIGRAM(S): 8 TABLET ORAL at 22:40

## 2025-08-07 RX ADMIN — Medication 81 MILLIGRAM(S): at 11:50

## 2025-08-07 RX ADMIN — Medication 3 MILLIGRAM(S): at 23:23

## 2025-08-07 RX ADMIN — DIGOXIN 125 MICROGRAM(S): 250 TABLET ORAL at 06:04

## 2025-08-07 RX ADMIN — METHOCARBAMOL 500 MILLIGRAM(S): 500 TABLET, FILM COATED ORAL at 23:23

## 2025-08-07 RX ADMIN — Medication 2 SPRAY(S): at 06:04

## 2025-08-08 ENCOUNTER — TRANSCRIPTION ENCOUNTER (OUTPATIENT)
Age: 65
End: 2025-08-08

## 2025-08-08 RX ADMIN — METOPROLOL SUCCINATE 25 MILLIGRAM(S): 50 TABLET, EXTENDED RELEASE ORAL at 06:14

## 2025-08-08 RX ADMIN — Medication 2 SPRAY(S): at 14:32

## 2025-08-08 RX ADMIN — OXYCODONE HYDROCHLORIDE 5 MILLIGRAM(S): 30 TABLET ORAL at 23:30

## 2025-08-08 RX ADMIN — DIGOXIN 125 MICROGRAM(S): 250 TABLET ORAL at 06:14

## 2025-08-08 RX ADMIN — Medication 3 MILLIGRAM(S): at 23:30

## 2025-08-08 RX ADMIN — GABAPENTIN 200 MILLIGRAM(S): 400 CAPSULE ORAL at 14:47

## 2025-08-08 RX ADMIN — Medication 975 MILLIGRAM(S): at 13:10

## 2025-08-08 RX ADMIN — Medication 975 MILLIGRAM(S): at 07:14

## 2025-08-08 RX ADMIN — ROSUVASTATIN CALCIUM 20 MILLIGRAM(S): 20 TABLET, FILM COATED ORAL at 21:32

## 2025-08-08 RX ADMIN — APIXABAN 5 MILLIGRAM(S): 2.5 TABLET, FILM COATED ORAL at 17:06

## 2025-08-08 RX ADMIN — GABAPENTIN 200 MILLIGRAM(S): 400 CAPSULE ORAL at 06:14

## 2025-08-08 RX ADMIN — APIXABAN 5 MILLIGRAM(S): 2.5 TABLET, FILM COATED ORAL at 06:14

## 2025-08-08 RX ADMIN — OXYCODONE HYDROCHLORIDE 5 MILLIGRAM(S): 30 TABLET ORAL at 18:31

## 2025-08-08 RX ADMIN — OXYCODONE HYDROCHLORIDE 5 MILLIGRAM(S): 30 TABLET ORAL at 00:23

## 2025-08-08 RX ADMIN — METOPROLOL SUCCINATE 25 MILLIGRAM(S): 50 TABLET, EXTENDED RELEASE ORAL at 17:06

## 2025-08-08 RX ADMIN — Medication 81 MILLIGRAM(S): at 13:10

## 2025-08-08 RX ADMIN — Medication 975 MILLIGRAM(S): at 06:14

## 2025-08-08 RX ADMIN — Medication 2000 UNIT(S): at 13:10

## 2025-08-08 RX ADMIN — DOXAZOSIN MESYLATE 2 MILLIGRAM(S): 8 TABLET ORAL at 21:33

## 2025-08-08 RX ADMIN — METHOCARBAMOL 500 MILLIGRAM(S): 500 TABLET, FILM COATED ORAL at 17:06

## 2025-08-08 RX ADMIN — GABAPENTIN 200 MILLIGRAM(S): 400 CAPSULE ORAL at 21:31

## 2025-08-08 RX ADMIN — METHOCARBAMOL 500 MILLIGRAM(S): 500 TABLET, FILM COATED ORAL at 06:14

## 2025-08-08 RX ADMIN — OXYCODONE HYDROCHLORIDE 5 MILLIGRAM(S): 30 TABLET ORAL at 17:06

## 2025-08-08 RX ADMIN — Medication 2 TABLET(S): at 21:32

## 2025-08-08 RX ADMIN — METHOCARBAMOL 500 MILLIGRAM(S): 500 TABLET, FILM COATED ORAL at 13:11

## 2025-08-08 RX ADMIN — Medication 975 MILLIGRAM(S): at 17:02

## 2025-08-08 RX ADMIN — METHOCARBAMOL 500 MILLIGRAM(S): 500 TABLET, FILM COATED ORAL at 23:31

## 2025-08-08 RX ADMIN — Medication 40 MILLIGRAM(S): at 06:15

## 2025-08-08 RX ADMIN — Medication 2 SPRAY(S): at 06:17

## 2025-08-08 RX ADMIN — Medication 2 SPRAY(S): at 17:07

## 2025-08-09 VITALS
RESPIRATION RATE: 17 BRPM | OXYGEN SATURATION: 97 % | SYSTOLIC BLOOD PRESSURE: 102 MMHG | HEART RATE: 70 BPM | DIASTOLIC BLOOD PRESSURE: 66 MMHG | TEMPERATURE: 98 F

## 2025-08-09 PROCEDURE — 99232 SBSQ HOSP IP/OBS MODERATE 35: CPT

## 2025-08-09 PROCEDURE — 97530 THERAPEUTIC ACTIVITIES: CPT | Mod: GO

## 2025-08-09 PROCEDURE — 82306 VITAMIN D 25 HYDROXY: CPT

## 2025-08-09 PROCEDURE — 87635 SARS-COV-2 COVID-19 AMP PRB: CPT

## 2025-08-09 PROCEDURE — 85027 COMPLETE CBC AUTOMATED: CPT

## 2025-08-09 PROCEDURE — 97161 PT EVAL LOW COMPLEX 20 MIN: CPT | Mod: GP

## 2025-08-09 PROCEDURE — 36415 COLL VENOUS BLD VENIPUNCTURE: CPT

## 2025-08-09 PROCEDURE — 97542 WHEELCHAIR MNGMENT TRAINING: CPT | Mod: GO

## 2025-08-09 PROCEDURE — 85025 COMPLETE CBC W/AUTO DIFF WBC: CPT

## 2025-08-09 PROCEDURE — 80053 COMPREHEN METABOLIC PANEL: CPT

## 2025-08-09 PROCEDURE — 97165 OT EVAL LOW COMPLEX 30 MIN: CPT | Mod: GO

## 2025-08-09 PROCEDURE — 99232 SBSQ HOSP IP/OBS MODERATE 35: CPT | Mod: GC

## 2025-08-09 PROCEDURE — 97535 SELF CARE MNGMENT TRAINING: CPT | Mod: GO

## 2025-08-09 PROCEDURE — 97110 THERAPEUTIC EXERCISES: CPT | Mod: GP

## 2025-08-09 PROCEDURE — 97116 GAIT TRAINING THERAPY: CPT | Mod: GP

## 2025-08-09 PROCEDURE — 97112 NEUROMUSCULAR REEDUCATION: CPT | Mod: GP

## 2025-08-09 RX ADMIN — OXYCODONE HYDROCHLORIDE 5 MILLIGRAM(S): 30 TABLET ORAL at 10:16

## 2025-08-09 RX ADMIN — Medication 2000 UNIT(S): at 11:40

## 2025-08-09 RX ADMIN — Medication 2 SPRAY(S): at 05:58

## 2025-08-09 RX ADMIN — Medication 81 MILLIGRAM(S): at 11:40

## 2025-08-09 RX ADMIN — METHOCARBAMOL 500 MILLIGRAM(S): 500 TABLET, FILM COATED ORAL at 11:40

## 2025-08-09 RX ADMIN — Medication 2 SPRAY(S): at 11:53

## 2025-08-09 RX ADMIN — OXYCODONE HYDROCHLORIDE 5 MILLIGRAM(S): 30 TABLET ORAL at 08:12

## 2025-08-09 RX ADMIN — Medication 40 MILLIGRAM(S): at 05:55

## 2025-08-09 RX ADMIN — GABAPENTIN 200 MILLIGRAM(S): 400 CAPSULE ORAL at 05:56

## 2025-08-09 RX ADMIN — APIXABAN 5 MILLIGRAM(S): 2.5 TABLET, FILM COATED ORAL at 05:56

## 2025-08-09 RX ADMIN — METHOCARBAMOL 500 MILLIGRAM(S): 500 TABLET, FILM COATED ORAL at 05:55

## 2025-08-09 RX ADMIN — METOPROLOL SUCCINATE 25 MILLIGRAM(S): 50 TABLET, EXTENDED RELEASE ORAL at 05:56

## 2025-08-09 RX ADMIN — DIGOXIN 125 MICROGRAM(S): 250 TABLET ORAL at 05:56

## 2025-08-12 ENCOUNTER — APPOINTMENT (OUTPATIENT)
Dept: THORACIC SURGERY | Facility: CLINIC | Age: 65
End: 2025-08-12
Payer: COMMERCIAL

## 2025-08-12 ENCOUNTER — APPOINTMENT (OUTPATIENT)
Dept: RADIOLOGY | Facility: CLINIC | Age: 65
End: 2025-08-12
Payer: COMMERCIAL

## 2025-08-12 VITALS
OXYGEN SATURATION: 99 % | BODY MASS INDEX: 28.28 KG/M2 | HEIGHT: 66 IN | HEART RATE: 76 BPM | SYSTOLIC BLOOD PRESSURE: 136 MMHG | DIASTOLIC BLOOD PRESSURE: 87 MMHG | WEIGHT: 176 LBS

## 2025-08-12 DIAGNOSIS — J94.2 HEMOTHORAX: ICD-10-CM

## 2025-08-12 PROCEDURE — 71046 X-RAY EXAM CHEST 2 VIEWS: CPT

## 2025-08-12 PROCEDURE — 99024 POSTOP FOLLOW-UP VISIT: CPT

## 2025-08-13 DIAGNOSIS — S22.000A WEDGE COMPRESSION FRACTURE OF UNSPECIFIED THORACIC VERTEBRA, INITIAL ENCOUNTER FOR CLOSED FRACTURE: ICD-10-CM

## 2025-08-20 ENCOUNTER — APPOINTMENT (OUTPATIENT)
Dept: SPINE | Facility: CLINIC | Age: 65
End: 2025-08-20

## 2025-09-15 ENCOUNTER — APPOINTMENT (OUTPATIENT)
Dept: PHYSICAL MEDICINE AND REHAB | Facility: CLINIC | Age: 65
End: 2025-09-15
Payer: COMMERCIAL

## 2025-09-15 ENCOUNTER — NON-APPOINTMENT (OUTPATIENT)
Age: 65
End: 2025-09-15

## 2025-09-15 VITALS
SYSTOLIC BLOOD PRESSURE: 118 MMHG | WEIGHT: 180 LBS | HEIGHT: 66 IN | DIASTOLIC BLOOD PRESSURE: 71 MMHG | HEART RATE: 62 BPM | TEMPERATURE: 98.4 F | BODY MASS INDEX: 28.93 KG/M2 | OXYGEN SATURATION: 98 %

## 2025-09-15 DIAGNOSIS — S42.101A FRACTURE OF UNSPECIFIED PART OF SCAPULA, RIGHT SHOULDER, INITIAL ENCOUNTER FOR CLOSED FRACTURE: ICD-10-CM

## 2025-09-15 DIAGNOSIS — J94.2 HEMOTHORAX: ICD-10-CM

## 2025-09-15 DIAGNOSIS — S42.102A FRACTURE OF UNSPECIFIED PART OF SCAPULA, RIGHT SHOULDER, INITIAL ENCOUNTER FOR CLOSED FRACTURE: ICD-10-CM

## 2025-09-15 DIAGNOSIS — M79.2 NEURALGIA AND NEURITIS, UNSPECIFIED: ICD-10-CM

## 2025-09-15 DIAGNOSIS — S22.49XA MULTIPLE FRACTURES OF RIBS, UNSPECIFIED SIDE, INITIAL ENCOUNTER FOR CLOSED FRACTURE: ICD-10-CM

## 2025-09-15 DIAGNOSIS — T07.XXXA UNSPECIFIED MULTIPLE INJURIES, INITIAL ENCOUNTER: ICD-10-CM

## 2025-09-15 DIAGNOSIS — S22.000A WEDGE COMPRESSION FRACTURE OF UNSPECIFIED THORACIC VERTEBRA, INITIAL ENCOUNTER FOR CLOSED FRACTURE: ICD-10-CM

## 2025-09-15 PROCEDURE — 99213 OFFICE O/P EST LOW 20 MIN: CPT

## 2025-09-15 RX ORDER — GABAPENTIN 100 MG/1
100 CAPSULE ORAL
Qty: 180 | Refills: 3 | Status: ACTIVE | COMMUNITY
Start: 2025-09-15 | End: 1900-01-01

## (undated) DEVICE — TAPE SILK 3"

## (undated) DEVICE — CHEST DRAIN PLEUR-EVAC WET/WET ADULT-PEDS SINGLE (QUICK)

## (undated) DEVICE — Device

## (undated) DEVICE — VENODYNE/SCD SLEEVE CALF MEDIUM

## (undated) DEVICE — ENDOCATCH II 15MM

## (undated) DEVICE — SUT SOFSILK 0 30" V-20

## (undated) DEVICE — SUT MAXON 0 30" GS-11

## (undated) DEVICE — ELCTR BOVIE TIP BLADE INSULATED 2.75" EDGE

## (undated) DEVICE — SPECIMEN CONTAINER 4OZ

## (undated) DEVICE — SUT PROLENE 0 30" CT-2

## (undated) DEVICE — MEDICATION LABELS W MARKER

## (undated) DEVICE — SUT POLYSORB 3-0 30" V-20 UNDYED

## (undated) DEVICE — SYR LUER LOK 10CC

## (undated) DEVICE — SOL IRR POUR H2O 250ML

## (undated) DEVICE — STAPLER SKIN VISI-STAT 35 WIDE

## (undated) DEVICE — SUT POLYSORB 2-0 27" GS-21

## (undated) DEVICE — SUT PROLENE 4-0 36" BB

## (undated) DEVICE — ELCTR BOVIE TIP BLADE INSULATED 6.5" EDGE

## (undated) DEVICE — DRAPE MAGNETIC INSTRUMENT MEDIUM

## (undated) DEVICE — CHEST DRAIN OASIS DRY SUCTION WATER SEAL

## (undated) DEVICE — GOWN TRIMAX LG

## (undated) DEVICE — TRAP SPECIMEN SPUTUM 40CC

## (undated) DEVICE — SUT SOFSILK 2-0 30" V-20

## (undated) DEVICE — DRAPE IOBAN 23" X 23"

## (undated) DEVICE — DRAIN 24 FR ROUND HUBLESS FULL FLUTED SILICONE

## (undated) DEVICE — APPLICATOR FOR PROGEL EXTENDED SPRAY TIP 29CM

## (undated) DEVICE — DRSG SUPPORTER ADULT 3" WAISTBAND MED

## (undated) DEVICE — SUT POLYSORB 2-0 30" GS-26

## (undated) DEVICE — BLADE SCALPEL SAFETYLOCK #11

## (undated) DEVICE — POSITIONER FOAM EGG CRATE ULNAR 2PCS (PINK)

## (undated) DEVICE — DRSG STERISTRIPS 0.5 X 4"

## (undated) DEVICE — SPECIMEN CONTAINER 100ML

## (undated) DEVICE — SUT POLYSORB 2-0 30" V-20 UNDYED

## (undated) DEVICE — SOL ANTI FOG (FRED)

## (undated) DEVICE — SUPP ATHLETIC MALE XLG 44-55IN

## (undated) DEVICE — PREP CHLORAPREP HI-LITE ORANGE 26ML

## (undated) DEVICE — D HELP - CLEARVIEW CLEARIFY SYSTEM

## (undated) DEVICE — DRSG TEGADERM 6 X 8"

## (undated) DEVICE — TROCAR COVIDIEN VERSAPORT BLADELESS OPTICAL 5MM STANDARD

## (undated) DEVICE — TUBING SUCTION 20FT

## (undated) DEVICE — DRSG SUPPORTER ADULT 3" WAISTBAND LRG

## (undated) DEVICE — GLV 7 PROTEXIS (WHITE)

## (undated) DEVICE — FOLEY TRAY 16FR 5CC LTX UMETER CLOSED

## (undated) DEVICE — DRSG CURITY GAUZE SPONGE 4 X 4" 12-PLY

## (undated) DEVICE — DISSECTOR ENDO PEANUT 5MM

## (undated) DEVICE — SLV COMPRESSION KNEE MED

## (undated) DEVICE — PROBE FIAPC DIA 2.3MM/7FR LNTH 220CM/7.2FT

## (undated) DEVICE — SCOPE WARMER SEAL DISP

## (undated) DEVICE — FORCEP RADIAL JAW 4 240CM DISP

## (undated) DEVICE — DRAPE GENERAL ENDOSCOPY

## (undated) DEVICE — DRAPE INSTRUMENT POUCH 6.75" X 11"

## (undated) DEVICE — DRAPE TOWEL BLUE 17" X 24"

## (undated) DEVICE — STAPLER COVIDIEN ENDO GIA STANDARD HANDLE

## (undated) DEVICE — SUT POLYSORB 0 30" GS-21 UNDYED

## (undated) DEVICE — ENDOCATCH 10MM

## (undated) DEVICE — SUT BIOSYN 4-0 18" P-12

## (undated) DEVICE — STAPLER COVIDIEN ENDO GIA SHORT HANDLE

## (undated) DEVICE — WARMING BLANKET LOWER ADULT

## (undated) DEVICE — BLADE SCALPEL SAFETYLOCK #10

## (undated) DEVICE — SUCTION YANKAUER NO CONTROL VENT

## (undated) DEVICE — SUT POLYSORB 0 36" GS-25 UNDYED

## (undated) DEVICE — BLADE SCALPEL SAFETYLOCK #15

## (undated) DEVICE — DRAPE MAYO STAND 30"

## (undated) DEVICE — SNARE CAPTIVATOR RND COLD STIFF 10X2.8MM

## (undated) DEVICE — ELCTR GROUNDING PAD ADULT COVIDIEN

## (undated) DEVICE — FORCEP RADIAL JAW 4 W NDL 2.2MM 2.8MM 240CM ORANGE DISP

## (undated) DEVICE — SOL IRR POUR NS 0.9% 500ML

## (undated) DEVICE — SUT MONOCRYL 4-0 18" PS-2

## (undated) DEVICE — SUT PDS II 0 27" OS-6

## (undated) DEVICE — ELCTR BOVIE PENCIL SMOKE EVACUATION

## (undated) DEVICE — DRSG OPSITE 13.75 X 4"

## (undated) DEVICE — SUT VICRYL 2-0 27" SH

## (undated) DEVICE — SUT MONOCRYL 4-0 27" PS-2 UNDYED

## (undated) DEVICE — NDL HYPO SAFE 22G X 1.5" (BLACK)